# Patient Record
Sex: MALE | Race: WHITE | NOT HISPANIC OR LATINO | URBAN - METROPOLITAN AREA
[De-identification: names, ages, dates, MRNs, and addresses within clinical notes are randomized per-mention and may not be internally consistent; named-entity substitution may affect disease eponyms.]

---

## 2017-01-19 ENCOUNTER — INPATIENT (INPATIENT)
Facility: HOSPITAL | Age: 72
LOS: 0 days | Discharge: HOME | DRG: 72 | End: 2017-01-20
Attending: INTERNAL MEDICINE | Admitting: INTERNAL MEDICINE
Payer: COMMERCIAL

## 2017-01-19 DIAGNOSIS — E78.5 HYPERLIPIDEMIA, UNSPECIFIED: ICD-10-CM

## 2017-01-19 DIAGNOSIS — R25.3 FASCICULATION: ICD-10-CM

## 2017-01-19 DIAGNOSIS — Z79.82 LONG TERM (CURRENT) USE OF ASPIRIN: ICD-10-CM

## 2017-01-19 DIAGNOSIS — J44.9 CHRONIC OBSTRUCTIVE PULMONARY DISEASE, UNSPECIFIED: ICD-10-CM

## 2017-01-19 DIAGNOSIS — Z87.891 PERSONAL HISTORY OF NICOTINE DEPENDENCE: ICD-10-CM

## 2017-01-19 DIAGNOSIS — R53.1 WEAKNESS: ICD-10-CM

## 2017-01-19 DIAGNOSIS — R56.9 UNSPECIFIED CONVULSIONS: ICD-10-CM

## 2017-01-19 DIAGNOSIS — I48.2 CHRONIC ATRIAL FIBRILLATION: ICD-10-CM

## 2017-01-19 DIAGNOSIS — I10 ESSENTIAL (PRIMARY) HYPERTENSION: ICD-10-CM

## 2017-01-19 DIAGNOSIS — G93.9 DISORDER OF BRAIN, UNSPECIFIED: ICD-10-CM

## 2017-01-19 DIAGNOSIS — E11.9 TYPE 2 DIABETES MELLITUS WITHOUT COMPLICATIONS: ICD-10-CM

## 2017-01-19 PROCEDURE — 99223 1ST HOSP IP/OBS HIGH 75: CPT

## 2017-01-19 PROCEDURE — 71010: CPT | Mod: 26

## 2017-01-19 PROCEDURE — 70450 CT HEAD/BRAIN W/O DYE: CPT | Mod: 26

## 2017-01-19 PROCEDURE — 93010 ELECTROCARDIOGRAM REPORT: CPT

## 2017-01-20 PROCEDURE — 83036 HEMOGLOBIN GLYCOSYLATED A1C: CPT

## 2017-01-20 PROCEDURE — 84484 ASSAY OF TROPONIN QUANT: CPT

## 2017-01-20 PROCEDURE — 96360 HYDRATION IV INFUSION INIT: CPT

## 2017-01-20 PROCEDURE — 82607 VITAMIN B-12: CPT

## 2017-01-20 PROCEDURE — 99285 EMERGENCY DEPT VISIT HI MDM: CPT | Mod: 25

## 2017-01-20 PROCEDURE — 84436 ASSAY OF TOTAL THYROXINE: CPT

## 2017-01-20 PROCEDURE — 84443 ASSAY THYROID STIM HORMONE: CPT

## 2017-01-20 PROCEDURE — 80053 COMPREHEN METABOLIC PANEL: CPT

## 2017-01-20 PROCEDURE — 85027 COMPLETE CBC AUTOMATED: CPT

## 2017-01-20 PROCEDURE — 93005 ELECTROCARDIOGRAM TRACING: CPT

## 2017-01-20 PROCEDURE — 71045 X-RAY EXAM CHEST 1 VIEW: CPT

## 2017-01-20 PROCEDURE — 70450 CT HEAD/BRAIN W/O DYE: CPT

## 2017-01-20 PROCEDURE — 82962 GLUCOSE BLOOD TEST: CPT

## 2017-01-20 PROCEDURE — 80061 LIPID PANEL: CPT

## 2017-01-20 PROCEDURE — 95812 EEG 41-60 MINUTES: CPT

## 2017-01-20 PROCEDURE — 85610 PROTHROMBIN TIME: CPT

## 2017-01-20 PROCEDURE — 99222 1ST HOSP IP/OBS MODERATE 55: CPT

## 2017-01-20 PROCEDURE — 85730 THROMBOPLASTIN TIME PARTIAL: CPT

## 2017-01-20 PROCEDURE — 82550 ASSAY OF CK (CPK): CPT

## 2017-01-20 PROCEDURE — 82248 BILIRUBIN DIRECT: CPT

## 2017-02-08 ENCOUNTER — OUTPATIENT (OUTPATIENT)
Dept: OUTPATIENT SERVICES | Facility: HOSPITAL | Age: 72
LOS: 1 days | End: 2017-02-08
Payer: COMMERCIAL

## 2017-02-08 ENCOUNTER — APPOINTMENT (OUTPATIENT)
Dept: MRI IMAGING | Facility: CLINIC | Age: 72
End: 2017-02-08

## 2017-02-08 DIAGNOSIS — Z00.8 ENCOUNTER FOR OTHER GENERAL EXAMINATION: ICD-10-CM

## 2017-02-08 PROCEDURE — 70553 MRI BRAIN STEM W/O & W/DYE: CPT | Mod: 26

## 2017-02-08 PROCEDURE — 82565 ASSAY OF CREATININE: CPT

## 2017-02-08 PROCEDURE — A9585: CPT

## 2017-02-08 PROCEDURE — 70553 MRI BRAIN STEM W/O & W/DYE: CPT

## 2017-03-01 ENCOUNTER — APPOINTMENT (OUTPATIENT)
Dept: MRI IMAGING | Facility: CLINIC | Age: 72
End: 2017-03-01

## 2017-03-01 ENCOUNTER — OUTPATIENT (OUTPATIENT)
Dept: OUTPATIENT SERVICES | Facility: HOSPITAL | Age: 72
LOS: 1 days | End: 2017-03-01
Payer: COMMERCIAL

## 2017-03-01 DIAGNOSIS — Z00.8 ENCOUNTER FOR OTHER GENERAL EXAMINATION: ICD-10-CM

## 2017-03-31 ENCOUNTER — APPOINTMENT (OUTPATIENT)
Dept: NEUROSURGERY | Facility: CLINIC | Age: 72
End: 2017-03-31

## 2017-03-31 VITALS
BODY MASS INDEX: 27.3 KG/M2 | HEART RATE: 90 BPM | OXYGEN SATURATION: 98 % | SYSTOLIC BLOOD PRESSURE: 144 MMHG | HEIGHT: 71 IN | WEIGHT: 195 LBS | DIASTOLIC BLOOD PRESSURE: 83 MMHG

## 2017-04-13 PROCEDURE — 70544 MR ANGIOGRAPHY HEAD W/O DYE: CPT

## 2017-05-02 ENCOUNTER — APPOINTMENT (OUTPATIENT)
Dept: NEUROSURGERY | Facility: CLINIC | Age: 72
End: 2017-05-02

## 2017-05-02 VITALS
DIASTOLIC BLOOD PRESSURE: 88 MMHG | BODY MASS INDEX: 27.3 KG/M2 | HEIGHT: 71 IN | SYSTOLIC BLOOD PRESSURE: 127 MMHG | WEIGHT: 195 LBS | OXYGEN SATURATION: 97 % | HEART RATE: 125 BPM

## 2017-05-08 ENCOUNTER — INPATIENT (INPATIENT)
Facility: HOSPITAL | Age: 72
LOS: 9 days | Discharge: NURSING, REHAB OR LONG-TERM CARE | DRG: 25 | End: 2017-05-18
Attending: NEUROLOGICAL SURGERY | Admitting: NEUROLOGICAL SURGERY
Payer: COMMERCIAL

## 2017-05-08 VITALS
HEART RATE: 80 BPM | TEMPERATURE: 96 F | DIASTOLIC BLOOD PRESSURE: 65 MMHG | OXYGEN SATURATION: 94 % | SYSTOLIC BLOOD PRESSURE: 116 MMHG | RESPIRATION RATE: 15 BRPM

## 2017-05-08 DIAGNOSIS — D32.9 BENIGN NEOPLASM OF MENINGES, UNSPECIFIED: ICD-10-CM

## 2017-05-08 DIAGNOSIS — Z98.890 OTHER SPECIFIED POSTPROCEDURAL STATES: Chronic | ICD-10-CM

## 2017-05-08 DIAGNOSIS — Z98.49 CATARACT EXTRACTION STATUS, UNSPECIFIED EYE: Chronic | ICD-10-CM

## 2017-05-08 LAB
ANION GAP SERPL CALC-SCNC: 8 MMOL/L — LOW (ref 9–16)
ANION GAP SERPL CALC-SCNC: 9 MMOL/L — SIGNIFICANT CHANGE UP (ref 9–16)
APTT BLD: 27.6 SEC — SIGNIFICANT CHANGE UP (ref 27.5–37.4)
APTT BLD: 33.9 SEC — SIGNIFICANT CHANGE UP (ref 27.5–37.4)
BASOPHILS NFR BLD AUTO: 0.5 % — SIGNIFICANT CHANGE UP (ref 0–2)
BLD GP AB SCN SERPL QL: NEGATIVE — SIGNIFICANT CHANGE UP
BUN SERPL-MCNC: 19 MG/DL — SIGNIFICANT CHANGE UP (ref 7–23)
BUN SERPL-MCNC: 22 MG/DL — SIGNIFICANT CHANGE UP (ref 7–23)
CALCIUM SERPL-MCNC: 8.3 MG/DL — LOW (ref 8.5–10.5)
CALCIUM SERPL-MCNC: 9.6 MG/DL — SIGNIFICANT CHANGE UP (ref 8.5–10.5)
CHLORIDE SERPL-SCNC: 106 MMOL/L — SIGNIFICANT CHANGE UP (ref 96–108)
CHLORIDE SERPL-SCNC: 111 MMOL/L — HIGH (ref 96–108)
CHOLEST SERPL-MCNC: 120 MG/DL — SIGNIFICANT CHANGE UP
CO2 SERPL-SCNC: 21 MMOL/L — LOW (ref 22–31)
CO2 SERPL-SCNC: 25 MMOL/L — SIGNIFICANT CHANGE UP (ref 22–31)
CREAT SERPL-MCNC: 0.86 MG/DL — SIGNIFICANT CHANGE UP (ref 0.5–1.3)
CREAT SERPL-MCNC: 1.02 MG/DL — SIGNIFICANT CHANGE UP (ref 0.5–1.3)
EOSINOPHIL NFR BLD AUTO: 4.3 % — SIGNIFICANT CHANGE UP (ref 0–6)
GLUCOSE SERPL-MCNC: 127 MG/DL — HIGH (ref 70–99)
GLUCOSE SERPL-MCNC: 162 MG/DL — HIGH (ref 70–99)
HBA1C BLD-MCNC: 7.1 % — HIGH (ref 4.8–5.6)
HCT VFR BLD CALC: 32.8 % — LOW (ref 39–50)
HCT VFR BLD CALC: 40 % — SIGNIFICANT CHANGE UP (ref 39–50)
HDLC SERPL-MCNC: 28 MG/DL — LOW
HGB BLD-MCNC: 10.4 G/DL — LOW (ref 13–17)
HGB BLD-MCNC: 12.8 G/DL — LOW (ref 13–17)
INR BLD: 1.01 — SIGNIFICANT CHANGE UP (ref 0.88–1.16)
INR BLD: 1.08 — SIGNIFICANT CHANGE UP (ref 0.88–1.16)
LIPID PNL WITH DIRECT LDL SERPL: 66 MG/DL — SIGNIFICANT CHANGE UP
LYMPHOCYTES # BLD AUTO: 12.2 % — LOW (ref 13–44)
MAGNESIUM SERPL-MCNC: 1.7 MG/DL — SIGNIFICANT CHANGE UP (ref 1.6–2.4)
MCHC RBC-ENTMCNC: 24.8 PG — LOW (ref 27–34)
MCHC RBC-ENTMCNC: 25.2 PG — LOW (ref 27–34)
MCHC RBC-ENTMCNC: 31.7 G/DL — LOW (ref 32–36)
MCHC RBC-ENTMCNC: 32 G/DL — SIGNIFICANT CHANGE UP (ref 32–36)
MCV RBC AUTO: 78.3 FL — LOW (ref 80–100)
MCV RBC AUTO: 78.7 FL — LOW (ref 80–100)
MONOCYTES NFR BLD AUTO: 7.3 % — SIGNIFICANT CHANGE UP (ref 2–14)
NEUTROPHILS NFR BLD AUTO: 75.7 % — SIGNIFICANT CHANGE UP (ref 43–77)
PHOSPHATE SERPL-MCNC: 2.9 MG/DL — SIGNIFICANT CHANGE UP (ref 2.5–4.5)
PLATELET # BLD AUTO: 294 K/UL — SIGNIFICANT CHANGE UP (ref 150–400)
PLATELET # BLD AUTO: 372 K/UL — SIGNIFICANT CHANGE UP (ref 150–400)
POTASSIUM SERPL-MCNC: 3.7 MMOL/L — SIGNIFICANT CHANGE UP (ref 3.5–5.3)
POTASSIUM SERPL-MCNC: 3.9 MMOL/L — SIGNIFICANT CHANGE UP (ref 3.5–5.3)
POTASSIUM SERPL-SCNC: 3.7 MMOL/L — SIGNIFICANT CHANGE UP (ref 3.5–5.3)
POTASSIUM SERPL-SCNC: 3.9 MMOL/L — SIGNIFICANT CHANGE UP (ref 3.5–5.3)
PROTHROM AB SERPL-ACNC: 11.2 SEC — SIGNIFICANT CHANGE UP (ref 9.8–12.7)
PROTHROM AB SERPL-ACNC: 12 SEC — SIGNIFICANT CHANGE UP (ref 9.8–12.7)
RBC # BLD: 4.19 M/UL — LOW (ref 4.2–5.8)
RBC # BLD: 5.08 M/UL — SIGNIFICANT CHANGE UP (ref 4.2–5.8)
RBC # FLD: 16.2 % — SIGNIFICANT CHANGE UP (ref 10.3–16.9)
RBC # FLD: 16.3 % — SIGNIFICANT CHANGE UP (ref 10.3–16.9)
RH IG SCN BLD-IMP: POSITIVE — SIGNIFICANT CHANGE UP
SODIUM SERPL-SCNC: 139 MMOL/L — SIGNIFICANT CHANGE UP (ref 135–145)
SODIUM SERPL-SCNC: 141 MMOL/L — SIGNIFICANT CHANGE UP (ref 135–145)
TOTAL CHOLESTEROL/HDL RATIO MEASUREMENT: 4.3 RATIO — SIGNIFICANT CHANGE UP
TRIGL SERPL-MCNC: 131 MG/DL — SIGNIFICANT CHANGE UP
WBC # BLD: 11.1 K/UL — HIGH (ref 3.8–10.5)
WBC # BLD: 9.7 K/UL — SIGNIFICANT CHANGE UP (ref 3.8–10.5)
WBC # FLD AUTO: 11.1 K/UL — HIGH (ref 3.8–10.5)
WBC # FLD AUTO: 9.7 K/UL — SIGNIFICANT CHANGE UP (ref 3.8–10.5)

## 2017-05-08 PROCEDURE — 93970 EXTREMITY STUDY: CPT | Mod: 26

## 2017-05-08 PROCEDURE — 61624 TCAT PERM OCCLS/EMBOLJ CNS: CPT

## 2017-05-08 PROCEDURE — 70552 MRI BRAIN STEM W/DYE: CPT | Mod: 26

## 2017-05-08 PROCEDURE — 99291 CRITICAL CARE FIRST HOUR: CPT | Mod: 24

## 2017-05-08 PROCEDURE — 75894 X-RAYS TRANSCATH THERAPY: CPT | Mod: 26

## 2017-05-08 PROCEDURE — 36228 PLACE CATH INTRACRANIAL ART: CPT | Mod: LT

## 2017-05-08 PROCEDURE — 36227 PLACE CATH XTRNL CAROTID: CPT | Mod: 50

## 2017-05-08 PROCEDURE — 36224 PLACE CATH CAROTD ART: CPT | Mod: 50

## 2017-05-08 PROCEDURE — 75898 FOLLOW-UP ANGIOGRAPHY: CPT | Mod: 26

## 2017-05-08 RX ORDER — PANTOPRAZOLE SODIUM 20 MG/1
40 TABLET, DELAYED RELEASE ORAL
Qty: 0 | Refills: 0 | Status: DISCONTINUED | OUTPATIENT
Start: 2017-05-08 | End: 2017-05-09

## 2017-05-08 RX ORDER — CHLORHEXIDINE GLUCONATE 213 G/1000ML
1 SOLUTION TOPICAL ONCE
Qty: 0 | Refills: 0 | Status: DISCONTINUED | OUTPATIENT
Start: 2017-05-08 | End: 2017-05-09

## 2017-05-08 RX ORDER — SENNA PLUS 8.6 MG/1
2 TABLET ORAL AT BEDTIME
Qty: 0 | Refills: 0 | Status: DISCONTINUED | OUTPATIENT
Start: 2017-05-08 | End: 2017-05-09

## 2017-05-08 RX ORDER — DOCUSATE SODIUM 100 MG
100 CAPSULE ORAL THREE TIMES A DAY
Qty: 0 | Refills: 0 | Status: DISCONTINUED | OUTPATIENT
Start: 2017-05-08 | End: 2017-05-09

## 2017-05-08 RX ORDER — SODIUM CHLORIDE 9 MG/ML
1000 INJECTION, SOLUTION INTRAVENOUS
Qty: 0 | Refills: 0 | Status: DISCONTINUED | OUTPATIENT
Start: 2017-05-08 | End: 2017-05-09

## 2017-05-08 RX ORDER — DEXTROSE 50 % IN WATER 50 %
1 SYRINGE (ML) INTRAVENOUS ONCE
Qty: 0 | Refills: 0 | Status: DISCONTINUED | OUTPATIENT
Start: 2017-05-08 | End: 2017-05-09

## 2017-05-08 RX ORDER — LEVETIRACETAM 250 MG/1
500 TABLET, FILM COATED ORAL
Qty: 0 | Refills: 0 | Status: DISCONTINUED | OUTPATIENT
Start: 2017-05-08 | End: 2017-05-09

## 2017-05-08 RX ORDER — GLUCAGON INJECTION, SOLUTION 0.5 MG/.1ML
1 INJECTION, SOLUTION SUBCUTANEOUS ONCE
Qty: 0 | Refills: 0 | Status: DISCONTINUED | OUTPATIENT
Start: 2017-05-08 | End: 2017-05-09

## 2017-05-08 RX ORDER — SODIUM CHLORIDE 9 MG/ML
1000 INJECTION INTRAMUSCULAR; INTRAVENOUS; SUBCUTANEOUS
Qty: 0 | Refills: 0 | Status: DISCONTINUED | OUTPATIENT
Start: 2017-05-08 | End: 2017-05-09

## 2017-05-08 RX ORDER — DEXAMETHASONE 0.5 MG/5ML
6 ELIXIR ORAL EVERY 6 HOURS
Qty: 0 | Refills: 0 | Status: COMPLETED | OUTPATIENT
Start: 2017-05-08 | End: 2017-05-08

## 2017-05-08 RX ORDER — INSULIN LISPRO 100/ML
VIAL (ML) SUBCUTANEOUS
Qty: 0 | Refills: 0 | Status: DISCONTINUED | OUTPATIENT
Start: 2017-05-08 | End: 2017-05-09

## 2017-05-08 RX ORDER — MAGNESIUM SULFATE 500 MG/ML
2 VIAL (ML) INJECTION ONCE
Qty: 0 | Refills: 0 | Status: COMPLETED | OUTPATIENT
Start: 2017-05-08 | End: 2017-05-08

## 2017-05-08 RX ORDER — ACETAMINOPHEN 500 MG
650 TABLET ORAL EVERY 6 HOURS
Qty: 0 | Refills: 0 | Status: DISCONTINUED | OUTPATIENT
Start: 2017-05-08 | End: 2017-05-09

## 2017-05-08 RX ORDER — DEXTROSE 50 % IN WATER 50 %
12.5 SYRINGE (ML) INTRAVENOUS ONCE
Qty: 0 | Refills: 0 | Status: DISCONTINUED | OUTPATIENT
Start: 2017-05-08 | End: 2017-05-09

## 2017-05-08 RX ORDER — LAMOTRIGINE 25 MG/1
150 TABLET, ORALLY DISINTEGRATING ORAL DAILY
Qty: 0 | Refills: 0 | Status: DISCONTINUED | OUTPATIENT
Start: 2017-05-08 | End: 2017-05-09

## 2017-05-08 RX ORDER — METOPROLOL TARTRATE 50 MG
100 TABLET ORAL DAILY
Qty: 0 | Refills: 0 | Status: DISCONTINUED | OUTPATIENT
Start: 2017-05-08 | End: 2017-05-09

## 2017-05-08 RX ORDER — FOLIC ACID 0.8 MG
1 TABLET ORAL DAILY
Qty: 0 | Refills: 0 | Status: DISCONTINUED | OUTPATIENT
Start: 2017-05-08 | End: 2017-05-09

## 2017-05-08 RX ADMIN — Medication 6: at 22:07

## 2017-05-08 RX ADMIN — LEVETIRACETAM 500 MILLIGRAM(S): 250 TABLET, FILM COATED ORAL at 19:11

## 2017-05-08 RX ADMIN — Medication 50 GRAM(S): at 19:12

## 2017-05-08 RX ADMIN — Medication 100 MILLIGRAM(S): at 22:07

## 2017-05-08 NOTE — H&P ADULT - HISTORY OF PRESENT ILLNESS
72 y. o Right  handed male h/o rate controlled Afib not on AC, h/o GIB, CAD, DM, HLD found to have a left frontal mass likely a meningioma.  Initially presented with seizure episode and currently has right sided trace weakness RUE>RLE right arm spasticity.  Patient presents today for pre op cerebral angiogram and possible embolization.

## 2017-05-08 NOTE — PROGRESS NOTE ADULT - SUBJECTIVE AND OBJECTIVE BOX
=================================  NEUROCRITICAL CARE ATTENDING NOTE  =================================    NANY HOUGH   [id number]  Summary:  72M with h/o Afib not on AC, h/o GI bleed, CAD, Diabetes Mellitus dyslipidemia, presented with seizures, on work-up found a meningioma.  Admitted on  for pre-op angio / embolization.  Overnight Events: Admitted to NSICU today    PAST MEDICAL & SURGICAL HISTORY: Pulmonary hypertension DM (diabetes mellitus) Seizure Meningioma: left frontal Other hyperlipidemia Essential hypertension H/O cataract removal with insertion of prosthetic lens: B/L H/O right inguinal hernia repair  NKDA  Home meds: exforge HCT 10/320/25 (amlodipine valsartan HCTZ) fenofibrate 135 dailiy zetia 10mg daily folic acid daily metformin 1g BID omeprazole 40mg daily onglyza (saxagliptin) 5mg daily thiamine 50mg daily levetiracetam 500mg BID lamictal 150mg daily metoprolol 100mg daily     PHYSICAL EXAMINATION  T(C): , Max: 35.7 (-08 @ 11:45) HR: 80 (78 - 84) BP: 106/67 (105/61 - 116/65) RR: 14 (12 - 15) SpO2: 94% (94% - 96%)  NEUROLOGIC EXAMINATION:  Patient is awake, alert, fully oriented, pupils 2-3mm equal and briskly reactive to light, EOMs intact, R facial droop, R UE 4/5, and RLE L UE/LE 5/5   GENERAL:  not intubated, not in cardiorespiratory distress  EENT: anicteric  CARDIOVASC:  (+) S1 S2, normal rate and irregularly irregular rhythm  PULMONARY:  clear to auscultation bilaterally  ABDOMEN:  soft, nontender, with normoactive bowel sounds  EXTREMITIES:  no edema, no groin hematoma on R, distal pulses good  SKIN:  no rash    LABS:  CAPILLARY BLOOD GLUCOSE 157 (08 May 2017 12:30)                        10.4   11.1  )-----------( 294      ( 08 May 2017 12:21 )             32.8     141  |  111<H>  |  19  ----------------------------<  162<H>  3.9   |  21<L>  |  0.86    Ca    8.3<L>      08 May 2017 12:21  Phos  2.9     05-08  Mg     1.7     05-08    Bacteriology:  CSF studies:  EEG:  Neuroimagin/08 L frontal convexity menigioma abutting SSS, involving sinus wall, intraosseous extension, mass effect, small edema.  Other imaging:    MEDICATIONS: peridex topical docusate mod ISS levetiracetam 500 BID lamotrigine 150mg daily metoprolol ER 100mg daily pantoprazole 40 folic acid 1mg daily senna    IV FLUIDS: NS  DRIPS:  DIET:  Lines / Drains:    CODE STATUS:  full code                       GOALS OF CARE:  aggressive                      DISPOSITION:  ICU

## 2017-05-08 NOTE — H&P ADULT - PMH
DM (diabetes mellitus)    Essential hypertension    Meningioma  left frontal  Other hyperlipidemia    Seizure DM (diabetes mellitus)    Essential hypertension    Meningioma  left frontal  Other hyperlipidemia    Pulmonary hypertension    Seizure

## 2017-05-08 NOTE — H&P ADULT - PROBLEM SELECTOR PLAN 1
Cerebral angiogram and possible endovascular embolization in preparation for next day surgical removal.  Neuro checks along with vitals, groin and ipsilateral pulses  NPO after midnight for OR in AM Cerebral angiogram and possible endovascular embolization in preparation for next day surgical removal.  Neuro checks along with vitals, groin and ipsilateral pulses  NPO after midnight for OR in AM  Decadron 6mg every 6 hrs

## 2017-05-08 NOTE — PROGRESS NOTE ADULT - ATTENDING COMMENTS
PLAN:   NEURO: neurochecks q1h, pain meds with tylenol  meningioma s/p angio/embo:  for resection tomorrow  seizure treatment: continue levetiracetam and lamotrigine  REHAB:  physical therapy evaluation and management    EARLY MOB:   flat x4 hours then HOB up thereafter    PULM:  Room air, incentive spirometry  CARDIO:  SBP goal 100-150mm Hg, continue metoprolol (off exforge); h/o AFib not on anticoagulation due to h/o GI bleed, will discuss re: need for anticoagulation; rate controlled with metoprolol  ENDO:  Blood sugar goals 140-180 mg/dL, continue insulin sliding scale, check A1C, lipid profile, restart fenofibrate / zetia  GI:  PPI - cont (home meds)  DIET: restart diet once HOB up, NPO after midnight  RENAL:  IVF once NPO  HEM/ONC: check post-op Hb  VTE Prophylaxis:  SCDs only, patient for OR tomorrow - no DVT chemoprophylaxis tonight; baseline Dopplers for DVT suspected on admission  ID: afebrile, no leukocytosis  Social: will update family    Patient at high risk for neurological deterioration or death due to:   seizure, delirium, intracranial bleeding.   Critical care time, excluding procedures: 60 minutes.

## 2017-05-08 NOTE — PROGRESS NOTE ADULT - SUBJECTIVE AND OBJECTIVE BOX
Subjective: Seen/evaluated at bedside.  Doing well, no complaints at this time.  No acute events    T(C): 35.7, Max: 35.7 (05-08 @ 11:45)  HR: 80 (78 - 84)  BP: 106/67 (105/61 - 116/65)  RR: 14 (12 - 15)  SpO2: 94% (94% - 96%)  Wt(kg): --    Exam: A/Ox3, FC, speech clear  Right facial droop, cranial nerves otherwise intact  Right UE baseline weakness, grossly 4/5.  Otherwise 5/5 strength throughout.  Sensation grossly intact    CBC Full  -  ( 08 May 2017 12:21 )  WBC Count : 11.1 K/uL  Hemoglobin : 10.4 g/dL  Hematocrit : 32.8 %  Platelet Count - Automated : 294 K/uL  Mean Cell Volume : 78.3 fL  Mean Cell Hemoglobin : 24.8 pg  Mean Cell Hemoglobin Concentration : 31.7 g/dL  Auto Neutrophil # : x  Auto Lymphocyte # : x  Auto Monocyte # : x  Auto Eosinophil # : x  Auto Basophil # : x  Auto Neutrophil % : x  Auto Lymphocyte % : x  Auto Monocyte % : x  Auto Eosinophil % : x  Auto Basophil % : x    05-08    141  |  111<H>  |  19  ----------------------------<  162<H>  3.9   |  21<L>  |  0.86    Ca    8.3<L>      08 May 2017 12:21  Phos  2.9     05-08  Mg     1.7     05-08      PT/INR - ( 08 May 2017 12:21 )   PT: 12.0 sec;   INR: 1.08          PTT - ( 08 May 2017 12:21 )  PTT:27.6 sec      Wound: Groin C/D/I, no hematoma    Assessment/Plan: left frontal brain tumor s/p embolization, for resection in AM  -Neuro checks  -BP control  -AEDs, continue home meds  -Preop for OR in AM, NPO  -MRI tonight  -DVT/GI PPX  -D/W Dr. Estevez/Gen

## 2017-05-08 NOTE — H&P ADULT - NSHPPHYSICALEXAM_GEN_ALL_CORE
WD, WN pleasant male in NAD  Neuro;  A&O X3, speech clear and fluent, EOMI, PERRLA (B/L prosthetic lenses)  Motor: Mild right arm weakness, right hand spasticity. remainder motor and sensory grossly normal  Heart: S1-S2,   Lungs: CTA  ABD: Soft NT, +BS  Ext: No C/C/E, no calf tenderness  B/L DP Pulses +2

## 2017-05-08 NOTE — PROGRESS NOTE ADULT - ASSESSMENT
72M with meninigioma L frontal lobe, brain compression, cerebral edema, s/p angio / embo (05/08/17, Dr. Blevins); Diabetes Mellitus, Hypertension, seizure disorder, dyslipidemia, h/o pulmonary hypertension

## 2017-05-09 ENCOUNTER — APPOINTMENT (OUTPATIENT)
Dept: NEUROSURGERY | Facility: HOSPITAL | Age: 72
End: 2017-05-09

## 2017-05-09 ENCOUNTER — RESULT REVIEW (OUTPATIENT)
Age: 72
End: 2017-05-09

## 2017-05-09 LAB
ANION GAP SERPL CALC-SCNC: 9 MMOL/L — SIGNIFICANT CHANGE UP (ref 9–16)
ANION GAP SERPL CALC-SCNC: 9 MMOL/L — SIGNIFICANT CHANGE UP (ref 9–16)
APTT BLD: 27.7 SEC — SIGNIFICANT CHANGE UP (ref 27.5–37.4)
APTT BLD: 27.7 SEC — SIGNIFICANT CHANGE UP (ref 27.5–37.4)
BASE EXCESS BLDA CALC-SCNC: -2.8 MMOL/L — LOW (ref -2–3)
BASE EXCESS BLDA CALC-SCNC: -5.9 MMOL/L — LOW (ref -2–3)
BASE EXCESS BLDA CALC-SCNC: -7 MMOL/L — LOW (ref -2–3)
BUN SERPL-MCNC: 15 MG/DL — SIGNIFICANT CHANGE UP (ref 7–23)
BUN SERPL-MCNC: 15 MG/DL — SIGNIFICANT CHANGE UP (ref 7–23)
CA-I BLDA-SCNC: 0.99 MMOL/L — LOW (ref 1.1–1.3)
CA-I BLDA-SCNC: 1 MMOL/L — LOW (ref 1.1–1.3)
CA-I BLDA-SCNC: 1.13 MMOL/L — SIGNIFICANT CHANGE UP (ref 1.1–1.3)
CALCIUM SERPL-MCNC: 7.8 MG/DL — LOW (ref 8.5–10.5)
CALCIUM SERPL-MCNC: 8.2 MG/DL — LOW (ref 8.5–10.5)
CHLORIDE SERPL-SCNC: 110 MMOL/L — HIGH (ref 96–108)
CHLORIDE SERPL-SCNC: 112 MMOL/L — HIGH (ref 96–108)
CO2 SERPL-SCNC: 18 MMOL/L — LOW (ref 22–31)
CO2 SERPL-SCNC: 20 MMOL/L — LOW (ref 22–31)
COHGB MFR BLDA: 0.4 % — SIGNIFICANT CHANGE UP
COHGB MFR BLDA: 0.5 % — SIGNIFICANT CHANGE UP
COHGB MFR BLDA: 1.1 % — SIGNIFICANT CHANGE UP
CREAT SERPL-MCNC: 0.8 MG/DL — SIGNIFICANT CHANGE UP (ref 0.5–1.3)
CREAT SERPL-MCNC: 0.92 MG/DL — SIGNIFICANT CHANGE UP (ref 0.5–1.3)
GAS PNL BLDA: SIGNIFICANT CHANGE UP
GLUCOSE SERPL-MCNC: 109 MG/DL — HIGH (ref 70–99)
GLUCOSE SERPL-MCNC: 179 MG/DL — HIGH (ref 70–99)
HBA1C BLD-MCNC: 6.8 % — HIGH (ref 4.8–5.6)
HCO3 BLDA-SCNC: 17 MMOL/L — LOW (ref 21–28)
HCO3 BLDA-SCNC: 17 MMOL/L — LOW (ref 21–28)
HCO3 BLDA-SCNC: 20 MMOL/L — LOW (ref 21–28)
HCT VFR BLD CALC: 29.8 % — LOW (ref 39–50)
HCT VFR BLD CALC: 30.1 % — LOW (ref 39–50)
HCT VFR BLD CALC: 32 % — LOW (ref 39–50)
HGB BLD-MCNC: 10.1 G/DL — LOW (ref 13–17)
HGB BLD-MCNC: 9.6 G/DL — LOW (ref 13–17)
HGB BLD-MCNC: 9.7 G/DL — LOW (ref 13–17)
HGB BLDA-MCNC: 10 G/DL — LOW (ref 13–17)
HGB BLDA-MCNC: 11 G/DL — LOW (ref 13–17)
HGB BLDA-MCNC: 9.3 G/DL — LOW (ref 13–17)
INR BLD: 1.07 — SIGNIFICANT CHANGE UP (ref 0.88–1.16)
INR BLD: 1.15 — SIGNIFICANT CHANGE UP (ref 0.88–1.16)
MAGNESIUM SERPL-MCNC: 2.1 MG/DL — SIGNIFICANT CHANGE UP (ref 1.6–2.4)
MAGNESIUM SERPL-MCNC: 2.2 MG/DL — SIGNIFICANT CHANGE UP (ref 1.6–2.4)
MCHC RBC-ENTMCNC: 24.6 PG — LOW (ref 27–34)
MCHC RBC-ENTMCNC: 25.1 PG — LOW (ref 27–34)
MCHC RBC-ENTMCNC: 25.1 PG — LOW (ref 27–34)
MCHC RBC-ENTMCNC: 31.6 G/DL — LOW (ref 32–36)
MCHC RBC-ENTMCNC: 32.2 G/DL — SIGNIFICANT CHANGE UP (ref 32–36)
MCHC RBC-ENTMCNC: 32.2 G/DL — SIGNIFICANT CHANGE UP (ref 32–36)
MCV RBC AUTO: 77.8 FL — LOW (ref 80–100)
MCV RBC AUTO: 78 FL — LOW (ref 80–100)
MCV RBC AUTO: 78 FL — LOW (ref 80–100)
METHGB MFR BLDA: 0.1 % — SIGNIFICANT CHANGE UP
METHGB MFR BLDA: 0.2 % — SIGNIFICANT CHANGE UP
METHGB MFR BLDA: 0.4 % — SIGNIFICANT CHANGE UP
O2 CT VFR BLDA CALC: 13.6 ML/DL — SIGNIFICANT CHANGE UP (ref 15–23)
O2 CT VFR BLDA CALC: 14.6 ML/DL — SIGNIFICANT CHANGE UP (ref 15–23)
O2 CT VFR BLDA CALC: 15.5 ML/DL — SIGNIFICANT CHANGE UP (ref 15–23)
OXYHGB MFR BLDA: 98 % — SIGNIFICANT CHANGE UP (ref 94–100)
OXYHGB MFR BLDA: 98 % — SIGNIFICANT CHANGE UP (ref 94–100)
OXYHGB MFR BLDA: 99 % — SIGNIFICANT CHANGE UP (ref 94–100)
PCO2 BLDA: 25 MMHG — LOW (ref 35–48)
PCO2 BLDA: 31 MMHG — LOW (ref 35–48)
PCO2 BLDA: 31 MMHG — LOW (ref 35–48)
PH BLDA: 7.37 — SIGNIFICANT CHANGE UP (ref 7.35–7.45)
PH BLDA: 7.44 — SIGNIFICANT CHANGE UP (ref 7.35–7.45)
PH BLDA: 7.45 — SIGNIFICANT CHANGE UP (ref 7.35–7.45)
PHOSPHATE SERPL-MCNC: 2 MG/DL — LOW (ref 2.5–4.5)
PHOSPHATE SERPL-MCNC: 2.8 MG/DL — SIGNIFICANT CHANGE UP (ref 2.5–4.5)
PLATELET # BLD AUTO: 308 K/UL — SIGNIFICANT CHANGE UP (ref 150–400)
PLATELET # BLD AUTO: 317 K/UL — SIGNIFICANT CHANGE UP (ref 150–400)
PLATELET # BLD AUTO: 321 K/UL — SIGNIFICANT CHANGE UP (ref 150–400)
PO2 BLDA: 179 MMHG — HIGH (ref 83–108)
PO2 BLDA: 262 MMHG — HIGH (ref 83–108)
PO2 BLDA: 289 MMHG — HIGH (ref 83–108)
POTASSIUM BLDA-SCNC: 3.1 MMOL/L — LOW (ref 3.5–4.9)
POTASSIUM BLDA-SCNC: 3.6 MMOL/L — SIGNIFICANT CHANGE UP (ref 3.5–4.9)
POTASSIUM BLDA-SCNC: 4.1 MMOL/L — SIGNIFICANT CHANGE UP (ref 3.5–4.9)
POTASSIUM SERPL-MCNC: 3.6 MMOL/L — SIGNIFICANT CHANGE UP (ref 3.5–5.3)
POTASSIUM SERPL-MCNC: 4.3 MMOL/L — SIGNIFICANT CHANGE UP (ref 3.5–5.3)
POTASSIUM SERPL-SCNC: 3.6 MMOL/L — SIGNIFICANT CHANGE UP (ref 3.5–5.3)
POTASSIUM SERPL-SCNC: 4.3 MMOL/L — SIGNIFICANT CHANGE UP (ref 3.5–5.3)
PROTHROM AB SERPL-ACNC: 11.9 SEC — SIGNIFICANT CHANGE UP (ref 9.8–12.7)
PROTHROM AB SERPL-ACNC: 12.8 SEC — HIGH (ref 9.8–12.7)
RBC # BLD: 3.82 M/UL — LOW (ref 4.2–5.8)
RBC # BLD: 3.87 M/UL — LOW (ref 4.2–5.8)
RBC # BLD: 4.1 M/UL — LOW (ref 4.2–5.8)
RBC # FLD: 16.2 % — SIGNIFICANT CHANGE UP (ref 10.3–16.9)
RBC # FLD: 16.2 % — SIGNIFICANT CHANGE UP (ref 10.3–16.9)
RBC # FLD: 16.3 % — SIGNIFICANT CHANGE UP (ref 10.3–16.9)
SAO2 % BLDA: 99 % — SIGNIFICANT CHANGE UP (ref 95–100)
SODIUM BLDA-SCNC: 136 MMOL/L — LOW (ref 138–146)
SODIUM BLDA-SCNC: 136 MMOL/L — LOW (ref 138–146)
SODIUM BLDA-SCNC: 137 MMOL/L — LOW (ref 138–146)
SODIUM SERPL-SCNC: 139 MMOL/L — SIGNIFICANT CHANGE UP (ref 135–145)
SODIUM SERPL-SCNC: 139 MMOL/L — SIGNIFICANT CHANGE UP (ref 135–145)
WBC # BLD: 10 K/UL — SIGNIFICANT CHANGE UP (ref 3.8–10.5)
WBC # BLD: 13.4 K/UL — HIGH (ref 3.8–10.5)
WBC # BLD: 14.5 K/UL — HIGH (ref 3.8–10.5)
WBC # FLD AUTO: 10 K/UL — SIGNIFICANT CHANGE UP (ref 3.8–10.5)
WBC # FLD AUTO: 13.4 K/UL — HIGH (ref 3.8–10.5)
WBC # FLD AUTO: 14.5 K/UL — HIGH (ref 3.8–10.5)

## 2017-05-09 PROCEDURE — 15750 NEUROVASCULAR PEDICLE FLAP: CPT

## 2017-05-09 PROCEDURE — 99291 CRITICAL CARE FIRST HOUR: CPT | Mod: 24

## 2017-05-09 PROCEDURE — 61781 SCAN PROC CRANIAL INTRA: CPT

## 2017-05-09 PROCEDURE — 61512 CRNEC TREPH EXC MNGIOMA STTL: CPT

## 2017-05-09 RX ORDER — DEXAMETHASONE 0.5 MG/5ML
4 ELIXIR ORAL EVERY 6 HOURS
Qty: 0 | Refills: 0 | Status: DISCONTINUED | OUTPATIENT
Start: 2017-05-09 | End: 2017-05-10

## 2017-05-09 RX ORDER — LAMOTRIGINE 25 MG/1
150 TABLET, ORALLY DISINTEGRATING ORAL DAILY
Qty: 0 | Refills: 0 | Status: DISCONTINUED | OUTPATIENT
Start: 2017-05-09 | End: 2017-05-18

## 2017-05-09 RX ORDER — SENNA PLUS 8.6 MG/1
2 TABLET ORAL AT BEDTIME
Qty: 0 | Refills: 0 | Status: DISCONTINUED | OUTPATIENT
Start: 2017-05-09 | End: 2017-05-18

## 2017-05-09 RX ORDER — SODIUM CHLORIDE 9 MG/ML
1000 INJECTION INTRAMUSCULAR; INTRAVENOUS; SUBCUTANEOUS
Qty: 0 | Refills: 0 | Status: DISCONTINUED | OUTPATIENT
Start: 2017-05-09 | End: 2017-05-10

## 2017-05-09 RX ORDER — DOCUSATE SODIUM 100 MG
100 CAPSULE ORAL THREE TIMES A DAY
Qty: 0 | Refills: 0 | Status: DISCONTINUED | OUTPATIENT
Start: 2017-05-09 | End: 2017-05-18

## 2017-05-09 RX ORDER — POTASSIUM CHLORIDE 20 MEQ
40 PACKET (EA) ORAL ONCE
Qty: 0 | Refills: 0 | Status: DISCONTINUED | OUTPATIENT
Start: 2017-05-09 | End: 2017-05-09

## 2017-05-09 RX ORDER — CEFAZOLIN SODIUM 1 G
1000 VIAL (EA) INJECTION EVERY 8 HOURS
Qty: 0 | Refills: 0 | Status: COMPLETED | OUTPATIENT
Start: 2017-05-09 | End: 2017-05-09

## 2017-05-09 RX ORDER — ONDANSETRON 8 MG/1
4 TABLET, FILM COATED ORAL EVERY 6 HOURS
Qty: 0 | Refills: 0 | Status: DISCONTINUED | OUTPATIENT
Start: 2017-05-09 | End: 2017-05-18

## 2017-05-09 RX ORDER — VALSARTAN 80 MG/1
160 TABLET ORAL DAILY
Qty: 0 | Refills: 0 | Status: DISCONTINUED | OUTPATIENT
Start: 2017-05-09 | End: 2017-05-18

## 2017-05-09 RX ORDER — PANTOPRAZOLE SODIUM 20 MG/1
40 TABLET, DELAYED RELEASE ORAL
Qty: 0 | Refills: 0 | Status: DISCONTINUED | OUTPATIENT
Start: 2017-05-09 | End: 2017-05-18

## 2017-05-09 RX ORDER — DEXTROSE 50 % IN WATER 50 %
1 SYRINGE (ML) INTRAVENOUS ONCE
Qty: 0 | Refills: 0 | Status: DISCONTINUED | OUTPATIENT
Start: 2017-05-09 | End: 2017-05-18

## 2017-05-09 RX ORDER — LABETALOL HCL 100 MG
10 TABLET ORAL EVERY 4 HOURS
Qty: 0 | Refills: 0 | Status: DISCONTINUED | OUTPATIENT
Start: 2017-05-09 | End: 2017-05-17

## 2017-05-09 RX ORDER — FOLIC ACID 0.8 MG
1 TABLET ORAL DAILY
Qty: 0 | Refills: 0 | Status: DISCONTINUED | OUTPATIENT
Start: 2017-05-09 | End: 2017-05-11

## 2017-05-09 RX ORDER — METOPROLOL TARTRATE 50 MG
100 TABLET ORAL DAILY
Qty: 0 | Refills: 0 | Status: DISCONTINUED | OUTPATIENT
Start: 2017-05-09 | End: 2017-05-18

## 2017-05-09 RX ORDER — INSULIN LISPRO 100/ML
VIAL (ML) SUBCUTANEOUS
Qty: 0 | Refills: 0 | Status: DISCONTINUED | OUTPATIENT
Start: 2017-05-09 | End: 2017-05-18

## 2017-05-09 RX ORDER — ACETAMINOPHEN 500 MG
650 TABLET ORAL EVERY 6 HOURS
Qty: 0 | Refills: 0 | Status: DISCONTINUED | OUTPATIENT
Start: 2017-05-09 | End: 2017-05-09

## 2017-05-09 RX ORDER — ACETAMINOPHEN 500 MG
325 TABLET ORAL EVERY 4 HOURS
Qty: 0 | Refills: 0 | Status: DISCONTINUED | OUTPATIENT
Start: 2017-05-09 | End: 2017-05-18

## 2017-05-09 RX ORDER — GLUCAGON INJECTION, SOLUTION 0.5 MG/.1ML
1 INJECTION, SOLUTION SUBCUTANEOUS ONCE
Qty: 0 | Refills: 0 | Status: DISCONTINUED | OUTPATIENT
Start: 2017-05-09 | End: 2017-05-18

## 2017-05-09 RX ORDER — DEXTROSE 50 % IN WATER 50 %
12.5 SYRINGE (ML) INTRAVENOUS ONCE
Qty: 0 | Refills: 0 | Status: DISCONTINUED | OUTPATIENT
Start: 2017-05-09 | End: 2017-05-18

## 2017-05-09 RX ORDER — ALBUMIN HUMAN 25 %
50 VIAL (ML) INTRAVENOUS
Qty: 0 | Refills: 0 | Status: COMPLETED | OUTPATIENT
Start: 2017-05-09 | End: 2017-05-09

## 2017-05-09 RX ORDER — ACETAMINOPHEN 500 MG
650 TABLET ORAL EVERY 6 HOURS
Qty: 0 | Refills: 0 | Status: DISCONTINUED | OUTPATIENT
Start: 2017-05-09 | End: 2017-05-18

## 2017-05-09 RX ORDER — POTASSIUM CHLORIDE 20 MEQ
40 PACKET (EA) ORAL ONCE
Qty: 0 | Refills: 0 | Status: COMPLETED | OUTPATIENT
Start: 2017-05-09 | End: 2017-05-09

## 2017-05-09 RX ORDER — LEVETIRACETAM 250 MG/1
500 TABLET, FILM COATED ORAL
Qty: 0 | Refills: 0 | Status: DISCONTINUED | OUTPATIENT
Start: 2017-05-09 | End: 2017-05-18

## 2017-05-09 RX ORDER — FENTANYL CITRATE 50 UG/ML
25 INJECTION INTRAVENOUS ONCE
Qty: 0 | Refills: 0 | Status: DISCONTINUED | OUTPATIENT
Start: 2017-05-09 | End: 2017-05-09

## 2017-05-09 RX ORDER — SODIUM CHLORIDE 9 MG/ML
1000 INJECTION, SOLUTION INTRAVENOUS
Qty: 0 | Refills: 0 | Status: DISCONTINUED | OUTPATIENT
Start: 2017-05-09 | End: 2017-05-18

## 2017-05-09 RX ADMIN — ONDANSETRON 4 MILLIGRAM(S): 8 TABLET, FILM COATED ORAL at 23:23

## 2017-05-09 RX ADMIN — Medication 100 MILLIGRAM(S): at 14:59

## 2017-05-09 RX ADMIN — Medication 2: at 23:23

## 2017-05-09 RX ADMIN — Medication 10 MILLIGRAM(S): at 22:54

## 2017-05-09 RX ADMIN — Medication 100 MILLIGRAM(S): at 21:10

## 2017-05-09 RX ADMIN — PANTOPRAZOLE SODIUM 40 MILLIGRAM(S): 20 TABLET, DELAYED RELEASE ORAL at 18:33

## 2017-05-09 RX ADMIN — Medication 650 MILLIGRAM(S): at 17:01

## 2017-05-09 RX ADMIN — FENTANYL CITRATE 25 MICROGRAM(S): 50 INJECTION INTRAVENOUS at 12:52

## 2017-05-09 RX ADMIN — LEVETIRACETAM 500 MILLIGRAM(S): 250 TABLET, FILM COATED ORAL at 17:01

## 2017-05-09 RX ADMIN — Medication 2: at 17:38

## 2017-05-09 RX ADMIN — Medication 100 MILLIGRAM(S): at 07:07

## 2017-05-09 RX ADMIN — Medication 650 MILLIGRAM(S): at 17:40

## 2017-05-09 RX ADMIN — LEVETIRACETAM 500 MILLIGRAM(S): 250 TABLET, FILM COATED ORAL at 07:07

## 2017-05-09 RX ADMIN — FENTANYL CITRATE 25 MICROGRAM(S): 50 INJECTION INTRAVENOUS at 13:15

## 2017-05-09 RX ADMIN — VALSARTAN 160 MILLIGRAM(S): 80 TABLET ORAL at 18:32

## 2017-05-09 RX ADMIN — Medication 4 MILLIGRAM(S): at 17:01

## 2017-05-09 RX ADMIN — Medication 100 MILLIGRAM(S): at 07:08

## 2017-05-09 RX ADMIN — Medication 4 MILLIGRAM(S): at 23:23

## 2017-05-09 RX ADMIN — Medication 63.75 MILLIMOLE(S): at 14:58

## 2017-05-09 NOTE — PROGRESS NOTE ADULT - ATTENDING COMMENTS
PLAN:   NEURO: neurochecks q1h, pain meds with tylenol  meningioma s/p angio/embo:  for resection tomorrow  seizure treatment: continue levetiracetam and lamotrigine  REHAB:  physical therapy evaluation and management    EARLY MOB:   flat x4 hours then HOB up thereafter    PULM:  Room air, incentive spirometry  CARDIO:  SBP goal 100-150mm Hg, continue metoprolol (off exforge); h/o AFib not on anticoagulation due to h/o GI bleed, will discuss re: need for anticoagulation; rate controlled with metoprolol  ENDO:  Blood sugar goals 140-180 mg/dL, continue insulin sliding scale, check A1C, lipid profile, restart fenofibrate / zetia  GI:  PPI - cont (home meds)  DIET: restart diet once HOB up, NPO after midnight  RENAL:  IVF once NPO  HEM/ONC: check post-op Hb  VTE Prophylaxis:  SCDs only, patient for OR tomorrow - no DVT chemoprophylaxis tonight; baseline Dopplers for DVT suspected on admission  ID: afebrile, no leukocytosis  Social: will update family    Patient at high risk for neurological deterioration or death due to:   seizure, delirium, intracranial bleeding.   Critical care time, excluding procedures: 60 minutes. PLAN:   NEURO: neurochecks q1h, pain meds with tylenol  meningioma s/p angio/embo:  s/p resection today, frozen: meningioma, f/u histopathology  seizure treatment: continue levetiracetam and lamotrigine  REHAB:  physical therapy evaluation and management    EARLY MOB:   HOB up    PULM:  Room air, incentive spirometry  CARDIO:  SBP goal 100-150mm Hg, continue metoprolol (off exforge); h/o AFib not on anticoagulation due to h/o GI bleed, will discuss re: need for anticoagulation; rate controlled with metoprolol  ENDO:  Blood sugar goals 140-180 mg/dL, continue insulin sliding scale, Hemoglobin A1C = 6.8, LDL 66 HDL 28  total 120, cont fenofibrate / zetia  GI:  PPI -   DIET: restart diet   RENAL:  continue IV fluids (sinus breach, keep hydrated)  HEM/ONC: check post-op Hb  VTE Prophylaxis:  SCDs only, no DVT chemoprophylaxis tonight - patient is fresh post-op; baseline Dopplers for DVT suspected on admission  ID: afebrile, no leukocytosis  Social: will update family    Patient at high risk for neurological deterioration or death due to:   seizure, delirium, intracranial bleeding.   Critical care time, excluding procedures: 45 minutes.

## 2017-05-09 NOTE — PROGRESS NOTE ADULT - ASSESSMENT
72M with meninigioma L frontal lobe, brain compression, cerebral edema, s/p angio / embo (05/08/17, Dr. Blevins); Diabetes Mellitus, Hypertension, seizure disorder, dyslipidemia, h/o pulmonary hypertension 72M with meninigioma L frontal lobe, brain compression, cerebral edema, s/p L crani, resection of meningioma (Dr. Estevez, 05/09/2017), s/p angio / embo (05/08/17, Dr. Blevins); Diabetes Mellitus, fair control, Hypertension, seizure disorder, dyslipidemia, h/o pulmonary hypertension

## 2017-05-09 NOTE — PROGRESS NOTE ADULT - SUBJECTIVE AND OBJECTIVE BOX
=================================  NEUROCRITICAL CARE ATTENDING NOTE  =================================    NANY HOUGH   MRN-[id number]  Summary:  72M with h/o Afib not on AC, h/o GI bleed, CAD, Diabetes Mellitus dyslipidemia, presented with seizures, on work-up found a meningioma.  Admitted on  for pre-op angio / embolization.  Overnight Events: Admitted to NSICU today    PAST MEDICAL & SURGICAL HISTORY: Pulmonary hypertension DM (diabetes mellitus) Seizure Meningioma: left frontal Other hyperlipidemia Essential hypertension H/O cataract removal with insertion of prosthetic lens: B/L H/O right inguinal hernia repair  NKDA  Home meds: exforge HCT 10/320/25 (amlodipine valsartan HCTZ) fenofibrate 135 dailiy zetia 10mg daily folic acid daily metformin 1g BID omeprazole 40mg daily onglyza (saxagliptin) 5mg daily thiamine 50mg daily levetiracetam 500mg BID lamictal 150mg daily metoprolol 100mg daily     PHYSICAL EXAMINATION  T(C): , Max: 37.2 (-09 @ 01:00) HR: 80 (70 - 96) BP: 140/96 (105/61 - 140/96) RR: 15 (12 - 34) SpO2: 94% (93% - 96%)  NEUROLOGIC EXAMINATION:  Patient is awake, alert, fully oriented, pupils 2-3mm equal and briskly reactive to light, EOMs intact, R facial droop, R UE 4/5, and RLE L UE/LE 5/5   GENERAL:  not intubated, not in cardiorespiratory distress  EENT: anicteric  CARDIOVASC:  (+) S1 S2, normal rate and irregularly irregular rhythm  PULMONARY:  clear to auscultation bilaterally  ABDOMEN:  soft, nontender, with normoactive bowel sounds  EXTREMITIES:  no edema, no groin hematoma on R, distal pulses good  SKIN:  no rash    LABS:  CAPILLARY BLOOD GLUCOSE 114 (09 May 2017 06:36) 114 (09 May 2017 06:01) 286 (08 May 2017 21:39) 157 (08 May 2017 12:30)                        10.1   10.0  )-----------( 308      ( 09 May 2017 05:36 )             32.0     139  |  110<H>  |  15  ----------------------------<  109<H>  3.6   |  20<L>  |  0.80    Ca    8.2<L>      09 May 2017 05:36  Phos  2.8       Mg     2.1         I & Os for current day (as of  @ 07:48)  IN: 1620 ml / OUT: 2180 ml / NET: -560 ml    Bacteriology:  CSF studies:  EEG:  Neuroimagin/08 L frontal convexity menigioma abutting SSS, involving sinus wall, intraosseous extension, mass effect, small edema.  Other imaging:    MEDICATIONS:  levetiracetam 500 BID lamotrigine 150 daily docusate senna pantoprazole 40 mod ISS metoprolol 100mg daily ER    IV FLUIDS: NS  DRIPS:  DIET:  Lines / Drains:    CODE STATUS:  full code                       GOALS OF CARE:  aggressive                      DISPOSITION:  ICU =================================  NEUROCRITICAL CARE ATTENDING NOTE  =================================    NANY HOUGH   MRN-[id number]  Summary:  72M with h/o Afib not on AC, h/o GI bleed, CAD, Diabetes Mellitus dyslipidemia, presented with seizures, on work-up found a meningioma.  Admitted on  for pre-op angio / embolization.  Overnight Events: went to OR this morning     PAST MEDICAL & SURGICAL HISTORY: Pulmonary hypertension DM (diabetes mellitus) Seizure Meningioma: left frontal Other hyperlipidemia Essential hypertension H/O cataract removal with insertion of prosthetic lens: B/L H/O right inguinal hernia repair  NKDA  Home meds: exforge HCT 10/320/25 (amlodipine valsartan HCTZ) fenofibrate 135 dailiy zetia 10mg daily folic acid daily metformin 1g BID omeprazole 40mg daily onglyza (saxagliptin) 5mg daily thiamine 50mg daily levetiracetam 500mg BID lamictal 150mg daily metoprolol 100mg daily     PHYSICAL EXAMINATION  T(C): , Max: 37.2 (-09 @ 01:00) HR: 98 (70 - 98) BP: 150/92 (111/74 - 150/92) RR: 14 (12 - 34) SpO2: 95% (93% - 96%)  NEUROLOGIC EXAMINATION:  Patient is awake, alert, fully oriented, pupils 2-3mm equal and briskly reactive to light, EOMs intact, R facial droop, R UE 2/5, and RLE L UE/LE 5/5   GENERAL:  not intubated, not in cardiorespiratory distress  EENT: anicteric  CARDIOVASC:  (+) S1 S2, normal rate and irregularly irregular rhythm  PULMONARY:  clear to auscultation bilaterally  ABDOMEN:  soft, nontender, with normoactive bowel sounds  EXTREMITIES:  no edema, no groin hematoma on R, distal pulses good  SKIN:  no rash    LABS:  CAPILLARY BLOOD GLUCOSE 114 (09 May 2017 06:36) 114 (09 May 2017 06:01) 286 (08 May 2017 21:39) 157 (08 May 2017 12:30)                        10.1   10.0  )-----------( 308      ( 09 May 2017 05:36 )             32.0     139  |  110<H>  |  15  ----------------------------<  109<H>  3.6   |  20<L>  |  0.80    Ca    8.2<L>      09 May 2017 05:36  Phos  2.8       Mg     2.1         I & Os for current day (as of  @ 07:48)  IN: 1620 ml / OUT: 2180 ml / NET: -560 ml    Bacteriology:  CSF studies:  EEG:  Neuroimagin/08 L frontal convexity menigioma abutting SSS, involving sinus wall, intraosseous extension, mass effect, small edema.  Other imaging:    MEDICATIONS:  levetiracetam 500 BID lamotrigine 150 daily docusate senna pantoprazole 40 mod ISS metoprolol 100mg daily ER    IV FLUIDS: NS  DRIPS:  DIET:  Lines / Drains:    CODE STATUS:  full code                       GOALS OF CARE:  aggressive                      DISPOSITION:  ICU

## 2017-05-09 NOTE — BRIEF OPERATIVE NOTE - OPERATION/FINDINGS
PROCEDURE  L frontoparietal craniotomy, resection of parasagittal meningioma    Perez grade II gross total resection, some venous bleeding from SSS during craniotomy and tumor resection  Frozen section: meningioma  No complications
Under general anesthesia, using a 5 fr sheath right CFA, cerebral angiogram was performed.  Findings C/w moderately vascular left frontal tumor blush supplied by Left MMA.  Endovascular embolization using embospheres.  Full report to follow,  Patient tolerated procedure well, no new neurological deficit, hemodynamically stable.  Right groin/vasc access site: Perclose.  Hemostasis obtained, no hematoma  Patient was extubated and transferred to NSICU  Above d/w Dr. Blevins

## 2017-05-09 NOTE — PROGRESS NOTE ADULT - SUBJECTIVE AND OBJECTIVE BOX
NEUROSURGERY POST OP CHECK    Pt seen and examined at bedside. c/o mild complain at incision sight     HPI:  72 y.o Right  handed male h/o rate controlled Afib not on AC, h/o GIB, CAD, DM, HLD found to have a left frontal mass likely a meningioma.  Initially presented with seizure episode and currently has right sided trace weakness RUE>RLE right arm spasticity.  Patient presents today for pre op cerebral angiogram and possible embolization. (08 May 2017 08:57)    OVERNIGHT EVENTS:  Vital Signs Last 24 Hrs  T(C): 36.1, Max: 37.2 (05-09 @ 01:00)  T(F): 97, Max: 98.9 (05-09 @ 01:00)  HR: 96 (70 - 100)  BP: 112/86 (111/74 - 150/92)  BP(mean): 93 (83 - 114)  RR: 19 (12 - 34)  SpO2: 95% (93% - 96%)    I&O's Detail  I & Os for 24h ending 09 May 2017 07:00  =============================================  IN:    sodium chloride 0.9%: 1520 ml    Oral Fluid: 100 ml    Total IN: 1620 ml  ---------------------------------------------  OUT:    Indwelling Catheter - Urethral: 2180 ml    Total OUT: 2180 ml  ---------------------------------------------  Total NET: -560 ml    I & Os for current day (as of 09 May 2017 15:49)  =============================================  IN:    sodium chloride 0.9%.: 160 ml    IV PiggyBack: 50 ml    Total IN: 210 ml  ---------------------------------------------  OUT:    Indwelling Catheter - Urethral: 800 ml    Total OUT: 800 ml  ---------------------------------------------  Total NET: -590 ml    I&O's Summary  I & Os for 24h ending 09 May 2017 07:00  =============================================  IN: 1620 ml / OUT: 2180 ml / NET: -560 ml    I & Os for current day (as of 09 May 2017 15:49)  =============================================  IN: 210 ml / OUT: 800 ml / NET: -590 ml      PHYSICAL EXAM:  Neurological:  AAOx3, slightly dysarthric, FC  PERRL, EOMI, flight R facial   MAEx4, strength ARCADIO and LL 5/5, RU 3/5, RL 2/5  SILT         TUBES/LINES:  [] CVC  [x] A-line  [] Lumbar Drain  [] Ventriculostomy  [] Other  -Herrera    DIET:  [] NPO  [x] Mechanical  [] Tube feeds    LABS:                        9.6    13.4  )-----------( 317      ( 09 May 2017 13:08 )             29.8     05-09    139  |  112<H>  |  15  ----------------------------<  179<H>  4.3   |  18<L>  |  0.92    Ca    7.8<L>      09 May 2017 13:08  Phos  2.0     05-09  Mg     2.2     05-09      PT/INR - ( 09 May 2017 05:37 )   PT: 11.9 sec;   INR: 1.07          PTT - ( 09 May 2017 05:37 )  PTT:27.7 sec        CAPILLARY BLOOD GLUCOSE  142 (09 May 2017 11:50)  127 (09 May 2017 08:39)  114 (09 May 2017 06:36)  114 (09 May 2017 06:01)  286 (08 May 2017 21:39)      Drug Levels: [] N/A    CSF Analysis: [] N/A      Allergies    No Known Allergies    Intolerances      MEDICATIONS:  Antibiotics:  ceFAZolin   IVPB 1000milliGRAM(s) IV Intermittent every 8 hours    Neuro:  levETIRAcetam 500milliGRAM(s) Oral two times a day  lamoTRIgine 150milliGRAM(s) Oral daily  acetaminophen   Tablet. 325milliGRAM(s) Oral every 4 hours PRN  acetaminophen   Tablet. 650milliGRAM(s) Oral every 6 hours PRN  ondansetron Injectable 4milliGRAM(s) IV Push every 6 hours PRN    Anticoagulation:    OTHER:  insulin lispro (HumaLOG) corrective regimen sliding scale  SubCutaneous Before meals and at bedtime  dextrose Gel 1Dose(s) Oral once PRN  dextrose 50% Injectable 12.5Gram(s) IV Push once  glucagon  Injectable 1milliGRAM(s) IntraMuscular once PRN  docusate sodium 100milliGRAM(s) Oral three times a day  senna 2Tablet(s) Oral at bedtime PRN  metoprolol succinate ER 100milliGRAM(s) Oral daily  pantoprazole    Tablet 40milliGRAM(s) Oral before breakfast  dexamethasone  Injectable 4milliGRAM(s) IV Push every 6 hours    IVF:  dextrose 5%. 1000milliLiter(s) IV Continuous <Continuous>  sodium chloride 0.9%. 1000milliLiter(s) IV Continuous <Continuous>  folic acid 1milliGRAM(s) Oral daily    CULTURES:    RADIOLOGY & ADDITIONAL TESTS:      ASSESSMENT:  72y Male s/p craniotomy for resection of left frontal meningioma     PLAN:  NEURO:  -neuro checks q1hr  -pain management  -keppra/lamictal  -decadron   -MRI in 48 hrs    CARDIOVASCULAR:  --150    PULMONARY:  -room air    RENAL:  -IVF    GI:    HEME:    ID:    ENDO:    DVT PROPHYLAXIS:  [x] Venodynes                                [] Heparin/Lovenox    FALL RISK:  [] Low Risk                                    [] Impulsive    DISPOSITION: NEUROSURGERY POST OP CHECK    Pt seen and examined at bedside. c/o mild complain at incision sight     HPI:  72 y.o Right  handed male h/o rate controlled Afib not on AC, h/o GIB, CAD, DM, HLD found to have a left frontal mass likely a meningioma.  Initially presented with seizure episode and currently has right sided trace weakness RUE>RLE right arm spasticity.  Patient presents today for pre op cerebral angiogram and possible embolization. (08 May 2017 08:57)    OVERNIGHT EVENTS:  Vital Signs Last 24 Hrs  T(C): 36.1, Max: 37.2 (05-09 @ 01:00)  T(F): 97, Max: 98.9 (05-09 @ 01:00)  HR: 96 (70 - 100)  BP: 112/86 (111/74 - 150/92)  BP(mean): 93 (83 - 114)  RR: 19 (12 - 34)  SpO2: 95% (93% - 96%)    I&O's Detail  I & Os for 24h ending 09 May 2017 07:00  =============================================  IN:    sodium chloride 0.9%: 1520 ml    Oral Fluid: 100 ml    Total IN: 1620 ml  ---------------------------------------------  OUT:    Indwelling Catheter - Urethral: 2180 ml    Total OUT: 2180 ml  ---------------------------------------------  Total NET: -560 ml    I & Os for current day (as of 09 May 2017 15:49)  =============================================  IN:    sodium chloride 0.9%.: 160 ml    IV PiggyBack: 50 ml    Total IN: 210 ml  ---------------------------------------------  OUT:    Indwelling Catheter - Urethral: 800 ml    Total OUT: 800 ml  ---------------------------------------------  Total NET: -590 ml    I&O's Summary  I & Os for 24h ending 09 May 2017 07:00  =============================================  IN: 1620 ml / OUT: 2180 ml / NET: -560 ml    I & Os for current day (as of 09 May 2017 15:49)  =============================================  IN: 210 ml / OUT: 800 ml / NET: -590 ml      PHYSICAL EXAM:  Neurological:  AAOx3, slightly dysarthric, FC  PERRL, EOMI, flight R facial   MAEx4, strength ARCADIO and LL 5/5, RU 3/5, RL 2/5  SILT         TUBES/LINES:  [] CVC  [x] A-line  [] Lumbar Drain  [] Ventriculostomy  [] Other  -Herrera    DIET:  [] NPO  [x] Mechanical  [] Tube feeds    LABS:                        9.6    13.4  )-----------( 317      ( 09 May 2017 13:08 )             29.8     05-09    139  |  112<H>  |  15  ----------------------------<  179<H>  4.3   |  18<L>  |  0.92    Ca    7.8<L>      09 May 2017 13:08  Phos  2.0     05-09  Mg     2.2     05-09      PT/INR - ( 09 May 2017 05:37 )   PT: 11.9 sec;   INR: 1.07          PTT - ( 09 May 2017 05:37 )  PTT:27.7 sec        CAPILLARY BLOOD GLUCOSE  142 (09 May 2017 11:50)  127 (09 May 2017 08:39)  114 (09 May 2017 06:36)  114 (09 May 2017 06:01)  286 (08 May 2017 21:39)      Drug Levels: [] N/A    CSF Analysis: [] N/A      Allergies    No Known Allergies    Intolerances      MEDICATIONS:  Antibiotics:  ceFAZolin   IVPB 1000milliGRAM(s) IV Intermittent every 8 hours    Neuro:  levETIRAcetam 500milliGRAM(s) Oral two times a day  lamoTRIgine 150milliGRAM(s) Oral daily  acetaminophen   Tablet. 325milliGRAM(s) Oral every 4 hours PRN  acetaminophen   Tablet. 650milliGRAM(s) Oral every 6 hours PRN  ondansetron Injectable 4milliGRAM(s) IV Push every 6 hours PRN    Anticoagulation:    OTHER:  insulin lispro (HumaLOG) corrective regimen sliding scale  SubCutaneous Before meals and at bedtime  dextrose Gel 1Dose(s) Oral once PRN  dextrose 50% Injectable 12.5Gram(s) IV Push once  glucagon  Injectable 1milliGRAM(s) IntraMuscular once PRN  docusate sodium 100milliGRAM(s) Oral three times a day  senna 2Tablet(s) Oral at bedtime PRN  metoprolol succinate ER 100milliGRAM(s) Oral daily  pantoprazole    Tablet 40milliGRAM(s) Oral before breakfast  dexamethasone  Injectable 4milliGRAM(s) IV Push every 6 hours    IVF:  dextrose 5%. 1000milliLiter(s) IV Continuous <Continuous>  sodium chloride 0.9%. 1000milliLiter(s) IV Continuous <Continuous>  folic acid 1milliGRAM(s) Oral daily    CULTURES:    RADIOLOGY & ADDITIONAL TESTS:      ASSESSMENT:  72y Male s/p craniotomy for resection of left frontal meningioma     PLAN:  NEURO:  -neuro checks q1hr  -pain management  -keppra/lamictal  -decadron   -MRI in 48 hrs    CARDIOVASCULAR:  --150    PULMONARY:  -room air    RENAL:  -IVF    GI:  -regular diet  -protonix  -senna/colace    HEME:  -stable    ID:  -stable    ENDO:  -ISS    DVT PROPHYLAXIS:  [x] Venodynes                                [] Heparin/Lovenox    -D/w Dr. Blevins NEUROSURGERY POST OP CHECK    Pt seen and examined at bedside. c/o mild complain at incision sight. pt had 1U of blood loss intraop    HPI:  72 y.o Right  handed male h/o rate controlled Afib not on AC, h/o GIB, CAD, DM, HLD found to have a left frontal mass likely a meningioma.  Initially presented with seizure episode and currently has right sided trace weakness RUE>RLE right arm spasticity.  Patient presents today for pre op cerebral angiogram and possible embolization. (08 May 2017 08:57)    OVERNIGHT EVENTS:  Vital Signs Last 24 Hrs  T(C): 36.1, Max: 37.2 (05-09 @ 01:00)  T(F): 97, Max: 98.9 (05-09 @ 01:00)  HR: 96 (70 - 100)  BP: 112/86 (111/74 - 150/92)  BP(mean): 93 (83 - 114)  RR: 19 (12 - 34)  SpO2: 95% (93% - 96%)    I&O's Detail  I & Os for 24h ending 09 May 2017 07:00  =============================================  IN:    sodium chloride 0.9%: 1520 ml    Oral Fluid: 100 ml    Total IN: 1620 ml  ---------------------------------------------  OUT:    Indwelling Catheter - Urethral: 2180 ml    Total OUT: 2180 ml  ---------------------------------------------  Total NET: -560 ml    I & Os for current day (as of 09 May 2017 15:49)  =============================================  IN:    sodium chloride 0.9%.: 160 ml    IV PiggyBack: 50 ml    Total IN: 210 ml  ---------------------------------------------  OUT:    Indwelling Catheter - Urethral: 800 ml    Total OUT: 800 ml  ---------------------------------------------  Total NET: -590 ml    I&O's Summary  I & Os for 24h ending 09 May 2017 07:00  =============================================  IN: 1620 ml / OUT: 2180 ml / NET: -560 ml    I & Os for current day (as of 09 May 2017 15:49)  =============================================  IN: 210 ml / OUT: 800 ml / NET: -590 ml      PHYSICAL EXAM:  Neurological:  AAOx3, slightly dysarthric, FC  PERRL, EOMI, flight R facial   MAEx4, strength ARCADIO and LL 5/5, RU 3/5, RL 2/5  SILT         TUBES/LINES:  [] CVC  [x] A-line  [] Lumbar Drain  [] Ventriculostomy  [] Other  -Herrera    DIET:  [] NPO  [x] Mechanical  [] Tube feeds    LABS:                        9.6    13.4  )-----------( 317      ( 09 May 2017 13:08 )             29.8     05-09    139  |  112<H>  |  15  ----------------------------<  179<H>  4.3   |  18<L>  |  0.92    Ca    7.8<L>      09 May 2017 13:08  Phos  2.0     05-09  Mg     2.2     05-09      PT/INR - ( 09 May 2017 05:37 )   PT: 11.9 sec;   INR: 1.07          PTT - ( 09 May 2017 05:37 )  PTT:27.7 sec        CAPILLARY BLOOD GLUCOSE  142 (09 May 2017 11:50)  127 (09 May 2017 08:39)  114 (09 May 2017 06:36)  114 (09 May 2017 06:01)  286 (08 May 2017 21:39)      Drug Levels: [] N/A    CSF Analysis: [] N/A      Allergies    No Known Allergies    Intolerances      MEDICATIONS:  Antibiotics:  ceFAZolin   IVPB 1000milliGRAM(s) IV Intermittent every 8 hours    Neuro:  levETIRAcetam 500milliGRAM(s) Oral two times a day  lamoTRIgine 150milliGRAM(s) Oral daily  acetaminophen   Tablet. 325milliGRAM(s) Oral every 4 hours PRN  acetaminophen   Tablet. 650milliGRAM(s) Oral every 6 hours PRN  ondansetron Injectable 4milliGRAM(s) IV Push every 6 hours PRN    Anticoagulation:    OTHER:  insulin lispro (HumaLOG) corrective regimen sliding scale  SubCutaneous Before meals and at bedtime  dextrose Gel 1Dose(s) Oral once PRN  dextrose 50% Injectable 12.5Gram(s) IV Push once  glucagon  Injectable 1milliGRAM(s) IntraMuscular once PRN  docusate sodium 100milliGRAM(s) Oral three times a day  senna 2Tablet(s) Oral at bedtime PRN  metoprolol succinate ER 100milliGRAM(s) Oral daily  pantoprazole    Tablet 40milliGRAM(s) Oral before breakfast  dexamethasone  Injectable 4milliGRAM(s) IV Push every 6 hours    IVF:  dextrose 5%. 1000milliLiter(s) IV Continuous <Continuous>  sodium chloride 0.9%. 1000milliLiter(s) IV Continuous <Continuous>  folic acid 1milliGRAM(s) Oral daily    CULTURES:    RADIOLOGY & ADDITIONAL TESTS:      ASSESSMENT:  72y Male s/p craniotomy for resection of left frontal meningioma     PLAN:  NEURO:  -neuro checks q1hr  -pain management  -keppra/lamictal  -decadron   -MRI in 48 hrs    CARDIOVASCULAR:  --150  -monitor hemodynamics  -repeat CBC in pm    PULMONARY:  -room air    RENAL:  -IVF    GI:  -regular diet  -protonix  -senna/colace    HEME:  -stable    ID:  -stable    ENDO:  -ISS    DVT PROPHYLAXIS:  [x] Venodynes                                [] Heparin/Lovenox    -D/w Dr. Blevins NEUROSURGERY POST OP CHECK    Pt seen and examined at bedside. c/o mild complain at incision sight. pt had 1L of blood loss intraop    HPI:  72 y.o Right  handed male h/o rate controlled Afib not on AC, h/o GIB, CAD, DM, HLD found to have a left frontal mass likely a meningioma.  Initially presented with seizure episode and currently has right sided trace weakness RUE>RLE right arm spasticity.  Patient presents today for pre op cerebral angiogram and possible embolization. (08 May 2017 08:57)    OVERNIGHT EVENTS:  Vital Signs Last 24 Hrs  T(C): 36.1, Max: 37.2 (05-09 @ 01:00)  T(F): 97, Max: 98.9 (05-09 @ 01:00)  HR: 96 (70 - 100)  BP: 112/86 (111/74 - 150/92)  BP(mean): 93 (83 - 114)  RR: 19 (12 - 34)  SpO2: 95% (93% - 96%)    I&O's Detail  I & Os for 24h ending 09 May 2017 07:00  =============================================  IN:    sodium chloride 0.9%: 1520 ml    Oral Fluid: 100 ml    Total IN: 1620 ml  ---------------------------------------------  OUT:    Indwelling Catheter - Urethral: 2180 ml    Total OUT: 2180 ml  ---------------------------------------------  Total NET: -560 ml    I & Os for current day (as of 09 May 2017 15:49)  =============================================  IN:    sodium chloride 0.9%.: 160 ml    IV PiggyBack: 50 ml    Total IN: 210 ml  ---------------------------------------------  OUT:    Indwelling Catheter - Urethral: 800 ml    Total OUT: 800 ml  ---------------------------------------------  Total NET: -590 ml    I&O's Summary  I & Os for 24h ending 09 May 2017 07:00  =============================================  IN: 1620 ml / OUT: 2180 ml / NET: -560 ml    I & Os for current day (as of 09 May 2017 15:49)  =============================================  IN: 210 ml / OUT: 800 ml / NET: -590 ml      PHYSICAL EXAM:  Neurological:  AAOx3, slightly dysarthric, FC  PERRL, EOMI, flight R facial   MAEx4, strength ARCADIO and LL 5/5, RU 3/5, RL 2/5  SILT         TUBES/LINES:  [] CVC  [x] A-line  [] Lumbar Drain  [] Ventriculostomy  [] Other  -Herrera    DIET:  [] NPO  [x] Mechanical  [] Tube feeds    LABS:                        9.6    13.4  )-----------( 317      ( 09 May 2017 13:08 )             29.8     05-09    139  |  112<H>  |  15  ----------------------------<  179<H>  4.3   |  18<L>  |  0.92    Ca    7.8<L>      09 May 2017 13:08  Phos  2.0     05-09  Mg     2.2     05-09      PT/INR - ( 09 May 2017 05:37 )   PT: 11.9 sec;   INR: 1.07          PTT - ( 09 May 2017 05:37 )  PTT:27.7 sec        CAPILLARY BLOOD GLUCOSE  142 (09 May 2017 11:50)  127 (09 May 2017 08:39)  114 (09 May 2017 06:36)  114 (09 May 2017 06:01)  286 (08 May 2017 21:39)      Drug Levels: [] N/A    CSF Analysis: [] N/A      Allergies    No Known Allergies    Intolerances      MEDICATIONS:  Antibiotics:  ceFAZolin   IVPB 1000milliGRAM(s) IV Intermittent every 8 hours    Neuro:  levETIRAcetam 500milliGRAM(s) Oral two times a day  lamoTRIgine 150milliGRAM(s) Oral daily  acetaminophen   Tablet. 325milliGRAM(s) Oral every 4 hours PRN  acetaminophen   Tablet. 650milliGRAM(s) Oral every 6 hours PRN  ondansetron Injectable 4milliGRAM(s) IV Push every 6 hours PRN    Anticoagulation:    OTHER:  insulin lispro (HumaLOG) corrective regimen sliding scale  SubCutaneous Before meals and at bedtime  dextrose Gel 1Dose(s) Oral once PRN  dextrose 50% Injectable 12.5Gram(s) IV Push once  glucagon  Injectable 1milliGRAM(s) IntraMuscular once PRN  docusate sodium 100milliGRAM(s) Oral three times a day  senna 2Tablet(s) Oral at bedtime PRN  metoprolol succinate ER 100milliGRAM(s) Oral daily  pantoprazole    Tablet 40milliGRAM(s) Oral before breakfast  dexamethasone  Injectable 4milliGRAM(s) IV Push every 6 hours    IVF:  dextrose 5%. 1000milliLiter(s) IV Continuous <Continuous>  sodium chloride 0.9%. 1000milliLiter(s) IV Continuous <Continuous>  folic acid 1milliGRAM(s) Oral daily    CULTURES:    RADIOLOGY & ADDITIONAL TESTS:      ASSESSMENT:  72y Male s/p craniotomy for resection of left frontal meningioma     PLAN:  NEURO:  -neuro checks q1hr  -pain management  -keppra/lamictal  -decadron   -MRI in 48 hrs    CARDIOVASCULAR:  --150  -monitor hemodynamics  -repeat CBC in pm    PULMONARY:  -room air    RENAL:  -IVF    GI:  -regular diet  -protonix  -senna/colace    HEME:  -stable    ID:  -stable    ENDO:  -ISS    DVT PROPHYLAXIS:  [x] Venodynes                                [] Heparin/Lovenox    -D/w Dr. Blevins

## 2017-05-10 LAB
ANION GAP SERPL CALC-SCNC: 10 MMOL/L — SIGNIFICANT CHANGE UP (ref 9–16)
APTT BLD: 27.5 SEC — SIGNIFICANT CHANGE UP (ref 27.5–37.4)
BUN SERPL-MCNC: 17 MG/DL — SIGNIFICANT CHANGE UP (ref 7–23)
CALCIUM SERPL-MCNC: 8.7 MG/DL — SIGNIFICANT CHANGE UP (ref 8.5–10.5)
CHLORIDE SERPL-SCNC: 108 MMOL/L — SIGNIFICANT CHANGE UP (ref 96–108)
CO2 SERPL-SCNC: 22 MMOL/L — SIGNIFICANT CHANGE UP (ref 22–31)
CREAT SERPL-MCNC: 0.75 MG/DL — SIGNIFICANT CHANGE UP (ref 0.5–1.3)
GLUCOSE SERPL-MCNC: 212 MG/DL — HIGH (ref 70–99)
HCT VFR BLD CALC: 30 % — LOW (ref 39–50)
HGB BLD-MCNC: 9.5 G/DL — LOW (ref 13–17)
INR BLD: 1.13 — SIGNIFICANT CHANGE UP (ref 0.88–1.16)
MAGNESIUM SERPL-MCNC: 2.1 MG/DL — SIGNIFICANT CHANGE UP (ref 1.6–2.4)
MCHC RBC-ENTMCNC: 24.5 PG — LOW (ref 27–34)
MCHC RBC-ENTMCNC: 31.7 G/DL — LOW (ref 32–36)
MCV RBC AUTO: 77.3 FL — LOW (ref 80–100)
PHOSPHATE SERPL-MCNC: 2.2 MG/DL — LOW (ref 2.5–4.5)
PLATELET # BLD AUTO: 330 K/UL — SIGNIFICANT CHANGE UP (ref 150–400)
POTASSIUM SERPL-MCNC: 3.5 MMOL/L — SIGNIFICANT CHANGE UP (ref 3.5–5.3)
POTASSIUM SERPL-SCNC: 3.5 MMOL/L — SIGNIFICANT CHANGE UP (ref 3.5–5.3)
PROTHROM AB SERPL-ACNC: 12.6 SEC — SIGNIFICANT CHANGE UP (ref 9.8–12.7)
RBC # BLD: 3.88 M/UL — LOW (ref 4.2–5.8)
RBC # FLD: 16.8 % — SIGNIFICANT CHANGE UP (ref 10.3–16.9)
SODIUM SERPL-SCNC: 140 MMOL/L — SIGNIFICANT CHANGE UP (ref 135–145)
T4 FREE SERPL-MCNC: 0.95 NG/DL — SIGNIFICANT CHANGE UP (ref 0.7–1.48)
TSH SERPL-MCNC: 0.69 UIU/ML — SIGNIFICANT CHANGE UP (ref 0.35–4.94)
WBC # BLD: 15.2 K/UL — HIGH (ref 3.8–10.5)
WBC # FLD AUTO: 15.2 K/UL — HIGH (ref 3.8–10.5)

## 2017-05-10 PROCEDURE — 99291 CRITICAL CARE FIRST HOUR: CPT | Mod: 24

## 2017-05-10 PROCEDURE — 93010 ELECTROCARDIOGRAM REPORT: CPT

## 2017-05-10 RX ORDER — HALOPERIDOL DECANOATE 100 MG/ML
0.5 INJECTION INTRAMUSCULAR ONCE
Qty: 0 | Refills: 0 | Status: COMPLETED | OUTPATIENT
Start: 2017-05-10 | End: 2017-05-11

## 2017-05-10 RX ORDER — POTASSIUM CHLORIDE 20 MEQ
40 PACKET (EA) ORAL ONCE
Qty: 0 | Refills: 0 | Status: COMPLETED | OUTPATIENT
Start: 2017-05-10 | End: 2017-05-10

## 2017-05-10 RX ORDER — POTASSIUM CHLORIDE 20 MEQ
10 PACKET (EA) ORAL ONCE
Qty: 0 | Refills: 0 | Status: COMPLETED | OUTPATIENT
Start: 2017-05-10 | End: 2017-05-10

## 2017-05-10 RX ORDER — DILTIAZEM HCL 120 MG
5 CAPSULE, EXT RELEASE 24 HR ORAL
Qty: 125 | Refills: 0 | Status: DISCONTINUED | OUTPATIENT
Start: 2017-05-10 | End: 2017-05-10

## 2017-05-10 RX ORDER — SCOPALAMINE 1 MG/3D
1.5 PATCH, EXTENDED RELEASE TRANSDERMAL
Qty: 0 | Refills: 0 | Status: DISCONTINUED | OUTPATIENT
Start: 2017-05-10 | End: 2017-05-11

## 2017-05-10 RX ORDER — METOPROLOL TARTRATE 50 MG
5 TABLET ORAL ONCE
Qty: 0 | Refills: 0 | Status: COMPLETED | OUTPATIENT
Start: 2017-05-10 | End: 2017-05-10

## 2017-05-10 RX ORDER — POTASSIUM PHOSPHATE, MONOBASIC POTASSIUM PHOSPHATE, DIBASIC 236; 224 MG/ML; MG/ML
21 INJECTION, SOLUTION INTRAVENOUS ONCE
Qty: 0 | Refills: 0 | Status: COMPLETED | OUTPATIENT
Start: 2017-05-10 | End: 2017-05-10

## 2017-05-10 RX ORDER — INSULIN GLARGINE 100 [IU]/ML
10 INJECTION, SOLUTION SUBCUTANEOUS EVERY MORNING
Qty: 0 | Refills: 0 | Status: DISCONTINUED | OUTPATIENT
Start: 2017-05-10 | End: 2017-05-18

## 2017-05-10 RX ORDER — POTASSIUM CHLORIDE 20 MEQ
10 PACKET (EA) ORAL
Qty: 0 | Refills: 0 | Status: COMPLETED | OUTPATIENT
Start: 2017-05-10 | End: 2017-05-10

## 2017-05-10 RX ORDER — DEXAMETHASONE 0.5 MG/5ML
4 ELIXIR ORAL EVERY 8 HOURS
Qty: 0 | Refills: 0 | Status: DISCONTINUED | OUTPATIENT
Start: 2017-05-10 | End: 2017-05-11

## 2017-05-10 RX ORDER — ENOXAPARIN SODIUM 100 MG/ML
40 INJECTION SUBCUTANEOUS AT BEDTIME
Qty: 0 | Refills: 0 | Status: DISCONTINUED | OUTPATIENT
Start: 2017-05-10 | End: 2017-05-18

## 2017-05-10 RX ORDER — SODIUM,POTASSIUM PHOSPHATES 278-250MG
1 POWDER IN PACKET (EA) ORAL
Qty: 0 | Refills: 0 | Status: DISCONTINUED | OUTPATIENT
Start: 2017-05-10 | End: 2017-05-10

## 2017-05-10 RX ADMIN — Medication 100 MILLIEQUIVALENT(S): at 09:30

## 2017-05-10 RX ADMIN — SCOPALAMINE 1.5 MILLIGRAM(S): 1 PATCH, EXTENDED RELEASE TRANSDERMAL at 11:55

## 2017-05-10 RX ADMIN — Medication 6: at 11:55

## 2017-05-10 RX ADMIN — Medication 10 MILLIGRAM(S): at 02:54

## 2017-05-10 RX ADMIN — Medication 4 MILLIGRAM(S): at 14:24

## 2017-05-10 RX ADMIN — Medication 1 MILLIGRAM(S): at 11:55

## 2017-05-10 RX ADMIN — Medication 5 MG/HR: at 10:27

## 2017-05-10 RX ADMIN — Medication 40 MILLIEQUIVALENT(S): at 11:54

## 2017-05-10 RX ADMIN — ONDANSETRON 4 MILLIGRAM(S): 8 TABLET, FILM COATED ORAL at 05:28

## 2017-05-10 RX ADMIN — Medication 5 MILLIGRAM(S): at 09:20

## 2017-05-10 RX ADMIN — LEVETIRACETAM 500 MILLIGRAM(S): 250 TABLET, FILM COATED ORAL at 05:23

## 2017-05-10 RX ADMIN — LEVETIRACETAM 500 MILLIGRAM(S): 250 TABLET, FILM COATED ORAL at 18:08

## 2017-05-10 RX ADMIN — LAMOTRIGINE 150 MILLIGRAM(S): 25 TABLET, ORALLY DISINTEGRATING ORAL at 11:55

## 2017-05-10 RX ADMIN — ENOXAPARIN SODIUM 40 MILLIGRAM(S): 100 INJECTION SUBCUTANEOUS at 21:23

## 2017-05-10 RX ADMIN — Medication 100 MILLIGRAM(S): at 05:23

## 2017-05-10 RX ADMIN — Medication 5 MILLIGRAM(S): at 09:15

## 2017-05-10 RX ADMIN — Medication 100 MILLIGRAM(S): at 21:23

## 2017-05-10 RX ADMIN — Medication 100 MILLIGRAM(S): at 14:24

## 2017-05-10 RX ADMIN — Medication 4 MILLIGRAM(S): at 21:23

## 2017-05-10 RX ADMIN — INSULIN GLARGINE 10 UNIT(S): 100 INJECTION, SOLUTION SUBCUTANEOUS at 11:57

## 2017-05-10 RX ADMIN — VALSARTAN 160 MILLIGRAM(S): 80 TABLET ORAL at 05:23

## 2017-05-10 RX ADMIN — Medication 4: at 07:04

## 2017-05-10 RX ADMIN — Medication 2: at 16:57

## 2017-05-10 RX ADMIN — Medication 4 MILLIGRAM(S): at 05:22

## 2017-05-10 RX ADMIN — SODIUM CHLORIDE 80 MILLILITER(S): 9 INJECTION INTRAMUSCULAR; INTRAVENOUS; SUBCUTANEOUS at 05:23

## 2017-05-10 RX ADMIN — PANTOPRAZOLE SODIUM 40 MILLIGRAM(S): 20 TABLET, DELAYED RELEASE ORAL at 07:04

## 2017-05-10 NOTE — PROGRESS NOTE ADULT - SUBJECTIVE AND OBJECTIVE BOX
HPI:  72 y. o Right  handed male h/o rate controlled Afib not on AC, h/o GIB, CAD, DM, HLD found to have a left frontal mass likely a meningioma.  Initially presented with seizure episode and currently has right sided trace weakness RUE>RLE right arm spasticity.  Patient presents today for pre op cerebral angiogram and possible embolization. (08 May 2017 08:57)    OVERNIGHT EVENTS:  Pt c/o nausea overnight. No vomiting. No HA. Pt had onset of rapid Afib in am. Started on Cardizem drip.     Vital Signs Last 24 Hrs  T(C): 37.7, Max: 37.7 (05-10 @ 09:00)  T(F): 99.9, Max: 99.9 (05-10 @ 09:00)  HR: 110 (86 - 168)  BP: 146/87 (112/86 - 173/120)  BP(mean): 109 (93 - 144)  RR: 17 (14 - 31)  SpO2: 95% (93% - 97%)    I&O's Detail  I & Os for 24h ending 10 May 2017 07:00  =============================================  IN:    sodium chloride 0.9%: 1040 ml    IV PiggyBack: 352 ml    Oral Fluid: 250 ml    Total IN: 1642 ml  ---------------------------------------------  OUT:    Indwelling Catheter - Urethral: 3300 ml    Total OUT: 3300 ml  ---------------------------------------------  Total NET: -1658 ml    I & Os for current day (as of 10 May 2017 12:14)  =============================================  IN:    IV PiggyBack: 100 ml    sodium chloride 0.9%: 80 ml    diltiazem Infusion: 10 ml    Total IN: 190 ml  ---------------------------------------------  OUT:    Indwelling Catheter - Urethral: 250 ml    Total OUT: 250 ml  ---------------------------------------------  Total NET: -60 ml    I&O's Summary  I & Os for 24h ending 10 May 2017 07:00  =============================================  IN: 1642 ml / OUT: 3300 ml / NET: -1658 ml    I & Os for current day (as of 10 May 2017 12:14)  =============================================  IN: 190 ml / OUT: 250 ml / NET: -60 ml      PHYSICAL EXAM:  Neurological:  Awake , some confusion, delerium ?   AxOx3  Dysarthria improved, however worsening expressive aphasia  RUE antigravity  RLE 2-3/5  L side 5/5  Sensation: [x] intact to light touch  Incision/Wound:C/D/I    TUBES/LINES:  [] CVC  [] A-line  [] Lumbar Drain  [] Ventriculostomy  [] Other    DIET:  [] NPO  [x] Mechanical  [] Tube feeds    LABS:                        9.5    15.2  )-----------( 330      ( 10 May 2017 06:35 )             30.0     05-10    140  |  108  |  17  ----------------------------<  212<H>  3.5   |  22  |  0.75    Ca    8.7      10 May 2017 06:35  Phos  2.2     05-10  Mg     2.1     05-10      PT/INR - ( 10 May 2017 06:35 )   PT: 12.6 sec;   INR: 1.13          PTT - ( 10 May 2017 06:35 )  PTT:27.5 sec        CAPILLARY BLOOD GLUCOSE  257 (10 May 2017 11:00)  203 (10 May 2017 06:00)  193 (09 May 2017 17:00)      Drug Levels: [] N/A    CSF Analysis: [] N/A      Allergies    No Known Allergies    Intolerances      MEDICATIONS:  Antibiotics:    Neuro:  levETIRAcetam 500milliGRAM(s) Oral two times a day  lamoTRIgine 150milliGRAM(s) Oral daily  acetaminophen   Tablet. 325milliGRAM(s) Oral every 4 hours PRN  acetaminophen   Tablet. 650milliGRAM(s) Oral every 6 hours PRN  ondansetron Injectable 4milliGRAM(s) IV Push every 6 hours PRN  oxyCODONE  5 mG/acetaminophen 325 mG 1Tablet(s) Oral every 4 hours PRN  scopolamine   Patch 1.5milliGRAM(s) Transdermal every 3 days    Anticoagulation:    OTHER:  insulin lispro (HumaLOG) corrective regimen sliding scale  SubCutaneous Before meals and at bedtime  dextrose Gel 1Dose(s) Oral once PRN  dextrose 50% Injectable 12.5Gram(s) IV Push once  glucagon  Injectable 1milliGRAM(s) IntraMuscular once PRN  docusate sodium 100milliGRAM(s) Oral three times a day  senna 2Tablet(s) Oral at bedtime PRN  metoprolol succinate ER 100milliGRAM(s) Oral daily  pantoprazole    Tablet 40milliGRAM(s) Oral before breakfast  valsartan 160milliGRAM(s) Oral daily  labetalol Injectable 10milliGRAM(s) IV Push every 4 hours PRN  diltiazem Infusion 5mG/Hr IV Continuous <Continuous>  insulin glargine Injectable (LANTUS) 10Unit(s) SubCutaneous every morning  dexamethasone     Tablet 4milliGRAM(s) Oral every 8 hours    IVF:  dextrose 5%. 1000milliLiter(s) IV Continuous <Continuous>  folic acid 1milliGRAM(s) Oral daily      ASSESSMENT:  72y Male s/p HPI:   Right  handed male h/o rate controlled Afib not on AC, h/o GIB, CAD, DM, HLD found to have a left frontal mass likely a meningioma.  Initially presented with seizure episode and currently has right sided trace weakness RUE>RLE right arm spasticity.  Patient presents today for pre op cerebral angiogram and possible embolization. (08 May 2017 08:57)    PLAN:  -A-line d/paul in am, was leaking  -decadron taper. May be contributing to delerium.  -Afib on cardizem drip  -hold PT and transfer to stepdown until Afib controlled  -MRI Brain post op  -Zofran prn nausea  -Lovenox for DVT prophylaxis  -D/W       DVT PROPHYLAXIS:  [x] Venodynes                                [x] Lovenox

## 2017-05-10 NOTE — PROGRESS NOTE ADULT - ATTENDING COMMENTS
PLAN:   NEURO: neurochecks q1h, pain meds with tylenol  meningioma s/p angio/embo:  s/p resection today, frozen: meningioma, f/u histopathology  seizure treatment: continue levetiracetam and lamotrigine  REHAB:  physical therapy evaluation and management    EARLY MOB:   HOB up    PULM:  Room air, incentive spirometry  CARDIO:  SBP goal 100-150mm Hg, continue metoprolol (off exforge); h/o AFib not on anticoagulation due to h/o GI bleed, will discuss re: need for anticoagulation; rate controlled with metoprolol  ENDO:  Blood sugar goals 140-180 mg/dL, continue insulin sliding scale, Hemoglobin A1C = 6.8, LDL 66 HDL 28  total 120, cont fenofibrate / zetia  GI:  PPI -   DIET: restart diet   RENAL:  continue IV fluids (sinus breach, keep hydrated)  HEM/ONC: check post-op Hb  VTE Prophylaxis:  SCDs only, no DVT chemoprophylaxis tonight - patient is fresh post-op; baseline Dopplers for DVT suspected on admission  ID: afebrile, no leukocytosis  Social: will update family    Patient at high risk for neurological deterioration or death due to:   seizure, delirium, intracranial bleeding.   Critical care time, excluding procedures: 45 minutes. PLAN:   NEURO: neurochecks q1h, pain meds with tylenol  meningioma s/p angio/embo:  s/p resection today, frozen: meningioma, f/u histopathology  seizure treatment: continue levetiracetam and lamotrigine  REHAB:  physical therapy evaluation and management    EARLY MOB:   HOB up    PULM:  PRN O2 supplement, incentive spirometry  CARDIO:  SBP goal 100-150mm Hg, continue metoprolol (off exforge); h/o AFib not on anticoagulation due to h/o GI bleed, will discuss re: need for anticoagulation; rate controlled with metoprolol; cardizem drip, transition to PO later  ENDO:  Blood sugar goals 140-180 mg/dL, continue insulin sliding scale, Hemoglobin A1C = 6.8, LDL 66 HDL 28  total 120, cont fenofibrate / zetia; lantus 10 this am  GI:  PPI -   DIET: cont diet   RENAL:  Iv lock  HEM/ONC: Hb stable  VTE Prophylaxis:  SCDs only, start DVT chemoprophylaxis tonight if okay with neurosurgery; Doppler NEG  ID: afebrile, no leukocytosis  Social: will update family    Patient at high risk for neurological deterioration or death due to:   seizure, delirium, intracranial bleeding.   Critical care time, excluding procedures: 45 minutes.

## 2017-05-10 NOTE — DIETITIAN INITIAL EVALUATION ADULT. - OTHER INFO
71y/o man s/p cerebral angiogram and s/p embolization. Pt w/ hx of frontal meningioma, seizures, DM, HTN, HLD, AFib. Pt had nausea yesterday w/o emesis which is now resolved. No diarrhea/constipation. Skin intact w/ ASW. Pt was NPO for breakfast today. Diet restarted for lunch today. Pt notes 20# wt loss over past 3 months 2/2 medication changes affecting his appetite. At home pt takes: fish oil, folic acid, garlic, MVI, selenium, milk thistle, vit C and vit E. Pt states he follows a consistent CHO diet at home and pt/wife demonstrated appropriate knowledge base. Pt has NKFA. Pt denies pain at present. Pt states yesterday s/p procedure he did not have difficulties chewing/swallowing. 73y/o man s/p cerebral angiogram and s/p embolization. Pt w/ hx of frontal meningioma, seizures, DM, HTN, HLD, AFib. Pt had nausea yesterday w/ emesis x1 which is now resolved. No diarrhea/constipation. Skin intact w/ ASW. Pt was NPO for breakfast today. Diet restarted for lunch today. Pt notes 20# wt loss over past 3 months 2/2 medication changes affecting his appetite. At home pt takes: fish oil, folic acid, garlic, MVI, selenium, milk thistle, vit C and vit E. Pt states he follows a consistent CHO diet at home and pt/wife demonstrated appropriate knowledge base. Pt has NKFA. Pt denies pain at present. Pt states yesterday s/p procedure he did not have difficulties chewing/swallowing.

## 2017-05-10 NOTE — DIETITIAN INITIAL EVALUATION ADULT. - ENERGY NEEDS
Ht: 71" IBW: 78kg, %IBW: 112%, BMI: 27  ABW used for calculations as pt between % of IBW.   Needs estimated for recent (9%) involuntary wt loss and surgical intervention.

## 2017-05-10 NOTE — DIETITIAN INITIAL EVALUATION ADULT. - DIET TYPE
consistent carbohydrate (evening snack)/renal replacement pts:no protein restr,no conc K & phos, low sodium/Renal restrictions not necessary for this pt.

## 2017-05-10 NOTE — PROGRESS NOTE ADULT - SUBJECTIVE AND OBJECTIVE BOX
=================================  NEUROCRITICAL CARE ATTENDING NOTE  =================================    NANY HOUGH   MRN-[id number]  Summary:  72M with h/o Afib not on AC, h/o GI bleed, CAD, Diabetes Mellitus dyslipidemia, presented with seizures, on work-up found a meningioma.  Admitted on  for pre-op angio / embolization.  Overnight Events: went to OR this morning     PAST MEDICAL & SURGICAL HISTORY: Pulmonary hypertension DM (diabetes mellitus) Seizure Meningioma: left frontal Other hyperlipidemia Essential hypertension H/O cataract removal with insertion of prosthetic lens: B/L H/O right inguinal hernia repair  NKDA  Home meds: exforge HCT 10/320/25 (amlodipine valsartan HCTZ) fenofibrate 135 dailiy zetia 10mg daily folic acid daily metformin 1g BID omeprazole 40mg daily onglyza (saxagliptin) 5mg daily thiamine 50mg daily levetiracetam 500mg BID lamictal 150mg daily metoprolol 100mg daily     PHYSICAL EXAMINATION  T(C): , Max: 36.9 (-09 @ 17:30) HR: 106 (86 - 110) BP: 142/89 (112/86 - 160/93) RR: 20 (14 - 31)SpO2: 97% (93% - 97%)  NEUROLOGIC EXAMINATION:  Patient is awake, alert, fully oriented, pupils 2-3mm equal and briskly reactive to light, EOMs intact, R facial droop, R UE 2/5, and RLE L UE/LE 5/5   GENERAL:  not intubated, not in cardiorespiratory distress  EENT: anicteric  CARDIOVASC:  (+) S1 S2, normal rate and irregularly irregular rhythm  PULMONARY:  clear to auscultation bilaterally  ABDOMEN:  soft, nontender, with normoactive bowel sounds  EXTREMITIES:  no edema, no groin hematoma on R, distal pulses good  SKIN:  no rash    LABS:  CAPILLARY BLOOD GLUCOSE 203 (10 May 2017 06:00) 193 (09 May 2017 17:00) 142 (09 May 2017 11:50)                        9.5    15.2  )-----------( 330      ( 10 May 2017 06:35 )             30.0     140  |  108  |  17  ----------------------------<  212<H>  3.5   |  22  |  0.75    Ca    8.7      10 May 2017 06:35  Phos  2.2     05-10  Mg     2.1     05-10    I & Os for 24h ending 05-10 @ 07:00  IN: 1642 ml / OUT: 3300 ml / NET: -1658 ml    Bacteriology:  CSF studies:  EEG:  Neuroimagin/08 L frontal convexity menigioma abutting SSS, involving sinus wall, intraosseous extension, mass effect, small edema.  Other imaging:    MEDICATIONS: mod ISS docusate senna levetiracetam 500 BID lamotrigine 150 daily metoprolol  daily pantoprazole 40 folic acid dexamethasone 4mg IV q6h valsartan 160 daily percocet scopolamine patch    IV FLUIDS: NS  DRIPS:  DIET:  Lines / Drains:    CODE STATUS:  full code                       GOALS OF CARE:  aggressive                      DISPOSITION:  ICU =================================  NEUROCRITICAL CARE ATTENDING NOTE  =================================    NANY HOUGH   MRN-[id number]  Summary:  72M with h/o Afib not on AC, h/o GI bleed, CAD, Diabetes Mellitus dyslipidemia, presented with seizures, on work-up found a meningioma.  Admitted on  for pre-op angio / embolization.  Overnight Events: Afib 180s given metoprolol 5mg IV x2, cardizem 10mg IV    PAST MEDICAL & SURGICAL HISTORY: Pulmonary hypertension DM (diabetes mellitus) Seizure Meningioma: left frontal Other hyperlipidemia Essential hypertension H/O cataract removal with insertion of prosthetic lens: B/L H/O right inguinal hernia repair  NKDA  Home meds: exforge HCT 10/320/25 (amlodipine valsartan HCTZ) fenofibrate 135 dailiy zetia 10mg daily folic acid daily metformin 1g BID omeprazole 40mg daily onglyza (saxagliptin) 5mg daily thiamine 50mg daily levetiracetam 500mg BID lamictal 150mg daily metoprolol 100mg daily     PHYSICAL EXAMINATION  T(C): , Max: 36.9 (- @ 17:30) HR: 106 (86 - 110) BP: 142/89 (112/86 - 160/93) RR: 20 (14 - 31)SpO2: 97% (93% - 97%)  NEUROLOGIC EXAMINATION:  Patient is awake, alert, fully oriented, pupils 2-3mm equal and briskly reactive to light, EOMs intact, R facial droop, R UE 2/5, and RLE L UE/LE 5/5   GENERAL:  not intubated, not in cardiorespiratory distress  EENT: anicteric  CARDIOVASC:  (+) S1 S2, tachycardic and irregularly irregular rhythm  PULMONARY:  clear to auscultation bilaterally  ABDOMEN:  soft, nontender, with normoactive bowel sounds  EXTREMITIES:  no edema, no groin hematoma on R, distal pulses good  SKIN:  no rash    LABS:  CAPILLARY BLOOD GLUCOSE 203 (10 May 2017 06:00) 193 (09 May 2017 17:00) 142 (09 May 2017 11:50)             (14.5)    9.5    15.2  )-----------( 330      ( 10 May 2017 06:35 )             30.0     140  |  108  |  17  ----------------------------<  212<H>  3.5   |  22  |  0.75    Ca    8.7      10 May 2017 06:35  Phos  2.2     05-10  Mg     2.1     05-10    I & Os for 24h ending -10 @ 07:00  IN: 1642 ml / OUT: 3300 ml / NET: -1658 ml    Bacteriology:  CSF studies:  EEG:  Neuroimagin/08 L frontal convexity menigioma abutting SSS, involving sinus wall, intraosseous extension, mass effect, small edema.  Other imaging:    MEDICATIONS: mod ISS docusate senna levetiracetam 500 BID lamotrigine 150 daily metoprolol  daily pantoprazole 40 folic acid dexamethasone 4mg IV q6h valsartan 160 daily percocet scopolamine patch    IV FLUIDS: IV locked  DRIPS: cardizem 5mg/hr  DIET: CCD  Lines / Drains: Herrera    CODE STATUS:  full code                       GOALS OF CARE:  aggressive                      DISPOSITION:  ICU

## 2017-05-10 NOTE — PROGRESS NOTE ADULT - ASSESSMENT
72M with meninigioma L frontal lobe, brain compression, cerebral edema, s/p L crani, resection of meningioma (Dr. Estevez, 05/09/2017), s/p angio / embo (05/08/17, Dr. Blevins); Diabetes Mellitus, fair control, Hypertension, seizure disorder, dyslipidemia, h/o pulmonary hypertension

## 2017-05-11 LAB
ANION GAP SERPL CALC-SCNC: 12 MMOL/L — SIGNIFICANT CHANGE UP (ref 9–16)
APTT BLD: 30.2 SEC — SIGNIFICANT CHANGE UP (ref 27.5–37.4)
BUN SERPL-MCNC: 26 MG/DL — HIGH (ref 7–23)
CALCIUM SERPL-MCNC: 8.6 MG/DL — SIGNIFICANT CHANGE UP (ref 8.5–10.5)
CHLORIDE SERPL-SCNC: 105 MMOL/L — SIGNIFICANT CHANGE UP (ref 96–108)
CO2 SERPL-SCNC: 21 MMOL/L — LOW (ref 22–31)
CREAT SERPL-MCNC: 0.87 MG/DL — SIGNIFICANT CHANGE UP (ref 0.5–1.3)
GLUCOSE SERPL-MCNC: 114 MG/DL — HIGH (ref 70–99)
HCT VFR BLD CALC: 31.2 % — LOW (ref 39–50)
HGB BLD-MCNC: 10 G/DL — LOW (ref 13–17)
INR BLD: 1.1 — SIGNIFICANT CHANGE UP (ref 0.88–1.16)
MAGNESIUM SERPL-MCNC: 2.3 MG/DL — SIGNIFICANT CHANGE UP (ref 1.6–2.4)
MCHC RBC-ENTMCNC: 24.9 PG — LOW (ref 27–34)
MCHC RBC-ENTMCNC: 32.1 G/DL — SIGNIFICANT CHANGE UP (ref 32–36)
MCV RBC AUTO: 77.8 FL — LOW (ref 80–100)
PHOSPHATE SERPL-MCNC: 2 MG/DL — LOW (ref 2.5–4.5)
PLATELET # BLD AUTO: 361 K/UL — SIGNIFICANT CHANGE UP (ref 150–400)
POTASSIUM SERPL-MCNC: 4.2 MMOL/L — SIGNIFICANT CHANGE UP (ref 3.5–5.3)
POTASSIUM SERPL-SCNC: 4.2 MMOL/L — SIGNIFICANT CHANGE UP (ref 3.5–5.3)
PROTHROM AB SERPL-ACNC: 12.2 SEC — SIGNIFICANT CHANGE UP (ref 9.8–12.7)
RBC # BLD: 4.01 M/UL — LOW (ref 4.2–5.8)
RBC # FLD: 16.6 % — SIGNIFICANT CHANGE UP (ref 10.3–16.9)
SODIUM SERPL-SCNC: 138 MMOL/L — SIGNIFICANT CHANGE UP (ref 135–145)
WBC # BLD: 19.3 K/UL — HIGH (ref 3.8–10.5)
WBC # FLD AUTO: 19.3 K/UL — HIGH (ref 3.8–10.5)

## 2017-05-11 PROCEDURE — 99291 CRITICAL CARE FIRST HOUR: CPT | Mod: 24

## 2017-05-11 PROCEDURE — 43752 NASAL/OROGASTRIC W/TUBE PLMT: CPT

## 2017-05-11 PROCEDURE — 70450 CT HEAD/BRAIN W/O DYE: CPT | Mod: 26

## 2017-05-11 PROCEDURE — 71010: CPT | Mod: 26

## 2017-05-11 RX ORDER — HALOPERIDOL DECANOATE 100 MG/ML
0.5 INJECTION INTRAMUSCULAR ONCE
Qty: 0 | Refills: 0 | Status: DISCONTINUED | OUTPATIENT
Start: 2017-05-11 | End: 2017-05-11

## 2017-05-11 RX ORDER — DILTIAZEM HCL 120 MG
5 CAPSULE, EXT RELEASE 24 HR ORAL
Qty: 125 | Refills: 0 | Status: DISCONTINUED | OUTPATIENT
Start: 2017-05-11 | End: 2017-05-11

## 2017-05-11 RX ORDER — DEXMEDETOMIDINE HYDROCHLORIDE IN 0.9% SODIUM CHLORIDE 4 UG/ML
0.1 INJECTION INTRAVENOUS
Qty: 200 | Refills: 0 | Status: DISCONTINUED | OUTPATIENT
Start: 2017-05-11 | End: 2017-05-11

## 2017-05-11 RX ORDER — QUETIAPINE FUMARATE 200 MG/1
25 TABLET, FILM COATED ORAL
Qty: 0 | Refills: 0 | Status: DISCONTINUED | OUTPATIENT
Start: 2017-05-11 | End: 2017-05-11

## 2017-05-11 RX ORDER — HALOPERIDOL DECANOATE 100 MG/ML
0.5 INJECTION INTRAMUSCULAR ONCE
Qty: 0 | Refills: 0 | Status: COMPLETED | OUTPATIENT
Start: 2017-05-11 | End: 2017-05-11

## 2017-05-11 RX ORDER — SODIUM CHLORIDE 9 MG/ML
1000 INJECTION, SOLUTION INTRAVENOUS
Qty: 0 | Refills: 0 | Status: DISCONTINUED | OUTPATIENT
Start: 2017-05-11 | End: 2017-05-12

## 2017-05-11 RX ORDER — SODIUM CHLORIDE 9 MG/ML
1000 INJECTION INTRAMUSCULAR; INTRAVENOUS; SUBCUTANEOUS
Qty: 0 | Refills: 0 | Status: DISCONTINUED | OUTPATIENT
Start: 2017-05-11 | End: 2017-05-13

## 2017-05-11 RX ORDER — DEXAMETHASONE 0.5 MG/5ML
2 ELIXIR ORAL EVERY 8 HOURS
Qty: 0 | Refills: 0 | Status: DISCONTINUED | OUTPATIENT
Start: 2017-05-11 | End: 2017-05-12

## 2017-05-11 RX ORDER — HALOPERIDOL DECANOATE 100 MG/ML
1 INJECTION INTRAMUSCULAR ONCE
Qty: 0 | Refills: 0 | Status: COMPLETED | OUTPATIENT
Start: 2017-05-11 | End: 2017-05-11

## 2017-05-11 RX ADMIN — LEVETIRACETAM 500 MILLIGRAM(S): 250 TABLET, FILM COATED ORAL at 17:08

## 2017-05-11 RX ADMIN — Medication 100 MILLIGRAM(S): at 06:34

## 2017-05-11 RX ADMIN — VALSARTAN 160 MILLIGRAM(S): 80 TABLET ORAL at 06:30

## 2017-05-11 RX ADMIN — LEVETIRACETAM 500 MILLIGRAM(S): 250 TABLET, FILM COATED ORAL at 06:30

## 2017-05-11 RX ADMIN — HALOPERIDOL DECANOATE 1 MILLIGRAM(S): 100 INJECTION INTRAMUSCULAR at 07:57

## 2017-05-11 RX ADMIN — Medication 1 MILLIGRAM(S): at 07:57

## 2017-05-11 RX ADMIN — HALOPERIDOL DECANOATE 0.5 MILLIGRAM(S): 100 INJECTION INTRAMUSCULAR at 00:04

## 2017-05-11 RX ADMIN — LAMOTRIGINE 150 MILLIGRAM(S): 25 TABLET, ORALLY DISINTEGRATING ORAL at 12:15

## 2017-05-11 RX ADMIN — Medication 2 MILLIGRAM(S): at 14:57

## 2017-05-11 RX ADMIN — Medication 2: at 21:31

## 2017-05-11 RX ADMIN — Medication 0.75 MILLIGRAM(S): at 00:57

## 2017-05-11 RX ADMIN — HALOPERIDOL DECANOATE 0.5 MILLIGRAM(S): 100 INJECTION INTRAMUSCULAR at 05:15

## 2017-05-11 RX ADMIN — Medication 100 MILLIGRAM(S): at 06:30

## 2017-05-11 RX ADMIN — Medication 25 MILLIGRAM(S): at 21:31

## 2017-05-11 RX ADMIN — HALOPERIDOL DECANOATE 0.5 MILLIGRAM(S): 100 INJECTION INTRAMUSCULAR at 05:07

## 2017-05-11 RX ADMIN — Medication: at 06:55

## 2017-05-11 RX ADMIN — Medication 25 MILLIGRAM(S): at 15:04

## 2017-05-11 RX ADMIN — SODIUM CHLORIDE 125 MILLILITER(S): 9 INJECTION, SOLUTION INTRAVENOUS at 12:15

## 2017-05-11 RX ADMIN — Medication 64.25 MILLIMOLE(S): at 06:34

## 2017-05-11 RX ADMIN — INSULIN GLARGINE 10 UNIT(S): 100 INJECTION, SOLUTION SUBCUTANEOUS at 07:22

## 2017-05-11 RX ADMIN — Medication 4: at 00:09

## 2017-05-11 RX ADMIN — Medication 2 MILLIGRAM(S): at 21:31

## 2017-05-11 RX ADMIN — Medication 4 MILLIGRAM(S): at 06:30

## 2017-05-11 RX ADMIN — DEXMEDETOMIDINE HYDROCHLORIDE IN 0.9% SODIUM CHLORIDE 2.2 MICROGRAM(S)/KG/HR: 4 INJECTION INTRAVENOUS at 01:50

## 2017-05-11 RX ADMIN — ENOXAPARIN SODIUM 40 MILLIGRAM(S): 100 INJECTION SUBCUTANEOUS at 21:30

## 2017-05-11 RX ADMIN — SODIUM CHLORIDE 75 MILLILITER(S): 9 INJECTION INTRAMUSCULAR; INTRAVENOUS; SUBCUTANEOUS at 21:30

## 2017-05-11 RX ADMIN — PANTOPRAZOLE SODIUM 40 MILLIGRAM(S): 20 TABLET, DELAYED RELEASE ORAL at 06:34

## 2017-05-11 NOTE — PROGRESS NOTE ADULT - SUBJECTIVE AND OBJECTIVE BOX
=================================  NEUROCRITICAL CARE ATTENDING NOTE  =================================    NANY HOUGH   MRN-[id number]  Summary:  72M with h/o Afib not on AC, h/o GI bleed, CAD, Diabetes Mellitus dyslipidemia, presented with seizures, on work-up found a meningioma.  Admitted on  for pre-op angio / embolization. 05/10 SVT / Afib in RVR, on cardizem drip  Overnight Events: cardizem drip transitioned to PO overnight; agitation overnight given haldol / ativan and started on precedex drip    PAST MEDICAL & SURGICAL HISTORY: Pulmonary hypertension DM (diabetes mellitus) Seizure Meningioma: left frontal Other hyperlipidemia Essential hypertension H/O cataract removal with insertion of prosthetic lens: B/L H/O right inguinal hernia repair  NKDA  Home meds: exforge HCT 10/320/25 (amlodipine valsartan HCTZ) fenofibrate 135 dailiy zetia 10mg daily folic acid daily metformin 1g BID omeprazole 40mg daily onglyza (saxagliptin) 5mg daily thiamine 50mg daily levetiracetam 500mg BID lamictal 150mg daily metoprolol 100mg daily     PHYSICAL EXAMINATION  T(C): , Max: 37.7 (05-10 @ 09:00) HR: 112 (80 - 168) BP: 107/71 (93/53 - 173/120) RR: 37 (16 - 37) SpO2: 95% (93% - 100%)  NEUROLOGIC EXAMINATION:  Patient is awake, alert, fully oriented, pupils 2-3mm equal and briskly reactive to light, EOMs intact, R facial droop, R UE 2/5, and RLE L UE/LE 5/5   GENERAL:  not intubated, not in cardiorespiratory distress  EENT: anicteric  CARDIOVASC:  (+) S1 S2, tachycardic and irregularly irregular rhythm  PULMONARY:  clear to auscultation bilaterally  ABDOMEN:  soft, nontender, with normoactive bowel sounds  EXTREMITIES:  no edema, no groin hematoma on R, distal pulses good  SKIN:  no rash    LABS:  CAPILLARY BLOOD GLUCOSE 151 (10 May 2017 16:00) 257 (10 May 2017 11:00)             (15.2)    10.0   19.3  )-----------( 361      ( 11 May 2017 04:25 )             31.2     138  |  105  |  26<H>  ----------------------------<  114<H>  4.2   |  21<L>  |  0.87    Ca    8.6      11 May 2017 04:25  Phos  2.0       Mg     2.3         I & Os for current day (as of  @ 07:40)  IN: 990 ml / OUT: 850 ml / NET: 140 ml    Bacteriology:  CSF studies:  EEG:  Neuroimagin/11 CT head: s/p L high parietal occipital craniotomy with subdural collection of acute hemorrhage and pneumocephalus;  L frontal convexity menigioma abutting SSS, involving sinus wall, intraosseous extension, mass effect, small edema.  Other imaging:    MEDICATIONS: mod ISS docusate senna levetiracetam 500 BID lamotrigine 150 daily metoprolol  daily pantoprazole 40 folic acid dexamethasone 4mg IV q6h valsartan 160 daily percocet scopolamine patch  MEDICATIONS: mod ISS docusate senna levetiracetam 500 BID lamotrigine 150 daily metoprolol  mg daily pantoprazole 40 folic acid valsartan 160mg daily percocet scopolamine patch 1.5 q3d lantus 10 in AM dexamethasone 4mg q8h diltiazem 60mg q6h SQL quetiapine 25mg BID     IV FLUIDS: banana bag  DRIPS:   DIET: CCD  Lines / Drains: Herrera    CODE STATUS:  full code                       GOALS OF CARE:  aggressive                      DISPOSITION:  ICU =================================  NEUROCRITICAL CARE ATTENDING NOTE  =================================    NANY HOUGH   MRN-[id number]  Summary:  72M with h/o Afib not on AC, h/o GI bleed, CAD, Diabetes Mellitus dyslipidemia, presented with seizures, on work-up found a meningioma.  Admitted on  for pre-op angio / embolization. 05/10 SVT / Afib in RVR, on cardizem drip  Overnight Events: cardizem drip transitioned to PO overnight; agitation overnight given haldol / ativan and started on precedex drip, now off    PAST MEDICAL & SURGICAL HISTORY: Pulmonary hypertension DM (diabetes mellitus) Seizure Meningioma: left frontal Other hyperlipidemia Essential hypertension H/O cataract removal with insertion of prosthetic lens: B/L H/O right inguinal hernia repair  NKDA  Home meds: exforge HCT 10/320/25 (amlodipine valsartan HCTZ) fenofibrate 135 dailiy zetia 10mg daily folic acid daily metformin 1g BID omeprazole 40mg daily onglyza (saxagliptin) 5mg daily thiamine 50mg daily levetiracetam 500mg BID lamictal 150mg daily metoprolol 100mg daily     PHYSICAL EXAMINATION  T(C): , Max: 37.7 (05-10 @ 09:00) HR: 112 (80 - 168) BP: 107/71 (93/53 - 173/120) RR: 37 (16 - 37) SpO2: 95% (93% - 100%)  NEUROLOGIC EXAMINATION:  Patient is awake, alert, fully oriented, pupils 2-3mm equal and briskly reactive to light, EOMs intact, R facial droop, R UE 2/5, and RLE L UE/LE 5/5   GENERAL:  not intubated, not in cardiorespiratory distress  EENT: anicteric  CARDIOVASC:  (+) S1 S2, tachycardic and irregularly irregular rhythm  PULMONARY:  clear to auscultation bilaterally  ABDOMEN:  soft, nontender, with normoactive bowel sounds  EXTREMITIES:  no edema, no groin hematoma on R, distal pulses good  SKIN:  no rash    LABS:  CAPILLARY BLOOD GLUCOSE 151 (10 May 2017 16:00) 257 (10 May 2017 11:00)             (15.2)    10.0   19.3  )-----------( 361      ( 11 May 2017 04:25 )             31.2     138  |  105  |  26<H>  ----------------------------<  114<H>  4.2   |  21<L>  |  0.87    Ca    8.6      11 May 2017 04:25  Phos  2.0       Mg     2.3         I & Os for current day (as of  @ 07:40)  IN: 990 ml / OUT: 850 ml / NET: 140 ml    Bacteriology:  CSF studies:  EEG:  Neuroimagin/11 CT head: s/p L high parietal occipital craniotomy with subdural collection of acute hemorrhage and pneumocephalus;  L frontal convexity menigioma abutting SSS, involving sinus wall, intraosseous extension, mass effect, small edema.  Other imaging:    MEDICATIONS: mod ISS docusate senna levetiracetam 500 BID lamotrigine 150 daily metoprolol  mg daily pantoprazole 40 folic acid valsartan 160mg daily percocet scopolamine patch 1.5 q3d lantus 10 in AM dexamethasone 4mg q8h diltiazem 60mg q6h SQL quetiapine 25mg BID     IV FLUIDS: banana bag  DRIPS:   DIET: CCD  Lines / Drains: restraints    CODE STATUS:  full code                       GOALS OF CARE:  aggressive                      DISPOSITION:  ICU

## 2017-05-11 NOTE — PROGRESS NOTE ADULT - SUBJECTIVE AND OBJECTIVE BOX
HPI:  Pt is 38yo female, R handed, Jehovah witness,  PMH: Proximal femoral focal deficiency syndrome, Mitral Valve repair (2012), 3 pregnancies, currently on ASA 81, presented to [facility] ED with complaints of L side numbness and tingling and bowel bladder incontinence x 1.5 weeks.  Pt developed transient L sided numbness and tingling since 2010.  Pt also reports being newly diagnosed with cystic lesion of the pituitary gland in 2015 for work up of a headache.  Pt was seen by Dr. Blevins and the plan was to observe the pituitary cystic lesion.  Pt states L sided numbness and tingling has completely resolved at this point.  Pt s/o generalized weakness, but denies sob, cp, acute visual changes, chills/fevers. (04 May 2017 23:10)    upon transfer to SDU went into afib, started dilt   overnight complicated by agitation and disorientation, given ativan, haldol, started precedex drip  CTH obtained showing pneumocephalus      OVERNIGHT EVENTS:  Vital Signs Last 24 Hrs  T(C): 37.1, Max: 37.1 (05-11 @ 05:36)  T(F): 98.7, Max: 98.7 (05-11 @ 05:36)  HR: 108 (74 - 110)  BP: 151/77 (111/59 - 151/77)  BP(mean): 100 (81 - 106)  RR: 11 (9 - 24)  SpO2: 98% (95% - 100%)    I&O's Detail  I & Os for 24h ending 10 May 2017 07:00  =============================================  IN:    Total IN: 0 ml  ---------------------------------------------  OUT:    Voided: 650 ml    Total OUT: 650 ml  ---------------------------------------------  Total NET: -650 ml    I & Os for current day (as of 11 May 2017 06:58)  =============================================  IN:    sodium chloride 2% .: 375 ml    sodium chloride 2%: 250 ml    sodium chloride 0.9%: 240 ml    Total IN: 865 ml  ---------------------------------------------  OUT:    Indwelling Catheter - Urethral: 1150 ml    Total OUT: 1150 ml  ---------------------------------------------  Total NET: -285 ml    I&O's Summary  I & Os for 24h ending 10 May 2017 07:00  =============================================  IN: 0 ml / OUT: 650 ml / NET: -650 ml    I & Os for current day (as of 11 May 2017 06:58)  =============================================  IN: 865 ml / OUT: 1150 ml / NET: -285 ml      PHYSICAL EXAM:  Neurological:    Motor exam:         [] Upper extremity              Bi(c5)  WE(c6)  EE(c7)   FF(c8)                                                R         5/5        5/5        5/5       5/5                                               L          5/5        5/5        5/5       5/5         [] Lower extremeity          HF(l2)   KE(l3)    TA(l4)   EHL(l5)  GS(s1)                                                 R        5/5        5/5        5/5       5/5         5/5                                               L         5/5        5/5       5/5       5/5          5/5                                                        [] warm well perfused; capillary refill <3 seconds     Sensation: [] intact to light touch  [] decreased:       Cardiovascular:  Respiratory:  Gastrointestinal:  Genitourinary:  Extremities:  Incision/Wound:    TUBES/LINES:  [] CVC  [] A-line  [] Lumbar Drain  [] Ventriculostomy  [] Other    DIET:  [] NPO  [] Mechanical  [] Tube feeds    LABS:                        10.6   18.8  )-----------( 242      ( 11 May 2017 04:20 )             32.0     05-11    142  |  110<H>  |  14  ----------------------------<  130<H>  3.9   |  22  |  0.54    Ca    7.8<L>      11 May 2017 04:20  Phos  2.8     05-11  Mg     2.1     05-11    TPro  7.2  /  Alb  3.3<L>  /  TBili  0.1<L>  /  DBili  x   /  AST  17  /  ALT  23  /  AlkPhos  102  05-10    PT/INR - ( 10 May 2017 18:54 )   PT: 11.9 sec;   INR: 1.07          PTT - ( 10 May 2017 18:54 )  PTT:28.5 sec        CAPILLARY BLOOD GLUCOSE  135 (11 May 2017 06:00)  157 (11 May 2017 01:00)      Drug Levels: [] N/A    CSF Analysis: [] N/A      Allergies    Atrovent (Short breath)    Intolerances    Zosyn (Nephrotoxicity)    MEDICATIONS:  Antibiotics:    Neuro:  topiramate 25milliGRAM(s) Oral four times a day  citalopram 40milliGRAM(s) Oral daily  metoclopramide Injectable 10milliGRAM(s) IV Push every 6 hours PRN  ondansetron Injectable 4milliGRAM(s) IV Push every 6 hours PRN  HYDROmorphone  Injectable 0.5milliGRAM(s) IV Push once PRN  acetaminophen  IVPB. 1000milliGRAM(s) IV Intermittent once    Anticoagulation:    OTHER:  metoprolol succinate ER 25milliGRAM(s) Oral daily  ALBUTerol    90 MICROgram(s) HFA Inhaler 2Puff(s) Inhalation every 6 hours PRN  docusate sodium 100milliGRAM(s) Oral two times a day  montelukast 10milliGRAM(s) Oral at bedtime  pantoprazole    Tablet 40milliGRAM(s) Oral before breakfast  senna 2Tablet(s) Oral at bedtime PRN  labetalol Injectable 20milliGRAM(s) IV Push every 1 hour PRN  hydrALAZINE Injectable 10milliGRAM(s) IV Push every 1 hour PRN  dexamethasone  Injectable 4milliGRAM(s) IV Push every 6 hours  insulin lispro (HumaLOG) corrective regimen sliding scale  SubCutaneous Before meals and at bedtime    IVF:  folic acid 1milliGRAM(s) Oral daily    CULTURES:    RADIOLOGY & ADDITIONAL TESTS: Status post interval left high parietal occipital craniotomy   with underlying subdural collection containing foci of acute hemorrhage   as well as pneumocephalus. No midline shift or evidence of central   herniation.      ASSESSMENT:  39y Female s/p    PLAN:  NEURO:    CARDIOVASCULAR:    PULMONARY:    RENAL:    GI:    HEME:    ID:    ENDO:    DVT PROPHYLAXIS:  [] Venodynes                                [] Heparin/Lovenox    FALL RISK:  [] Low Risk                                    [] Impulsive    DISPOSITION: HPI:  Pt is 38yo female, R handed, Jehovah witness,  PMH: Proximal femoral focal deficiency syndrome, Mitral Valve repair (2012), 3 pregnancies, currently on ASA 81, presented to [facility] ED with complaints of L side numbness and tingling and bowel bladder incontinence x 1.5 weeks.  Pt developed transient L sided numbness and tingling since 2010.  Pt also reports being newly diagnosed with cystic lesion of the pituitary gland in 2015 for work up of a headache.  Pt was seen by Dr. Blevins and the plan was to observe the pituitary cystic lesion.  Pt states L sided numbness and tingling has completely resolved at this point.  Pt s/o generalized weakness, but denies sob, cp, acute visual changes, chills/fevers. (04 May 2017 23:10)    upon transfer to SDU went into afib, started dilt   overnight complicated by agitation and disorientation, given ativan, haldol, started precedex drip  CTH obtained showing pneumocephalus      OVERNIGHT EVENTS:  Vital Signs Last 24 Hrs  T(C): 37.1, Max: 37.1 (05-11 @ 05:36)  T(F): 98.7, Max: 98.7 (05-11 @ 05:36)  HR: 108 (74 - 110)  BP: 151/77 (111/59 - 151/77)  BP(mean): 100 (81 - 106)  RR: 11 (9 - 24)  SpO2: 98% (95% - 100%)    I&O's Detail  I & Os for 24h ending 10 May 2017 07:00  =============================================  IN:    Total IN: 0 ml  ---------------------------------------------  OUT:    Voided: 650 ml    Total OUT: 650 ml  ---------------------------------------------  Total NET: -650 ml    I & Os for current day (as of 11 May 2017 06:58)  =============================================  IN:    sodium chloride 2% .: 375 ml    sodium chloride 2%: 250 ml    sodium chloride 0.9%: 240 ml    Total IN: 865 ml  ---------------------------------------------  OUT:    Indwelling Catheter - Urethral: 1150 ml    Total OUT: 1150 ml  ---------------------------------------------  Total NET: -285 ml    I&O's Summary  I & Os for 24h ending 10 May 2017 07:00  =============================================  IN: 0 ml / OUT: 650 ml / NET: -650 ml    I & Os for current day (as of 11 May 2017 06:58)  =============================================  IN: 865 ml / OUT: 1150 ml / NET: -285 ml      PHYSICAL EXAM:  Neurological:  awake, alert, confused, intermittently agitated  EOMI, PERRL, rt facial droop, RUE 2/5, o/w 5/5   [place] x 4, exam non focal  incision c/d/i    TUBES/LINES:  pivs    DIET:  [x NPO  [] Mechanical  [] Tube feeds    LABS:                        10.6   18.8  )-----------( 242      ( 11 May 2017 04:20 )             32.0     05-11    142  |  110<H>  |  14  ----------------------------<  130<H>  3.9   |  22  |  0.54    Ca    7.8<L>      11 May 2017 04:20  Phos  2.8     05-11  Mg     2.1     05-11    TPro  7.2  /  Alb  3.3<L>  /  TBili  0.1<L>  /  DBili  x   /  AST  17  /  ALT  23  /  AlkPhos  102  05-10    PT/INR - ( 10 May 2017 18:54 )   PT: 11.9 sec;   INR: 1.07          PTT - ( 10 May 2017 18:54 )  PTT:28.5 sec        CAPILLARY BLOOD GLUCOSE  135 (11 May 2017 06:00)  157 (11 May 2017 01:00)      Drug Levels: [] N/A    CSF Analysis: [] N/A      Allergies    Atrovent (Short breath)    Intolerances    Zosyn (Nephrotoxicity)    MEDICATIONS:  Antibiotics:    Neuro:  topiramate 25milliGRAM(s) Oral four times a day  citalopram 40milliGRAM(s) Oral daily  metoclopramide Injectable 10milliGRAM(s) IV Push every 6 hours PRN  ondansetron Injectable 4milliGRAM(s) IV Push every 6 hours PRN  HYDROmorphone  Injectable 0.5milliGRAM(s) IV Push once PRN  acetaminophen  IVPB. 1000milliGRAM(s) IV Intermittent once    Anticoagulation:    OTHER:  metoprolol succinate ER 25milliGRAM(s) Oral daily  ALBUTerol    90 MICROgram(s) HFA Inhaler 2Puff(s) Inhalation every 6 hours PRN  docusate sodium 100milliGRAM(s) Oral two times a day  montelukast 10milliGRAM(s) Oral at bedtime  pantoprazole    Tablet 40milliGRAM(s) Oral before breakfast  senna 2Tablet(s) Oral at bedtime PRN  labetalol Injectable 20milliGRAM(s) IV Push every 1 hour PRN  hydrALAZINE Injectable 10milliGRAM(s) IV Push every 1 hour PRN  dexamethasone  Injectable 4milliGRAM(s) IV Push every 6 hours  insulin lispro (HumaLOG) corrective regimen sliding scale  SubCutaneous Before meals and at bedtime    IVF:  folic acid 1milliGRAM(s) Oral daily    CULTURES:    RADIOLOGY & ADDITIONAL TESTS: Status post interval left high parietal occipital craniotomy   with underlying subdural collection containing foci of acute hemorrhage   as well as pneumocephalus. No midline shift or evidence of central   herniation.      ASSESSMENT:  71 y/o male s/p menigioma resection     PLAN:  NEURO:  q1h neuro checks  prn tylenol  cont sz ppx  librium for DTs, banana bag    CARDIOVASCULAR:  -150  cont dilt    PULMONARY:  supplemental o2    RENAL:  NS hydration at 75cc /hr    GI:  NPO for now  PPI    HEME:  h/h stable    ID:  afeb  wbc stable    ENDO:  ISS    DVT PROPHYLAXIS:  [x] Venodynes                                [x] Lovenox    DISPOSITION:   ICU status full code  d/r Dr. Estevez

## 2017-05-11 NOTE — PROGRESS NOTE ADULT - ATTENDING COMMENTS
PLAN:   NEURO: neurochecks q1h, pain meds with tylenol  meningioma s/p angio/embo:  s/p resection today, frozen: meningioma, f/u histopathology  seizure treatment: continue levetiracetam and lamotrigine  REHAB:  physical therapy evaluation and management    EARLY MOB:   HOB up    PULM:  PRN O2 supplement, incentive spirometry  CARDIO:  SBP goal 100-150mm Hg, continue metoprolol (off exforge); h/o AFib not on anticoagulation due to h/o GI bleed, will discuss re: need for anticoagulation; rate controlled with metoprolol; cardizem drip, transition to PO later  ENDO:  Blood sugar goals 140-180 mg/dL, continue insulin sliding scale, Hemoglobin A1C = 6.8, LDL 66 HDL 28  total 120, cont fenofibrate / zetia; lantus 10 this am  GI:  PPI -   DIET: cont diet   RENAL:  Iv lock  HEM/ONC: Hb stable  VTE Prophylaxis:  SCDs only, start DVT chemoprophylaxis tonight if okay with neurosurgery; Doppler NEG  ID: afebrile, no leukocytosis  Social: will update family    Patient at high risk for neurological deterioration or death due to:   seizure, delirium, intracranial bleeding.   Critical care time, excluding procedures: 45 minutes. PLAN:   NEURO: neurochecks q1h, pain meds with tylenol  meningioma s/p angio/embo:  s/p resection today, frozen: meningioma, f/u histopathology  seizure treatment: continue levetiracetam and lamotrigine  DT - librium taper, banana bag  REHAB:  physical therapy evaluation and management    EARLY MOB:   flat on bed    PULM:  NRB O2, incentive spirometry  CARDIO:  SBP goal 100-150mm Hg, continue metoprolol (off exforge); h/o AFib not on anticoagulation due to h/o GI bleed, will discuss re: need for anticoagulation; rate controlled with metoprolol; cont PO cardizem  ENDO:  Blood sugar goals 140-180 mg/dL, continue insulin sliding scale, Hemoglobin A1C = 6.8, LDL 66 HDL 28  total 120, cont fenofibrate / zetia; lantus 10 this am  GI:  PPI -   DIET: NPO while lethargic  RENAL:  NS75cc/hr  HEM/ONC: Hb stable  VTE Prophylaxis:  SCDs only, SQL, Doppler NEG  ID: afebrile, no leukocytosis  Social: will update family    Patient at high risk for neurological deterioration or death due to:   seizure, delirium, intracranial bleeding.   Critical care time, excluding procedures: 45 minutes.

## 2017-05-11 NOTE — PROGRESS NOTE ADULT - ASSESSMENT
72M with meninigioma L frontal lobe, brain compression, cerebral edema, s/p L crani, resection of meningioma (Dr. Estevez, 05/09/2017), s/p angio / embo (05/08/17, Dr. Blevins); Diabetes Mellitus, fair control, Hypertension, seizure disorder, dyslipidemia, h/o pulmonary hypertension; acute delirium

## 2017-05-12 LAB
ANION GAP SERPL CALC-SCNC: 8 MMOL/L — LOW (ref 9–16)
BUN SERPL-MCNC: 31 MG/DL — HIGH (ref 7–23)
CALCIUM SERPL-MCNC: 8.9 MG/DL — SIGNIFICANT CHANGE UP (ref 8.5–10.5)
CHLORIDE SERPL-SCNC: 108 MMOL/L — SIGNIFICANT CHANGE UP (ref 96–108)
CO2 SERPL-SCNC: 23 MMOL/L — SIGNIFICANT CHANGE UP (ref 22–31)
CREAT SERPL-MCNC: 0.65 MG/DL — SIGNIFICANT CHANGE UP (ref 0.5–1.3)
GLUCOSE SERPL-MCNC: 176 MG/DL — HIGH (ref 70–99)
HCT VFR BLD CALC: 29.1 % — LOW (ref 39–50)
HGB BLD-MCNC: 9.2 G/DL — LOW (ref 13–17)
MAGNESIUM SERPL-MCNC: 2.2 MG/DL — SIGNIFICANT CHANGE UP (ref 1.6–2.6)
MCHC RBC-ENTMCNC: 24.5 PG — LOW (ref 27–34)
MCHC RBC-ENTMCNC: 31.6 G/DL — LOW (ref 32–36)
MCV RBC AUTO: 77.4 FL — LOW (ref 80–100)
PHOSPHATE SERPL-MCNC: 2.4 MG/DL — LOW (ref 2.5–4.5)
PLATELET # BLD AUTO: 338 K/UL — SIGNIFICANT CHANGE UP (ref 150–400)
POTASSIUM SERPL-MCNC: 4 MMOL/L — SIGNIFICANT CHANGE UP (ref 3.5–5.3)
POTASSIUM SERPL-SCNC: 4 MMOL/L — SIGNIFICANT CHANGE UP (ref 3.5–5.3)
RBC # BLD: 3.76 M/UL — LOW (ref 4.2–5.8)
RBC # FLD: 16.6 % — SIGNIFICANT CHANGE UP (ref 10.3–16.9)
SODIUM SERPL-SCNC: 139 MMOL/L — SIGNIFICANT CHANGE UP (ref 135–145)
WBC # BLD: 12.4 K/UL — HIGH (ref 3.8–10.5)
WBC # FLD AUTO: 12.4 K/UL — HIGH (ref 3.8–10.5)

## 2017-05-12 PROCEDURE — 99291 CRITICAL CARE FIRST HOUR: CPT | Mod: 24

## 2017-05-12 RX ORDER — DEXAMETHASONE 0.5 MG/5ML
2 ELIXIR ORAL EVERY 12 HOURS
Qty: 0 | Refills: 0 | Status: COMPLETED | OUTPATIENT
Start: 2017-05-12 | End: 2017-05-13

## 2017-05-12 RX ORDER — THIAMINE MONONITRATE (VIT B1) 100 MG
100 TABLET ORAL DAILY
Qty: 0 | Refills: 0 | Status: DISCONTINUED | OUTPATIENT
Start: 2017-05-12 | End: 2017-05-18

## 2017-05-12 RX ORDER — POTASSIUM PHOSPHATE, MONOBASIC POTASSIUM PHOSPHATE, DIBASIC 236; 224 MG/ML; MG/ML
15 INJECTION, SOLUTION INTRAVENOUS ONCE
Qty: 0 | Refills: 0 | Status: COMPLETED | OUTPATIENT
Start: 2017-05-12 | End: 2017-05-12

## 2017-05-12 RX ORDER — FOLIC ACID 0.8 MG
1 TABLET ORAL DAILY
Qty: 0 | Refills: 0 | Status: DISCONTINUED | OUTPATIENT
Start: 2017-05-12 | End: 2017-05-18

## 2017-05-12 RX ADMIN — Medication 1 TABLET(S): at 12:19

## 2017-05-12 RX ADMIN — Medication 100 MILLIGRAM(S): at 07:05

## 2017-05-12 RX ADMIN — Medication 1 MILLIGRAM(S): at 12:19

## 2017-05-12 RX ADMIN — LEVETIRACETAM 500 MILLIGRAM(S): 250 TABLET, FILM COATED ORAL at 17:35

## 2017-05-12 RX ADMIN — ENOXAPARIN SODIUM 40 MILLIGRAM(S): 100 INJECTION SUBCUTANEOUS at 23:21

## 2017-05-12 RX ADMIN — Medication 2: at 07:13

## 2017-05-12 RX ADMIN — Medication 4: at 17:34

## 2017-05-12 RX ADMIN — LEVETIRACETAM 500 MILLIGRAM(S): 250 TABLET, FILM COATED ORAL at 07:13

## 2017-05-12 RX ADMIN — LAMOTRIGINE 150 MILLIGRAM(S): 25 TABLET, ORALLY DISINTEGRATING ORAL at 12:19

## 2017-05-12 RX ADMIN — VALSARTAN 160 MILLIGRAM(S): 80 TABLET ORAL at 07:05

## 2017-05-12 RX ADMIN — POTASSIUM PHOSPHATE, MONOBASIC POTASSIUM PHOSPHATE, DIBASIC 62.5 MILLIMOLE(S): 236; 224 INJECTION, SOLUTION INTRAVENOUS at 09:27

## 2017-05-12 RX ADMIN — Medication 2 MILLIGRAM(S): at 17:34

## 2017-05-12 RX ADMIN — PANTOPRAZOLE SODIUM 40 MILLIGRAM(S): 20 TABLET, DELAYED RELEASE ORAL at 07:05

## 2017-05-12 RX ADMIN — Medication 100 MILLIGRAM(S): at 12:19

## 2017-05-12 RX ADMIN — Medication 100 MILLIGRAM(S): at 15:41

## 2017-05-12 RX ADMIN — INSULIN GLARGINE 10 UNIT(S): 100 INJECTION, SOLUTION SUBCUTANEOUS at 07:04

## 2017-05-12 RX ADMIN — Medication 2 MILLIGRAM(S): at 07:06

## 2017-05-12 RX ADMIN — Medication 100 MILLIGRAM(S): at 22:29

## 2017-05-12 RX ADMIN — Medication 2: at 22:29

## 2017-05-12 NOTE — PROGRESS NOTE ADULT - SUBJECTIVE AND OBJECTIVE BOX
HPI:  72 y. o Right  handed male h/o rate controlled Afib not on AC, h/o GIB, CAD, DM, HLD found to have a left frontal mass likely a meningioma.  Initially presented with seizure episode and currently has right sided trace weakness RUE>RLE right arm spasticity.  Patient presents today for pre op cerebral angiogram and possible embolization. (08 May 2017 08:57)    OVERNIGHT EVENTS: No acute events overnight. Ativan prn started.  Vital Signs Last 24 Hrs  T(C): 36.4, Max: 37.1 (05-11 @ 14:19)  T(F): 97.5, Max: 98.8 (05-11 @ 14:19)  HR: 66 (60 - 98)  BP: 128/75 (89/63 - 164/103)  BP(mean): 93 (70 - 126)  RR: 13 (10 - 20)  SpO2: 95% (94% - 100%)    I&O's Detail    I & Os for current day (as of 12 May 2017 09:02)  =============================================  IN:    multivitamin/thiamine/folic acid in sodium chloride 0.9%: 1250 ml    sodium chloride 0.9%.: 525 ml    Total IN: 1775 ml  ---------------------------------------------  OUT:    Voided: 1100 ml    Total OUT: 1100 ml  ---------------------------------------------  Total NET: 675 ml    I&O's Summary    I & Os for current day (as of 12 May 2017 09:02)  =============================================  IN: 1775 ml / OUT: 1100 ml / NET: 675 ml      PHYSICAL EXAM:  Neurological: alert, confused, intermittently agitated  CNII-XII: EOMI, PERRL, R facial droop, RUE 2/5, o/w 5/5   [place] x 4 spontaneously         [x] warm well perfused; capillary refill <3 seconds   Incision/Wound: Scalp incision site C/D/I    TUBES/LINES:  [] CVC  [] A-line  [] Lumbar Drain  [] Ventriculostomy  [] Other    DIET:  [x] NPO  [] Mechanical  [] Tube feeds    LABS:                        9.2    12.4  )-----------( 338      ( 12 May 2017 06:25 )             29.1     05-12    139  |  108  |  31<H>  ----------------------------<  176<H>  4.0   |  23  |  0.65    Ca    8.9      12 May 2017 06:25  Phos  2.4     05-12  Mg     2.2     05-12      PT/INR - ( 11 May 2017 04:25 )   PT: 12.2 sec;   INR: 1.10          PTT - ( 11 May 2017 04:25 )  PTT:30.2 sec        CAPILLARY BLOOD GLUCOSE  170 (12 May 2017 07:00)  170 (11 May 2017 22:00)  143 (11 May 2017 16:00)  150 (11 May 2017 12:00)      Drug Levels: [] N/A    CSF Analysis: [] N/A      Allergies    No Known Allergies    Intolerances      MEDICATIONS:  Antibiotics:    Neuro:  levETIRAcetam 500milliGRAM(s) Oral two times a day  lamoTRIgine 150milliGRAM(s) Oral daily  acetaminophen   Tablet. 325milliGRAM(s) Oral every 4 hours PRN  acetaminophen   Tablet. 650milliGRAM(s) Oral every 6 hours PRN  ondansetron Injectable 4milliGRAM(s) IV Push every 6 hours PRN  oxyCODONE  5 mG/acetaminophen 325 mG 1Tablet(s) Oral every 4 hours PRN  LORazepam   Injectable 1milliGRAM(s) IntraMuscular every 6 hours PRN    Anticoagulation:  enoxaparin Injectable 40milliGRAM(s) SubCutaneous at bedtime    OTHER:  insulin lispro (HumaLOG) corrective regimen sliding scale  SubCutaneous Before meals and at bedtime  dextrose Gel 1Dose(s) Oral once PRN  dextrose 50% Injectable 12.5Gram(s) IV Push once  glucagon  Injectable 1milliGRAM(s) IntraMuscular once PRN  docusate sodium 100milliGRAM(s) Oral three times a day  senna 2Tablet(s) Oral at bedtime PRN  metoprolol succinate ER 100milliGRAM(s) Oral daily  pantoprazole    Tablet 40milliGRAM(s) Oral before breakfast  valsartan 160milliGRAM(s) Oral daily  labetalol Injectable 10milliGRAM(s) IV Push every 4 hours PRN  insulin glargine Injectable (LANTUS) 10Unit(s) SubCutaneous every morning  diltiazem    Tablet 60milliGRAM(s) Oral every 6 hours  dexamethasone     Tablet 2milliGRAM(s) Oral every 8 hours    IVF:  dextrose 5%. 1000milliLiter(s) IV Continuous <Continuous>  multivitamin/thiamine/folic acid in sodium chloride 0.9% 1000milliLiter(s) IV Continuous <Continuous>  sodium chloride 0.9%. 1000milliLiter(s) IV Continuous <Continuous>  potassium phosphate IVPB 15milliMole(s) IV Intermittent once    CULTURES:    RADIOLOGY & ADDITIONAL TESTS:      ASSESSMENT:  73y/o male s/p left craniotomy for meningioma resection, s/p angio, embo (05/8/17)    PLAN:  NEURO:  -neuro checks  -prn tylenol  -continue ativan for DTs, banana bag  -continue keppra    CARDIOVASCULAR:  --150    PULMONARY:  -nasal canula    RENAL:  -NS hydration at 75cc /hr    GI:  -NPO for now, speech and swallow eval  -PPI    HEME:  -H/H stable    ID:  -Afebrile    ENDO:  -ISS    DVT PROPHYLAXIS:  [x] Venodynes                                [x] Lovenox    -D/w Dr. Blevins HPI:  72 y. o Right  handed male h/o rate controlled Afib not on AC, h/o GIB, CAD, DM, HLD found to have a left frontal mass likely a meningioma.  Initially presented with seizure episode and currently has right sided trace weakness RUE>RLE right arm spasticity.  Patient presents today for pre op cerebral angiogram and possible embolization. (08 May 2017 08:57)    OVERNIGHT EVENTS: No acute events overnight. Ativan prn started.  Vital Signs Last 24 Hrs  T(C): 36.4, Max: 37.1 (05-11 @ 14:19)  T(F): 97.5, Max: 98.8 (05-11 @ 14:19)  HR: 66 (60 - 98)  BP: 128/75 (89/63 - 164/103)  BP(mean): 93 (70 - 126)  RR: 13 (10 - 20)  SpO2: 95% (94% - 100%)    I&O's Detail    I & Os for current day (as of 12 May 2017 09:02)  =============================================  IN:    multivitamin/thiamine/folic acid in sodium chloride 0.9%: 1250 ml    sodium chloride 0.9%.: 525 ml    Total IN: 1775 ml  ---------------------------------------------  OUT:    Voided: 1100 ml    Total OUT: 1100 ml  ---------------------------------------------  Total NET: 675 ml    I&O's Summary    I & Os for current day (as of 12 May 2017 09:02)  =============================================  IN: 1775 ml / OUT: 1100 ml / NET: 675 ml      PHYSICAL EXAM:  Neurological: AAOX3, FC, dysarthria  CNII-XII: EOMI, PERRL, R facial droop  Motor: [place] x 4, RUE 3/5, LUE 4/5, RLE HF 2/5, DF 3/5 KE/KF/PF 4/5, LLE HF 4+/5, KE/KF/DF/PF 5/5          [x] warm well perfused; capillary refill <3 seconds   Incision/Wound: Scalp incision site C/D/I, staples in place    TUBES/LINES:  [] CVC  [] A-line  [] Lumbar Drain  [] Ventriculostomy  [] Other    DIET:  [x] NPO  [] Mechanical  [] Tube feeds    LABS:                        9.2    12.4  )-----------( 338      ( 12 May 2017 06:25 )             29.1     05-12    139  |  108  |  31<H>  ----------------------------<  176<H>  4.0   |  23  |  0.65    Ca    8.9      12 May 2017 06:25  Phos  2.4     05-12  Mg     2.2     05-12      PT/INR - ( 11 May 2017 04:25 )   PT: 12.2 sec;   INR: 1.10          PTT - ( 11 May 2017 04:25 )  PTT:30.2 sec        CAPILLARY BLOOD GLUCOSE  170 (12 May 2017 07:00)  170 (11 May 2017 22:00)  143 (11 May 2017 16:00)  150 (11 May 2017 12:00)      Drug Levels: [] N/A    CSF Analysis: [] N/A      Allergies    No Known Allergies    Intolerances      MEDICATIONS:  Antibiotics:    Neuro:  levETIRAcetam 500milliGRAM(s) Oral two times a day  lamoTRIgine 150milliGRAM(s) Oral daily  acetaminophen   Tablet. 325milliGRAM(s) Oral every 4 hours PRN  acetaminophen   Tablet. 650milliGRAM(s) Oral every 6 hours PRN  ondansetron Injectable 4milliGRAM(s) IV Push every 6 hours PRN  oxyCODONE  5 mG/acetaminophen 325 mG 1Tablet(s) Oral every 4 hours PRN  LORazepam   Injectable 1milliGRAM(s) IntraMuscular every 6 hours PRN    Anticoagulation:  enoxaparin Injectable 40milliGRAM(s) SubCutaneous at bedtime    OTHER:  insulin lispro (HumaLOG) corrective regimen sliding scale  SubCutaneous Before meals and at bedtime  dextrose Gel 1Dose(s) Oral once PRN  dextrose 50% Injectable 12.5Gram(s) IV Push once  glucagon  Injectable 1milliGRAM(s) IntraMuscular once PRN  docusate sodium 100milliGRAM(s) Oral three times a day  senna 2Tablet(s) Oral at bedtime PRN  metoprolol succinate ER 100milliGRAM(s) Oral daily  pantoprazole    Tablet 40milliGRAM(s) Oral before breakfast  valsartan 160milliGRAM(s) Oral daily  labetalol Injectable 10milliGRAM(s) IV Push every 4 hours PRN  insulin glargine Injectable (LANTUS) 10Unit(s) SubCutaneous every morning  diltiazem    Tablet 60milliGRAM(s) Oral every 6 hours  dexamethasone     Tablet 2milliGRAM(s) Oral every 8 hours    IVF:  dextrose 5%. 1000milliLiter(s) IV Continuous <Continuous>  multivitamin/thiamine/folic acid in sodium chloride 0.9% 1000milliLiter(s) IV Continuous <Continuous>  sodium chloride 0.9%. 1000milliLiter(s) IV Continuous <Continuous>  potassium phosphate IVPB 15milliMole(s) IV Intermittent once    CULTURES:    RADIOLOGY & ADDITIONAL TESTS:      ASSESSMENT:  73y/o male s/p left craniotomy for meningioma resection, s/p angio, embo (05/8/17)    PLAN:  NEURO:  -neuro checks  -prn tylenol  -continue ativan for DTs, banana bag  -continue keppra    CARDIOVASCULAR:  --150    PULMONARY:  -nasal canula    RENAL:  -NS hydration at 75cc /hr    GI:  -NPO for now, f/u dysphagia screen  -PPI    HEME:  -H/H stable    ID:  -Afebrile    ENDO:  -ISS    DVT PROPHYLAXIS:  [x] Venodynes                                [x] Lovenox    -D/w Dr. Blevins

## 2017-05-12 NOTE — PROGRESS NOTE ADULT - SUBJECTIVE AND OBJECTIVE BOX
=================================  NEUROCRITICAL CARE ATTENDING NOTE  =================================    NANY HOUGH   MRN-[id number]  Summary:  72M with h/o Afib not on AC, h/o GI bleed, CAD, Diabetes Mellitus dyslipidemia, presented with seizures, on work-up found a meningioma.  Admitted on  for pre-op angio / embolization. 05/10 SVT / Afib in RVR, on cardizem drip  Overnight Events: cardizem drip transitioned to PO overnight; agitation overnight given haldol / ativan and started on precedex drip, now off    PAST MEDICAL & SURGICAL HISTORY: Pulmonary hypertension DM (diabetes mellitus) Seizure Meningioma: left frontal Other hyperlipidemia Essential hypertension H/O cataract removal with insertion of prosthetic lens: B/L H/O right inguinal hernia repair  NKDA  Home meds: exforge HCT 10/320/25 (amlodipine valsartan HCTZ) fenofibrate 135 dailiy zetia 10mg daily folic acid daily metformin 1g BID omeprazole 40mg daily onglyza (saxagliptin) 5mg daily thiamine 50mg daily levetiracetam 500mg BID lamictal 150mg daily metoprolol 100mg daily     PHYSICAL EXAMINATION  T(C): , Max: 37.1 (05-11 @ 14:19) HR: 74 (60 - 106) BP: 115/64 (89/63 - 164/103) RR: 16 (10 - 22) SpO2: 97% (94% - 100%)  NEUROLOGIC EXAMINATION:  Patient is awake, alert, fully oriented, pupils 2-3mm equal and briskly reactive to light, EOMs intact, R facial droop, R UE 2/5, and RLE L UE/LE 5/5   GENERAL:  not intubated, not in cardiorespiratory distress  EENT: anicteric  CARDIOVASC:  (+) S1 S2, tachycardic and irregularly irregular rhythm  PULMONARY:  clear to auscultation bilaterally  ABDOMEN:  soft, nontender, with normoactive bowel sounds  EXTREMITIES:  no edema, no groin hematoma on R, distal pulses good  SKIN:  no rash    LABS:  CAPILLARY BLOOD GLUCOSE 170 (11 May 2017 22:00) 143 (11 May 2017 16:00) 150 (11 May 2017 12:00)                        9.2    12.4  )-----------( 338      ( 12 May 2017 06:25 )             29.1     139  |  108  |  31<H>  ----------------------------<  176<H>  4.0   |  23  |  0.65    Ca    8.9      12 May 2017 06:25  Phos  2.4       Mg     2.2     12    I & Os for current day (as of  @ 07:20)  IN: 1775 ml / OUT: 1100 ml / NET: 675 ml    Bacteriology:  CSF studies:  EEG:  Neuroimagin/11 CT head: s/p L high parietal occipital craniotomy with subdural collection of acute hemorrhage and pneumocephalus;  L frontal convexity menigioma abutting SSS, involving sinus wall, intraosseous extension, mass effect, small edema.  Other imaging:    MEDICATIONS: mod ISS docusate senna levetiracetam 500 BID lamotrigine 150 daily metoprolol  daily pantoprazole 40 valsartan 160 daily percocet lantus 10 in AM diltiazem 60 q6h SQL HS dexamethasone 2mg q8h ativan PRN    IV FLUIDS: banana bag  DRIPS:   DIET: CCD  Lines / Drains: restraints    CODE STATUS:  full code                       GOALS OF CARE:  aggressive                      DISPOSITION:  ICU =================================  NEUROCRITICAL CARE ATTENDING NOTE  =================================    NANY HOUGH   MRN-[id number]  Summary:  72M with h/o Afib not on AC, h/o GI bleed, CAD, Diabetes Mellitus dyslipidemia, presented with seizures, on work-up found a meningioma.  Admitted on  for pre-op angio / embolization. 05/10 SVT / Afib in RVR, on cardizem drip  DTs - started on librium  Overnight Events: no agitation overnight    PAST MEDICAL & SURGICAL HISTORY: Pulmonary hypertension DM (diabetes mellitus) Seizure Meningioma: left frontal Other hyperlipidemia Essential hypertension H/O cataract removal with insertion of prosthetic lens: B/L H/O right inguinal hernia repair  NKDA  Home meds: exforge HCT 10/320/25 (amlodipine valsartan HCTZ) fenofibrate 135 dailiy zetia 10mg daily folic acid daily metformin 1g BID omeprazole 40mg daily onglyza (saxagliptin) 5mg daily thiamine 50mg daily levetiracetam 500mg BID lamictal 150mg daily metoprolol 100mg daily     PHYSICAL EXAMINATION  T(C): , Max: 37.1 (- @ 14:19) HR: 74 (60 - 106) BP: 115/64 (89/63 - 164/103) RR: 16 (10 - 22) SpO2: 97% (94% - 100%)  NEUROLOGIC EXAMINATION:  Patient is awake, alert, fully oriented, pupils 2-3mm equal and briskly reactive to light, EOMs intact, R facial droop, R UE 3/5, and BLE 4+/5 L UE 5/5   GENERAL:  not intubated, not in cardiorespiratory distress  EENT: anicteric  CARDIOVASC:  (+) S1 S2, normal rate and irregularly irregular rhythm  PULMONARY:  clear to auscultation bilaterally  ABDOMEN:  soft, nontender, with normoactive bowel sounds  EXTREMITIES:  no edema, no groin hematoma on R, distal pulses good  SKIN:  no rash    LABS:  CAPILLARY BLOOD GLUCOSE 170 (11 May 2017 22:00) 143 (11 May 2017 16:00) 150 (11 May 2017 12:00)             (19.3)   9.2    12.4  )-----------( 338      ( 12 May 2017 06:25 )             29.1     139  |  108  |  31<H>  ----------------------------<  176<H>  4.0   |  23  |  0.65    Ca    8.9      12 May 2017 06:25  Phos  2.4       Mg     2.2     12    I & Os for current day (as of  @ 07:20)  IN: 1775 ml / OUT: 1100 ml / NET: 675 ml    Bacteriology:  CSF studies:  EEG:  Neuroimagin/11 CT head: s/p L high parietal occipital craniotomy with subdural collection of acute hemorrhage and pneumocephalus;  L frontal convexity menigioma abutting SSS, involving sinus wall, intraosseous extension, mass effect, small edema.  Other imaging:    MEDICATIONS: mod ISS docusate senna levetiracetam 500 BID lamotrigine 150 daily metoprolol  daily pantoprazole 40 valsartan 160 daily percocet lantus 10 in AM diltiazem 60 q6h SQL HS dexamethasone 2mg q8h ativan PRN    IV FLUIDS: banana bag; NS@75cc/hr  DRIPS:   DIET: CCD  Lines / Drains:     CODE STATUS:  full code                       GOALS OF CARE:  aggressive                      DISPOSITION:  ICU / stepdown

## 2017-05-12 NOTE — PROGRESS NOTE ADULT - ATTENDING COMMENTS
PLAN:   NEURO: neurochecks q1h, pain meds with tylenol  meningioma s/p angio/embo:  s/p resection today, frozen: meningioma, f/u histopathology  seizure treatment: continue levetiracetam and lamotrigine  DT - librium taper, banana bag  REHAB:  physical therapy evaluation and management    EARLY MOB:   flat on bed    PULM:  NRB O2, incentive spirometry  CARDIO:  SBP goal 100-150mm Hg, continue metoprolol (off exforge); h/o AFib not on anticoagulation due to h/o GI bleed, will discuss re: need for anticoagulation; rate controlled with metoprolol; cont PO cardizem  ENDO:  Blood sugar goals 140-180 mg/dL, continue insulin sliding scale, Hemoglobin A1C = 6.8, LDL 66 HDL 28  total 120, cont fenofibrate / zetia; lantus 10 this am  GI:  PPI -   DIET: NPO while lethargic  RENAL:  NS75cc/hr  HEM/ONC: Hb stable  VTE Prophylaxis:  SCDs only, SQL, Doppler NEG  ID: afebrile, no leukocytosis  Social: will update family    Patient at high risk for neurological deterioration or death due to:   seizure, delirium, intracranial bleeding.   Critical care time, excluding procedures: 45 minutes. PLAN:   NEURO: neurochecks q2h, pain meds with tylenol  meningioma s/p angio/embo:  s/p resection today, frozen: meningioma, f/u histopathology  seizure treatment: continue levetiracetam and lamotrigine  DTs - ativan PRN  REHAB:  physical therapy evaluation and management    EARLY MOB:   OOB to chair    PULM:  Room air, incentive spirometry  CARDIO:  SBP goal 100-150mm Hg, continue metoprolol (off exforge); h/o AFib not on anticoagulation due to h/o GI bleed, will discuss re: need for anticoagulation; rate controlled with metoprolol; cont PO cardizem, valsartan  ENDO:  Blood sugar goals 140-180 mg/dL, continue insulin sliding scale, Hemoglobin A1C = 6.8, LDL 66 HDL 28  total 120, cont fenofibrate / zetia; lantus 10 this am  GI:  PPI   DIET: CCD  RENAL:  D/c IVF, d/c banana bag  HEM/ONC: Hb stable  VTE Prophylaxis:  SCDs,  SQL, Doppler NEG  ID: afebrile, no leukocytosis  Social: will update family    Patient at high risk for neurological deterioration or death due to:   seizure, delirium, intracranial bleeding.   Critical care time, excluding procedures: 45 minutes.

## 2017-05-13 ENCOUNTER — TRANSCRIPTION ENCOUNTER (OUTPATIENT)
Age: 72
End: 2017-05-13

## 2017-05-13 LAB
ANION GAP SERPL CALC-SCNC: 6 MMOL/L — LOW (ref 9–16)
BUN SERPL-MCNC: 33 MG/DL — HIGH (ref 7–23)
CALCIUM SERPL-MCNC: 8.3 MG/DL — LOW (ref 8.5–10.5)
CHLORIDE SERPL-SCNC: 106 MMOL/L — SIGNIFICANT CHANGE UP (ref 96–108)
CO2 SERPL-SCNC: 25 MMOL/L — SIGNIFICANT CHANGE UP (ref 22–31)
CREAT SERPL-MCNC: 0.71 MG/DL — SIGNIFICANT CHANGE UP (ref 0.5–1.3)
GLUCOSE SERPL-MCNC: 174 MG/DL — HIGH (ref 70–99)
HCT VFR BLD CALC: 31.5 % — LOW (ref 39–50)
HGB BLD-MCNC: 10.1 G/DL — LOW (ref 13–17)
MAGNESIUM SERPL-MCNC: 2.4 MG/DL — SIGNIFICANT CHANGE UP (ref 1.6–2.6)
MCHC RBC-ENTMCNC: 24.7 PG — LOW (ref 27–34)
MCHC RBC-ENTMCNC: 32.1 G/DL — SIGNIFICANT CHANGE UP (ref 32–36)
MCV RBC AUTO: 77 FL — LOW (ref 80–100)
PHOSPHATE SERPL-MCNC: 3.1 MG/DL — SIGNIFICANT CHANGE UP (ref 2.5–4.5)
PLATELET # BLD AUTO: 428 K/UL — HIGH (ref 150–400)
POTASSIUM SERPL-MCNC: 4.1 MMOL/L — SIGNIFICANT CHANGE UP (ref 3.5–5.3)
POTASSIUM SERPL-SCNC: 4.1 MMOL/L — SIGNIFICANT CHANGE UP (ref 3.5–5.3)
RBC # BLD: 4.09 M/UL — LOW (ref 4.2–5.8)
RBC # FLD: 16.6 % — SIGNIFICANT CHANGE UP (ref 10.3–16.9)
SODIUM SERPL-SCNC: 137 MMOL/L — SIGNIFICANT CHANGE UP (ref 135–145)
WBC # BLD: 14.6 K/UL — HIGH (ref 3.8–10.5)
WBC # FLD AUTO: 14.6 K/UL — HIGH (ref 3.8–10.5)

## 2017-05-13 PROCEDURE — 70553 MRI BRAIN STEM W/O & W/DYE: CPT | Mod: 26

## 2017-05-13 RX ORDER — METOPROLOL TARTRATE 50 MG
5 TABLET ORAL ONCE
Qty: 0 | Refills: 0 | Status: COMPLETED | OUTPATIENT
Start: 2017-05-13 | End: 2017-05-13

## 2017-05-13 RX ADMIN — ENOXAPARIN SODIUM 40 MILLIGRAM(S): 100 INJECTION SUBCUTANEOUS at 21:29

## 2017-05-13 RX ADMIN — Medication 2: at 06:25

## 2017-05-13 RX ADMIN — LEVETIRACETAM 500 MILLIGRAM(S): 250 TABLET, FILM COATED ORAL at 06:25

## 2017-05-13 RX ADMIN — LAMOTRIGINE 150 MILLIGRAM(S): 25 TABLET, ORALLY DISINTEGRATING ORAL at 10:58

## 2017-05-13 RX ADMIN — Medication 4: at 21:30

## 2017-05-13 RX ADMIN — PANTOPRAZOLE SODIUM 40 MILLIGRAM(S): 20 TABLET, DELAYED RELEASE ORAL at 06:25

## 2017-05-13 RX ADMIN — Medication: at 11:42

## 2017-05-13 RX ADMIN — Medication 10 MILLIGRAM(S): at 21:56

## 2017-05-13 RX ADMIN — Medication 100 MILLIGRAM(S): at 06:25

## 2017-05-13 RX ADMIN — LEVETIRACETAM 500 MILLIGRAM(S): 250 TABLET, FILM COATED ORAL at 18:09

## 2017-05-13 RX ADMIN — Medication 5 MILLIGRAM(S): at 21:40

## 2017-05-13 RX ADMIN — Medication 100 MILLIGRAM(S): at 07:11

## 2017-05-13 RX ADMIN — VALSARTAN 160 MILLIGRAM(S): 80 TABLET ORAL at 07:11

## 2017-05-13 RX ADMIN — Medication 100 MILLIGRAM(S): at 13:45

## 2017-05-13 RX ADMIN — Medication 1 MILLIGRAM(S): at 10:22

## 2017-05-13 RX ADMIN — Medication 100 MILLIGRAM(S): at 10:21

## 2017-05-13 RX ADMIN — Medication 100 MILLIGRAM(S): at 21:29

## 2017-05-13 RX ADMIN — Medication 2 MILLIGRAM(S): at 07:11

## 2017-05-13 RX ADMIN — INSULIN GLARGINE 10 UNIT(S): 100 INJECTION, SOLUTION SUBCUTANEOUS at 10:54

## 2017-05-13 RX ADMIN — Medication 2 MILLIGRAM(S): at 18:08

## 2017-05-13 RX ADMIN — Medication 1 TABLET(S): at 10:21

## 2017-05-13 RX ADMIN — Medication: at 18:09

## 2017-05-13 NOTE — OCCUPATIONAL THERAPY INITIAL EVALUATION ADULT - PERTINENT HX OF CURRENT PROBLEM, REHAB EVAL
Patient is a 71 yo RHD male presenting s/p L crani for meningioma resection. Initially presented with seizure episode and right sided weakness (RUE > RLE) and right arm spasticity, s/p angio and embo on 5/8/17

## 2017-05-13 NOTE — OCCUPATIONAL THERAPY INITIAL EVALUATION ADULT - NS ASR FOLLOW COMMAND OT EVAL
Patient reports difficulty with word finding at times/able to follow multistep instructions/100% of the time

## 2017-05-13 NOTE — DISCHARGE NOTE ADULT - PLAN OF CARE
Resume normal activities of daily living 1. You should wash your hair starting 24 hours after being home. Use your normal  shampoo. During the shampoo, massage the staples to remove any dried blood  or crusting. This keeps your wound clean and helps healing. you should shampoo everyday.  2. Pat the wound dry with a clean towel afterwards and leave it open to air. Do not  apply creams or ointments to the wound. Mild swelling around the incision is  common.  3. Follow-up with one of the Nurse Practitioners for suture or staple removal 7-10  days after surgery. Call our office at [phone] to set up the appointment  once you get home.  4. Inform the doctor immediately if you have a fever (above 101), chills, night  sweats, wound drainage, increasing wound redness or pain, nausea, vomiting, or  worsening headache.

## 2017-05-13 NOTE — PROVIDER CONTACT NOTE (CHANGE IN STATUS NOTIFICATION) - ACTION/TREATMENT ORDERED:
Lopressor 5mg IV push ordered and administered. Labetolol 10mg IV push ordered and administered and Cardizem 60mg PO administered. , will continue to monitor

## 2017-05-13 NOTE — DISCHARGE NOTE ADULT - ADDITIONAL INSTRUCTIONS
B. ACTIVITY LEVEL  1. Fatigue is common following brain surgery. Listen to your body and rest if  you feel tired.  2. You should get up and walk around every hour during the daytime.  3. No bending, lifting or twisting for the first 3 weeks, but walking is  recommended.  4. Drink plenty of water.  5. In summer, stay out of direct sun and heat; remain in the shade when  outdoors.   Pain Medications: You may be given a narcotic pain reliever such as Percocet  (oxycodone-acetaminophen). These are for use only for a short time after  surgery. They may cause drowsiness, nausea, and constipation. Take after food   and drink plenty of water to help reduce side effects. Do not drink alcohol with  these medications.

## 2017-05-13 NOTE — OCCUPATIONAL THERAPY INITIAL EVALUATION ADULT - RANGE OF MOTION EXAMINATION
LUE without active or passive deficits noted. RUE PROM WNL.  RUE AROM limited by weakness. R shoulder 0-120, R elbow WFL, R wrist flexion WFL, R wrist extension 0-10, R forearm supination 0-10. Demonstrating R full digit flex/ext. R thumb opposition limited by 2cm to digit II, compensating with thumb adductor

## 2017-05-13 NOTE — PHYSICAL THERAPY INITIAL EVALUATION ADULT - GENERAL OBSERVATIONS, REHAB EVAL
Patient received supine in NAD, denies pain +EKG +IV heplock. Patient left as found +RN Pritee aware + call roach

## 2017-05-13 NOTE — OCCUPATIONAL THERAPY INITIAL EVALUATION ADULT - MANUAL MUSCLE TESTING RESULTS, REHAB EVAL
JARON WFL. R shoulder 3+/5, R elbow flex/ext 3+/5, R supination 2/5, R wrist extension 2/5. L  > R , weak intrinsics

## 2017-05-13 NOTE — PROGRESS NOTE ADULT - SUBJECTIVE AND OBJECTIVE BOX
Subjective: Seen/evaluated at bedside.  Doing well.  No overnight events.  No new complaints    T(C): 36.6, Max: 36.6 (05-12 @ 22:00)  HR: 74 (66 - 96)  BP: 126/66 (98/54 - 159/87)  RR: 16 (12 - 30)  SpO2: 94% (93% - 97%)  Wt(kg): --    Exam: A/Ox3, FC, dysarthric speech  Left side 5/5 strength  Right UE 3/5, Right LE 4/5  Right facial droop, CN otherwise intact  PERRL, EOMI    CBC Full  -  ( 12 May 2017 06:25 )  WBC Count : 12.4 K/uL  Hemoglobin : 9.2 g/dL  Hematocrit : 29.1 %  Platelet Count - Automated : 338 K/uL  Mean Cell Volume : 77.4 fL  Mean Cell Hemoglobin : 24.5 pg  Mean Cell Hemoglobin Concentration : 31.6 g/dL  Auto Neutrophil # : x  Auto Lymphocyte # : x  Auto Monocyte # : x  Auto Eosinophil # : x  Auto Basophil # : x  Auto Neutrophil % : x  Auto Lymphocyte % : x  Auto Monocyte % : x  Auto Eosinophil % : x  Auto Basophil % : x    05-12    139  |  108  |  31<H>  ----------------------------<  176<H>  4.0   |  23  |  0.65    Ca    8.9      12 May 2017 06:25  Phos  2.4     05-12  Mg     2.2     05-12    Wound: C/D/I, +staples    Assessment/Plan: s/p left crani for meningioma  -Neuro checks  -BP control  -AFIB, rate-controlled  -Watch for DTs  -MRI brain w/ and w/out today  -PT/OT/OOB  -DVT/GI ppx  -D/W Dr. Estevez

## 2017-05-13 NOTE — OCCUPATIONAL THERAPY INITIAL EVALUATION ADULT - GENERAL OBSERVATIONS, REHAB EVAL
Communicated with FLEX Severino prior to session. Patient received supine with HOB elevated in NAD, +R PIV medlocked, +tele Communicated with FLEX Severino prior to session. Patient received supine with HOB elevated in NAD, +R PIV medlocked, +tele. Incision c/d/i

## 2017-05-13 NOTE — PHYSICAL THERAPY INITIAL EVALUATION ADULT - CRITERIA FOR SKILLED THERAPEUTIC INTERVENTIONS
anticipated discharge recommendation/therapy frequency/impairments found/rehab potential/functional limitations in following categories

## 2017-05-13 NOTE — DISCHARGE NOTE ADULT - MEDICATION SUMMARY - MEDICATIONS TO STOP TAKING
I will STOP taking the medications listed below when I get home from the hospital:    Exforge HCT 10 mg-320 mg-25 mg oral tablet  -- 1 tab(s) by mouth once a day    fenofibric acid 135 mg oral delayed release capsule  -- 1 cap(s) by mouth once a day    Zetia 10 mg oral tablet  -- 1 tab(s) by mouth once a day    metFORMIN 1000 mg oral tablet  -- 1 tab(s) by mouth 2 times a day    omeprazole 40 mg oral delayed release capsule  -- 1 cap(s) by mouth once a day    Onglyza 5 mg oral tablet  -- 1 tab(s) by mouth once a day

## 2017-05-13 NOTE — OCCUPATIONAL THERAPY INITIAL EVALUATION ADULT - FINE MOTOR COORDINATION, RIGHT HAND, GRAPHOMOTOR SKILLS, OT EVAL
unable to perform/Patient reports being unable to properly write for past 4 months since seizure medication was changed

## 2017-05-13 NOTE — DISCHARGE NOTE ADULT - MEDICATION SUMMARY - MEDICATIONS TO TAKE
I will START or STAY ON the medications listed below when I get home from the hospital:    dexamethasone 2 mg oral tablet  -- 1 tab(s) by mouth 2 times a day  -- Indication: For Brain swelling    Diovan 160 mg oral tablet  -- 1 tab(s) by mouth once a day  -- Indication: For Hypertension    dilTIAZem 360 mg/24 hours oral capsule, extended release  -- 1 cap(s) by mouth once a day  -- Indication: For Atrial fibrillation    enoxaparin  -- 40 milligram(s) subcutaneous once a day (at bedtime)  -- Indication: For DVT prophylaxis    Keppra 500 mg oral tablet  -- 1 tab(s) by mouth 2 times a day  -- Indication: For Antiepileptic    LaMICtal 150 mg oral tablet  --  by mouth once a day  -- Indication: For Antiepileptic    insulin glargine  -- 10 unit(s) subcutaneous once a day (in the morning)  -- Indication: For Diabetes mellitus    insulin lispro 100 units/mL subcutaneous solution  --  subcutaneous 4 times a day (before meals and at bedtime); 2 Unit(s) if Glucose 151 - 200  4 Unit(s) if Glucose 201 - 250  6 Unit(s) if Glucose 251 - 300  8 Unit(s) if Glucose 301 - 350  10 Unit(s) if Glucose 351 - 400  12 Unit(s) if Glucose Greater Than 400  -- Indication: For Diabetes Mellitus    ondansetron 2 mg/mL injectable solution  --  injectable   -- Indication: For Antiemetic    metoprolol succinate 100 mg oral tablet, extended release  -- 1 tab(s) by mouth once a day  -- Indication: For Hypertension/Atrial fibrillation    docusate sodium 100 mg oral capsule  -- 1 cap(s) by mouth 3 times a day  -- Indication: For Stool softener    pantoprazole 40 mg oral delayed release tablet  -- 1 tab(s) by mouth once a day (before a meal)  -- Indication: For GERD and while on steroids    Multiple Vitamins oral tablet  -- 1 tab(s) by mouth once a day  -- Indication: For Vitamins    folic acid 1 mg oral tablet  -- 1 tab(s) by mouth once a day  -- Indication: For Vitamins    thiamine 100 mg oral tablet  -- 1 tab(s) by mouth once a day  -- Indication: For Vitamins

## 2017-05-13 NOTE — DISCHARGE NOTE ADULT - PATIENT PORTAL LINK FT
“You can access the [facility] Patient Portal, offered by [facility], by registering with the following website: [email or web address]”

## 2017-05-13 NOTE — OCCUPATIONAL THERAPY INITIAL EVALUATION ADULT - LIVES WITH, PROFILE
Private home. 1 flight to entrance of home, 1 flight to 1st level of house then 2 flights to bedroom/spouse
spouse/Private home. 1 flight to entrance of home, 1 flight to 1st level of house then 2 flights to bedroom

## 2017-05-13 NOTE — PROVIDER CONTACT NOTE (CHANGE IN STATUS NOTIFICATION) - SITUATION
patient went into Rapid Afib. patient heart rate 130-160 bpm. patient blood pressure 186/113. patient reports no pain at this time. no palpitations

## 2017-05-13 NOTE — DISCHARGE NOTE ADULT - HOSPITAL COURSE
73 y/o Right  handed male h/o rate controlled Afib not on AC, h/o GIB, CAD, DM, HLD found to have a left frontal mass likely a meningioma. Initially presented with seizure episode and currently has right sided trace weakness RUE>RLE, right arm spasticity.  Patient underwent a cerebral angiogram and endovascular embolization of left frontal tumor on 5/8/17. On 5/9/17 Pt underwent left frontoparietal craniotomy, resection of parasagittal meningioma. Post-operative hospital course complicated by delirium tremens. Patient is now stable for discharge to rehab. 73 y/o Right  handed male h/o rate controlled Afib not on AC, h/o GIB, CAD, DM, HLD found to have a left frontal mass likely a meningioma. Initially presented with seizure episode and currently has right sided trace weakness RUE>RLE, right arm spasticity.  Patient underwent a cerebral angiogram and endovascular embolization of left frontal tumor on 5/8/17. On 5/9/17 Pt underwent left frontoparietal craniotomy, resection of parasagittal meningioma. Post-operative hospital course complicated by delirium tremens. On 5/15/2017 R side slightly more wekaer  Head CT stable and increased his steroids. Slow steroid taper and transferred then to rehab at_______________ ____________________ 73 y/o Right  handed male h/o rate controlled Afib not on AC, h/o GIB, CAD, DM, HLD found to have a left frontal mass likely a meningioma. Initially presented with seizure episode and currently has right sided trace weakness RUE>RLE, right arm spasticity.  Patient underwent a cerebral angiogram and endovascular embolization of left frontal tumor on 5/8/17. On 5/9/17 Pt underwent left frontoparietal craniotomy, resection of parasagittal meningioma. Post-operative hospital course complicated by delirium tremens and atrial fibrillation. On 5/15/2017 R side slightly weaker. Head CT stable and increased his steroids. Slow steroid taper and transferred then to rehab at_______________ ____________________ 73 y/o Right  handed male h/o rate controlled Afib not on AC, h/o GIB, CAD, DM, HLD found to have a left frontal mass likely a meningioma. Initially presented with seizure episode and currently has right sided trace weakness RUE>RLE, right arm spasticity.  Patient underwent a cerebral angiogram and endovascular embolization of left frontal tumor on 5/8/17. On 5/9/17 Pt underwent left frontoparietal craniotomy, resection of parasagittal meningioma. Post-operative hospital course complicated by delirium tremens and atrial fibrillation. On 5/15/2017 R side slightly weaker. Head CT stable and increased his steroids. Slow steroid taper. Pt is stable for discharge to rehab.

## 2017-05-13 NOTE — DISCHARGE NOTE ADULT - CARE PROVIDER_API CALL
Anthony Estevez), Neurological Surgery  [address] [address]  Phone: [phone]  Fax: [phone]

## 2017-05-13 NOTE — DISCHARGE NOTE ADULT - CARE PLAN
Goal:	Resume normal activities of daily living  Instructions for follow-up, activity and diet:	1. You should wash your hair starting 24 hours after being home. Use your normal  shampoo. During the shampoo, massage the staples to remove any dried blood  or crusting. This keeps your wound clean and helps healing. you should shampoo everyday.  2. Pat the wound dry with a clean towel afterwards and leave it open to air. Do not  apply creams or ointments to the wound. Mild swelling around the incision is  common.  3. Follow-up with one of the Nurse Practitioners for suture or staple removal 7-10  days after surgery. Call our office at [phone] to set up the appointment  once you get home.  4. Inform the doctor immediately if you have a fever (above 101), chills, night  sweats, wound drainage, increasing wound redness or pain, nausea, vomiting, or  worsening headache. Goal:	Resume normal activities of daily living  Instructions for follow-up, activity and diet:	1. You should wash your hair starting 24 hours after being home. Use your normal  shampoo. During the shampoo, massage the staples to remove any dried blood  or crusting. This keeps your wound clean and helps healing. you should shampoo everyday.  2. Pat the wound dry with a clean towel afterwards and leave it open to air. Do not  apply creams or ointments to the wound. Mild swelling around the incision is  common.  3. Follow-up with one of the Nurse Practitioners for suture or staple removal 7-10  days after surgery. Call our office at [phone] to set up the appointment  once you get home.  4. Inform the doctor immediately if you have a fever (above 101), chills, night  sweats, wound drainage, increasing wound redness or pain, nausea, vomiting, or  worsening headache. Goal:	Resume normal activities of daily living  Instructions for follow-up, activity and diet:	1. You should wash your hair starting 24 hours after being home. Use your normal  shampoo. During the shampoo, massage the staples to remove any dried blood  or crusting. This keeps your wound clean and helps healing. you should shampoo everyday.  2. Pat the wound dry with a clean towel afterwards and leave it open to air. Do not  apply creams or ointments to the wound. Mild swelling around the incision is  common.  3. Follow-up with one of the Nurse Practitioners for suture or staple removal 7-10  days after surgery. Call our office at [phone] to set up the appointment  once you get home.  4. Inform the doctor immediately if you have a fever (above 101), chills, night  sweats, wound drainage, increasing wound redness or pain, nausea, vomiting, or  worsening headache. Goal:	Resume normal activities of daily living  Instructions for follow-up, activity and diet:	1. You should wash your hair starting 24 hours after being home. Use your normal  shampoo. During the shampoo, massage the staples to remove any dried blood  or crusting. This keeps your wound clean and helps healing. you should shampoo everyday.  2. Pat the wound dry with a clean towel afterwards and leave it open to air. Do not  apply creams or ointments to the wound. Mild swelling around the incision is  common.  3. Follow-up with one of the Nurse Practitioners for suture or staple removal 7-10  days after surgery. Call our office at [phone] to set up the appointment  once you get home.  4. Inform the doctor immediately if you have a fever (above 101), chills, night  sweats, wound drainage, increasing wound redness or pain, nausea, vomiting, or  worsening headache. Principal Discharge DX:	Meningioma  Goal:	Resume normal activities of daily living  Instructions for follow-up, activity and diet:	1. You should wash your hair starting 24 hours after being home. Use your normal  shampoo. During the shampoo, massage the staples to remove any dried blood  or crusting. This keeps your wound clean and helps healing. you should shampoo everyday.  2. Pat the wound dry with a clean towel afterwards and leave it open to air. Do not  apply creams or ointments to the wound. Mild swelling around the incision is  common.  3. Follow-up with one of the Nurse Practitioners for suture or staple removal 7-10  days after surgery. Call our office at [phone] to set up the appointment  once you get home.  4. Inform the doctor immediately if you have a fever (above 101), chills, night  sweats, wound drainage, increasing wound redness or pain, nausea, vomiting, or  worsening headache. Principal Discharge DX:	Meningioma  Goal:	Resume normal activities of daily living  Instructions for follow-up, activity and diet:	1. You should wash your hair starting 24 hours after being home. Use your normal  shampoo. During the shampoo, massage the staples to remove any dried blood  or crusting. This keeps your wound clean and helps healing. you should shampoo everyday.  2. Pat the wound dry with a clean towel afterwards and leave it open to air. Do not  apply creams or ointments to the wound. Mild swelling around the incision is  common.  3. Follow-up with one of the Nurse Practitioners for suture or staple removal 7-10  days after surgery. Call our office at [phone] to set up the appointment  once you get home.  4. Inform the doctor immediately if you have a fever (above 101), chills, night  sweats, wound drainage, increasing wound redness or pain, nausea, vomiting, or  worsening headache.

## 2017-05-13 NOTE — OCCUPATIONAL THERAPY INITIAL EVALUATION ADULT - PERTINENT HX OF CURRENT PROBLEM, REHAB EVAL
Patient is a 71 yo RHD male presenting s/p L crani for meningioma resection. Initially presented with seizure episode and right sided weakness (RUE>RLE) and right arm spasticity, s/p angio, embo on 5/8/17

## 2017-05-13 NOTE — PHYSICAL THERAPY INITIAL EVALUATION ADULT - PERTINENT HX OF CURRENT PROBLEM, REHAB EVAL
72M found to have a left frontal mass likely a meningioma.  Initially presented with seizure episode and currently has right sided trace weakness RUE>RLE right arm spasticity.

## 2017-05-13 NOTE — PHYSICAL THERAPY INITIAL EVALUATION ADULT - GAIT DEVIATIONS NOTED, PT EVAL
R knee buckling, decreased R hand  strength, moderate VCs for sequencing and executing/decreased weight-shifting ability/decreased daniela/decreased step length

## 2017-05-13 NOTE — OCCUPATIONAL THERAPY INITIAL EVALUATION ADULT - GENERAL OBSERVATIONS, REHAB EVAL
Communicated with FLEX Severino prior to session. Patient received supine with HOB elevated in NAD, +R PIV medlocked, +tele.

## 2017-05-13 NOTE — PHYSICAL THERAPY INITIAL EVALUATION ADULT - PLANNED THERAPY INTERVENTIONS, PT EVAL
neuromuscular re-education/strengthening/postural re-education/transfer training/bed mobility training/gait training/balance training

## 2017-05-13 NOTE — PHYSICAL THERAPY INITIAL EVALUATION ADULT - MODALITIES TREATMENT COMMENTS
Faicla nerve: smile slightly drooped R lip, eyebrow elevation/cheek puff symmetrical; Tongue: midline; Visual fields grossly intact; Visual tracking all directions grossly intact

## 2017-05-13 NOTE — PHYSICAL THERAPY INITIAL EVALUATION ADULT - IMPAIRMENTS FOUND, PT EVAL
posture/aerobic capacity/endurance/gait, locomotion, and balance/decreased midline orientation/muscle strength

## 2017-05-13 NOTE — OCCUPATIONAL THERAPY INITIAL EVALUATION ADULT - SHORT TERM MEMORY, REHAB EVAL
Patient reports decreased attention/short term memory with names of staff members, however with good recall during session

## 2017-05-13 NOTE — PROVIDER CONTACT NOTE (CHANGE IN STATUS NOTIFICATION) - BACKGROUND
patient s/pangioembolization of meningioma and left frontoparietal resection of parasagittal meningioma. Patient does have a history of AFIB,

## 2017-05-14 LAB
ANION GAP SERPL CALC-SCNC: 9 MMOL/L — SIGNIFICANT CHANGE UP (ref 9–16)
BUN SERPL-MCNC: 30 MG/DL — HIGH (ref 7–23)
CALCIUM SERPL-MCNC: 8.6 MG/DL — SIGNIFICANT CHANGE UP (ref 8.5–10.5)
CHLORIDE SERPL-SCNC: 107 MMOL/L — SIGNIFICANT CHANGE UP (ref 96–108)
CO2 SERPL-SCNC: 23 MMOL/L — SIGNIFICANT CHANGE UP (ref 22–31)
CREAT SERPL-MCNC: 0.72 MG/DL — SIGNIFICANT CHANGE UP (ref 0.5–1.3)
GLUCOSE SERPL-MCNC: 193 MG/DL — HIGH (ref 70–99)
HCT VFR BLD CALC: 34.5 % — LOW (ref 39–50)
HGB BLD-MCNC: 11 G/DL — LOW (ref 13–17)
MAGNESIUM SERPL-MCNC: 2.3 MG/DL — SIGNIFICANT CHANGE UP (ref 1.6–2.6)
MCHC RBC-ENTMCNC: 24.6 PG — LOW (ref 27–34)
MCHC RBC-ENTMCNC: 31.9 G/DL — LOW (ref 32–36)
MCV RBC AUTO: 77 FL — LOW (ref 80–100)
PHOSPHATE SERPL-MCNC: 3.4 MG/DL — SIGNIFICANT CHANGE UP (ref 2.5–4.5)
PLATELET # BLD AUTO: 449 K/UL — HIGH (ref 150–400)
POTASSIUM SERPL-MCNC: 4.1 MMOL/L — SIGNIFICANT CHANGE UP (ref 3.5–5.3)
POTASSIUM SERPL-SCNC: 4.1 MMOL/L — SIGNIFICANT CHANGE UP (ref 3.5–5.3)
RBC # BLD: 4.48 M/UL — SIGNIFICANT CHANGE UP (ref 4.2–5.8)
RBC # FLD: 16.6 % — SIGNIFICANT CHANGE UP (ref 10.3–16.9)
SODIUM SERPL-SCNC: 139 MMOL/L — SIGNIFICANT CHANGE UP (ref 135–145)
WBC # BLD: 14.1 K/UL — HIGH (ref 3.8–10.5)
WBC # FLD AUTO: 14.1 K/UL — HIGH (ref 3.8–10.5)

## 2017-05-14 RX ORDER — METOPROLOL TARTRATE 50 MG
5 TABLET ORAL ONCE
Qty: 0 | Refills: 0 | Status: DISCONTINUED | OUTPATIENT
Start: 2017-05-14 | End: 2017-05-16

## 2017-05-14 RX ADMIN — LAMOTRIGINE 150 MILLIGRAM(S): 25 TABLET, ORALLY DISINTEGRATING ORAL at 12:17

## 2017-05-14 RX ADMIN — PANTOPRAZOLE SODIUM 40 MILLIGRAM(S): 20 TABLET, DELAYED RELEASE ORAL at 06:04

## 2017-05-14 RX ADMIN — ENOXAPARIN SODIUM 40 MILLIGRAM(S): 100 INJECTION SUBCUTANEOUS at 23:20

## 2017-05-14 RX ADMIN — INSULIN GLARGINE 10 UNIT(S): 100 INJECTION, SOLUTION SUBCUTANEOUS at 09:15

## 2017-05-14 RX ADMIN — LEVETIRACETAM 500 MILLIGRAM(S): 250 TABLET, FILM COATED ORAL at 18:29

## 2017-05-14 RX ADMIN — VALSARTAN 160 MILLIGRAM(S): 80 TABLET ORAL at 06:03

## 2017-05-14 RX ADMIN — Medication 1 TABLET(S): at 12:17

## 2017-05-14 RX ADMIN — Medication 1 MILLIGRAM(S): at 12:17

## 2017-05-14 RX ADMIN — Medication 2: at 22:49

## 2017-05-14 RX ADMIN — Medication 100 MILLIGRAM(S): at 12:17

## 2017-05-14 RX ADMIN — Medication 100 MILLIGRAM(S): at 22:49

## 2017-05-14 RX ADMIN — Medication 100 MILLIGRAM(S): at 18:01

## 2017-05-14 RX ADMIN — LEVETIRACETAM 500 MILLIGRAM(S): 250 TABLET, FILM COATED ORAL at 06:03

## 2017-05-14 RX ADMIN — Medication: at 09:16

## 2017-05-14 RX ADMIN — Medication 100 MILLIGRAM(S): at 06:03

## 2017-05-14 NOTE — PROGRESS NOTE ADULT - SUBJECTIVE AND OBJECTIVE BOX
Pt was examined at the bedside, overnight required Lopressor and Labetalol IV push for A-fib control, pt denies sob, cp, n/v, acute visual changes    ICU Vital Signs Last 24 Hrs  T(C): 36.1, Max: 36.1 (05-13 @ 14:07)  T(F): 97, Max: 97 (05-13 @ 14:07)  HR: 74 (74 - 138)  BP: 156/81 (136/78 - 186/113)  BP(mean): 112 (103 - 160)  ABP: --  ABP(mean): --  RR: 17 (17 - 18)  SpO2: 97% (93% - 98%)                          11.0   14.1  )-----------( 449      ( 14 May 2017 06:54 )             34.5       Exam:  AA&Ox3, NAD, dysarthric speech,  PERRL, EOMI b/l, R facial weakness, tongue protr midline  motor RUE 3/5, RLE 3-4/5, o/w 5/5  sensation to LT grossly intact  Head: incision c/d/i, no drainage, no edema, no erythema, no palpable collections    Plan:  MRI brain - completed  SQL  SCDs, IS, Bowel regimen, OOB with assistance  PT/OT eval - Acute Rehab placement  Cont Lamictal and Keppra  Ativan PRN agitation due to DTs   D/w Dr. Blevins

## 2017-05-15 LAB — SURGICAL PATHOLOGY STUDY: SIGNIFICANT CHANGE UP

## 2017-05-15 PROCEDURE — 70450 CT HEAD/BRAIN W/O DYE: CPT | Mod: 26

## 2017-05-15 PROCEDURE — 93970 EXTREMITY STUDY: CPT | Mod: 26

## 2017-05-15 RX ORDER — DEXAMETHASONE 0.5 MG/5ML
2 ELIXIR ORAL
Qty: 0 | Refills: 0 | Status: DISCONTINUED | OUTPATIENT
Start: 2017-05-15 | End: 2017-05-18

## 2017-05-15 RX ORDER — DEXAMETHASONE 0.5 MG/5ML
6 ELIXIR ORAL ONCE
Qty: 0 | Refills: 0 | Status: COMPLETED | OUTPATIENT
Start: 2017-05-15 | End: 2017-05-15

## 2017-05-15 RX ADMIN — LAMOTRIGINE 150 MILLIGRAM(S): 25 TABLET, ORALLY DISINTEGRATING ORAL at 12:17

## 2017-05-15 RX ADMIN — Medication 6 MILLIGRAM(S): at 12:16

## 2017-05-15 RX ADMIN — Medication 4: at 18:12

## 2017-05-15 RX ADMIN — PANTOPRAZOLE SODIUM 40 MILLIGRAM(S): 20 TABLET, DELAYED RELEASE ORAL at 06:12

## 2017-05-15 RX ADMIN — LEVETIRACETAM 500 MILLIGRAM(S): 250 TABLET, FILM COATED ORAL at 06:12

## 2017-05-15 RX ADMIN — Medication 6: at 22:45

## 2017-05-15 RX ADMIN — Medication 2 MILLIGRAM(S): at 18:16

## 2017-05-15 RX ADMIN — Medication 100 MILLIGRAM(S): at 06:12

## 2017-05-15 RX ADMIN — INSULIN GLARGINE 10 UNIT(S): 100 INJECTION, SOLUTION SUBCUTANEOUS at 08:13

## 2017-05-15 RX ADMIN — Medication 1 TABLET(S): at 12:17

## 2017-05-15 RX ADMIN — LEVETIRACETAM 500 MILLIGRAM(S): 250 TABLET, FILM COATED ORAL at 18:12

## 2017-05-15 RX ADMIN — ENOXAPARIN SODIUM 40 MILLIGRAM(S): 100 INJECTION SUBCUTANEOUS at 22:46

## 2017-05-15 RX ADMIN — VALSARTAN 160 MILLIGRAM(S): 80 TABLET ORAL at 06:12

## 2017-05-15 RX ADMIN — Medication 1 MILLIGRAM(S): at 12:17

## 2017-05-15 RX ADMIN — Medication 100 MILLIGRAM(S): at 12:16

## 2017-05-15 NOTE — PROGRESS NOTE ADULT - SUBJECTIVE AND OBJECTIVE BOX
HPI:  72 y. o Right  handed male h/o rate controlled Afib not on AC, h/o GIB, CAD, DM, HLD found to have a left frontal mass likely a meningioma.  Initially presented with seizure episode and currently has right sided trace weakness RUE>RLE right arm spasticity.  Patient presents today for pre op cerebral angiogram and possible embolization. (08 May 2017 08:57)    OVERNIGHT EVENTS:  Vital Signs Last 24 Hrs  T(C): 36.4, Max: 36.8 (05-14 @ 09:00)  T(F): 97.6, Max: 98.2 (05-14 @ 09:00)  HR: 78 (78 - 86)  BP: 120/67 (101/50 - 164/83)  BP(mean): 89 (69 - 117)  RR: 17 (17 - 18)  SpO2: 97% (92% - 97%)    I&O's Summary    I & Os for current day (as of 15 May 2017 07:51)  =============================================  IN: 0 ml / OUT: 400 ml / NET: -400 ml      PHYSICAL EXAM:  Neurological:  Exam: A/Ox3, FC, dysarthric speech  Left side 5/5 strength  Right UE 3/5, Right LE 4/5  Right facial droop,PERRL, EOMI    DIET: Regular  LABS:                        11.0   14.1  )-----------( 449      ( 14 May 2017 06:54 )             34.5     05-14    139  |  107  |  30<H>  ----------------------------<  193<H>  4.1   |  23  |  0.72    Ca    8.6      14 May 2017 06:54  Phos  3.4     05-14  Mg     2.3     05-14              CAPILLARY BLOOD GLUCOSE  131 (15 May 2017 05:52)  152 (14 May 2017 21:38)  103 (14 May 2017 16:49)  153 (14 May 2017 11:23)        Allergies    No Known Allergies    Intolerances      MEDICATIONS:  Antibiotics:    Neuro:  levETIRAcetam 500milliGRAM(s) Oral two times a day  lamoTRIgine 150milliGRAM(s) Oral daily  acetaminophen   Tablet. 325milliGRAM(s) Oral every 4 hours PRN  acetaminophen   Tablet. 650milliGRAM(s) Oral every 6 hours PRN  ondansetron Injectable 4milliGRAM(s) IV Push every 6 hours PRN  oxyCODONE  5 mG/acetaminophen 325 mG 1Tablet(s) Oral every 4 hours PRN  LORazepam   Injectable 1milliGRAM(s) IV Push every 6 hours PRN    Anticoagulation:  enoxaparin Injectable 40milliGRAM(s) SubCutaneous at bedtime    OTHER:  insulin lispro (HumaLOG) corrective regimen sliding scale  SubCutaneous Before meals and at bedtime  dextrose Gel 1Dose(s) Oral once PRN  dextrose 50% Injectable 12.5Gram(s) IV Push once  glucagon  Injectable 1milliGRAM(s) IntraMuscular once PRN  docusate sodium 100milliGRAM(s) Oral three times a day  senna 2Tablet(s) Oral at bedtime PRN  metoprolol succinate ER 100milliGRAM(s) Oral daily  pantoprazole    Tablet 40milliGRAM(s) Oral before breakfast  valsartan 160milliGRAM(s) Oral daily  labetalol Injectable 10milliGRAM(s) IV Push every 4 hours PRN  insulin glargine Injectable (LANTUS) 10Unit(s) SubCutaneous every morning  diltiazem    Tablet 60milliGRAM(s) Oral every 6 hours  metoprolol Injectable 5milliGRAM(s) IV Push once PRN    IVF:  dextrose 5%. 1000milliLiter(s) IV Continuous <Continuous>  multivitamin 1Tablet(s) Oral daily  thiamine 100milliGRAM(s) Oral daily  folic acid 1milliGRAM(s) Oral daily        ASSESSMENT:  72y Male s/p Crani resection mass    PLAN:  NEURO:    Monitor neuro status  OT/PT  Continue AED  Pain management  Bowel regime  Routine BLE dopplers    Dispo Discussed with attending  .

## 2017-05-16 LAB
ANION GAP SERPL CALC-SCNC: 7 MMOL/L — LOW (ref 9–16)
BUN SERPL-MCNC: 32 MG/DL — HIGH (ref 7–23)
CALCIUM SERPL-MCNC: 9 MG/DL — SIGNIFICANT CHANGE UP (ref 8.5–10.5)
CHLORIDE SERPL-SCNC: 107 MMOL/L — SIGNIFICANT CHANGE UP (ref 96–108)
CO2 SERPL-SCNC: 25 MMOL/L — SIGNIFICANT CHANGE UP (ref 22–31)
CREAT SERPL-MCNC: 0.8 MG/DL — SIGNIFICANT CHANGE UP (ref 0.5–1.3)
GLUCOSE SERPL-MCNC: 238 MG/DL — HIGH (ref 70–99)
HCT VFR BLD CALC: 34.2 % — LOW (ref 39–50)
HGB BLD-MCNC: 10.6 G/DL — LOW (ref 13–17)
MCHC RBC-ENTMCNC: 24 PG — LOW (ref 27–34)
MCHC RBC-ENTMCNC: 31 G/DL — LOW (ref 32–36)
MCV RBC AUTO: 77.4 FL — LOW (ref 80–100)
PLATELET # BLD AUTO: 439 K/UL — HIGH (ref 150–400)
POTASSIUM SERPL-MCNC: 4.4 MMOL/L — SIGNIFICANT CHANGE UP (ref 3.5–5.3)
POTASSIUM SERPL-SCNC: 4.4 MMOL/L — SIGNIFICANT CHANGE UP (ref 3.5–5.3)
RBC # BLD: 4.42 M/UL — SIGNIFICANT CHANGE UP (ref 4.2–5.8)
RBC # FLD: 16.6 % — SIGNIFICANT CHANGE UP (ref 10.3–16.9)
SODIUM SERPL-SCNC: 139 MMOL/L — SIGNIFICANT CHANGE UP (ref 135–145)
WBC # BLD: 12 K/UL — HIGH (ref 3.8–10.5)
WBC # FLD AUTO: 12 K/UL — HIGH (ref 3.8–10.5)

## 2017-05-16 RX ADMIN — Medication 2: at 12:30

## 2017-05-16 RX ADMIN — Medication 100 MILLIGRAM(S): at 22:00

## 2017-05-16 RX ADMIN — Medication 1 TABLET(S): at 12:27

## 2017-05-16 RX ADMIN — PANTOPRAZOLE SODIUM 40 MILLIGRAM(S): 20 TABLET, DELAYED RELEASE ORAL at 06:54

## 2017-05-16 RX ADMIN — VALSARTAN 160 MILLIGRAM(S): 80 TABLET ORAL at 06:55

## 2017-05-16 RX ADMIN — Medication 100 MILLIGRAM(S): at 13:52

## 2017-05-16 RX ADMIN — Medication 2: at 22:00

## 2017-05-16 RX ADMIN — Medication 4: at 06:54

## 2017-05-16 RX ADMIN — Medication 1 MILLIGRAM(S): at 12:27

## 2017-05-16 RX ADMIN — LAMOTRIGINE 150 MILLIGRAM(S): 25 TABLET, ORALLY DISINTEGRATING ORAL at 12:27

## 2017-05-16 RX ADMIN — Medication 100 MILLIGRAM(S): at 12:27

## 2017-05-16 RX ADMIN — ENOXAPARIN SODIUM 40 MILLIGRAM(S): 100 INJECTION SUBCUTANEOUS at 22:00

## 2017-05-16 RX ADMIN — Medication 100 MILLIGRAM(S): at 06:55

## 2017-05-16 RX ADMIN — LEVETIRACETAM 500 MILLIGRAM(S): 250 TABLET, FILM COATED ORAL at 18:18

## 2017-05-16 RX ADMIN — INSULIN GLARGINE 10 UNIT(S): 100 INJECTION, SOLUTION SUBCUTANEOUS at 08:47

## 2017-05-16 RX ADMIN — Medication 2 MILLIGRAM(S): at 07:10

## 2017-05-16 RX ADMIN — LEVETIRACETAM 500 MILLIGRAM(S): 250 TABLET, FILM COATED ORAL at 06:53

## 2017-05-16 RX ADMIN — Medication 2 MILLIGRAM(S): at 18:17

## 2017-05-16 RX ADMIN — Medication 6: at 18:18

## 2017-05-16 NOTE — PROGRESS NOTE ADULT - SUBJECTIVE AND OBJECTIVE BOX
HPI:  72 y. o Right  handed male h/o rate controlled Afib not on AC, h/o GIB, CAD, DM, HLD found to have a left frontal mass likely a meningioma.  Initially presented with seizure episode and currently has right sided trace weakness RUE>RLE right arm spasticity.  Patient presents today for pre op cerebral angiogram and possible embolization. (08 May 2017 08:57)    OVERNIGHT EVENTS:  Vital Signs Last 24 Hrs  T(C): 36.4, Max: 36.5 (05-15 @ 18:21)  T(F): 97.6, Max: 97.7 (05-15 @ 18:21)  HR: 80 (76 - 90)  BP: 138/78 (114/58 - 139/78)  BP(mean): 98 (82 - 104)  RR: 14 (14 - 16)  SpO2: 98% (93% - 98%)    I&O's Summary    I & Os for current day (as of 16 May 2017 09:05)  =============================================  IN: 0 ml / OUT: 450 ml / NET: -450 ml      PHYSICAL EXAM:  Neurological:A&OX3 expressive aphasia imroving AFSHAN resolving R facial weakness TML  Motor L side 5/5 RUE +3/5 RLE 3/4 stronger distally than proximally  Crani incision CDI      DIET: NPO      LABS:                        10.6   12.0  )-----------( 439      ( 16 May 2017 06:58 )             34.2     05-16    139  |  107  |  32<H>  ----------------------------<  238<H>  4.4   |  25  |  0.80    Ca    9.0      16 May 2017 06:58        CAPILLARY BLOOD GLUCOSE  214 (16 May 2017 05:00)  266 (15 May 2017 21:14)  244 (15 May 2017 17:00)  205 (15 May 2017 11:30)      Drug Levels: [] N/A    CSF Analysis: [] N/A      Allergies    No Known Allergies    Intolerances      MEDICATIONS:  Antibiotics:    Neuro:  levETIRAcetam 500milliGRAM(s) Oral two times a day  lamoTRIgine 150milliGRAM(s) Oral daily  acetaminophen   Tablet. 325milliGRAM(s) Oral every 4 hours PRN  acetaminophen   Tablet. 650milliGRAM(s) Oral every 6 hours PRN  ondansetron Injectable 4milliGRAM(s) IV Push every 6 hours PRN  oxyCODONE  5 mG/acetaminophen 325 mG 1Tablet(s) Oral every 4 hours PRN  LORazepam   Injectable 1milliGRAM(s) IV Push every 6 hours PRN    Anticoagulation:  enoxaparin Injectable 40milliGRAM(s) SubCutaneous at bedtime    OTHER:  insulin lispro (HumaLOG) corrective regimen sliding scale  SubCutaneous Before meals and at bedtime  dextrose Gel 1Dose(s) Oral once PRN  dextrose 50% Injectable 12.5Gram(s) IV Push once  glucagon  Injectable 1milliGRAM(s) IntraMuscular once PRN  docusate sodium 100milliGRAM(s) Oral three times a day  senna 2Tablet(s) Oral at bedtime PRN  metoprolol succinate ER 100milliGRAM(s) Oral daily  pantoprazole    Tablet 40milliGRAM(s) Oral before breakfast  valsartan 160milliGRAM(s) Oral daily  labetalol Injectable 10milliGRAM(s) IV Push every 4 hours PRN  insulin glargine Injectable (LANTUS) 10Unit(s) SubCutaneous every morning  diltiazem    Tablet 60milliGRAM(s) Oral every 6 hours  metoprolol Injectable 5milliGRAM(s) IV Push once PRN  dexamethasone     Tablet 2milliGRAM(s) Oral two times a day    IVF:  dextrose 5%. 1000milliLiter(s) IV Continuous <Continuous>  multivitamin 1Tablet(s) Oral daily  thiamine 100milliGRAM(s) Oral daily  folic acid 1milliGRAM(s) Oral daily      ASSESSMENT:  72y Male s/p Crani resection mass    PLAN:    NEURO:  Monitor neuro status  Keep steroid dose today and start weaning steroid slowly  Pain Management  Bowel Regime  Continue current medical regime    Dispo; Discussed with attending

## 2017-05-17 RX ORDER — METOPROLOL TARTRATE 50 MG
5 TABLET ORAL ONCE
Qty: 0 | Refills: 0 | Status: COMPLETED | OUTPATIENT
Start: 2017-05-17 | End: 2017-05-17

## 2017-05-17 RX ORDER — DILTIAZEM HCL 120 MG
300 CAPSULE, EXT RELEASE 24 HR ORAL DAILY
Qty: 0 | Refills: 0 | Status: COMPLETED | OUTPATIENT
Start: 2017-05-17 | End: 2017-05-18

## 2017-05-17 RX ORDER — DILTIAZEM HCL 120 MG
360 CAPSULE, EXT RELEASE 24 HR ORAL DAILY
Qty: 0 | Refills: 0 | Status: DISCONTINUED | OUTPATIENT
Start: 2017-05-18 | End: 2017-05-18

## 2017-05-17 RX ADMIN — ENOXAPARIN SODIUM 40 MILLIGRAM(S): 100 INJECTION SUBCUTANEOUS at 22:17

## 2017-05-17 RX ADMIN — INSULIN GLARGINE 10 UNIT(S): 100 INJECTION, SOLUTION SUBCUTANEOUS at 09:18

## 2017-05-17 RX ADMIN — Medication 1 MILLIGRAM(S): at 12:45

## 2017-05-17 RX ADMIN — Medication 2: at 22:17

## 2017-05-17 RX ADMIN — LEVETIRACETAM 500 MILLIGRAM(S): 250 TABLET, FILM COATED ORAL at 17:26

## 2017-05-17 RX ADMIN — LEVETIRACETAM 500 MILLIGRAM(S): 250 TABLET, FILM COATED ORAL at 05:57

## 2017-05-17 RX ADMIN — Medication 2 MILLIGRAM(S): at 17:26

## 2017-05-17 RX ADMIN — Medication 100 MILLIGRAM(S): at 22:16

## 2017-05-17 RX ADMIN — VALSARTAN 160 MILLIGRAM(S): 80 TABLET ORAL at 05:57

## 2017-05-17 RX ADMIN — Medication 300 MILLIGRAM(S): at 12:46

## 2017-05-17 RX ADMIN — Medication 100 MILLIGRAM(S): at 06:04

## 2017-05-17 RX ADMIN — Medication 4: at 12:49

## 2017-05-17 RX ADMIN — Medication 2 MILLIGRAM(S): at 05:57

## 2017-05-17 RX ADMIN — Medication 6: at 17:26

## 2017-05-17 RX ADMIN — LAMOTRIGINE 150 MILLIGRAM(S): 25 TABLET, ORALLY DISINTEGRATING ORAL at 12:46

## 2017-05-17 RX ADMIN — Medication 5 MILLIGRAM(S): at 09:15

## 2017-05-17 RX ADMIN — Medication 100 MILLIGRAM(S): at 12:45

## 2017-05-17 RX ADMIN — Medication 1 TABLET(S): at 12:45

## 2017-05-17 RX ADMIN — PANTOPRAZOLE SODIUM 40 MILLIGRAM(S): 20 TABLET, DELAYED RELEASE ORAL at 09:08

## 2017-05-17 RX ADMIN — Medication 2: at 09:14

## 2017-05-17 RX ADMIN — Medication 100 MILLIGRAM(S): at 05:56

## 2017-05-17 NOTE — PROGRESS NOTE ADULT - SUBJECTIVE AND OBJECTIVE BOX
HPI:  72 y. o Right  handed male h/o rate controlled Afib not on AC, h/o GIB, CAD, DM, HLD found to have a left frontal mass likely a meningioma.  Initially presented with seizure episode and currently has right sided trace weakness RUE>RLE right arm spasticity.  Patient presents today for pre op cerebral angiogram and possible embolization. (08 May 2017 08:57)    OVERNIGHT EVENTS:  Pt c/o dizziness when getting out of bed this morning. Pt also in rapid a.fib this morning, converted w/ metoprolol IV push 5mg. Denies pain. No signs of DT.     Vital Signs Last 24 Hrs  T(C): 36.7, Max: 36.8 (05-16 @ 21:57)  T(F): 98, Max: 98.2 (05-16 @ 21:57)  HR: 96 (74 - 96)  BP: 138/94 (113/56 - 163/76)  BP(mean): 111 (81 - 111)  RR: 16 (16 - 18)  SpO2: 97% (94% - 97%)    I&O's Summary    I & Os for current day (as of 17 May 2017 09:53)  =============================================  IN: 0 ml / OUT: 950 ml / NET: -950 ml      PHYSICAL EXAM:  Neurological: Awake and alert  AxOx3  R face droop slight  expressive aphasia improving  RUE 4-/5 RLE 2/5 proximal 3/5 distal    Incision/Wound:C/D/I      DIET:  [] NPO  [x] Mechanical  [] Tube feeds    LABS:                        10.6   12.0  )-----------( 439      ( 16 May 2017 06:58 )             34.2     05-16    139  |  107  |  32<H>  ----------------------------<  238<H>  4.4   |  25  |  0.80    Ca    9.0      16 May 2017 06:58              CAPILLARY BLOOD GLUCOSE  171 (17 May 2017 05:48)  170 (16 May 2017 22:11)  273 (16 May 2017 16:05)  166 (16 May 2017 12:23)      Drug Levels: [] N/A    CSF Analysis: [] N/A      Allergies    No Known Allergies    Intolerances      MEDICATIONS:  Antibiotics:    Neuro:  levETIRAcetam 500milliGRAM(s) Oral two times a day  lamoTRIgine 150milliGRAM(s) Oral daily  acetaminophen   Tablet. 325milliGRAM(s) Oral every 4 hours PRN  acetaminophen   Tablet. 650milliGRAM(s) Oral every 6 hours PRN  ondansetron Injectable 4milliGRAM(s) IV Push every 6 hours PRN  LORazepam   Injectable 1milliGRAM(s) IV Push every 6 hours PRN    Anticoagulation:  enoxaparin Injectable 40milliGRAM(s) SubCutaneous at bedtime    OTHER:  insulin lispro (HumaLOG) corrective regimen sliding scale  SubCutaneous Before meals and at bedtime  dextrose Gel 1Dose(s) Oral once PRN  dextrose 50% Injectable 12.5Gram(s) IV Push once  glucagon  Injectable 1milliGRAM(s) IntraMuscular once PRN  docusate sodium 100milliGRAM(s) Oral three times a day  senna 2Tablet(s) Oral at bedtime PRN  metoprolol succinate ER 100milliGRAM(s) Oral daily  pantoprazole    Tablet 40milliGRAM(s) Oral before breakfast  valsartan 160milliGRAM(s) Oral daily  insulin glargine Injectable (LANTUS) 10Unit(s) SubCutaneous every morning  dexamethasone     Tablet 2milliGRAM(s) Oral two times a day  diltiazem   CD 300milliGRAM(s) Oral daily    IVF:  dextrose 5%. 1000milliLiter(s) IV Continuous <Continuous>  multivitamin 1Tablet(s) Oral daily  thiamine 100milliGRAM(s) Oral daily  folic acid 1milliGRAM(s) Oral daily    CULTURES:    RADIOLOGY & ADDITIONAL TESTS:  v.doppler 5/16 :No deep vein thrombosis seen.  MRI Brain 5/13 post op:   tatus post left high posterior frontal parietal craniotomy   with resection of previously seen extra-axial enhancing mass lesion with   mild overlying dural enhancement. Bilateralcerebral hemispheric   extra-axial collections of fluid blood product and air. No evidence of   acute infarction.  ASSESSMENT:  72y Male s/p L frontal craniotomy tumor excision on 5/9 post op course complicated by rapid a fib, DT/ withdrawl, R side weakness improving after restarting decadron    PLAN:  -continue decadron 2q12. slow taper  -PT for OOB  -plan to d/c staples 5/19  -rapid afib this am. Converted. Cardizem dose increased and changed to Cardizem  daily. Continue hold Exforge.  -lovenox for DVT prophylaxis  -SW for acute rehab placement  -D/W     DVT PROPHYLAXIS:  [x] Venodynes                                [x] Lovenox

## 2017-05-18 ENCOUNTER — INPATIENT (INPATIENT)
Facility: HOSPITAL | Age: 72
LOS: 22 days | Discharge: HOME HEALTH | DRG: 949 | End: 2017-06-10
Payer: COMMERCIAL

## 2017-05-18 VITALS — DIASTOLIC BLOOD PRESSURE: 65 MMHG | HEART RATE: 74 BPM | SYSTOLIC BLOOD PRESSURE: 122 MMHG | OXYGEN SATURATION: 100 %

## 2017-05-18 DIAGNOSIS — R26.9 UNSPECIFIED ABNORMALITIES OF GAIT AND MOBILITY: ICD-10-CM

## 2017-05-18 DIAGNOSIS — G81.91 HEMIPLEGIA, UNSPECIFIED AFFECTING RIGHT DOMINANT SIDE: ICD-10-CM

## 2017-05-18 DIAGNOSIS — E11.65 TYPE 2 DIABETES MELLITUS WITH HYPERGLYCEMIA: ICD-10-CM

## 2017-05-18 DIAGNOSIS — G93.6 CEREBRAL EDEMA: ICD-10-CM

## 2017-05-18 DIAGNOSIS — I10 ESSENTIAL (PRIMARY) HYPERTENSION: ICD-10-CM

## 2017-05-18 DIAGNOSIS — G40.909 EPILEPSY, UNSPECIFIED, NOT INTRACTABLE, WITHOUT STATUS EPILEPTICUS: ICD-10-CM

## 2017-05-18 DIAGNOSIS — I48.2 CHRONIC ATRIAL FIBRILLATION: ICD-10-CM

## 2017-05-18 DIAGNOSIS — I27.2 OTHER SECONDARY PULMONARY HYPERTENSION: ICD-10-CM

## 2017-05-18 DIAGNOSIS — Z98.890 OTHER SPECIFIED POSTPROCEDURAL STATES: Chronic | ICD-10-CM

## 2017-05-18 DIAGNOSIS — Z48.3 AFTERCARE FOLLOWING SURGERY FOR NEOPLASM: ICD-10-CM

## 2017-05-18 DIAGNOSIS — E78.00 PURE HYPERCHOLESTEROLEMIA, UNSPECIFIED: ICD-10-CM

## 2017-05-18 DIAGNOSIS — Z98.49 CATARACT EXTRACTION STATUS, UNSPECIFIED EYE: Chronic | ICD-10-CM

## 2017-05-18 DIAGNOSIS — R25.1 TREMOR, UNSPECIFIED: ICD-10-CM

## 2017-05-18 DIAGNOSIS — D64.9 ANEMIA, UNSPECIFIED: ICD-10-CM

## 2017-05-18 DIAGNOSIS — R19.5 OTHER FECAL ABNORMALITIES: ICD-10-CM

## 2017-05-18 DIAGNOSIS — I50.9 HEART FAILURE, UNSPECIFIED: ICD-10-CM

## 2017-05-18 DIAGNOSIS — D50.9 IRON DEFICIENCY ANEMIA, UNSPECIFIED: ICD-10-CM

## 2017-05-18 DIAGNOSIS — Z51.89 ENCOUNTER FOR OTHER SPECIFIED AFTERCARE: ICD-10-CM

## 2017-05-18 DIAGNOSIS — D43.2 NEOPLASM OF UNCERTAIN BEHAVIOR OF BRAIN, UNSPECIFIED: ICD-10-CM

## 2017-05-18 DIAGNOSIS — D72.829 ELEVATED WHITE BLOOD CELL COUNT, UNSPECIFIED: ICD-10-CM

## 2017-05-18 DIAGNOSIS — R00.0 TACHYCARDIA, UNSPECIFIED: ICD-10-CM

## 2017-05-18 DIAGNOSIS — Z48.812 ENCOUNTER FOR SURGICAL AFTERCARE FOLLOWING SURGERY ON THE CIRCULATORY SYSTEM: ICD-10-CM

## 2017-05-18 DIAGNOSIS — E78.5 HYPERLIPIDEMIA, UNSPECIFIED: ICD-10-CM

## 2017-05-18 DIAGNOSIS — E87.8 OTHER DISORDERS OF ELECTROLYTE AND FLUID BALANCE, NOT ELSEWHERE CLASSIFIED: ICD-10-CM

## 2017-05-18 DIAGNOSIS — I25.10 ATHEROSCLEROTIC HEART DISEASE OF NATIVE CORONARY ARTERY WITHOUT ANGINA PECTORIS: ICD-10-CM

## 2017-05-18 LAB
ANION GAP SERPL CALC-SCNC: 14 MMOL/L — SIGNIFICANT CHANGE UP (ref 5–17)
BUN SERPL-MCNC: 27 MG/DL — HIGH (ref 7–23)
CALCIUM SERPL-MCNC: 8.9 MG/DL — SIGNIFICANT CHANGE UP (ref 8.4–10.5)
CHLORIDE SERPL-SCNC: 99 MMOL/L — SIGNIFICANT CHANGE UP (ref 96–108)
CO2 SERPL-SCNC: 21 MMOL/L — LOW (ref 22–31)
CREAT SERPL-MCNC: 0.7 MG/DL — SIGNIFICANT CHANGE UP (ref 0.5–1.3)
GLUCOSE SERPL-MCNC: 203 MG/DL — HIGH (ref 70–99)
HCT VFR BLD CALC: 37.4 % — LOW (ref 39–50)
HGB BLD-MCNC: 12.1 G/DL — LOW (ref 13–17)
MCHC RBC-ENTMCNC: 24.5 PG — LOW (ref 27–34)
MCHC RBC-ENTMCNC: 32.4 G/DL — SIGNIFICANT CHANGE UP (ref 32–36)
MCV RBC AUTO: 75.7 FL — LOW (ref 80–100)
PLATELET # BLD AUTO: 560 K/UL — HIGH (ref 150–400)
POTASSIUM SERPL-MCNC: 4.7 MMOL/L — SIGNIFICANT CHANGE UP (ref 3.5–5.3)
POTASSIUM SERPL-SCNC: 4.7 MMOL/L — SIGNIFICANT CHANGE UP (ref 3.5–5.3)
RBC # BLD: 4.94 M/UL — SIGNIFICANT CHANGE UP (ref 4.2–5.8)
RBC # FLD: 16.4 % — SIGNIFICANT CHANGE UP (ref 10.3–16.9)
SODIUM SERPL-SCNC: 134 MMOL/L — LOW (ref 135–145)
WBC # BLD: 9.4 K/UL — SIGNIFICANT CHANGE UP (ref 3.8–10.5)
WBC # FLD AUTO: 9.4 K/UL — SIGNIFICANT CHANGE UP (ref 3.8–10.5)

## 2017-05-18 PROCEDURE — P9047: CPT

## 2017-05-18 PROCEDURE — 36415 COLL VENOUS BLD VENIPUNCTURE: CPT

## 2017-05-18 PROCEDURE — 70552 MRI BRAIN STEM W/DYE: CPT

## 2017-05-18 PROCEDURE — 86900 BLOOD TYPING SEROLOGIC ABO: CPT

## 2017-05-18 PROCEDURE — C1713: CPT

## 2017-05-18 PROCEDURE — 86850 RBC ANTIBODY SCREEN: CPT

## 2017-05-18 PROCEDURE — A9585: CPT

## 2017-05-18 PROCEDURE — 85027 COMPLETE CBC AUTOMATED: CPT

## 2017-05-18 PROCEDURE — 99222 1ST HOSP IP/OBS MODERATE 55: CPT

## 2017-05-18 PROCEDURE — 97162 PT EVAL MOD COMPLEX 30 MIN: CPT

## 2017-05-18 PROCEDURE — 85610 PROTHROMBIN TIME: CPT

## 2017-05-18 PROCEDURE — 93306 TTE W/DOPPLER COMPLETE: CPT

## 2017-05-18 PROCEDURE — 93005 ELECTROCARDIOGRAM TRACING: CPT

## 2017-05-18 PROCEDURE — 85730 THROMBOPLASTIN TIME PARTIAL: CPT

## 2017-05-18 PROCEDURE — 71045 X-RAY EXAM CHEST 1 VIEW: CPT

## 2017-05-18 PROCEDURE — C1760: CPT

## 2017-05-18 PROCEDURE — 86923 COMPATIBILITY TEST ELECTRIC: CPT

## 2017-05-18 PROCEDURE — 84100 ASSAY OF PHOSPHORUS: CPT

## 2017-05-18 PROCEDURE — 88333 PATH CONSLTJ SURG CYTO XM 1: CPT

## 2017-05-18 PROCEDURE — C1769: CPT

## 2017-05-18 PROCEDURE — 93970 EXTREMITY STUDY: CPT

## 2017-05-18 PROCEDURE — 88307 TISSUE EXAM BY PATHOLOGIST: CPT

## 2017-05-18 PROCEDURE — 84295 ASSAY OF SERUM SODIUM: CPT

## 2017-05-18 PROCEDURE — 70450 CT HEAD/BRAIN W/O DYE: CPT

## 2017-05-18 PROCEDURE — 84443 ASSAY THYROID STIM HORMONE: CPT

## 2017-05-18 PROCEDURE — C1889: CPT

## 2017-05-18 PROCEDURE — 83735 ASSAY OF MAGNESIUM: CPT

## 2017-05-18 PROCEDURE — 80061 LIPID PANEL: CPT

## 2017-05-18 PROCEDURE — 84439 ASSAY OF FREE THYROXINE: CPT

## 2017-05-18 PROCEDURE — 82330 ASSAY OF CALCIUM: CPT

## 2017-05-18 PROCEDURE — 84132 ASSAY OF SERUM POTASSIUM: CPT

## 2017-05-18 PROCEDURE — 88360 TUMOR IMMUNOHISTOCHEM/MANUAL: CPT

## 2017-05-18 PROCEDURE — 70553 MRI BRAIN STEM W/O & W/DYE: CPT

## 2017-05-18 PROCEDURE — 97530 THERAPEUTIC ACTIVITIES: CPT

## 2017-05-18 PROCEDURE — 85025 COMPLETE CBC W/AUTO DIFF WBC: CPT

## 2017-05-18 PROCEDURE — 88331 PATH CONSLTJ SURG 1 BLK 1SPC: CPT

## 2017-05-18 PROCEDURE — 85018 HEMOGLOBIN: CPT

## 2017-05-18 PROCEDURE — C1894: CPT

## 2017-05-18 PROCEDURE — 86901 BLOOD TYPING SEROLOGIC RH(D): CPT

## 2017-05-18 PROCEDURE — 80048 BASIC METABOLIC PNL TOTAL CA: CPT

## 2017-05-18 PROCEDURE — 83036 HEMOGLOBIN GLYCOSYLATED A1C: CPT

## 2017-05-18 RX ORDER — EZETIMIBE 10 MG/1
1 TABLET ORAL
Qty: 0 | Refills: 0 | COMMUNITY

## 2017-05-18 RX ORDER — FOLIC ACID 0.8 MG
1 TABLET ORAL
Qty: 0 | Refills: 0 | COMMUNITY

## 2017-05-18 RX ORDER — METOPROLOL TARTRATE 50 MG
1 TABLET ORAL
Qty: 0 | Refills: 0 | DISCHARGE
Start: 2017-05-18

## 2017-05-18 RX ORDER — THIAMINE MONONITRATE (VIT B1) 100 MG
1 TABLET ORAL
Qty: 0 | Refills: 0 | COMMUNITY

## 2017-05-18 RX ORDER — DEXAMETHASONE 0.5 MG/5ML
1 ELIXIR ORAL
Qty: 0 | Refills: 0 | DISCHARGE
Start: 2017-05-18

## 2017-05-18 RX ORDER — DILTIAZEM HCL 120 MG
1 CAPSULE, EXT RELEASE 24 HR ORAL
Qty: 0 | Refills: 0 | DISCHARGE
Start: 2017-05-18

## 2017-05-18 RX ORDER — METFORMIN HYDROCHLORIDE 850 MG/1
1 TABLET ORAL
Qty: 0 | Refills: 0 | COMMUNITY

## 2017-05-18 RX ORDER — OMEPRAZOLE 10 MG/1
1 CAPSULE, DELAYED RELEASE ORAL
Qty: 0 | Refills: 0 | COMMUNITY

## 2017-05-18 RX ORDER — PANTOPRAZOLE SODIUM 20 MG/1
1 TABLET, DELAYED RELEASE ORAL
Qty: 0 | Refills: 0 | DISCHARGE
Start: 2017-05-18

## 2017-05-18 RX ORDER — DOCUSATE SODIUM 100 MG
1 CAPSULE ORAL
Qty: 0 | Refills: 0 | DISCHARGE
Start: 2017-05-18

## 2017-05-18 RX ORDER — ONDANSETRON 8 MG/1
0 TABLET, FILM COATED ORAL
Qty: 0 | Refills: 0 | DISCHARGE
Start: 2017-05-18

## 2017-05-18 RX ORDER — INSULIN GLARGINE 100 [IU]/ML
10 INJECTION, SOLUTION SUBCUTANEOUS
Qty: 0 | Refills: 0 | DISCHARGE
Start: 2017-05-18

## 2017-05-18 RX ORDER — ENOXAPARIN SODIUM 100 MG/ML
40 INJECTION SUBCUTANEOUS
Qty: 0 | Refills: 0 | DISCHARGE
Start: 2017-05-18

## 2017-05-18 RX ORDER — METOPROLOL TARTRATE 50 MG
1 TABLET ORAL
Qty: 0 | Refills: 0 | COMMUNITY

## 2017-05-18 RX ORDER — FENOFIBRIC ACID 105 MG/1
1 TABLET ORAL
Qty: 0 | Refills: 0 | COMMUNITY

## 2017-05-18 RX ORDER — FOLIC ACID 0.8 MG
1 TABLET ORAL
Qty: 0 | Refills: 0 | DISCHARGE
Start: 2017-05-18

## 2017-05-18 RX ORDER — VALSARTAN 80 MG/1
1 TABLET ORAL
Qty: 0 | Refills: 0 | DISCHARGE
Start: 2017-05-18

## 2017-05-18 RX ORDER — THIAMINE MONONITRATE (VIT B1) 100 MG
1 TABLET ORAL
Qty: 0 | Refills: 0 | DISCHARGE
Start: 2017-05-18

## 2017-05-18 RX ORDER — INSULIN LISPRO 100/ML
0 VIAL (ML) SUBCUTANEOUS
Qty: 0 | Refills: 0 | DISCHARGE
Start: 2017-05-18

## 2017-05-18 RX ADMIN — VALSARTAN 160 MILLIGRAM(S): 80 TABLET ORAL at 09:56

## 2017-05-18 RX ADMIN — Medication 2: at 06:33

## 2017-05-18 RX ADMIN — Medication 360 MILLIGRAM(S): at 06:33

## 2017-05-18 RX ADMIN — LAMOTRIGINE 150 MILLIGRAM(S): 25 TABLET, ORALLY DISINTEGRATING ORAL at 12:03

## 2017-05-18 RX ADMIN — Medication 4: at 12:05

## 2017-05-18 RX ADMIN — Medication 2 MILLIGRAM(S): at 06:30

## 2017-05-18 RX ADMIN — INSULIN GLARGINE 10 UNIT(S): 100 INJECTION, SOLUTION SUBCUTANEOUS at 09:55

## 2017-05-18 RX ADMIN — PANTOPRAZOLE SODIUM 40 MILLIGRAM(S): 20 TABLET, DELAYED RELEASE ORAL at 06:32

## 2017-05-18 RX ADMIN — Medication 100 MILLIGRAM(S): at 12:03

## 2017-05-18 RX ADMIN — Medication 1 MILLIGRAM(S): at 12:03

## 2017-05-18 RX ADMIN — Medication 100 MILLIGRAM(S): at 06:32

## 2017-05-18 RX ADMIN — LEVETIRACETAM 500 MILLIGRAM(S): 250 TABLET, FILM COATED ORAL at 06:32

## 2017-05-18 RX ADMIN — Medication 1 TABLET(S): at 12:03

## 2017-05-18 NOTE — PROGRESS NOTE ADULT - SUBJECTIVE AND OBJECTIVE BOX
HPI:  72 y. o Right  handed male h/o rate controlled Afib not on AC, h/o GIB, CAD, DM, HLD found to have a left frontal mass likely a meningioma.  Initially presented with seizure episode and currently has right sided trace weakness RUE>RLE right arm spasticity.  Patient presents today for pre op cerebral angiogram and possible embolization. (08 May 2017 08:57)    OVERNIGHT EVENTS: No acute events overnight. Pt states weakness of right side is much improved since surgery. Pt denies acute weakness/loss of sensation of extremities.    Vital Signs Last 24 Hrs  T(C): 35.8, Max: 36.8 (05-17 @ 10:25)  T(F): 96.5, Max: 98.2 (05-17 @ 10:25)  HR: 102 (86 - 102)  BP: 153/94 (153/80 - 161/84)  BP(mean): 117 (107 - 117)  RR: 16 (16 - 20)  SpO2: 100% (99% - 100%)    I&O's Summary    I & Os for current day (as of 18 May 2017 09:01)  =============================================  IN: 0 ml / OUT: 1150 ml / NET: -1150 ml      PHYSICAL EXAM:  Neurological: AAOx3, FC, speech coherent  CNII-XII: EOM intact, PERRL  Motor: LUE/LLE 5/5, RUE 4/5, RLE PF 3/5 o/w 4/5         [x] warm well perfused; capillary refill <3 seconds   Sensation: [x] intact to light touch  [] decreased:   Incision/Wound: Scalp incision site C/D/I, staples in place    TUBES/LINES:  [] Herrera  [] Lumbar Drain  [] Wound Drains  [] Others    DIET:  [] NPO  [x] Mechanical  [] Tube feeds    LABS:                        12.1   9.4   )-----------( 560      ( 18 May 2017 06:53 )             37.4     05-18    134<L>  |  99  |  27<H>  ----------------------------<  203<H>  4.7   |  21<L>  |  0.70    Ca    8.9      18 May 2017 06:52              CAPILLARY BLOOD GLUCOSE  198 (18 May 2017 05:23)  187 (17 May 2017 21:52)  251 (17 May 2017 15:41)  245 (17 May 2017 11:25)      Drug Levels: [] N/A    CSF Analysis: [] N/A      Allergies    No Known Allergies    Intolerances      MEDICATIONS:  Antibiotics:    Neuro:  levETIRAcetam 500milliGRAM(s) Oral two times a day  lamoTRIgine 150milliGRAM(s) Oral daily  acetaminophen   Tablet. 325milliGRAM(s) Oral every 4 hours PRN  acetaminophen   Tablet. 650milliGRAM(s) Oral every 6 hours PRN  ondansetron Injectable 4milliGRAM(s) IV Push every 6 hours PRN  LORazepam   Injectable 1milliGRAM(s) IV Push every 6 hours PRN    Anticoagulation:  enoxaparin Injectable 40milliGRAM(s) SubCutaneous at bedtime    OTHER:  insulin lispro (HumaLOG) corrective regimen sliding scale  SubCutaneous Before meals and at bedtime  dextrose Gel 1Dose(s) Oral once PRN  dextrose 50% Injectable 12.5Gram(s) IV Push once  glucagon  Injectable 1milliGRAM(s) IntraMuscular once PRN  docusate sodium 100milliGRAM(s) Oral three times a day  senna 2Tablet(s) Oral at bedtime PRN  metoprolol succinate ER 100milliGRAM(s) Oral daily  pantoprazole    Tablet 40milliGRAM(s) Oral before breakfast  valsartan 160milliGRAM(s) Oral daily  insulin glargine Injectable (LANTUS) 10Unit(s) SubCutaneous every morning  dexamethasone     Tablet 2milliGRAM(s) Oral two times a day  diltiazem   CD 360milliGRAM(s) Oral daily    IVF:  dextrose 5%. 1000milliLiter(s) IV Continuous <Continuous>  multivitamin 1Tablet(s) Oral daily  thiamine 100milliGRAM(s) Oral daily  folic acid 1milliGRAM(s) Oral daily    CULTURES:    RADIOLOGY & ADDITIONAL TESTS:    ASSESSMENT:  72y Male s/p left frontal craniotomy for brain tumor excision on 5/9 post-op course complicated by rapid a-fib, DT/ withdrawl, R side weakness improving after restarting decadron    PLAN:  -neuro checks  -continue decadron 2q12, slow taper  -continue keppra 500 BID  -remove staples today  -Pt for OOB  -Dispo planning  -DVT prophylaxis: SCDs, lovenox  -D/w Dr. Blevins HPI:  72 y. o Right  handed male h/o rate controlled Afib not on AC, h/o GIB, CAD, DM, HLD found to have a left frontal mass likely a meningioma.  Initially presented with seizure episode and currently has right sided trace weakness RUE>RLE right arm spasticity.  Patient presents today for pre op cerebral angiogram and possible embolization. (08 May 2017 08:57)    OVERNIGHT EVENTS: No acute events overnight. A-fib rate controlled overnight on cardizem 360mg daily. Pt states weakness of right side is much improved since surgery. Pt denies acute weakness/loss of sensation of extremities.    Vital Signs Last 24 Hrs  T(C): 35.8, Max: 36.8 (05-17 @ 10:25)  T(F): 96.5, Max: 98.2 (05-17 @ 10:25)  HR: 102 (86 - 102)  BP: 153/94 (153/80 - 161/84)  BP(mean): 117 (107 - 117)  RR: 16 (16 - 20)  SpO2: 100% (99% - 100%)    I&O's Summary    I & Os for current day (as of 18 May 2017 09:01)  =============================================  IN: 0 ml / OUT: 1150 ml / NET: -1150 ml      PHYSICAL EXAM:  Neurological: AAOx3, FC, speech coherent  CNII-XII: EOM intact, PERRL  Motor: LUE/LLE 5/5, RUE 4/5, RLE PF 3/5 o/w 4/5         [x] warm well perfused; capillary refill <3 seconds   Sensation: [x] intact to light touch  [] decreased:   Incision/Wound: Scalp incision site C/D/I, staples in place    TUBES/LINES:  [] Herrera  [] Lumbar Drain  [] Wound Drains  [] Others    DIET:  [] NPO  [x] Mechanical  [] Tube feeds    LABS:                        12.1   9.4   )-----------( 560      ( 18 May 2017 06:53 )             37.4     05-18    134<L>  |  99  |  27<H>  ----------------------------<  203<H>  4.7   |  21<L>  |  0.70    Ca    8.9      18 May 2017 06:52              CAPILLARY BLOOD GLUCOSE  198 (18 May 2017 05:23)  187 (17 May 2017 21:52)  251 (17 May 2017 15:41)  245 (17 May 2017 11:25)      Drug Levels: [] N/A    CSF Analysis: [] N/A      Allergies    No Known Allergies    Intolerances      MEDICATIONS:  Antibiotics:    Neuro:  levETIRAcetam 500milliGRAM(s) Oral two times a day  lamoTRIgine 150milliGRAM(s) Oral daily  acetaminophen   Tablet. 325milliGRAM(s) Oral every 4 hours PRN  acetaminophen   Tablet. 650milliGRAM(s) Oral every 6 hours PRN  ondansetron Injectable 4milliGRAM(s) IV Push every 6 hours PRN  LORazepam   Injectable 1milliGRAM(s) IV Push every 6 hours PRN    Anticoagulation:  enoxaparin Injectable 40milliGRAM(s) SubCutaneous at bedtime    OTHER:  insulin lispro (HumaLOG) corrective regimen sliding scale  SubCutaneous Before meals and at bedtime  dextrose Gel 1Dose(s) Oral once PRN  dextrose 50% Injectable 12.5Gram(s) IV Push once  glucagon  Injectable 1milliGRAM(s) IntraMuscular once PRN  docusate sodium 100milliGRAM(s) Oral three times a day  senna 2Tablet(s) Oral at bedtime PRN  metoprolol succinate ER 100milliGRAM(s) Oral daily  pantoprazole    Tablet 40milliGRAM(s) Oral before breakfast  valsartan 160milliGRAM(s) Oral daily  insulin glargine Injectable (LANTUS) 10Unit(s) SubCutaneous every morning  dexamethasone     Tablet 2milliGRAM(s) Oral two times a day  diltiazem   CD 360milliGRAM(s) Oral daily    IVF:  dextrose 5%. 1000milliLiter(s) IV Continuous <Continuous>  multivitamin 1Tablet(s) Oral daily  thiamine 100milliGRAM(s) Oral daily  folic acid 1milliGRAM(s) Oral daily    CULTURES:    RADIOLOGY & ADDITIONAL TESTS:    ASSESSMENT:  72y Male s/p left frontal craniotomy for brain tumor excision on 5/9 post-op course complicated by rapid a-fib, DT/ withdrawl, R side weakness improving after restarting decadron    PLAN:  -neuro checks  -continue decadron 2q12, slow taper  -continue keppra 500 BID  -remove staples today  -Pt for OOB  -Dispo planning  -DVT prophylaxis: SCDs, lovenox  -D/w Dr. Blevins

## 2017-05-19 PROCEDURE — 96116 NUBHVL XM PHYS/QHP 1ST HR: CPT

## 2017-05-19 PROCEDURE — 99233 SBSQ HOSP IP/OBS HIGH 50: CPT

## 2017-05-20 PROCEDURE — 99232 SBSQ HOSP IP/OBS MODERATE 35: CPT

## 2017-05-20 PROCEDURE — 99223 1ST HOSP IP/OBS HIGH 75: CPT

## 2017-05-21 PROCEDURE — 99232 SBSQ HOSP IP/OBS MODERATE 35: CPT

## 2017-05-22 DIAGNOSIS — J45.909 UNSPECIFIED ASTHMA, UNCOMPLICATED: ICD-10-CM

## 2017-05-22 DIAGNOSIS — E11.9 TYPE 2 DIABETES MELLITUS WITHOUT COMPLICATIONS: ICD-10-CM

## 2017-05-22 DIAGNOSIS — D32.0 BENIGN NEOPLASM OF CEREBRAL MENINGES: ICD-10-CM

## 2017-05-22 DIAGNOSIS — R53.1 WEAKNESS: ICD-10-CM

## 2017-05-22 DIAGNOSIS — F10.231 ALCOHOL DEPENDENCE WITH WITHDRAWAL DELIRIUM: ICD-10-CM

## 2017-05-22 DIAGNOSIS — I10 ESSENTIAL (PRIMARY) HYPERTENSION: ICD-10-CM

## 2017-05-22 DIAGNOSIS — Z79.84 LONG TERM (CURRENT) USE OF ORAL HYPOGLYCEMIC DRUGS: ICD-10-CM

## 2017-05-22 DIAGNOSIS — G40.909 EPILEPSY, UNSPECIFIED, NOT INTRACTABLE, WITHOUT STATUS EPILEPTICUS: ICD-10-CM

## 2017-05-22 DIAGNOSIS — I48.91 UNSPECIFIED ATRIAL FIBRILLATION: ICD-10-CM

## 2017-05-22 DIAGNOSIS — Z87.891 PERSONAL HISTORY OF NICOTINE DEPENDENCE: ICD-10-CM

## 2017-05-22 DIAGNOSIS — I27.2 OTHER SECONDARY PULMONARY HYPERTENSION: ICD-10-CM

## 2017-05-22 DIAGNOSIS — G93.5 COMPRESSION OF BRAIN: ICD-10-CM

## 2017-05-22 DIAGNOSIS — E78.4 OTHER HYPERLIPIDEMIA: ICD-10-CM

## 2017-05-22 DIAGNOSIS — I25.10 ATHEROSCLEROTIC HEART DISEASE OF NATIVE CORONARY ARTERY WITHOUT ANGINA PECTORIS: ICD-10-CM

## 2017-05-22 PROCEDURE — 99232 SBSQ HOSP IP/OBS MODERATE 35: CPT

## 2017-05-23 PROCEDURE — 99232 SBSQ HOSP IP/OBS MODERATE 35: CPT

## 2017-05-23 PROCEDURE — 99233 SBSQ HOSP IP/OBS HIGH 50: CPT

## 2017-05-24 PROCEDURE — 99232 SBSQ HOSP IP/OBS MODERATE 35: CPT

## 2017-05-25 PROCEDURE — 99233 SBSQ HOSP IP/OBS HIGH 50: CPT

## 2017-05-25 PROCEDURE — 99232 SBSQ HOSP IP/OBS MODERATE 35: CPT

## 2017-05-26 PROCEDURE — 99232 SBSQ HOSP IP/OBS MODERATE 35: CPT

## 2017-05-27 PROCEDURE — 99232 SBSQ HOSP IP/OBS MODERATE 35: CPT | Mod: GC

## 2017-05-28 PROCEDURE — 99232 SBSQ HOSP IP/OBS MODERATE 35: CPT

## 2017-05-29 PROCEDURE — 99232 SBSQ HOSP IP/OBS MODERATE 35: CPT

## 2017-05-30 PROCEDURE — 99232 SBSQ HOSP IP/OBS MODERATE 35: CPT

## 2017-05-31 PROCEDURE — 99232 SBSQ HOSP IP/OBS MODERATE 35: CPT

## 2017-06-01 PROCEDURE — 99232 SBSQ HOSP IP/OBS MODERATE 35: CPT

## 2017-06-02 PROCEDURE — 99232 SBSQ HOSP IP/OBS MODERATE 35: CPT

## 2017-06-03 PROCEDURE — 99232 SBSQ HOSP IP/OBS MODERATE 35: CPT

## 2017-06-04 PROCEDURE — 99232 SBSQ HOSP IP/OBS MODERATE 35: CPT

## 2017-06-05 PROCEDURE — 99232 SBSQ HOSP IP/OBS MODERATE 35: CPT

## 2017-06-06 PROCEDURE — 99232 SBSQ HOSP IP/OBS MODERATE 35: CPT

## 2017-06-07 PROCEDURE — 99232 SBSQ HOSP IP/OBS MODERATE 35: CPT

## 2017-06-07 PROCEDURE — 99233 SBSQ HOSP IP/OBS HIGH 50: CPT

## 2017-06-08 PROCEDURE — 99232 SBSQ HOSP IP/OBS MODERATE 35: CPT

## 2017-06-09 PROCEDURE — 99232 SBSQ HOSP IP/OBS MODERATE 35: CPT

## 2017-06-10 PROCEDURE — 82728 ASSAY OF FERRITIN: CPT

## 2017-06-10 PROCEDURE — 83550 IRON BINDING TEST: CPT

## 2017-06-10 PROCEDURE — 97163 PT EVAL HIGH COMPLEX 45 MIN: CPT

## 2017-06-10 PROCEDURE — 97116 GAIT TRAINING THERAPY: CPT

## 2017-06-10 PROCEDURE — 97167 OT EVAL HIGH COMPLEX 60 MIN: CPT

## 2017-06-10 PROCEDURE — 97530 THERAPEUTIC ACTIVITIES: CPT

## 2017-06-10 PROCEDURE — 97110 THERAPEUTIC EXERCISES: CPT

## 2017-06-10 PROCEDURE — 97535 SELF CARE MNGMENT TRAINING: CPT

## 2017-06-10 PROCEDURE — 99238 HOSP IP/OBS DSCHRG MGMT 30/<: CPT

## 2017-06-10 PROCEDURE — 85025 COMPLETE CBC W/AUTO DIFF WBC: CPT

## 2017-06-10 PROCEDURE — 80048 BASIC METABOLIC PNL TOTAL CA: CPT

## 2017-06-10 PROCEDURE — 83036 HEMOGLOBIN GLYCOSYLATED A1C: CPT

## 2017-06-10 PROCEDURE — 92610 EVALUATE SWALLOWING FUNCTION: CPT

## 2017-06-10 PROCEDURE — 92523 SPEECH SOUND LANG COMPREHEN: CPT

## 2017-06-10 PROCEDURE — 82272 OCCULT BLD FECES 1-3 TESTS: CPT

## 2017-06-10 PROCEDURE — 80053 COMPREHEN METABOLIC PANEL: CPT

## 2017-06-10 PROCEDURE — 84100 ASSAY OF PHOSPHORUS: CPT

## 2017-06-10 PROCEDURE — 80069 RENAL FUNCTION PANEL: CPT

## 2017-06-10 PROCEDURE — 85027 COMPLETE CBC AUTOMATED: CPT

## 2017-06-10 PROCEDURE — 83735 ASSAY OF MAGNESIUM: CPT

## 2017-06-10 PROCEDURE — 92507 TX SP LANG VOICE COMM INDIV: CPT

## 2017-08-08 ENCOUNTER — OUTPATIENT (OUTPATIENT)
Dept: OUTPATIENT SERVICES | Facility: HOSPITAL | Age: 72
LOS: 1 days | End: 2017-08-08
Payer: COMMERCIAL

## 2017-08-08 ENCOUNTER — APPOINTMENT (OUTPATIENT)
Dept: MRI IMAGING | Facility: CLINIC | Age: 72
End: 2017-08-08
Payer: MEDICARE

## 2017-08-08 DIAGNOSIS — D32.9 BENIGN NEOPLASM OF MENINGES, UNSPECIFIED: ICD-10-CM

## 2017-08-08 DIAGNOSIS — Z98.49 CATARACT EXTRACTION STATUS, UNSPECIFIED EYE: Chronic | ICD-10-CM

## 2017-08-08 DIAGNOSIS — R56.9 UNSPECIFIED CONVULSIONS: ICD-10-CM

## 2017-08-08 DIAGNOSIS — Z98.890 OTHER SPECIFIED POSTPROCEDURAL STATES: Chronic | ICD-10-CM

## 2017-08-08 PROCEDURE — 70553 MRI BRAIN STEM W/O & W/DYE: CPT | Mod: 26

## 2017-08-09 PROCEDURE — 70553 MRI BRAIN STEM W/O & W/DYE: CPT

## 2017-08-09 PROCEDURE — 82565 ASSAY OF CREATININE: CPT

## 2017-08-09 PROCEDURE — A9585: CPT

## 2017-08-28 ENCOUNTER — APPOINTMENT (OUTPATIENT)
Dept: NEUROSURGERY | Facility: CLINIC | Age: 72
End: 2017-08-28
Payer: MEDICARE

## 2017-08-28 VITALS
SYSTOLIC BLOOD PRESSURE: 153 MMHG | DIASTOLIC BLOOD PRESSURE: 80 MMHG | HEIGHT: 71 IN | HEART RATE: 76 BPM | WEIGHT: 200 LBS | OXYGEN SATURATION: 100 % | BODY MASS INDEX: 28 KG/M2

## 2017-08-28 PROCEDURE — 99214 OFFICE O/P EST MOD 30 MIN: CPT

## 2017-09-05 ENCOUNTER — APPOINTMENT (OUTPATIENT)
Dept: RADIATION ONCOLOGY | Facility: CLINIC | Age: 72
End: 2017-09-05
Payer: MEDICARE

## 2017-09-05 ENCOUNTER — OUTPATIENT (OUTPATIENT)
Dept: OUTPATIENT SERVICES | Facility: HOSPITAL | Age: 72
LOS: 1 days | Discharge: HOME | End: 2017-09-05

## 2017-09-05 DIAGNOSIS — Z98.890 OTHER SPECIFIED POSTPROCEDURAL STATES: Chronic | ICD-10-CM

## 2017-09-05 DIAGNOSIS — Z98.49 CATARACT EXTRACTION STATUS, UNSPECIFIED EYE: Chronic | ICD-10-CM

## 2017-09-05 PROCEDURE — 99203 OFFICE O/P NEW LOW 30 MIN: CPT | Mod: 25

## 2017-09-05 PROCEDURE — 77263 THER RADIOLOGY TX PLNG CPLX: CPT

## 2017-09-15 PROCEDURE — 77300 RADIATION THERAPY DOSE PLAN: CPT | Mod: 26

## 2017-09-15 PROCEDURE — 77338 DESIGN MLC DEVICE FOR IMRT: CPT | Mod: 26

## 2017-09-15 PROCEDURE — 77301 RADIOTHERAPY DOSE PLAN IMRT: CPT | Mod: 26

## 2017-09-25 PROCEDURE — G6002: CPT | Mod: 26

## 2017-09-26 PROCEDURE — G6002: CPT | Mod: 26

## 2017-09-27 PROCEDURE — G6002: CPT | Mod: 26

## 2017-09-28 PROCEDURE — G6002: CPT | Mod: 26

## 2017-09-29 PROCEDURE — G6002: CPT | Mod: 26

## 2017-10-02 VITALS — BODY MASS INDEX: 28.46 KG/M2 | WEIGHT: 204.03 LBS

## 2017-10-02 VITALS
RESPIRATION RATE: 16 BRPM | HEART RATE: 76 BPM | TEMPERATURE: 98.2 F | SYSTOLIC BLOOD PRESSURE: 142 MMHG | HEIGHT: 71 IN | OXYGEN SATURATION: 99 % | DIASTOLIC BLOOD PRESSURE: 91 MMHG | BODY MASS INDEX: 28.46 KG/M2

## 2017-10-02 PROCEDURE — G6002: CPT | Mod: 26

## 2017-10-02 PROCEDURE — 77427 RADIATION TX MANAGEMENT X5: CPT

## 2017-10-03 PROCEDURE — G6002: CPT | Mod: 26

## 2017-10-04 PROCEDURE — G6002: CPT | Mod: 26

## 2017-10-05 PROCEDURE — G6002: CPT | Mod: 26

## 2017-10-06 PROCEDURE — G6002: CPT | Mod: 26

## 2017-10-09 VITALS
TEMPERATURE: 97.8 F | BODY MASS INDEX: 29 KG/M2 | HEIGHT: 71 IN | DIASTOLIC BLOOD PRESSURE: 78 MMHG | HEART RATE: 83 BPM | RESPIRATION RATE: 16 BRPM | SYSTOLIC BLOOD PRESSURE: 165 MMHG | WEIGHT: 207.12 LBS | OXYGEN SATURATION: 99 %

## 2017-10-09 PROCEDURE — 77427 RADIATION TX MANAGEMENT X5: CPT

## 2017-10-09 PROCEDURE — G6002: CPT | Mod: 26

## 2017-10-10 PROCEDURE — G6002: CPT | Mod: 26

## 2017-10-11 PROCEDURE — G6002: CPT | Mod: 26

## 2017-10-12 PROCEDURE — G6002: CPT | Mod: 26

## 2017-10-13 PROCEDURE — G6002: CPT | Mod: 26

## 2017-10-16 VITALS
BODY MASS INDEX: 28.69 KG/M2 | TEMPERATURE: 97.5 F | WEIGHT: 204.92 LBS | DIASTOLIC BLOOD PRESSURE: 90 MMHG | HEART RATE: 74 BPM | HEIGHT: 71 IN | OXYGEN SATURATION: 99 % | SYSTOLIC BLOOD PRESSURE: 144 MMHG | RESPIRATION RATE: 15 BRPM

## 2017-10-16 PROCEDURE — G6002: CPT | Mod: 26

## 2017-10-16 PROCEDURE — 77427 RADIATION TX MANAGEMENT X5: CPT

## 2017-10-17 PROCEDURE — G6002: CPT | Mod: 26

## 2017-10-18 PROCEDURE — G6002: CPT | Mod: 26

## 2017-10-19 PROCEDURE — G6002: CPT | Mod: 26

## 2017-10-20 PROCEDURE — G6002: CPT | Mod: 26

## 2017-10-23 VITALS
HEIGHT: 71 IN | SYSTOLIC BLOOD PRESSURE: 154 MMHG | OXYGEN SATURATION: 99 % | TEMPERATURE: 98.2 F | RESPIRATION RATE: 15 BRPM | HEART RATE: 84 BPM | WEIGHT: 202.49 LBS | BODY MASS INDEX: 28.35 KG/M2 | DIASTOLIC BLOOD PRESSURE: 88 MMHG

## 2017-10-23 PROCEDURE — G6002: CPT | Mod: 26

## 2017-10-23 PROCEDURE — 77427 RADIATION TX MANAGEMENT X5: CPT

## 2017-10-24 PROCEDURE — G6002: CPT | Mod: 26

## 2017-10-25 PROCEDURE — G6002: CPT | Mod: 26

## 2017-10-26 PROCEDURE — G6002: CPT | Mod: 26

## 2017-10-27 PROCEDURE — G6002: CPT | Mod: 26

## 2017-10-30 VITALS
OXYGEN SATURATION: 99 % | DIASTOLIC BLOOD PRESSURE: 79 MMHG | HEART RATE: 64 BPM | TEMPERATURE: 97.8 F | HEIGHT: 71 IN | RESPIRATION RATE: 15 BRPM | WEIGHT: 204.26 LBS | SYSTOLIC BLOOD PRESSURE: 132 MMHG | BODY MASS INDEX: 28.6 KG/M2

## 2017-10-30 PROCEDURE — G6002: CPT | Mod: 26

## 2017-10-30 PROCEDURE — 77427 RADIATION TX MANAGEMENT X5: CPT

## 2017-10-31 PROCEDURE — G6002: CPT | Mod: 26

## 2017-11-01 PROCEDURE — G6002: CPT | Mod: 26

## 2017-11-02 PROCEDURE — G6002: CPT | Mod: 26

## 2017-11-03 PROCEDURE — G6002: CPT | Mod: 26

## 2017-11-21 ENCOUNTER — INPATIENT (INPATIENT)
Facility: HOSPITAL | Age: 72
LOS: 3 days | Discharge: HOME | DRG: 872 | End: 2017-11-25
Attending: STUDENT IN AN ORGANIZED HEALTH CARE EDUCATION/TRAINING PROGRAM | Admitting: STUDENT IN AN ORGANIZED HEALTH CARE EDUCATION/TRAINING PROGRAM
Payer: COMMERCIAL

## 2017-11-21 DIAGNOSIS — R65.20 SEVERE SEPSIS WITHOUT SEPTIC SHOCK: ICD-10-CM

## 2017-11-21 DIAGNOSIS — I12.9 HYPERTENSIVE CHRONIC KIDNEY DISEASE WITH STAGE 1 THROUGH STAGE 4 CHRONIC KIDNEY DISEASE, OR UNSPECIFIED CHRONIC KIDNEY DISEASE: ICD-10-CM

## 2017-11-21 DIAGNOSIS — N20.1 CALCULUS OF URETER: ICD-10-CM

## 2017-11-21 DIAGNOSIS — A41.9 SEPSIS, UNSPECIFIED ORGANISM: ICD-10-CM

## 2017-11-21 DIAGNOSIS — E87.1 HYPO-OSMOLALITY AND HYPONATREMIA: ICD-10-CM

## 2017-11-21 DIAGNOSIS — N17.9 ACUTE KIDNEY FAILURE, UNSPECIFIED: ICD-10-CM

## 2017-11-21 DIAGNOSIS — E86.1 HYPOVOLEMIA: ICD-10-CM

## 2017-11-21 DIAGNOSIS — E11.22 TYPE 2 DIABETES MELLITUS WITH DIABETIC CHRONIC KIDNEY DISEASE: ICD-10-CM

## 2017-11-21 DIAGNOSIS — E78.5 HYPERLIPIDEMIA, UNSPECIFIED: ICD-10-CM

## 2017-11-21 DIAGNOSIS — N18.3 CHRONIC KIDNEY DISEASE, STAGE 3 (MODERATE): ICD-10-CM

## 2017-11-21 DIAGNOSIS — E87.6 HYPOKALEMIA: ICD-10-CM

## 2017-11-21 DIAGNOSIS — Z98.49 CATARACT EXTRACTION STATUS, UNSPECIFIED EYE: Chronic | ICD-10-CM

## 2017-11-21 DIAGNOSIS — Z98.890 OTHER SPECIFIED POSTPROCEDURAL STATES: Chronic | ICD-10-CM

## 2017-11-21 DIAGNOSIS — N39.0 URINARY TRACT INFECTION, SITE NOT SPECIFIED: ICD-10-CM

## 2017-11-21 DIAGNOSIS — I48.91 UNSPECIFIED ATRIAL FIBRILLATION: ICD-10-CM

## 2017-11-21 PROCEDURE — 93010 ELECTROCARDIOGRAM REPORT: CPT

## 2017-11-21 PROCEDURE — 71010: CPT | Mod: 26

## 2017-11-21 PROCEDURE — 74176 CT ABD & PELVIS W/O CONTRAST: CPT | Mod: 26

## 2017-11-21 PROCEDURE — 99223 1ST HOSP IP/OBS HIGH 75: CPT

## 2017-11-23 PROCEDURE — 99233 SBSQ HOSP IP/OBS HIGH 50: CPT

## 2017-11-24 PROCEDURE — 74176 CT ABD & PELVIS W/O CONTRAST: CPT | Mod: 26

## 2017-11-25 PROCEDURE — 99238 HOSP IP/OBS DSCHRG MGMT 30/<: CPT

## 2017-11-29 DIAGNOSIS — R50.9 FEVER, UNSPECIFIED: ICD-10-CM

## 2017-12-11 ENCOUNTER — APPOINTMENT (OUTPATIENT)
Dept: RADIATION ONCOLOGY | Facility: CLINIC | Age: 72
End: 2017-12-11
Payer: MEDICARE

## 2017-12-11 VITALS
HEART RATE: 92 BPM | HEIGHT: 71 IN | BODY MASS INDEX: 27.15 KG/M2 | TEMPERATURE: 98.1 F | DIASTOLIC BLOOD PRESSURE: 77 MMHG | OXYGEN SATURATION: 100 % | WEIGHT: 193.89 LBS | RESPIRATION RATE: 16 BRPM | SYSTOLIC BLOOD PRESSURE: 148 MMHG

## 2017-12-11 PROCEDURE — 99214 OFFICE O/P EST MOD 30 MIN: CPT

## 2017-12-19 PROCEDURE — 85610 PROTHROMBIN TIME: CPT

## 2017-12-19 PROCEDURE — 81003 URINALYSIS AUTO W/O SCOPE: CPT

## 2017-12-19 PROCEDURE — 87086 URINE CULTURE/COLONY COUNT: CPT

## 2017-12-19 PROCEDURE — 80177 DRUG SCRN QUAN LEVETIRACETAM: CPT

## 2017-12-19 PROCEDURE — 80076 HEPATIC FUNCTION PANEL: CPT

## 2017-12-19 PROCEDURE — 94640 AIRWAY INHALATION TREATMENT: CPT

## 2017-12-19 PROCEDURE — 84300 ASSAY OF URINE SODIUM: CPT

## 2017-12-19 PROCEDURE — 96365 THER/PROPH/DIAG IV INF INIT: CPT

## 2017-12-19 PROCEDURE — 83735 ASSAY OF MAGNESIUM: CPT

## 2017-12-19 PROCEDURE — 83605 ASSAY OF LACTIC ACID: CPT

## 2017-12-19 PROCEDURE — 83036 HEMOGLOBIN GLYCOSYLATED A1C: CPT

## 2017-12-19 PROCEDURE — 85730 THROMBOPLASTIN TIME PARTIAL: CPT

## 2017-12-19 PROCEDURE — 87040 BLOOD CULTURE FOR BACTERIA: CPT

## 2017-12-19 PROCEDURE — 99285 EMERGENCY DEPT VISIT HI MDM: CPT | Mod: 25

## 2017-12-19 PROCEDURE — 71045 X-RAY EXAM CHEST 1 VIEW: CPT

## 2017-12-19 PROCEDURE — 93005 ELECTROCARDIOGRAM TRACING: CPT

## 2017-12-19 PROCEDURE — 85027 COMPLETE CBC AUTOMATED: CPT

## 2017-12-19 PROCEDURE — 80069 RENAL FUNCTION PANEL: CPT

## 2017-12-19 PROCEDURE — 84550 ASSAY OF BLOOD/URIC ACID: CPT

## 2017-12-19 PROCEDURE — 96375 TX/PRO/DX INJ NEW DRUG ADDON: CPT

## 2017-12-19 PROCEDURE — 84443 ASSAY THYROID STIM HORMONE: CPT

## 2017-12-19 PROCEDURE — 74176 CT ABD & PELVIS W/O CONTRAST: CPT

## 2017-12-19 PROCEDURE — 87150 DNA/RNA AMPLIFIED PROBE: CPT

## 2017-12-19 PROCEDURE — 97162 PT EVAL MOD COMPLEX 30 MIN: CPT

## 2017-12-19 PROCEDURE — 85025 COMPLETE CBC W/AUTO DIFF WBC: CPT

## 2017-12-19 PROCEDURE — 80048 BASIC METABOLIC PNL TOTAL CA: CPT

## 2018-01-23 ENCOUNTER — OUTPATIENT (OUTPATIENT)
Dept: OUTPATIENT SERVICES | Facility: HOSPITAL | Age: 73
LOS: 1 days | End: 2018-01-23
Payer: COMMERCIAL

## 2018-01-23 ENCOUNTER — APPOINTMENT (OUTPATIENT)
Dept: MRI IMAGING | Facility: CLINIC | Age: 73
End: 2018-01-23
Payer: MEDICARE

## 2018-01-23 DIAGNOSIS — Z00.8 ENCOUNTER FOR OTHER GENERAL EXAMINATION: ICD-10-CM

## 2018-01-23 DIAGNOSIS — Z98.890 OTHER SPECIFIED POSTPROCEDURAL STATES: Chronic | ICD-10-CM

## 2018-01-23 DIAGNOSIS — Z98.49 CATARACT EXTRACTION STATUS, UNSPECIFIED EYE: Chronic | ICD-10-CM

## 2018-01-23 DIAGNOSIS — N20.0 CALCULUS OF KIDNEY: ICD-10-CM

## 2018-01-23 PROCEDURE — 70553 MRI BRAIN STEM W/O & W/DYE: CPT | Mod: 26

## 2018-01-23 PROCEDURE — A9585: CPT

## 2018-01-23 PROCEDURE — 70553 MRI BRAIN STEM W/O & W/DYE: CPT

## 2018-01-25 ENCOUNTER — OUTPATIENT (OUTPATIENT)
Dept: OUTPATIENT SERVICES | Facility: HOSPITAL | Age: 73
LOS: 1 days | End: 2018-01-25
Payer: COMMERCIAL

## 2018-01-25 DIAGNOSIS — Z98.49 CATARACT EXTRACTION STATUS, UNSPECIFIED EYE: Chronic | ICD-10-CM

## 2018-01-25 DIAGNOSIS — D42.0 NEOPLASM OF UNCERTAIN BEHAVIOR OF CEREBRAL MENINGES: ICD-10-CM

## 2018-01-25 DIAGNOSIS — R26.9 UNSPECIFIED ABNORMALITIES OF GAIT AND MOBILITY: ICD-10-CM

## 2018-01-25 DIAGNOSIS — Z98.890 OTHER SPECIFIED POSTPROCEDURAL STATES: Chronic | ICD-10-CM

## 2018-02-01 PROBLEM — N20.0 NEPHROLITHIASIS: Status: ACTIVE | Noted: 2018-02-01

## 2018-02-12 ENCOUNTER — APPOINTMENT (OUTPATIENT)
Dept: NEUROSURGERY | Facility: CLINIC | Age: 73
End: 2018-02-12
Payer: MEDICARE

## 2018-02-12 VITALS
SYSTOLIC BLOOD PRESSURE: 132 MMHG | DIASTOLIC BLOOD PRESSURE: 80 MMHG | HEIGHT: 71 IN | OXYGEN SATURATION: 99 % | BODY MASS INDEX: 27.72 KG/M2 | HEART RATE: 65 BPM | WEIGHT: 198 LBS

## 2018-02-12 PROCEDURE — 99214 OFFICE O/P EST MOD 30 MIN: CPT

## 2018-03-12 ENCOUNTER — APPOINTMENT (OUTPATIENT)
Dept: PHYSICAL MEDICINE AND REHAB | Facility: CLINIC | Age: 73
End: 2018-03-12
Payer: MEDICARE

## 2018-03-12 VITALS
RESPIRATION RATE: 16 BRPM | OXYGEN SATURATION: 97 % | DIASTOLIC BLOOD PRESSURE: 85 MMHG | WEIGHT: 197 LBS | HEIGHT: 71 IN | SYSTOLIC BLOOD PRESSURE: 146 MMHG | BODY MASS INDEX: 27.58 KG/M2 | HEART RATE: 67 BPM

## 2018-03-12 DIAGNOSIS — R25.1 TREMOR, UNSPECIFIED: ICD-10-CM

## 2018-03-12 PROCEDURE — 99214 OFFICE O/P EST MOD 30 MIN: CPT

## 2018-03-12 RX ORDER — TROLAMINE SALICYLATE 10 G/100G
10 CREAM TOPICAL
Qty: 2 | Refills: 1 | Status: ACTIVE | COMMUNITY
Start: 2018-03-12 | End: 1900-01-01

## 2018-03-12 RX ORDER — DICLOFENAC SODIUM 10 MG/G
1 GEL TOPICAL
Qty: 2 | Refills: 0 | Status: ACTIVE | COMMUNITY
Start: 2018-03-12 | End: 1900-01-01

## 2018-03-12 RX ADMIN — TROLAMINE SALICYLATE 0 %: 10 CREAM TOPICAL at 00:00

## 2018-04-11 ENCOUNTER — RX RENEWAL (OUTPATIENT)
Age: 73
End: 2018-04-11

## 2018-04-17 ENCOUNTER — OUTPATIENT (OUTPATIENT)
Dept: OUTPATIENT SERVICES | Facility: HOSPITAL | Age: 73
LOS: 1 days | End: 2018-04-17
Payer: COMMERCIAL

## 2018-04-17 DIAGNOSIS — M25.571 PAIN IN RIGHT ANKLE AND JOINTS OF RIGHT FOOT: ICD-10-CM

## 2018-04-17 DIAGNOSIS — Z98.49 CATARACT EXTRACTION STATUS, UNSPECIFIED EYE: Chronic | ICD-10-CM

## 2018-04-17 DIAGNOSIS — Z98.890 OTHER SPECIFIED POSTPROCEDURAL STATES: Chronic | ICD-10-CM

## 2018-04-17 PROCEDURE — 73610 X-RAY EXAM OF ANKLE: CPT

## 2018-04-18 ENCOUNTER — APPOINTMENT (OUTPATIENT)
Dept: RADIATION ONCOLOGY | Facility: CLINIC | Age: 73
End: 2018-04-18
Payer: MEDICARE

## 2018-04-18 VITALS
DIASTOLIC BLOOD PRESSURE: 82 MMHG | WEIGHT: 205 LBS | TEMPERATURE: 97.52 F | RESPIRATION RATE: 15 BRPM | HEART RATE: 79 BPM | BODY MASS INDEX: 28.59 KG/M2 | SYSTOLIC BLOOD PRESSURE: 149 MMHG | OXYGEN SATURATION: 96 %

## 2018-04-18 PROCEDURE — 99214 OFFICE O/P EST MOD 30 MIN: CPT

## 2018-04-19 PROCEDURE — 73610 X-RAY EXAM OF ANKLE: CPT | Mod: 26,RT

## 2018-04-24 PROCEDURE — 97140 MANUAL THERAPY 1/> REGIONS: CPT

## 2018-04-24 PROCEDURE — 97166 OT EVAL MOD COMPLEX 45 MIN: CPT

## 2018-04-24 PROCEDURE — 97116 GAIT TRAINING THERAPY: CPT

## 2018-04-24 PROCEDURE — 97530 THERAPEUTIC ACTIVITIES: CPT

## 2018-04-24 PROCEDURE — 97110 THERAPEUTIC EXERCISES: CPT

## 2018-04-24 PROCEDURE — 97162 PT EVAL MOD COMPLEX 30 MIN: CPT

## 2018-04-24 PROCEDURE — 97112 NEUROMUSCULAR REEDUCATION: CPT

## 2018-05-02 ENCOUNTER — APPOINTMENT (OUTPATIENT)
Dept: PHYSICAL MEDICINE AND REHAB | Facility: CLINIC | Age: 73
End: 2018-05-02
Payer: MEDICARE

## 2018-05-02 VITALS — SYSTOLIC BLOOD PRESSURE: 146 MMHG | DIASTOLIC BLOOD PRESSURE: 76 MMHG | RESPIRATION RATE: 12 BRPM | HEART RATE: 69 BPM

## 2018-05-02 DIAGNOSIS — R25.2 CRAMP AND SPASM: ICD-10-CM

## 2018-05-02 PROCEDURE — 99214 OFFICE O/P EST MOD 30 MIN: CPT

## 2018-06-08 ENCOUNTER — INPATIENT (INPATIENT)
Facility: HOSPITAL | Age: 73
LOS: 4 days | Discharge: HOME | DRG: 378 | End: 2018-06-13
Attending: INTERNAL MEDICINE | Admitting: INTERNAL MEDICINE
Payer: COMMERCIAL

## 2018-06-08 DIAGNOSIS — K62.5 HEMORRHAGE OF ANUS AND RECTUM: ICD-10-CM

## 2018-06-08 DIAGNOSIS — Z98.890 OTHER SPECIFIED POSTPROCEDURAL STATES: Chronic | ICD-10-CM

## 2018-06-08 DIAGNOSIS — Z98.49 CATARACT EXTRACTION STATUS, UNSPECIFIED EYE: Chronic | ICD-10-CM

## 2018-06-08 PROCEDURE — 99223 1ST HOSP IP/OBS HIGH 75: CPT

## 2018-06-09 PROCEDURE — 99233 SBSQ HOSP IP/OBS HIGH 50: CPT

## 2018-06-10 PROCEDURE — 74177 CT ABD & PELVIS W/CONTRAST: CPT | Mod: 26

## 2018-06-10 PROCEDURE — 99233 SBSQ HOSP IP/OBS HIGH 50: CPT

## 2018-06-11 PROCEDURE — 99233 SBSQ HOSP IP/OBS HIGH 50: CPT | Mod: GC

## 2018-06-12 ENCOUNTER — TRANSCRIPTION ENCOUNTER (OUTPATIENT)
Age: 73
End: 2018-06-12

## 2018-06-12 PROCEDURE — 99233 SBSQ HOSP IP/OBS HIGH 50: CPT

## 2018-06-13 ENCOUNTER — RESULT REVIEW (OUTPATIENT)
Age: 73
End: 2018-06-13

## 2018-06-13 PROCEDURE — 80053 COMPREHEN METABOLIC PANEL: CPT

## 2018-06-13 PROCEDURE — 80048 BASIC METABOLIC PNL TOTAL CA: CPT

## 2018-06-13 PROCEDURE — 83036 HEMOGLOBIN GLYCOSYLATED A1C: CPT

## 2018-06-13 PROCEDURE — 88305 TISSUE EXAM BY PATHOLOGIST: CPT

## 2018-06-13 PROCEDURE — 86900 BLOOD TYPING SEROLOGIC ABO: CPT

## 2018-06-13 PROCEDURE — 85027 COMPLETE CBC AUTOMATED: CPT

## 2018-06-13 PROCEDURE — 84300 ASSAY OF URINE SODIUM: CPT

## 2018-06-13 PROCEDURE — 88305 TISSUE EXAM BY PATHOLOGIST: CPT | Mod: 26

## 2018-06-13 PROCEDURE — 99233 SBSQ HOSP IP/OBS HIGH 50: CPT | Mod: GC

## 2018-06-13 PROCEDURE — 36415 COLL VENOUS BLD VENIPUNCTURE: CPT

## 2018-06-13 PROCEDURE — 85730 THROMBOPLASTIN TIME PARTIAL: CPT

## 2018-06-13 PROCEDURE — 99285 EMERGENCY DEPT VISIT HI MDM: CPT

## 2018-06-13 PROCEDURE — 82570 ASSAY OF URINE CREATININE: CPT

## 2018-06-13 PROCEDURE — 74177 CT ABD & PELVIS W/CONTRAST: CPT

## 2018-06-13 PROCEDURE — 86850 RBC ANTIBODY SCREEN: CPT

## 2018-06-13 PROCEDURE — 82272 OCCULT BLD FECES 1-3 TESTS: CPT

## 2018-06-13 PROCEDURE — 85610 PROTHROMBIN TIME: CPT

## 2018-07-25 ENCOUNTER — APPOINTMENT (OUTPATIENT)
Dept: PHYSICAL MEDICINE AND REHAB | Facility: CLINIC | Age: 73
End: 2018-07-25
Payer: MEDICARE

## 2018-07-25 VITALS
SYSTOLIC BLOOD PRESSURE: 139 MMHG | HEART RATE: 85 BPM | DIASTOLIC BLOOD PRESSURE: 79 MMHG | RESPIRATION RATE: 12 BRPM | OXYGEN SATURATION: 97 % | BODY MASS INDEX: 28.7 KG/M2 | HEIGHT: 71 IN | WEIGHT: 205 LBS

## 2018-07-25 DIAGNOSIS — M25.571 PAIN IN RIGHT ANKLE AND JOINTS OF RIGHT FOOT: ICD-10-CM

## 2018-07-25 PROBLEM — I27.2 OTHER SECONDARY PULMONARY HYPERTENSION: Chronic | Status: ACTIVE | Noted: 2017-05-08

## 2018-07-25 PROBLEM — I10 ESSENTIAL (PRIMARY) HYPERTENSION: Chronic | Status: ACTIVE | Noted: 2017-05-08

## 2018-07-25 PROBLEM — E78.4 OTHER HYPERLIPIDEMIA: Chronic | Status: ACTIVE | Noted: 2017-05-08

## 2018-07-25 PROCEDURE — 99213 OFFICE O/P EST LOW 20 MIN: CPT

## 2018-07-29 ENCOUNTER — FORM ENCOUNTER (OUTPATIENT)
Age: 73
End: 2018-07-29

## 2018-07-30 ENCOUNTER — APPOINTMENT (OUTPATIENT)
Dept: MRI IMAGING | Facility: CLINIC | Age: 73
End: 2018-07-30
Payer: MEDICARE

## 2018-07-30 ENCOUNTER — OUTPATIENT (OUTPATIENT)
Dept: OUTPATIENT SERVICES | Facility: HOSPITAL | Age: 73
LOS: 1 days | End: 2018-07-30
Payer: COMMERCIAL

## 2018-07-30 DIAGNOSIS — Z98.49 CATARACT EXTRACTION STATUS, UNSPECIFIED EYE: Chronic | ICD-10-CM

## 2018-07-30 DIAGNOSIS — Z00.8 ENCOUNTER FOR OTHER GENERAL EXAMINATION: ICD-10-CM

## 2018-07-30 DIAGNOSIS — Z98.890 OTHER SPECIFIED POSTPROCEDURAL STATES: Chronic | ICD-10-CM

## 2018-07-30 PROCEDURE — 70553 MRI BRAIN STEM W/O & W/DYE: CPT | Mod: 26

## 2018-07-30 PROCEDURE — A9585: CPT

## 2018-07-30 PROCEDURE — 70553 MRI BRAIN STEM W/O & W/DYE: CPT

## 2018-08-06 ENCOUNTER — APPOINTMENT (OUTPATIENT)
Dept: NEUROSURGERY | Facility: CLINIC | Age: 73
End: 2018-08-06
Payer: MEDICARE

## 2018-08-06 VITALS
HEIGHT: 71 IN | DIASTOLIC BLOOD PRESSURE: 82 MMHG | HEART RATE: 66 BPM | WEIGHT: 205 LBS | SYSTOLIC BLOOD PRESSURE: 142 MMHG | TEMPERATURE: 208.4 F | BODY MASS INDEX: 28.7 KG/M2 | OXYGEN SATURATION: 98 % | RESPIRATION RATE: 16 BRPM

## 2018-08-06 PROCEDURE — 99214 OFFICE O/P EST MOD 30 MIN: CPT

## 2018-09-19 ENCOUNTER — APPOINTMENT (OUTPATIENT)
Dept: RADIATION ONCOLOGY | Facility: CLINIC | Age: 73
End: 2018-09-19

## 2018-09-20 ENCOUNTER — APPOINTMENT (OUTPATIENT)
Dept: RADIATION ONCOLOGY | Facility: CLINIC | Age: 73
End: 2018-09-20

## 2018-11-06 ENCOUNTER — APPOINTMENT (OUTPATIENT)
Dept: RADIATION ONCOLOGY | Facility: CLINIC | Age: 73
End: 2018-11-06
Payer: MEDICARE

## 2018-11-06 VITALS
DIASTOLIC BLOOD PRESSURE: 87 MMHG | OXYGEN SATURATION: 98 % | SYSTOLIC BLOOD PRESSURE: 154 MMHG | HEART RATE: 92 BPM | RESPIRATION RATE: 18 BRPM

## 2018-11-06 VITALS — WEIGHT: 210.98 LBS | BODY MASS INDEX: 29.43 KG/M2

## 2018-11-06 PROCEDURE — 99214 OFFICE O/P EST MOD 30 MIN: CPT

## 2018-11-06 NOTE — REASON FOR VISIT
[Routine Follow-Up] : routine follow-up visit for [Brain Tumor] : brain tumor [FreeTextEntry1] : Atypical Meningioma

## 2018-11-06 NOTE — VITALS
[Maximal Pain Intensity: 0/10] : 0/10 [90: Able to carry normal activity; minor signs or symptoms of disease.] : 90: Able to carry normal activity; minor signs or symptoms of disease.  [90: Minor restrictions in physically strenous activity.] : 90: Minor restrictions in physically strenuous activity. [ECOG Performance Status: 1 - Restricted in physically strenuous activity but ambulatory and able to carry out work of a light or sedentary nature] : Performance Status: 1 - Restricted in physically strenuous activity but ambulatory and able to carry out work of a light or sedentary nature, e.g., light house work, office work

## 2018-11-06 NOTE — REVIEW OF SYSTEMS
[Fatigue] : fatigue [Joint Pain] : joint pain [Disturbance Of Gait] : gait disturbance [Dizziness] : dizziness [Difficulty Walking] : difficulty walking [Negative] : Psychiatric [Fever] : no fever [Chills] : no chills [Night Sweats] : no night sweats [Dysphagia] : no dysphagia [Loss of Hearing] : no loss of hearing [Confused] : no confusion [Fainting] : no fainting [FreeTextEntry2] : has gained weight recently [FreeTextEntry9] : right sided ankle pain [de-identified] : right hand still weak. Handwiting not the same as previous. Right side still weak, but much improved. dizziness when moving head rapidly.

## 2018-11-06 NOTE — HISTORY OF PRESENT ILLNESS
[FreeTextEntry1] : Mr. Juan Haddad is a 73 year old man with a diagnosis of a left frontal atypical grade 2 meningioma.  He underwent resection in May 2017 followed by adjuvant radiation completed November 2017.\par \par He was previously followed by my colleague Dr. Terrence Quiñones, last seen in April 2018, at which time he had persistent right sided weakness in both the arm and the leg. \par MRI on 1/23/18 demonstrated a stable exam,no recurrence.\par \par He saw Dr. Estevez in August 2018, at which time MRI was noted to be stable and a plan was made for repeat imaging in February 2019. \par \par 11/6/18- Last MRI done 7/30/18 showed decreased mild dural thickening and enhancement subadjacent to the craniotomy. Similar dural thickening and enhancement in the bilateral anterior frontal regions. Similar encephalomalacia and gliosis in the left parietal region. No evidence for residual enhancing neoplasm. Today he denies headaches. Notes his balance is still not 100%, but has greatly improved. He is still with some residual right sided weakness, which has improved. Handwriting seems to have plateaud. Has developed ankle pain, which he attributes to changes in walking after surgery and radiation. Denies blurry vision, confusion, nausea.

## 2018-11-06 NOTE — PHYSICAL EXAM
[Normal] : normal skin color and pigmentation and no rash [de-identified] : walks with a cane, not new [de-identified] : CN III-XII intact. Right UE intention tremor, Right UE interosseous 4/5, Right lower hip flexion, knee flexion and ankle flexion - 4/5. All other joints 5/5 in strength -no ataxia,dysmetria

## 2019-01-22 ENCOUNTER — APPOINTMENT (OUTPATIENT)
Dept: NEUROLOGY | Facility: CLINIC | Age: 74
End: 2019-01-22
Payer: MEDICARE

## 2019-01-22 VITALS
BODY MASS INDEX: 28.42 KG/M2 | SYSTOLIC BLOOD PRESSURE: 120 MMHG | OXYGEN SATURATION: 99 % | RESPIRATION RATE: 18 BRPM | DIASTOLIC BLOOD PRESSURE: 76 MMHG | WEIGHT: 203 LBS | HEIGHT: 71 IN | HEART RATE: 89 BPM | TEMPERATURE: 99.1 F

## 2019-01-22 PROCEDURE — 99215 OFFICE O/P EST HI 40 MIN: CPT

## 2019-01-22 RX ORDER — LAMOTRIGINE 2 MG/1
TABLET, FOR SUSPENSION ORAL
Refills: 0 | Status: DISCONTINUED | COMMUNITY
End: 2019-01-22

## 2019-01-22 RX ORDER — LEVETIRACETAM 1000 MG/1
TABLET, FILM COATED ORAL
Refills: 0 | Status: DISCONTINUED | COMMUNITY
End: 2019-01-22

## 2019-01-22 NOTE — HISTORY OF PRESENT ILLNESS
[FreeTextEntry1] : 73 M who is here for initial consultation of seizures that led to the diagnosis of his meningioma. Had right focal seizure to his hand in Jan and feb of 2017. he had none since. He was initially on keppra and he weaned off in Oct of 2018 when he was with Dr. Cornelius, his previous neurologist. Patient informed me that Dr. Cornelius didn't give the referral notes quickly to his PMD and so the PMD decided to refer him elsewhere. After his visit, his notes from Dr. Quiñones shows that he was on keppra and lamictal. He is currently on none. He has been driving since coming off the keppra. \par \par He is s/p craniotomy and tumor resection 5/9/17 and followup MRI 7/30/18 shows high left parietal craniotomy. No residual enhancing neoplasm.

## 2019-01-22 NOTE — CONSULT LETTER
[Dear  ___] : Dear ~LILA, [FreeTextEntry1] : \par \par Thank you very much for allowing me to participate in the care of your patient. Please don't hesitate to contact me with any questions or concerns. \par \par Sincerely,\par Salvatore Mejia MD\par Neurology\par [facility]\par [address]\[place] [address]\par Tel: [phone]\par Email: [email or web address]\par \par

## 2019-01-22 NOTE — DISCUSSION/SUMMARY
[FreeTextEntry1] : 73 M who is here for initial visit regarding seizures that led to his diagnosis of meningioma. Due to the location of the meningioma, he is at high risk for seizures. Will check EEG and have him see my epilepsy colleagues to determine if further testing such as EMU admission is needed to further evaluate his risk for recurrent seizure. Will have staff try to obtain previous records from Dr. Cornelius for reasons why he's off of lamictal and keppra. I left a message on patient's phone to determine why he's off the lamictal as well.\par \par addendum: spoke with VISHAL Hawk he is off lamictal and keppra as per Dr. Cornelius as she thought seizureswere from meningioma. \par \par Patient was advised to continue to monitor for neurologic symptoms and to notify my office or go to the nearest emergency room if there are any changes. Neurologic issues were discussed with the patient. All questions were answered to complete satisfaction. Education was provided to the patient during this encounter.\par Side effects of the above medications were discussed in detail including but not limited to applicable black box warning and teratogenicity as appropriate. \par Patient was advised to bring previous records to my office. \par \par

## 2019-01-22 NOTE — PHYSICAL EXAM
[General Appearance - Alert] : alert [General Appearance - Well Developed] : well developed [Oriented To Time, Place, And Person] : oriented to person, place, and time [Person] : oriented to person [Place] : oriented to place [Time] : oriented to time [Short Term Intact] : short term memory intact [Span Intact] : the attention span was normal [Naming Objects] : no difficulty naming common objects [Fluency] : fluency intact [Current Events] : adequate knowledge of current events [Cranial Nerves Optic (II)] : visual acuity intact bilaterally,  visual fields full to confrontation, pupils equal round and reactive to light [Cranial Nerves Oculomotor (III)] : extraocular motion intact [Cranial Nerves Trigeminal (V)] : facial sensation intact symmetrically [Cranial Nerves Facial (VII)] : face symmetrical [Cranial Nerves Vestibulocochlear (VIII)] : hearing was intact bilaterally [Cranial Nerves Glossopharyngeal (IX)] : tongue and palate midline [Cranial Nerves Accessory (XI - Cranial And Spinal)] : head turning and shoulder shrug symmetric [Cranial Nerves Hypoglossal (XII)] : there was no tongue deviation with protrusion [Motor Tone] : muscle tone was normal in all four extremities [Motor Strength] : muscle strength was normal in all four extremities [Sensation Tactile Decrease] : light touch was intact [Sensation Pain / Temperature Decrease] : pain and temperature was intact [1+] : Patella left 1+ [Extraocular Movements] : extraocular movements were intact [Hearing Threshold Finger Rub Not [place]] : hearing was normal [Arterial Pulses Femoral] : femoral pulses were normal without bruits [Abnormal Walk] : normal gait [] : no rash [Coordination - Dysmetria Impaired Finger-to-Nose Bilateral] : not present

## 2019-02-13 ENCOUNTER — APPOINTMENT (OUTPATIENT)
Dept: NEUROLOGY | Facility: CLINIC | Age: 74
End: 2019-02-13
Payer: MEDICARE

## 2019-02-13 VITALS
HEART RATE: 80 BPM | BODY MASS INDEX: 28 KG/M2 | DIASTOLIC BLOOD PRESSURE: 79 MMHG | SYSTOLIC BLOOD PRESSURE: 136 MMHG | HEIGHT: 71 IN | WEIGHT: 200 LBS

## 2019-02-13 PROCEDURE — 99214 OFFICE O/P EST MOD 30 MIN: CPT

## 2019-02-17 NOTE — CONSULT LETTER
[Dear  ___] : Dear  [unfilled], [Courtesy Letter:] : I had the pleasure of seeing your patient, [unfilled], in my office today. [Please see my note below.] : Please see my note below. [Sincerely,] : Sincerely, [FreeTextEntry3] : Pierre Elias MD PhD\par Director, Epilepsy Division\par [facility]\par  [DrAziza  ___] : Dr. KAY [DrAziza ___] : Dr. KAY

## 2019-02-17 NOTE — HISTORY OF PRESENT ILLNESS
[FreeTextEntry1] : PCP - Geeta Santiago MD -- [facility], [address], [place] [address] -- [phone] \par \par Referring Neurologist -  Salvatore Mejia - [facility] [address], [address], [place] [address] -- Phone: [phone]\par \par Neurosurgeon - Anthony Rodriguez MD -- [address] [address] -- Phone: [phone]\par \par *** 02/13/2019 ***\par Mr Juan Haddad is a 74 yo right handed male with hx of  Left meningioma which was discovered after having 2 focal seizures that affected his right hand. Seizures occurred in January and February of 2017 . He is s/p resection of tumor 5/2017. Mr. HADDAD  states that he was started on Keppra but experienced side effects including runny nose - which he attributed to Kepra - so Keppra dose was lowered and Lamictal was added to the regimen. It is unclear why Mr. HADDAD inadvertently stopped Lamictal in August of 2018. Mr. HADDAD stopped Keppra in Oct 2018. He has had no seizures apart from the 2 seizure in 2017 that led to he diagnosis of the meningioma. He is here today accompanied by his wife to see if anticonvulsant medication is necessary and needs to be resumed. \par \par

## 2019-02-17 NOTE — ASSESSMENT
[FreeTextEntry1] : 72 yo right handed male with 2 right sided focal seizures in his lifetime Hx of left  meningioma resection. Exam shows minimal weakness on right side.\par Plan:\par 1. 48hr EEG \par 2. MRI brain (doing in a few months)\par 3.reviewed seizure triggers\par 4. PT for strengthening and balance (right side)\par 5 Follow up in one month - after amb EEG\par 6. Mr. HOUGH has been stable off AEDs since Oct 2018. I do not think he needs to restart AED at this point. If amb EEG shows epileptiform activity, then it may be prudent to restart one medication, likely lamotrigine.

## 2019-02-17 NOTE — END OF VISIT
[FreeTextEntry3] : Mr. NANY HOUGH was seen with epilepsy nurse practitioner Evelina Cohen.  I reviewed history with patient and family, examined Mr. HOUGH , and edited the note.

## 2019-02-22 ENCOUNTER — OTHER (OUTPATIENT)
Age: 74
End: 2019-02-22

## 2019-02-22 ENCOUNTER — APPOINTMENT (OUTPATIENT)
Dept: NEUROLOGY | Facility: CLINIC | Age: 74
End: 2019-02-22
Payer: MEDICARE

## 2019-02-22 PROCEDURE — 95819 EEG AWAKE AND ASLEEP: CPT

## 2019-02-23 PROCEDURE — 95953: CPT

## 2019-02-24 PROCEDURE — 95953: CPT

## 2019-02-28 ENCOUNTER — OTHER (OUTPATIENT)
Age: 74
End: 2019-02-28

## 2019-03-13 ENCOUNTER — APPOINTMENT (OUTPATIENT)
Dept: NEUROLOGY | Facility: CLINIC | Age: 74
End: 2019-03-13
Payer: MEDICARE

## 2019-03-13 VITALS
SYSTOLIC BLOOD PRESSURE: 142 MMHG | BODY MASS INDEX: 28 KG/M2 | HEIGHT: 71 IN | DIASTOLIC BLOOD PRESSURE: 76 MMHG | HEART RATE: 78 BPM | WEIGHT: 200 LBS

## 2019-03-13 DIAGNOSIS — D42.0 NEOPLASM OF UNCERTAIN BEHAVIOR OF CEREBRAL MENINGES: ICD-10-CM

## 2019-03-13 DIAGNOSIS — R56.9 UNSPECIFIED CONVULSIONS: ICD-10-CM

## 2019-03-13 PROCEDURE — 99214 OFFICE O/P EST MOD 30 MIN: CPT

## 2019-03-13 RX ORDER — TIZANIDINE HYDROCHLORIDE 2 MG/1
2 CAPSULE ORAL
Qty: 60 | Refills: 1 | Status: DISCONTINUED | COMMUNITY
Start: 2018-05-02 | End: 2019-03-13

## 2019-03-13 RX ORDER — VALSARTAN 320 MG/1
320 TABLET, COATED ORAL
Refills: 0 | Status: COMPLETED | COMMUNITY
End: 2019-03-13

## 2019-03-13 NOTE — HISTORY OF PRESENT ILLNESS
[FreeTextEntry1] : PCP - Geeta Santiago MD -- [facility], [address], [place] [address] -- [phone] \par \par Referring Neurologist -  Salvatore Mejia - [facility] [address], [address], [place] [address] -- Phone: [phone]\par \par Neurosurgeon - Anthony Rodriguez MD -- [address] [address] -- Phone: [phone]\par \par *** 03/13/2019 ***\par Mr. NANY HADDAD returns for scheduled follow-up appointment. Mr. HADDAD reports that in the interval since his last visit, he is doing well. No interval seizures. He is not taking anticonvulsant.  He had 48h amb EEG that showed single left temporoparietal spike. \par \par *** 02/13/2019 ***\par Mr Nany Haddad is a 72 yo right handed male with hx of  Left meningioma which was discovered after having 2 focal seizures that affected his right hand. Seizures occurred in January and February of 2017 . He is s/p resection of tumor 5/2017. Mr. HADDAD  states that he was started on Keppra but experienced side effects including runny nose - which he attributed to Kepra - so Keppra dose was lowered and Lamictal was added to the regimen. It is unclear why Mr. HADDAD inadvertently stopped Lamictal in August of 2018. Mr. HADDAD stopped Keppra in Oct 2018. He has had no seizures apart from the 2 seizure in 2017 that led to he diagnosis of the meningioma. He is here today accompanied by his wife to see if anticonvulsant medication is necessary and needs to be resumed. \par \par

## 2019-03-13 NOTE — ASSESSMENT
[FreeTextEntry1] : 72 yo right handed male with 2 right sided focal seizures in his lifetime Hx of left meningioma resection. Exam shows minimal weakness on right side. Mr. HOUGH has been off all AED for approximately 6 months. He was last on lamotrigine after levetiracetam was dc'd following symptoms (e.g. runny nose) that were atypical side-effects. Amb EEG showed very rare epileptiform activity (EEG also reviewed by me)\par \par Plan:\par 1. Seizure recurrence risk in setting of structural abnormality and minimally epileptiform EEG is difficult to estimate, but most likely in range of 25-50% over course of 2 years.  I discussed at length the pros/cons of re-starting AED. Mr. HOUGH would like to weigh the decision and review information with Dr. Rodriguez at upcoming appointment. \par 2. Mr. HOUGH will f/u with me if he decides he wishes to resume AED.  \par \par Greater than 50% of the encounter time was spent on counseling and coordination of care for reviewing records in [employer], discussion with patient regarding plan. I have spent 25 minutes of face to face time with the patient reviewing the cause of seizures or seizure-like events, assessing the risk of recurrence, educating the patient or family to recognize seizures, and discussing possible treatment options and possible side effects of seizure medications. I also discussed seizure safety, and ways of reducing seizure risk.\par

## 2019-03-14 ENCOUNTER — MOBILE ON CALL (OUTPATIENT)
Age: 74
End: 2019-03-14

## 2019-03-22 ENCOUNTER — APPOINTMENT (OUTPATIENT)
Dept: MRI IMAGING | Facility: CLINIC | Age: 74
End: 2019-03-22

## 2019-03-29 ENCOUNTER — APPOINTMENT (OUTPATIENT)
Dept: MRI IMAGING | Facility: HOSPITAL | Age: 74
End: 2019-03-29

## 2019-03-30 ENCOUNTER — OUTPATIENT (OUTPATIENT)
Dept: OUTPATIENT SERVICES | Facility: HOSPITAL | Age: 74
LOS: 1 days | End: 2019-03-30
Payer: COMMERCIAL

## 2019-03-30 ENCOUNTER — APPOINTMENT (OUTPATIENT)
Dept: MRI IMAGING | Facility: CLINIC | Age: 74
End: 2019-03-30
Payer: MEDICARE

## 2019-03-30 DIAGNOSIS — Z98.49 CATARACT EXTRACTION STATUS, UNSPECIFIED EYE: Chronic | ICD-10-CM

## 2019-03-30 DIAGNOSIS — D42.0 NEOPLASM OF UNCERTAIN BEHAVIOR OF CEREBRAL MENINGES: ICD-10-CM

## 2019-03-30 DIAGNOSIS — Z98.890 OTHER SPECIFIED POSTPROCEDURAL STATES: Chronic | ICD-10-CM

## 2019-03-30 PROCEDURE — 70553 MRI BRAIN STEM W/O & W/DYE: CPT | Mod: 26

## 2019-03-30 PROCEDURE — 70553 MRI BRAIN STEM W/O & W/DYE: CPT

## 2019-03-30 PROCEDURE — A9585: CPT

## 2019-04-22 ENCOUNTER — APPOINTMENT (OUTPATIENT)
Dept: NEUROSURGERY | Facility: CLINIC | Age: 74
End: 2019-04-22
Payer: MEDICARE

## 2019-04-22 VITALS
DIASTOLIC BLOOD PRESSURE: 89 MMHG | HEIGHT: 71 IN | WEIGHT: 203 LBS | HEART RATE: 81 BPM | BODY MASS INDEX: 28.42 KG/M2 | RESPIRATION RATE: 16 BRPM | OXYGEN SATURATION: 96 % | SYSTOLIC BLOOD PRESSURE: 147 MMHG

## 2019-04-22 PROCEDURE — 99214 OFFICE O/P EST MOD 30 MIN: CPT

## 2019-04-22 NOTE — DATA REVIEWED
[de-identified] : I have reviewed the most recent MRI which shows stable postsurgical changes with small area of possiblenew enhancement within the left frontal lobe\par \par

## 2019-04-22 NOTE — ASSESSMENT
[FreeTextEntry1] : The patient’s established problem of meningioma WHO Grade II is stable. I had a long discussion with the patient regarding the role of serial imaging. Therapeutic and diagnostic tests include MRI brain with and without contrast in Spring 2020. I will see the patient back in 2020 with the new MRI. I have explained the alternatives, risks and benefits to the patient and he understands and agrees to proceed. \par

## 2019-04-22 NOTE — REASON FOR VISIT
[Follow-Up: _____] : a [unfilled] follow-up visit [FreeTextEntry1] : 73 year old male being seen for a follow-up visit, new MRI. \par \par He is s/p craniotomy and tumor resection 5/9/2017\par Pathology: Meningioma WHO II\par \par RT completed in November 2017. He has no new complaints. He is feeling well. He has some ringing in the ears bilaterally. He states his balance isn't the same since surgery. He is able to walk, just slower. \par \par He presents today with a new MRI. \par

## 2019-04-25 ENCOUNTER — APPOINTMENT (OUTPATIENT)
Dept: RADIATION ONCOLOGY | Facility: CLINIC | Age: 74
End: 2019-04-25

## 2019-06-25 ENCOUNTER — APPOINTMENT (OUTPATIENT)
Dept: NEUROLOGY | Facility: CLINIC | Age: 74
End: 2019-06-25

## 2019-08-20 NOTE — PHYSICAL THERAPY INITIAL EVALUATION ADULT - GAIT DISTANCE, PT EVAL
Reason:  SCREENING MAMMO

Procedure Date:  08/19/2019   

Accession Number:  [id number] / [id number]                    

Procedure:  MGN - Screening Mammo Dig w/Implants CPT Code:  

 

FULL RESULT:

 

 

EXAM: Screening Mammo Dig w/Implants

 

DATE: 8/19/2019 2:58 PM

 

CLINICAL HISTORY:  Routine screening. No reported personal history of 

breast cancer. Family history of breast cancer in daughter age 40, and 

age 70 and a cousin age 30. History of a left-sided implant.

 

TECHNIQUE: (B) - Bilateral  CC and MLO views were obtained.

 

COMPARISON: 8/1/2018 through 2/17/2015.

 

PARENCHYMAL PATTERN: (A) - The breasts demonstrate scattered 

fibroglandular densities bilaterally.

 

FINDINGS:

Bilateral breasts: There are no suspicious masses, calcifications, or 

areas of distortion. There is an intact subpectoral saline implant on the 

left. Stable oval mass a circumscribed margins medial right breast.

 

IMPRESSION: Benign findings.  BI-RADS category 2.

 

RECOMMENDATION: (ANNUAL)  - Recommend routine annual screening 

mammography.

 

BI-RADS CATEGORY: (2) - Benign Findings.

 

STANDARD QUALIFYING STATEMENTS:

1.  This examination was not reviewed with the aid of Computer-Aided 

Detection (CAD).

2.  A negative or benign  imaging report should not preclude biopsy if 

clinically suspicious findings are present.

3.  Dense breasts may obscure an underlying neoplasm.

4. This examination was reviewed without the aid of 3D breast imaging 

(tomosynthesis).
5 steps to HOB

## 2020-02-04 RX ORDER — MESALAMINE 400 MG
1 TABLET, DELAYED RELEASE (ENTERIC COATED) ORAL
Qty: 0 | Refills: 0 | DISCHARGE
Start: 2020-02-04

## 2020-02-27 ENCOUNTER — INPATIENT (INPATIENT)
Facility: HOSPITAL | Age: 75
LOS: 3 days | Discharge: HOME | DRG: 579 | End: 2020-03-02
Attending: INTERNAL MEDICINE | Admitting: FAMILY MEDICINE
Payer: COMMERCIAL

## 2020-02-27 ENCOUNTER — TRANSCRIPTION ENCOUNTER (OUTPATIENT)
Age: 75
End: 2020-02-27

## 2020-02-27 ENCOUNTER — APPOINTMENT (OUTPATIENT)
Dept: PODIATRY | Facility: HOSPITAL | Age: 75
End: 2020-02-27
Payer: MEDICARE

## 2020-02-27 ENCOUNTER — OUTPATIENT (OUTPATIENT)
Dept: OUTPATIENT SERVICES | Facility: HOSPITAL | Age: 75
LOS: 1 days | Discharge: ANOTHER HOSPITAL | End: 2020-02-27
Payer: COMMERCIAL

## 2020-02-27 VITALS
RESPIRATION RATE: 20 BRPM | BODY MASS INDEX: 29.4 KG/M2 | HEIGHT: 71 IN | SYSTOLIC BLOOD PRESSURE: 152 MMHG | OXYGEN SATURATION: 97 % | WEIGHT: 210 LBS | HEART RATE: 92 BPM | TEMPERATURE: 97.3 F | DIASTOLIC BLOOD PRESSURE: 79 MMHG

## 2020-02-27 VITALS
HEART RATE: 66 BPM | OXYGEN SATURATION: 98 % | TEMPERATURE: 98 F | WEIGHT: 205.03 LBS | SYSTOLIC BLOOD PRESSURE: 139 MMHG | RESPIRATION RATE: 18 BRPM | DIASTOLIC BLOOD PRESSURE: 83 MMHG

## 2020-02-27 DIAGNOSIS — Z98.890 OTHER SPECIFIED POSTPROCEDURAL STATES: Chronic | ICD-10-CM

## 2020-02-27 DIAGNOSIS — I10 ESSENTIAL (PRIMARY) HYPERTENSION: ICD-10-CM

## 2020-02-27 DIAGNOSIS — L03.90 CELLULITIS, UNSPECIFIED: ICD-10-CM

## 2020-02-27 DIAGNOSIS — E11.9 TYPE 2 DIABETES MELLITUS WITHOUT COMPLICATIONS: ICD-10-CM

## 2020-02-27 DIAGNOSIS — Z29.9 ENCOUNTER FOR PROPHYLACTIC MEASURES, UNSPECIFIED: ICD-10-CM

## 2020-02-27 DIAGNOSIS — N17.9 ACUTE KIDNEY FAILURE, UNSPECIFIED: ICD-10-CM

## 2020-02-27 DIAGNOSIS — Z98.49 CATARACT EXTRACTION STATUS, UNSPECIFIED EYE: Chronic | ICD-10-CM

## 2020-02-27 DIAGNOSIS — I48.91 UNSPECIFIED ATRIAL FIBRILLATION: ICD-10-CM

## 2020-02-27 DIAGNOSIS — L97.301 NON-PRESSURE CHRONIC ULCER OF UNSPECIFIED ANKLE LIMITED TO BREAKDOWN OF SKIN: ICD-10-CM

## 2020-02-27 DIAGNOSIS — L03.115 CELLULITIS OF RIGHT LOWER LIMB: ICD-10-CM

## 2020-02-27 DIAGNOSIS — K21.9 GASTRO-ESOPHAGEAL REFLUX DISEASE WITHOUT ESOPHAGITIS: ICD-10-CM

## 2020-02-27 LAB
ALBUMIN SERPL ELPH-MCNC: 3.8 G/DL — SIGNIFICANT CHANGE UP (ref 3.3–5)
ALP SERPL-CCNC: 59 U/L — SIGNIFICANT CHANGE UP (ref 40–120)
ALT FLD-CCNC: 19 U/L — SIGNIFICANT CHANGE UP (ref 12–78)
ANION GAP SERPL CALC-SCNC: 9 MMOL/L — SIGNIFICANT CHANGE UP (ref 5–17)
AST SERPL-CCNC: 21 U/L — SIGNIFICANT CHANGE UP (ref 15–37)
BASOPHILS # BLD AUTO: 0.05 K/UL — SIGNIFICANT CHANGE UP (ref 0–0.2)
BASOPHILS NFR BLD AUTO: 0.7 % — SIGNIFICANT CHANGE UP (ref 0–2)
BILIRUB SERPL-MCNC: 0.5 MG/DL — SIGNIFICANT CHANGE UP (ref 0.2–1.2)
BUN SERPL-MCNC: 28 MG/DL — HIGH (ref 7–23)
CALCIUM SERPL-MCNC: 9.8 MG/DL — SIGNIFICANT CHANGE UP (ref 8.5–10.1)
CHLORIDE SERPL-SCNC: 105 MMOL/L — SIGNIFICANT CHANGE UP (ref 96–108)
CO2 SERPL-SCNC: 25 MMOL/L — SIGNIFICANT CHANGE UP (ref 22–31)
CREAT SERPL-MCNC: 1.3 MG/DL — SIGNIFICANT CHANGE UP (ref 0.5–1.3)
CRP SERPL-MCNC: 0.48 MG/DL — HIGH (ref 0–0.4)
EOSINOPHIL # BLD AUTO: 0.26 K/UL — SIGNIFICANT CHANGE UP (ref 0–0.5)
EOSINOPHIL NFR BLD AUTO: 3.6 % — SIGNIFICANT CHANGE UP (ref 0–6)
ERYTHROCYTE [SEDIMENTATION RATE] IN BLOOD: 13 MM/HR — SIGNIFICANT CHANGE UP (ref 0–20)
GLUCOSE SERPL-MCNC: 98 MG/DL — SIGNIFICANT CHANGE UP (ref 70–99)
HCT VFR BLD CALC: 44.4 % — SIGNIFICANT CHANGE UP (ref 39–50)
HGB BLD-MCNC: 14.6 G/DL — SIGNIFICANT CHANGE UP (ref 13–17)
IMM GRANULOCYTES NFR BLD AUTO: 0.7 % — SIGNIFICANT CHANGE UP (ref 0–1.5)
LACTATE SERPL-SCNC: 0.9 MMOL/L — SIGNIFICANT CHANGE UP (ref 0.7–2)
LYMPHOCYTES # BLD AUTO: 1.34 K/UL — SIGNIFICANT CHANGE UP (ref 1–3.3)
LYMPHOCYTES # BLD AUTO: 18.7 % — SIGNIFICANT CHANGE UP (ref 13–44)
MCHC RBC-ENTMCNC: 29.1 PG — SIGNIFICANT CHANGE UP (ref 27–34)
MCHC RBC-ENTMCNC: 32.9 GM/DL — SIGNIFICANT CHANGE UP (ref 32–36)
MCV RBC AUTO: 88.6 FL — SIGNIFICANT CHANGE UP (ref 80–100)
MONOCYTES # BLD AUTO: 0.62 K/UL — SIGNIFICANT CHANGE UP (ref 0–0.9)
MONOCYTES NFR BLD AUTO: 8.6 % — SIGNIFICANT CHANGE UP (ref 2–14)
NEUTROPHILS # BLD AUTO: 4.86 K/UL — SIGNIFICANT CHANGE UP (ref 1.8–7.4)
NEUTROPHILS NFR BLD AUTO: 67.7 % — SIGNIFICANT CHANGE UP (ref 43–77)
NRBC # BLD: 0 /100 WBCS — SIGNIFICANT CHANGE UP (ref 0–0)
PLATELET # BLD AUTO: 208 K/UL — SIGNIFICANT CHANGE UP (ref 150–400)
POTASSIUM SERPL-MCNC: 3.9 MMOL/L — SIGNIFICANT CHANGE UP (ref 3.5–5.3)
POTASSIUM SERPL-SCNC: 3.9 MMOL/L — SIGNIFICANT CHANGE UP (ref 3.5–5.3)
PROT SERPL-MCNC: 8.3 G/DL — SIGNIFICANT CHANGE UP (ref 6–8.3)
RBC # BLD: 5.01 M/UL — SIGNIFICANT CHANGE UP (ref 4.2–5.8)
RBC # FLD: 14.2 % — SIGNIFICANT CHANGE UP (ref 10.3–14.5)
SODIUM SERPL-SCNC: 139 MMOL/L — SIGNIFICANT CHANGE UP (ref 135–145)
WBC # BLD: 7.18 K/UL — SIGNIFICANT CHANGE UP (ref 3.8–10.5)
WBC # FLD AUTO: 7.18 K/UL — SIGNIFICANT CHANGE UP (ref 3.8–10.5)

## 2020-02-27 PROCEDURE — 99285 EMERGENCY DEPT VISIT HI MDM: CPT

## 2020-02-27 PROCEDURE — 71045 X-RAY EXAM CHEST 1 VIEW: CPT | Mod: 26

## 2020-02-27 PROCEDURE — G0463: CPT

## 2020-02-27 PROCEDURE — 93925 LOWER EXTREMITY STUDY: CPT | Mod: 26

## 2020-02-27 PROCEDURE — 73720 MRI LWR EXTREMITY W/O&W/DYE: CPT | Mod: 26,RT

## 2020-02-27 PROCEDURE — 99223 1ST HOSP IP/OBS HIGH 75: CPT | Mod: GC,AI

## 2020-02-27 PROCEDURE — 73630 X-RAY EXAM OF FOOT: CPT | Mod: 26,50

## 2020-02-27 PROCEDURE — 93010 ELECTROCARDIOGRAM REPORT: CPT

## 2020-02-27 PROCEDURE — 99203 OFFICE O/P NEW LOW 30 MIN: CPT

## 2020-02-27 RX ORDER — LOSARTAN POTASSIUM 100 MG/1
100 TABLET, FILM COATED ORAL DAILY
Refills: 0 | Status: DISCONTINUED | OUTPATIENT
Start: 2020-02-27 | End: 2020-02-28

## 2020-02-27 RX ORDER — LEVETIRACETAM 250 MG/1
1 TABLET, FILM COATED ORAL
Qty: 0 | Refills: 0 | DISCHARGE

## 2020-02-27 RX ORDER — VANCOMYCIN HCL 1 G
1000 VIAL (EA) INTRAVENOUS ONCE
Refills: 0 | Status: COMPLETED | OUTPATIENT
Start: 2020-02-27 | End: 2020-02-27

## 2020-02-27 RX ORDER — SODIUM CHLORIDE 9 MG/ML
1000 INJECTION, SOLUTION INTRAVENOUS
Refills: 0 | Status: DISCONTINUED | OUTPATIENT
Start: 2020-02-27 | End: 2020-03-02

## 2020-02-27 RX ORDER — DEXTROSE 50 % IN WATER 50 %
25 SYRINGE (ML) INTRAVENOUS ONCE
Refills: 0 | Status: DISCONTINUED | OUTPATIENT
Start: 2020-02-27 | End: 2020-03-02

## 2020-02-27 RX ORDER — MESALAMINE 400 MG
1200 TABLET, DELAYED RELEASE (ENTERIC COATED) ORAL
Refills: 0 | Status: DISCONTINUED | OUTPATIENT
Start: 2020-02-27 | End: 2020-02-28

## 2020-02-27 RX ORDER — PANTOPRAZOLE SODIUM 20 MG/1
40 TABLET, DELAYED RELEASE ORAL
Refills: 0 | Status: DISCONTINUED | OUTPATIENT
Start: 2020-02-27 | End: 2020-02-27

## 2020-02-27 RX ORDER — PIPERACILLIN AND TAZOBACTAM 4; .5 G/20ML; G/20ML
3.38 INJECTION, POWDER, LYOPHILIZED, FOR SOLUTION INTRAVENOUS ONCE
Refills: 0 | Status: COMPLETED | OUTPATIENT
Start: 2020-02-27 | End: 2020-02-27

## 2020-02-27 RX ORDER — LAMOTRIGINE 25 MG/1
0 TABLET, ORALLY DISINTEGRATING ORAL
Qty: 0 | Refills: 0 | DISCHARGE

## 2020-02-27 RX ORDER — METOPROLOL TARTRATE 50 MG
100 TABLET ORAL DAILY
Refills: 0 | Status: DISCONTINUED | OUTPATIENT
Start: 2020-02-27 | End: 2020-03-02

## 2020-02-27 RX ORDER — HYDROCHLOROTHIAZIDE 25 MG
25 TABLET ORAL DAILY
Refills: 0 | Status: DISCONTINUED | OUTPATIENT
Start: 2020-02-27 | End: 2020-02-28

## 2020-02-27 RX ORDER — SODIUM CHLORIDE 9 MG/ML
1000 INJECTION, SOLUTION INTRAVENOUS
Refills: 0 | Status: DISCONTINUED | OUTPATIENT
Start: 2020-02-27 | End: 2020-02-27

## 2020-02-27 RX ORDER — INSULIN LISPRO 100/ML
VIAL (ML) SUBCUTANEOUS AT BEDTIME
Refills: 0 | Status: DISCONTINUED | OUTPATIENT
Start: 2020-02-27 | End: 2020-03-02

## 2020-02-27 RX ORDER — OMEPRAZOLE 10 MG/1
1 CAPSULE, DELAYED RELEASE ORAL
Qty: 0 | Refills: 0 | DISCHARGE

## 2020-02-27 RX ORDER — DEXTROSE 50 % IN WATER 50 %
12.5 SYRINGE (ML) INTRAVENOUS ONCE
Refills: 0 | Status: DISCONTINUED | OUTPATIENT
Start: 2020-02-27 | End: 2020-03-02

## 2020-02-27 RX ORDER — GLUCAGON INJECTION, SOLUTION 0.5 MG/.1ML
1 INJECTION, SOLUTION SUBCUTANEOUS ONCE
Refills: 0 | Status: DISCONTINUED | OUTPATIENT
Start: 2020-02-27 | End: 2020-03-02

## 2020-02-27 RX ORDER — INSULIN LISPRO 100/ML
VIAL (ML) SUBCUTANEOUS
Refills: 0 | Status: DISCONTINUED | OUTPATIENT
Start: 2020-02-27 | End: 2020-03-02

## 2020-02-27 RX ORDER — PIPERACILLIN AND TAZOBACTAM 4; .5 G/20ML; G/20ML
3.38 INJECTION, POWDER, LYOPHILIZED, FOR SOLUTION INTRAVENOUS EVERY 8 HOURS
Refills: 0 | Status: DISCONTINUED | OUTPATIENT
Start: 2020-02-27 | End: 2020-03-02

## 2020-02-27 RX ORDER — AMLODIPINE BESYLATE 2.5 MG/1
10 TABLET ORAL DAILY
Refills: 0 | Status: DISCONTINUED | OUTPATIENT
Start: 2020-02-27 | End: 2020-03-02

## 2020-02-27 RX ORDER — DEXTROSE 50 % IN WATER 50 %
15 SYRINGE (ML) INTRAVENOUS ONCE
Refills: 0 | Status: DISCONTINUED | OUTPATIENT
Start: 2020-02-27 | End: 2020-03-02

## 2020-02-27 RX ORDER — VANCOMYCIN HCL 1 G
1000 VIAL (EA) INTRAVENOUS EVERY 12 HOURS
Refills: 0 | Status: DISCONTINUED | OUTPATIENT
Start: 2020-02-28 | End: 2020-02-28

## 2020-02-27 RX ADMIN — PIPERACILLIN AND TAZOBACTAM 200 GRAM(S): 4; .5 INJECTION, POWDER, LYOPHILIZED, FOR SOLUTION INTRAVENOUS at 16:40

## 2020-02-27 RX ADMIN — Medication 250 MILLIGRAM(S): at 17:10

## 2020-02-27 NOTE — H&P ADULT - PROBLEM SELECTOR PLAN 6
IMPROVE VTE Individual Risk Assessment  RISK                                                                Points  [  ] Previous VTE                                                  3  [  ] Thrombophilia                                               2  [  ] Lower limb paralysis                                      2        (unable to hold up >15 seconds)    [  ] Current Cancer                                              2         (within 6 months)  [  ] Immobilization > 24 hrs                                1  [  ] ICU/CCU stay > 24 hours                              1  [ x ] Age > 60                                                      1  IMPROVE VTE Score ____1_____  IMPROVE Score 0-1: Low Risk, No VTE prophylaxis required for most patients, encourage ambulation.   IMPROVE Score 2-3: At risk, pharmacologic VTE prophylaxis is indicated for most patients (in the absence of a contraindication)  IMPROVE Score > or = 4: High Risk, pharmacologic VTE prophylaxis is indicated for most patients (in the absence of a contraindication)  DVT ppx: SCDs for possible surgery, may start DVT ppx following surgery per podiatry

## 2020-02-27 NOTE — H&P ADULT - NSHPREVIEWOFSYSTEMS_GEN_ALL_CORE
CONSTITUTIONAL: denies fever, chills, fatigue, weakness  HEENT: denies blurred vision, sore throat  SKIN: Admits ulcer, denies rash  CARDIOVASCULAR: denies chest pain, chest pressure, palpitations  RESPIRATORY: denies shortness of breath, sputum production  GASTROINTESTINAL: denies nausea, vomiting, diarrhea, abdominal pain, melena, hematochezia  GENITOURINARY: denies dysuria, discharge  NEUROLOGICAL: denies numbness, headache, focal weakness  MUSCULOSKELETAL: Admits right foot/ankle pain. denies new joint pain, muscle aches  HEMATOLOGIC: denies gross bleeding, bruising  LYMPHATICS: Admits extremity swelling

## 2020-02-27 NOTE — ED PROVIDER NOTE - PSYCHIATRIC, MLM
Alae nasi flaring Alert and oriented to person, place, time/situation. normal mood and affect. no apparent risk to self or others.

## 2020-02-27 NOTE — ED PROVIDER NOTE - ATTENDING CONTRIBUTION TO CARE
Pt is a 75 yo male who presents to the ED from wound care with concern for possible osteomyelitis.  PMhx of DM (diabetes mellitus), HTN, Meningioma  left frontal, HLD, HTN, seizure disorder. Pt reports that he has been following with the wound care center for a non healing right foot ulceration.  He has been on a course of po Doxycyline and then was started on a course of po Ampicillin.  She reports that despite taking this the wound has not healed and the cellulitis has actually worsened.  He reports pain at the site.  Denies fever, chills, N/V, CP, SOB, abd pain.  He followed up with the wound care center today and was center to the ED for admission for IV abx, and for concern for possible osteomyelitis.  On exam pt lying in bed NAD, NCAT, PERRL, EOMI, heart RRR, lungs CTA, abd soft NT/ND.  Obese abd limits exam.  Wound was just dressed at the wound care center prior to arrival.  Pt refusing to allow dressing take down.  Per their documentation 0.5 cm x 0.6 cm x 0.1 cm ulcer at the right lateral malleolus x 2, granular base, periwound erythema, serous drainage. +fluctuance at lateral aspect of right heel.  Pt with worsening cellulitis failed outpatient management.  Will obtain screening labs, x-ray of foot, MR, begin abx, consult podiatry and admit to the hospital

## 2020-02-27 NOTE — CONSULT NOTE ADULT - SUBJECTIVE AND OBJECTIVE BOX
74 year old male was seen for right foot/ankle cellulitis. Patient was referred for admission from [facility] [place]. Patient has failed outpatient oral antibiotics for the cellulitis/ulcer. Denies any N/V/F/C/CP/SOB.     PAST MEDICAL & SURGICAL HISTORY:  Pulmonary hypertension  DM (diabetes mellitus)  Seizure  Meningioma: left frontal  Other hyperlipidemia  Essential hypertension  H/O cataract removal with insertion of prosthetic lens: B/L  H/O right inguinal hernia repair    FAMILY HISTORY:  No pertinent family history in first degree relatives    Allergies    No Known Allergies    Intolerances    Social History:  Former smoker, denies drug use, drinks occasionally    Physical Exam:   Vasc: DP/PT pulses palpable left foot, DP/PT pulses nonpalpable right foot. +edema right foot and ankle.   Neuro: Grossly intact, cora.   Derm: 0.5 cm x 0.6 cm x 0.1 cm ulcer at the right lateral malleolus x 2, granular base, periwound erythema, serous drainage. +fluctuance at lateral aspect of right heel  MSK: 5/5 strength at all muscle groups crossing ankle joint, cora.     ICU Vital Signs Last 24 Hrs  T(C): 36.5 (27 Feb 2020 15:06), Max: 36.5 (27 Feb 2020 15:06)  T(F): 97.7 (27 Feb 2020 15:06), Max: 97.7 (27 Feb 2020 15:06)  HR: 66 (27 Feb 2020 15:06) (66 - 66)  BP: 139/83 (27 Feb 2020 15:06) (139/83 - 139/83)  BP(mean): --  ABP: --  ABP(mean): --  RR: 18 (27 Feb 2020 15:06) (18 - 18)  SpO2: 98% (27 Feb 2020 15:06) (98% - 98%)    CBC/BMP pending    MEDICATIONS  (STANDING):    MEDICATIONS  (PRN):

## 2020-02-27 NOTE — H&P ADULT - PROBLEM SELECTOR PLAN 1
- Rule out OM w/ MRI  - Continue vanc/zosyn  - Pending MRI results, pt will either undergo bedside debridement w/ podiatry or surgical intervention in OR  - NPO for possible surgery w/ maintenance IVF  - Podiatry consulted, Dr. Ocampo - Rule out OM w/ MRI  - Continue vanc/zosyn  - Pending MRI results, pt will either undergo bedside debridement w/ podiatry or surgical intervention in OR  - NPO for possible surgery w/ maintenance IVF  - Wound dressings per Podiatry  - Podiatry consulted, Dr. Ocampo - Rule out OM w/ MRI  - Continue vanc/zosyn  - Pending MRI results, pt will either undergo bedside debridement w/ podiatry or surgical intervention in OR  - F/u ESR, CRP, blood cultures  - NPO for possible surgery w/ maintenance IVF  - Wound dressings per Podiatry  - Podiatry consulted, Dr. Ocampo - Rule out OM w/ MRI  - Continue vanc/zosyn   - Pending MRI results, pt will either undergo bedside debridement w/ podiatry or surgical intervention in OR  - F/u ESR, CRP, blood cultures  - NPO for possible surgery w/ maintenance IVF  - Wound dressings per Podiatry  - Podiatry consulted, Dr. Ocampo  - Arterial duplex of LE showing possible stenosis of left peroneal artery. Consider vascular consult  - Daily monitoring of renal fxn. Per HIE baseline Cr ~1.5-1.7. May require renal dosing of zosyn/vanco if CrCl decreases

## 2020-02-27 NOTE — H&P ADULT - NSHPSOCIALHISTORY_GEN_ALL_CORE
Lives with wife in [place], independent of ADLs. Former smoker, quit in 2008. 1.5 etoh drinks per day. No illicit drugs

## 2020-02-27 NOTE — ED ADULT NURSE NOTE - NSIMPLEMENTINTERV_GEN_ALL_ED
Implemented All Fall Risk Interventions:  [place] to call system. Call bell, personal items and telephone within reach. Instruct patient to call for assistance. Room bathroom lighting operational. Non-slip footwear when patient is off stretcher. Physically safe environment: no spills, clutter or unnecessary equipment. Stretcher in lowest position, wheels locked, appropriate side rails in place. Provide visual cue, wrist band, yellow gown, etc. Monitor gait and stability. Monitor for mental status changes and reorient to person, place, and time. Review medications for side effects contributing to fall risk. Reinforce activity limits and safety measures with patient and family.

## 2020-02-27 NOTE — H&P ADULT - PROBLEM SELECTOR PLAN 4
- Continue PPI - Hold home glimepiride  - Low dose ISS, accuchecks, hypoglycemia protocol  - F/u A1c  - DASH/TLC w/ consistent carb diet when surgical intervention plans are finalized/tolerating PO

## 2020-02-27 NOTE — H&P ADULT - PROBLEM SELECTOR PLAN 5
IMPROVE VTE Individual Risk Assessment  RISK                                                                Points  [  ] Previous VTE                                                  3  [  ] Thrombophilia                                               2  [  ] Lower limb paralysis                                      2        (unable to hold up >15 seconds)    [  ] Current Cancer                                              2         (within 6 months)  [  ] Immobilization > 24 hrs                                1  [  ] ICU/CCU stay > 24 hours                              1  [ x ] Age > 60                                                      1  IMPROVE VTE Score ____1_____  IMPROVE Score 0-1: Low Risk, No VTE prophylaxis required for most patients, encourage ambulation.   IMPROVE Score 2-3: At risk, pharmacologic VTE prophylaxis is indicated for most patients (in the absence of a contraindication)  IMPROVE Score > or = 4: High Risk, pharmacologic VTE prophylaxis is indicated for most patients (in the absence of a contraindication)  DVT ppx: SCDs for possible surgery, may start DVT ppx following surgery per podiatry - Continue PPI

## 2020-02-27 NOTE — H&P ADULT - NSICDXPASTMEDICALHX_GEN_ALL_CORE_FT
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

## 2020-02-27 NOTE — H&P ADULT - ASSESSMENT
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 Admitted with cellulitis w/ failed outpatient therapy vs. osteomyelitis. 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 Admitted with cellulitis w/ abscess and failed outpatient therapy, and rule out osteomyelitis.

## 2020-02-27 NOTE — ED PROVIDER NOTE - MUSCULOSKELETAL, MLM
Spine appears normal, range of motion is not limited, right medial aspect of foot with fluctuant area and surrounding cellulitis warmth and redness

## 2020-02-27 NOTE — ED PROVIDER NOTE - PRO INTERPRETER NEED 2
I spoke with patient  Patient is rescheduled for 09/28 at 1 with Dr Nu Rai   Patient is Gren[place] speaking English

## 2020-02-27 NOTE — ED ADULT NURSE NOTE - PMH
DM (diabetes mellitus)    Essential hypertension    Meningioma  left frontal  Other hyperlipidemia    Pulmonary hypertension    Seizure

## 2020-02-27 NOTE — H&P ADULT - ATTENDING COMMENTS
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 Admitted with cellulitis w/ abscess and failed outpatient therapy, and rule out osteomyelitis. Plan: cont iv antibx, apprec ID recs, monitor clinical course, f/u MR results, f/u podiatry recs, PT eval, monitor clinical course

## 2020-02-27 NOTE — H&P ADULT - NSHPPHYSICALEXAM_GEN_ALL_CORE
T(C): 36.5 (02-27-20 @ 15:06), Max: 36.5 (02-27-20 @ 15:06)  HR: 66 (02-27-20 @ 15:06) (66 - 66)  BP: 139/83 (02-27-20 @ 15:06) (139/83 - 139/83)  RR: 18 (02-27-20 @ 15:06) (18 - 18)  SpO2: 98% (02-27-20 @ 15:06) (98% - 98%)    GENERAL: patient appears well, no acute distress, appropriate, pleasant  EYES: sclera clear, no exudates  ENMT: oropharynx clear without erythema, no exudates, moist mucous membranes  NECK: supple, soft   LUNGS: good air entry bilaterally, clear to auscultation, symmetric breath sounds, no wheezing or rhonchi appreciated  HEART: soft S1/S2, irregularly irregular, no murmurs noted, asymmetric LE edema (R>L)   GASTROINTESTINAL: abdomen is obese, soft, nontender, nondistended, normoactive bowel sounds   INTEGUMENT: good skin turgor, right foot/ankle ulcer has clean, dry dressing applied  MUSCULOSKELETAL: no clubbing or cyanosis, no obvious deformity  NEUROLOGIC: awake, alert, oriented x3, good muscle tone in 4 extremities, no obvious sensory deficits  PSYCHIATRIC: mood is good, affect is congruent, linear and logical thought process  HEME/LYMPH: no obvious ecchymosis or petechiae

## 2020-02-27 NOTE — ED ADULT NURSE NOTE - OBJECTIVE STATEMENT
Patient a and o x4 No acute distress Ultrasound done on leg labs sent. MRI form completed by patient MSpencer

## 2020-02-27 NOTE — H&P ADULT - NSHPOUTPATIENTPROVIDERS_GEN_ALL_CORE
Podiatry, Dr. Andrew PCP: Dr. Geeta Santiago  Podiatry, Dr. Andrew  Cardiology, Dr. Rainey at [facility]

## 2020-02-27 NOTE — ED PROVIDER NOTE - OBJECTIVE STATEMENT
Pt is a 73 yo male with pmhx of DM Essential hypertension Meningioma hyperlipidemia Pulmonary hypertension Seizure sent in from wound care by Dr. Andrew for admission. Pt has wound on right ankle/foot for months which is not healing and currently on ampicillin and advised cellulitis overlaying now. Pt denies any chest pain sob fever chills abdominal pain. Pt states wound started from his shoes.     pcp idania Pt is a 75 yo male with pmhx of DM Essential hypertension Meningioma afib hyperlipidemia Pulmonary hypertension Seizure sent in from wound care by Dr. Andrew for admission. Pt has wound on right ankle/foot for months which is not healing and currently on ampicillin and advised cellulitis overlaying now. Pt denies any chest pain sob fever chills abdominal pain. Pt states wound started from his shoes.     pcp idania

## 2020-02-27 NOTE — H&P ADULT - PROBLEM SELECTOR PLAN 2
Chronic, stable  - Continue home losartan, metoprolol, hctz, amlodipine with hold parameters  - Monitor routine hemodynamics - Admission BUN/Cr 28/1.3 from unknown recent baseline, last - Chronic, stable  - Continue rate control with metoprolol  - No on AC due to history of lower GIB

## 2020-02-27 NOTE — H&P ADULT - NSICDXPASTSURGICALHX_GEN_ALL_CORE_FT
PAST SURGICAL HISTORY:  H/O cataract removal with insertion of prosthetic lens B/L    H/O right inguinal hernia repair

## 2020-02-27 NOTE — PATIENT PROFILE ADULT - JOB HELP
The resident's documentation has been prepared under my direction and personally reviewed by me in its entirety. I confirm that the note above accurately reflects all work, treatment, procedures, and medical decision making performed by me. no

## 2020-02-27 NOTE — H&P ADULT - PROBLEM SELECTOR PLAN 3
- Hold home glimepiride  - Low dose ISS, accuchecks, hypoglycemia protocol  - F/u A1c  - DASH/TLC w/ consistent carb diet when surgical intervention plans are finalized/tolerating PO Chronic, stable  - Continue home losartan, metoprolol, hctz, amlodipine with hold parameters  - Monitor routine hemodynamics

## 2020-02-27 NOTE — H&P ADULT - HISTORY OF PRESENT ILLNESS
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 Andrew due to concern for osteomyelitis 2/2 non-healing right foot ulcer and need for IV antibiotics. Patient has had non-healing wound to right foot/ankle for several months. He is s/p course of PO doxycycline and is currently taking ampicillin for suspected cellulitis overlying the wound, without improvement. The ulcer is tender. Denies fever, chills, N/V, chest pain, SOB, abdominal pain.    ED Course: Vitals: Julio Cesar 97.7, HR 66, /83, RR 18, O2 sat 98 on RA.  Labs: WBC 7.18, H/h 14.6/44.4, BUN/Cr 28/1.3 (Cr was .7 in 2017), GFR 53  VA duplex LE arterial b/l: Left peroneal artery not well seen in the distal left calf with a blunted monophasic waveform, possibly secondary to a stenosis. Mild scattered plaque in the visualized lower extremity arteries.  XR foot: No evidence of osteomyelitis  CXR: No acute finding or change  EKG: 74 year old w/ PMHx of Afib (not on AC), h/o GIB, CAD, HLD, pulmonary HTN, HTN, T2DM, 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 (s/p left frontal craniotomy, 2017), who was sent to ED by Wound Care for non-healing right foot ulcer. Patient was sent due to worsening cellulitis despite PO abx, and concern for osteomyelitis. Patient has had non-healing wound to right foot/ankle for several months. He is s/p course of PO doxycycline and is currently taking ampicillin for suspected cellulitis overlying the wound, without improvement. The ulcer is tender. Denies fever, chills, N/V, chest pain, SOB, abdominal pain.    ED Course: Vitals: Julio Cesar 97.7, HR 66, /83, RR 18, O2 sat 98 on RA.  Labs: WBC 7.18, H/h 14.6/44.4, BUN/Cr 28/1.3 (Cr was .7 in 2017), GFR 53  VA duplex LE arterial b/l: Left peroneal artery not well seen in the distal left calf with a blunted monophasic waveform, possibly secondary to a stenosis. Mild scattered plaque in the visualized lower extremity arteries.  XR foot: No evidence of osteomyelitis  CXR: No acute finding or change  EKG: Atrial fibrillation at 63 bpm w/ PVCs  Patient s/p vanc/zosyn 74 year old w/ PMHx of Afib (not on AC 2/2 h/o GIB), CAD, HLD, pulmonary HTN, HTN, T2DM, 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 Patient developed the ulcer in October, when he had increased LE edema 2/2 switching diuretics. The increased edema caused his shoes to fit poorly, and the ulcer developed. Patient has been following with his PCP for treatment of the ulcer. His PCP diagnosed cellulitis overlying the wound, and he completed a course of doxycycline and is presently taking ampicillin, without improvement. He had an appointment with the [facility] Care Center today due to worsening cellulitis despite PO abx, and the [facility] Care Center was concerned for abscess and possible osteomyelitis. Patient reports that the ulcer and surrounding area is tender, erythematous, warm, and edematous. He denies purulent drainage from the wound, but does report that it is non-healing and occasionally drains a clear liquid. Denies fever, chills, N/V, chest pain, SOB, abdominal pain.    ED Course: Vitals: Julio Cesar 97.7, HR 66, /83, RR 18, O2 sat 98 on RA.  Labs: WBC 7.18, H/h 14.6/44.4, BUN/Cr 28/1.3 (Cr was .7 in 2017), GFR 53  VA duplex LE arterial b/l: Left peroneal artery not well seen in the distal left calf with a blunted monophasic waveform, possibly secondary to a stenosis. Mild scattered plaque in the visualized lower extremity arteries.  XR foot: No evidence of osteomyelitis  CXR: No acute finding or change  EKG: Atrial fibrillation at 63 bpm w/ PVCs  Patient s/p vanc/zosyn 74 year old w/ PMHx of Afib (not on AC 2/2 h/o GIB), CAD, HLD, pulmonary HTN, HTN, T2DM, 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 Patient developed the ulcer in October, when he had increased LE edema 2/2 switching diuretics. The increased edema caused his shoes to fit poorly, and the ulcer developed. Patient has been following with his PCP for treatment of the ulcer. His PCP diagnosed cellulitis overlying the wound, and he completed a course of doxycycline and is presently taking ampicillin, without improvement. He had an appointment with the [facility] Care Center today due to worsening cellulitis despite PO abx, and the [facility] Care Center was concerned for abscess and possible osteomyelitis. Patient reports that the ulcer and surrounding area is tender, erythematous, warm, and edematous. He denies purulent drainage from the wound, but does report that it is non-healing and occasionally drains a clear liquid. Denies fever, chills, N/V, chest pain, SOB, abdominal pain.    ED Course: Vitals: Julio Cesar 97.7, HR 66, /83, RR 18, O2 sat 98 on RA.  Labs: WBC 7.18, H/h 14.6/44.4, BUN/Cr 28/1.3 (baseline Cr 1.5 per HIE), GFR 53  VA duplex LE arterial b/l: Left peroneal artery not well seen in the distal left calf with a blunted monophasic waveform, possibly secondary to a stenosis. Mild scattered plaque in the visualized lower extremity arteries.  XR foot: No evidence of osteomyelitis  CXR: No acute finding or change  EKG: Atrial fibrillation at 63 bpm w/ PVCs  Patient s/p vanc/zosyn 74 year old male w/ PMHx of Afib (not on AC 2/2 h/o GIB), CAD, HLD, pulmonary HTN, HTN, T2DM, 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 Patient developed the ulcer in October, when he had increased LE edema 2/2 switching diuretics. The increased edema caused his shoes to fit poorly, and the ulcer developed. Patient has been following with his PCP for treatment of the ulcer. His PCP diagnosed cellulitis overlying the wound, and he completed a course of doxycycline and is presently taking ampicillin, without improvement. He had an appointment with the [facility] Center today due to worsening cellulitis despite PO abx, and the [facility] Care Center was concerned for abscess and possible osteomyelitis. Patient reports that the ulcer and surrounding area is tender, erythematous, warm, and edematous. He denies purulent drainage from the wound, but does report that it is non-healing and occasionally drains a clear liquid. Denies fever, chills, N/V, chest pain, SOB, abdominal pain.    ED Course: Vitals: Julio Cesar 97.7, HR 66, /83, RR 18, O2 sat 98 on RA.  Labs: WBC 7.18, H/h 14.6/44.4, BUN/Cr 28/1.3 (baseline Cr 1.5 per HIE), GFR 53  VA duplex LE arterial b/l: Left peroneal artery not well seen in the distal left calf with a blunted monophasic waveform, possibly secondary to a stenosis. Mild scattered plaque in the visualized lower extremity arteries.  XR foot: No evidence of osteomyelitis  CXR: No acute finding or change  EKG: Atrial fibrillation at 63 bpm w/ PVCs  Patient s/p vanc/zosyn 74 year old male w/ PMHx of Afib (not on AC 2/2 h/o GIB), CAD, HLD, pulmonary HTN, HTN, T2DM, 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 Patient developed the ulcer in October, when he had increased LE edema 2/2 switching diuretics. The increased edema caused his shoes to fit poorly, and the ulcer developed. Patient has been following with his PCP for treatment of the ulcer. His PCP diagnosed cellulitis overlying the wound, and he completed a course of doxycycline and is presently taking ampicillin, without improvement. He had an appointment with the [facility] Care Center today due to worsening cellulitis despite PO abx, and the Wound Care Center was concerned for abscess and possible osteomyelitis. Patient reports that the ulcer and surrounding area is tender, erythematous, warm, and edematous. He denies purulent drainage from the wound, but does report that it is non-healing and occasionally drains a clear liquid. Denies fever, chills, N/V, chest pain, SOB, abdominal pain.    ED Course: Vitals: Temp 97.7, HR 66, /83, RR 18, O2 sat 98 on RA.  Labs: WBC 7.18, H/h 14.6/44.4, BUN/Cr 28/1.3 (baseline Cr 1.5 per HIE), GFR 53  VA duplex LE arterial b/l: Left peroneal artery not well seen in the distal left calf with a blunted monophasic waveform, possibly secondary to a stenosis. Mild scattered plaque in the visualized lower extremity arteries.  XR foot: No evidence of osteomyelitis  CXR: No acute finding or change  EKG: Atrial fibrillation at 63 bpm w/ PVCs  Patient s/p vanc/zosyn

## 2020-02-28 LAB
-  COAGULASE NEGATIVE STAPHYLOCOCCUS: SIGNIFICANT CHANGE UP
ANION GAP SERPL CALC-SCNC: 10 MMOL/L — SIGNIFICANT CHANGE UP (ref 5–17)
BASOPHILS # BLD AUTO: 0.04 K/UL — SIGNIFICANT CHANGE UP (ref 0–0.2)
BASOPHILS NFR BLD AUTO: 0.6 % — SIGNIFICANT CHANGE UP (ref 0–2)
BUN SERPL-MCNC: 27 MG/DL — HIGH (ref 7–23)
CALCIUM SERPL-MCNC: 9.2 MG/DL — SIGNIFICANT CHANGE UP (ref 8.5–10.1)
CHLORIDE SERPL-SCNC: 106 MMOL/L — SIGNIFICANT CHANGE UP (ref 96–108)
CO2 SERPL-SCNC: 25 MMOL/L — SIGNIFICANT CHANGE UP (ref 22–31)
CREAT SERPL-MCNC: 1.5 MG/DL — HIGH (ref 0.5–1.3)
EOSINOPHIL # BLD AUTO: 0.32 K/UL — SIGNIFICANT CHANGE UP (ref 0–0.5)
EOSINOPHIL NFR BLD AUTO: 4.8 % — SIGNIFICANT CHANGE UP (ref 0–6)
GLUCOSE SERPL-MCNC: 147 MG/DL — HIGH (ref 70–99)
GRAM STN FLD: SIGNIFICANT CHANGE UP
HBA1C BLD-MCNC: 6.3 % — HIGH (ref 4–5.6)
HCT VFR BLD CALC: 39.5 % — SIGNIFICANT CHANGE UP (ref 39–50)
HGB BLD-MCNC: 12.9 G/DL — LOW (ref 13–17)
IMM GRANULOCYTES NFR BLD AUTO: 0.4 % — SIGNIFICANT CHANGE UP (ref 0–1.5)
LYMPHOCYTES # BLD AUTO: 1.36 K/UL — SIGNIFICANT CHANGE UP (ref 1–3.3)
LYMPHOCYTES # BLD AUTO: 20.3 % — SIGNIFICANT CHANGE UP (ref 13–44)
MCHC RBC-ENTMCNC: 29.1 PG — SIGNIFICANT CHANGE UP (ref 27–34)
MCHC RBC-ENTMCNC: 32.7 GM/DL — SIGNIFICANT CHANGE UP (ref 32–36)
MCV RBC AUTO: 89.2 FL — SIGNIFICANT CHANGE UP (ref 80–100)
METHOD TYPE: SIGNIFICANT CHANGE UP
MONOCYTES # BLD AUTO: 0.68 K/UL — SIGNIFICANT CHANGE UP (ref 0–0.9)
MONOCYTES NFR BLD AUTO: 10.2 % — SIGNIFICANT CHANGE UP (ref 2–14)
NEUTROPHILS # BLD AUTO: 4.26 K/UL — SIGNIFICANT CHANGE UP (ref 1.8–7.4)
NEUTROPHILS NFR BLD AUTO: 63.7 % — SIGNIFICANT CHANGE UP (ref 43–77)
NRBC # BLD: 0 /100 WBCS — SIGNIFICANT CHANGE UP (ref 0–0)
PLATELET # BLD AUTO: 187 K/UL — SIGNIFICANT CHANGE UP (ref 150–400)
POTASSIUM SERPL-MCNC: 3.6 MMOL/L — SIGNIFICANT CHANGE UP (ref 3.5–5.3)
POTASSIUM SERPL-SCNC: 3.6 MMOL/L — SIGNIFICANT CHANGE UP (ref 3.5–5.3)
PREALB SERPL-MCNC: 30 MG/DL — SIGNIFICANT CHANGE UP (ref 20–40)
RBC # BLD: 4.43 M/UL — SIGNIFICANT CHANGE UP (ref 4.2–5.8)
RBC # FLD: 13.9 % — SIGNIFICANT CHANGE UP (ref 10.3–14.5)
SODIUM SERPL-SCNC: 141 MMOL/L — SIGNIFICANT CHANGE UP (ref 135–145)
SPECIMEN SOURCE: SIGNIFICANT CHANGE UP
WBC # BLD: 6.69 K/UL — SIGNIFICANT CHANGE UP (ref 3.8–10.5)
WBC # FLD AUTO: 6.69 K/UL — SIGNIFICANT CHANGE UP (ref 3.8–10.5)

## 2020-02-28 PROCEDURE — 99223 1ST HOSP IP/OBS HIGH 75: CPT

## 2020-02-28 PROCEDURE — 10060 I&D ABSCESS SIMPLE/SINGLE: CPT | Mod: RT

## 2020-02-28 PROCEDURE — 99221 1ST HOSP IP/OBS SF/LOW 40: CPT

## 2020-02-28 PROCEDURE — 99233 SBSQ HOSP IP/OBS HIGH 50: CPT | Mod: GC

## 2020-02-28 RX ORDER — MESALAMINE 400 MG
800 TABLET, DELAYED RELEASE (ENTERIC COATED) ORAL DAILY
Refills: 0 | Status: DISCONTINUED | OUTPATIENT
Start: 2020-02-28 | End: 2020-02-29

## 2020-02-28 RX ORDER — VANCOMYCIN HCL 1 G
1000 VIAL (EA) INTRAVENOUS EVERY 24 HOURS
Refills: 0 | Status: DISCONTINUED | OUTPATIENT
Start: 2020-02-29 | End: 2020-02-29

## 2020-02-28 RX ORDER — IBUPROFEN 200 MG
200 TABLET ORAL ONCE
Refills: 0 | Status: COMPLETED | OUTPATIENT
Start: 2020-02-28 | End: 2020-02-28

## 2020-02-28 RX ADMIN — Medication 1200 MILLIGRAM(S): at 11:36

## 2020-02-28 RX ADMIN — SODIUM CHLORIDE 75 MILLILITER(S): 9 INJECTION, SOLUTION INTRAVENOUS at 00:12

## 2020-02-28 RX ADMIN — Medication 250 MILLIGRAM(S): at 05:34

## 2020-02-28 RX ADMIN — PIPERACILLIN AND TAZOBACTAM 25 GRAM(S): 4; .5 INJECTION, POWDER, LYOPHILIZED, FOR SOLUTION INTRAVENOUS at 19:13

## 2020-02-28 RX ADMIN — PIPERACILLIN AND TAZOBACTAM 25 GRAM(S): 4; .5 INJECTION, POWDER, LYOPHILIZED, FOR SOLUTION INTRAVENOUS at 11:35

## 2020-02-28 RX ADMIN — Medication 200 MILLIGRAM(S): at 01:35

## 2020-02-28 RX ADMIN — LOSARTAN POTASSIUM 100 MILLIGRAM(S): 100 TABLET, FILM COATED ORAL at 05:34

## 2020-02-28 RX ADMIN — Medication 2: at 12:16

## 2020-02-28 RX ADMIN — Medication 100 MILLIGRAM(S): at 05:34

## 2020-02-28 RX ADMIN — Medication 200 MILLIGRAM(S): at 00:33

## 2020-02-28 RX ADMIN — AMLODIPINE BESYLATE 10 MILLIGRAM(S): 2.5 TABLET ORAL at 05:34

## 2020-02-28 RX ADMIN — PIPERACILLIN AND TAZOBACTAM 25 GRAM(S): 4; .5 INJECTION, POWDER, LYOPHILIZED, FOR SOLUTION INTRAVENOUS at 00:12

## 2020-02-28 RX ADMIN — Medication 25 MILLIGRAM(S): at 05:34

## 2020-02-28 NOTE — PROGRESS NOTE ADULT - SUBJECTIVE AND OBJECTIVE BOX
Patient is a 74y old  Male who presents with a chief complaint of Foot ulcer (28 Feb 2020 10:58)    ----  INTERVAL HPI/OVERNIGHT EVENTS: Pt seen and evaluated at the bedside. Patient with 3 second pause on Tele monitor, with afib with rates of 70. Patient asymptomatic at the time. Denies any chest pain shortness of breath, headache, dizziness. Admits to not being able to sleep well last night due to foot discomfort.    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  Meningioma: left frontal, s/p resection and radiation  Other hyperlipidemia  Essential hypertension  H/O cataract removal with insertion of prosthetic lens: B/L  H/O right inguinal hernia repair      FAMILY HISTORY:  FH: hypertension: Father      Allergies    No Known Allergies    Intolerances    ----  REVIEW OF SYSTEMS:  CONSTITUTIONAL: denies fever, chills, fatigue, weakness  HEENT: denies blurred vision, sore throat  SKIN: denies new lesions, rash  CARDIOVASCULAR: denies chest pain, chest pressure, palpitations  RESPIRATORY: denies shortness of breath, sputum production  GASTROINTESTINAL: denies nausea, vomiting, diarrhea, abdominal pain  GENITOURINARY: denies dysuria, discharge  NEUROLOGICAL: denies numbness, headache, focal weakness  MUSCULOSKELETAL: admits to right foot pain, denies new joint pain, muscle aches  HEMATOLOGIC: denies gross bleeding, bruising  LYMPHATICS: denies enlarged lymph nodes, extremity swelling  PSYCHIATRIC: denies recent changes in anxiety, depression  ENDOCRINOLOGIC: denies sweating, cold or heat intolerance    ----  PHYSICAL EXAM:  GENERAL: patient appears well, no acute distress, appropriately interactive  EYES: sclera clear, no exudates  ENMT: oropharynx clear without erythema, moist mucous membranes  NECK: supple, soft, no thyromegaly noted  LUNGS: good air entry bilaterally, clear to auscultation, symmetric breath sounds, no wheezing or rhonchi appreciated  HEART: soft S1/S2, irregular rate and rhythm, no murmurs noted, no noted edema to b/l LE  GASTROINTESTINAL: abdomen is soft, nontender, nondistended, normoactive bowel sounds, no palpable masses  INTEGUMENT: good skin turgor, appropriate for ethnicity, appears well perfused, no jaundice noted  MUSCULOSKELETAL: no clubbing or cyanosis, no obvious deformity, right foot with dressings  NEUROLOGIC: awake, alert, oriented x3, good muscle tone in 4 extremities, no obvious sensory deficits  PSYCHIATRIC: mood is good, affect is congruent with mood, linear and logical thought process  HEME/LYMPH: no palpable supraclavicular nodules, no obvious ecchymosis     T(C): 36.3 (02-28-20 @ 08:25), Max: 36.8 (02-27-20 @ 20:55)  HR: 61 (02-28-20 @ 08:25) (61 - 73)  BP: 122/65 (02-28-20 @ 08:25) (100/73 - 139/83)  RR: 20 (02-28-20 @ 08:25) (16 - 20)  SpO2: 97% (02-28-20 @ 08:25) (95% - 99%)  Wt(kg): --    ----  I&O's Summary    27 Feb 2020 07:01  -  28 Feb 2020 07:00  --------------------------------------------------------  IN: 1050 mL / OUT: 0 mL / NET: 1050 mL        LABS:                        12.9   6.69  )-----------( 187      ( 28 Feb 2020 06:42 )             39.5     02-28    141  |  106  |  27<H>  ----------------------------<  147<H>  3.6   |  25  |  1.50<H>    Ca    9.2      28 Feb 2020 06:42  Phos  3.1     02-28  Mg     1.9     02-28    TPro  8.3  /  Alb  3.8  /  TBili  0.5  /  DBili  x   /  AST  21  /  ALT  19  /  AlkPhos  59  02-27        CAPILLARY BLOOD GLUCOSE      POCT Blood Glucose.: 240 mg/dL (28 Feb 2020 12:00)  POCT Blood Glucose.: 133 mg/dL (28 Feb 2020 08:13)  POCT Blood Glucose.: 93 mg/dL (27 Feb 2020 21:41)                ----

## 2020-02-28 NOTE — CONSULT NOTE ADULT - SUBJECTIVE AND OBJECTIVE BOX
[facility] - Daina Stevens, Julianne, Keny, Juhi, David Benites  Office Number: [phone]    Initial Consult Note    CHIEF COMPLAINT: Patient is a 74y old  Male who presents with a chief complaint of Foot ulcer (27 Feb 2020 17:27)    HPI: 74 year old male w/ PMHx of Afib (not on AC 2/2 h/o GIB), CAD, HLD, pulmonary HTN, HTN, T2DM, 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 Patient developed the ulcer in October, when he had increased LE edema 2/2 switching diuretics. The increased edema caused his shoes to fit poorly, and the ulcer developed. Patient has been following with his PCP for treatment of the ulcer. His PCP diagnosed cellulitis overlying the wound, and he completed a course of doxycycline and is presently taking ampicillin, without improvement. He had an appointment with the [facility] today due to worsening cellulitis despite PO abx, and the [facility] was concerned for abscess and possible osteomyelitis. Patient reports that the ulcer and surrounding area is tender, erythematous, warm, and edematous. He denies purulent drainage from the wound, but does report that it is non-healing and occasionally drains a clear liquid. Denies fever, chills, N/V, chest pain, SOB, abdominal pain.  ED Course: Vitals: Temp 97.7, HR 66, /83, RR 18, O2 sat 98 on RA. Patient s/p vanc/zosyn.  Labs: WBC 7.18, H/h 14.6/44.4, BUN/Cr 28/1.3 (baseline Cr 1.5 per HIE), GFR 53.      Patient seen and examined at bedside for cardio consultation - patient has been in A fib rates ranging 40s-60s, had 3 sec pause at 4:53 AM. He reports he was sleeping at the time. He denies any dizziness, lightheadedness, syncope, CP, palpitations, SOB or other complaints.     He has had A fib diagnosed in 2011, never been on AC due to bleeding from ulcerated colon in 2008 and 2018. He sees cardio Dr Rainey, last had echo in Nov 2020 which he believes was fine (does have documented hx of pulm HTN), and had nuclear stress test in 2017 which he also believes was fine. At home he tries to stay active, is able to climb his 4 flights of stairs if going at a steady pace. He does become dsypneic on overexertion e.g. running to catch a subway train but generally paces himself. Denies any angina with/without exertion.       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 He drinks one cocktail nightly.    FAMILY HISTORY: Hypertension and CVA in Father. Brother had "heart surgery" at age 80 (unknown type). No family history of acute MI or sudden cardiac death.    MEDICATIONS  (STANDING):  amLODIPine   Tablet 10 milliGRAM(s) Oral daily  dextrose 5%. 1000 milliLiter(s) (50 mL/Hr) IV Continuous <Continuous>  dextrose 50% Injectable 12.5 Gram(s) IV Push once  dextrose 50% Injectable 25 Gram(s) IV Push once  dextrose 50% Injectable 25 Gram(s) IV Push once  hydrochlorothiazide 25 milliGRAM(s) Oral daily  insulin lispro (HumaLOG) corrective regimen sliding scale   SubCutaneous three times a day before meals  insulin lispro (HumaLOG) corrective regimen sliding scale   SubCutaneous at bedtime  lactated ringers. 1000 milliLiter(s) (75 mL/Hr) IV Continuous <Continuous>  losartan 100 milliGRAM(s) Oral daily  mesalamine DR Capsule 1200 milliGRAM(s) Oral four times a day  metoprolol succinate  milliGRAM(s) Oral daily  piperacillin/tazobactam IVPB.. 3.375 Gram(s) IV Intermittent every 8 hours  vancomycin  IVPB 1000 milliGRAM(s) IV Intermittent every 12 hours    MEDICATIONS  (PRN):  dextrose 40% Gel 15 Gram(s) Oral once PRN Blood Glucose LESS THAN 70 milliGRAM(s)/deciliter  glucagon  Injectable 1 milliGRAM(s) IntraMuscular once PRN Glucose LESS THAN 70 milligrams/deciliter      Allergies    No Known Allergies    Intolerances        REVIEW OF SYSTEMS:    CONSTITUTIONAL: No weakness, fevers or chills  EYES/ENT: No visual changes;  No vertigo or throat pain   NECK: No pain or stiffness  RESPIRATORY: No cough, wheezing, hemoptysis; No shortness of breath  CARDIOVASCULAR: No chest pain or palpitations  GASTROINTESTINAL: No abdominal pain. No nausea, vomiting, or hematemesis; No diarrhea or constipation. No melena or hematochezia.  GENITOURINARY: No dysuria, frequency or hematuria  NEUROLOGICAL: No numbness or weakness  SKIN/EXTREMITY: +R foot/lower leg swelling and erythema with infection  All other review of systems is negative unless indicated above    VITAL SIGNS:   Vital Signs Last 24 Hrs  T(C): 36.3 (28 Feb 2020 08:25), Max: 36.8 (27 Feb 2020 20:55)  T(F): 97.3 (28 Feb 2020 08:25), Max: 98.3 (27 Feb 2020 20:55)  HR: 61 (28 Feb 2020 08:25) (61 - 73)  BP: 122/65 (28 Feb 2020 08:25) (100/73 - 139/83)  BP(mean): --  RR: 20 (28 Feb 2020 08:25) (16 - 20)  SpO2: 97% (28 Feb 2020 08:25) (95% - 99%)    I&O's Summary    27 Feb 2020 07:01  -  28 Feb 2020 07:00  --------------------------------------------------------  IN: 1050 mL / OUT: 0 mL / NET: 1050 mL        On Exam:    Constitutional: NAD, alert and oriented x 3  Lungs:  Non-labored, breath sounds are clear bilaterally, No wheezing, rales or rhonchi  Cardiovascular: Irregular, not tachycardic. S1 and S2 positive.  No murmurs, rubs, gallops or clicks  Gastrointestinal: Bowel Sounds present, soft, nontender.   Lymph: +edema by R foot/lower leg. No cervical lymphadenopathy.  Neurological: Alert, no focal deficits  Skin: R foot ulcer with erythema and edema  Psych: Mood & affect appropriate.    LABS: All Labs Reviewed:                        12.9   6.69  )-----------( 187      ( 28 Feb 2020 06:42 )             39.5                         14.6   7.18  )-----------( 208      ( 27 Feb 2020 16:39 )             44.4     28 Feb 2020 06:42    141    |  106    |  27     ----------------------------<  147    3.6     |  25     |  1.50   27 Feb 2020 16:39    139    |  105    |  28     ----------------------------<  98     3.9     |  25     |  1.30     Ca    9.2        28 Feb 2020 06:42  Ca    9.8        27 Feb 2020 16:39  Phos  3.1       28 Feb 2020 06:42  Mg     1.9       28 Feb 2020 06:42    TPro  8.3    /  Alb  3.8    /  TBili  0.5    /  DBili  x      /  AST  21     /  ALT  19     /  AlkPhos  59     27 Feb 2020 16:39    RADIOLOGY:  VA duplex LE arterial b/l: Left peroneal artery not well seen in the distal left calf with a blunted monophasic waveform, possibly secondary to a stenosis. Mild scattered plaque in the visualized lower extremity arteries.  XR foot: No evidence of osteomyelitis  CXR: No acute finding or change      EKG: Atrial fibrillation at 63 bpm w/ PVCs

## 2020-02-28 NOTE — CONSULT NOTE ADULT - SUBJECTIVE AND OBJECTIVE BOX
HPI:  74 year old male w/ PMHx of Afib (not on AC 2/2 h/o GIB), CAD, HLD, pulmonary HTN, HTN, T2DM, 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 (s/p left frontal craniotomy, 2017 and 3 months radiation), admitted with Right DM foot ulcer and abscess with cellulitis. Had completed a course of doxycycline and was on Ampicillin prior to hospitalization without improvement. He was seen at wound care Center and was sent to the hospital to be evaluated. Seen by podiatry and had I&D with purulent drainage. Denies fever, chills, N/V, chest pain, SOB, abdominal pain. MRI with no osteomyelitis.    Infectious Disease consult was called to evaluate pt and for antibiotic management.    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  Meningioma: left frontal, s/p resection and radiation  Other hyperlipidemia  Essential hypertension  H/O cataract removal with insertion of prosthetic lens: B/L  H/O right inguinal hernia repair      Social History--  EtOH: denies   Tobacco: denies   Drug Use: denies     FAMILY HISTORY:  FH: hypertension: Father      Allergies  No Known Allergies    Intolerances  NONE    Home Medications:  amLODIPine 10 mg oral tablet: 1 tab(s) orally once a day (27 Feb 2020 18:31)  glimepiride 2 mg oral tablet: 1 tab(s) orally 2 times a day (27 Feb 2020 18:31)  hydroCHLOROthiazide 25 mg oral tablet: 1 tab(s) orally once a day (27 Feb 2020 18:31)  losartan 100 mg oral tablet: 1 tab(s) orally once a day (27 Feb 2020 18:31)  mesalamine 1.2 g oral delayed release tablet: 1 tab(s) orally 4 times a day (27 Feb 2020 18:31)  metoprolol succinate 100 mg oral tablet, extended release: 1 tab(s) orally once a day (27 Feb 2020 18:31)  Current Inpatient Medications :    ANTIBIOTICS:   piperacillin/tazobactam IVPB.. 3.375 Gram(s) IV Intermittent every 8 hours      OTHER RELEVANT MEDICATIONS :  amLODIPine   Tablet 10 milliGRAM(s) Oral daily  dextrose 40% Gel 15 Gram(s) Oral once PRN  dextrose 5%. 1000 milliLiter(s) IV Continuous <Continuous>  dextrose 50% Injectable 12.5 Gram(s) IV Push once  dextrose 50% Injectable 25 Gram(s) IV Push once  dextrose 50% Injectable 25 Gram(s) IV Push once  glucagon  Injectable 1 milliGRAM(s) IntraMuscular once PRN  insulin lispro (HumaLOG) corrective regimen sliding scale   SubCutaneous three times a day before meals  insulin lispro (HumaLOG) corrective regimen sliding scale   SubCutaneous at bedtime  lactated ringers. 1000 milliLiter(s) IV Continuous <Continuous>  mesalamine DR Capsule 800 milliGRAM(s) Oral daily  metoprolol succinate  milliGRAM(s) Oral daily    ROS:  CONSTITUTIONAL:  Negative fever or chills  EYES:  Negative  blurry vision or double vision  CARDIOVASCULAR:  Negative for chest pain or palpitations  RESPIRATORY:  Negative for cough, wheezing, or SOB   GASTROINTESTINAL:  Negative for nausea, vomiting, diarrhea, constipation, or abdominal pain  GENITOURINARY:  Negative frequency, urgency , dysuria or hematuria   NEUROLOGIC:  No headache, confusion, dizziness, lightheadedness  All other systems were reviewed and are negative    I&O's Detail    27 Feb 2020 07:01  -  28 Feb 2020 07:00  --------------------------------------------------------  IN:    lactated ringers.: 600 mL    Solution: 200 mL    Solution: 250 mL  Total IN: 1050 mL    OUT:  Total OUT: 0 mL    Total NET: 1050 mL      28 Feb 2020 07:01  -  28 Feb 2020 14:38  --------------------------------------------------------  IN:    lactated ringers.: 375 mL    Solution: 100 mL  Total IN: 475 mL    OUT:    Voided: 300 mL  Total OUT: 300 mL    Total NET: 175 mL          Physical Exam:  Vital Signs Last 24 Hrs  T(C): 36.5 (28 Feb 2020 12:18), Max: 36.8 (27 Feb 2020 20:55)  T(F): 97.7 (28 Feb 2020 12:18), Max: 98.3 (27 Feb 2020 20:55)  HR: 76 (28 Feb 2020 12:18) (61 - 76)  BP: 132/84 (28 Feb 2020 12:18) (100/73 - 139/83)  BP(mean): --  RR: 17 (28 Feb 2020 12:18) (16 - 20)  SpO2: 97% (28 Feb 2020 12:18) (95% - 99%)    Weight (kg): 93 (02-27 @ 15:06)    General: well developed well nourished, in no acute distress  Eyes: sclera anicteric, pupils equal and reactive to light  ENMT: buccal mucosa moist, pharynx not injected  Neck: supple, trachea midline  Lungs: clear, no wheeze/rhonchi  Cardiovascular: regular rate and rhythm, S1 S2  Abdomen: soft, nontender, no organomegaly present, bowel sounds normal  Neurological:  alert and oriented x3, Cranial Nerves II-XII grossly intact  Skin:no increased ecchymosis/petechiae/purpura  Lymph Nodes: no palpable cervical/supraclavicular lymph nodes enlargements  Extremities: right foot heel ulcer/abscess site packed   + surrounding erythema + leg edema    Labs:                         12.9   6.69  )-----------( 187      ( 28 Feb 2020 06:42 )             39.5   02-28    141  |  106  |  27<H>  ----------------------------<  147<H>  3.6   |  25  |  1.50<H>    Ca    9.2      28 Feb 2020 06:42  Phos  3.1     02-28  Mg     1.9     02-28    TPro  8.3  /  Alb  3.8  /  TBili  0.5  /  DBili  x   /  AST  21  /  ALT  19  /  AlkPhos  59  02-27      RECENT CULTURES:  pending    RADIOLOGY & ADDITIONAL STUDIES:    EXAM:  MR FOOT WAW IC RT                            PROCEDURE DATE:  02/27/2020          INTERPRETATION:    PROCEDURE INFORMATION:   Exam: MR Right Lower Extremity Other Than Joint Without and With Contrast;   Hindfoot   Exam date and time: 2/27/2020 6:44 PM   Age: 74 years old   Clinical indication: Cellulitis and difficulty in walking; Patient HX: R/O   abscess right heel issues 4 months     TECHNIQUE:   Imaging protocol: MR of the Right foot without and with intravenous contrast.   Contrast material: GADAVIST; Contrast volume: 10 ml; Contrast route: BOLUS;    Other technique: Exam focused on the hindfoot.     COMPARISON:   DX XR FOOT 3 VIEWS BILATERAL 2/27/2020 4:49 PM     FINDINGS:   Diffuse subcutaneous edema/inflammation, more prominent   medially. No focal fluid collection or abscess.    No areas of bone marrow signal alteration that are suspicious for osteomyelitis. Navicular-cuneiform and first and second tarsometatarsal arthrosis with marginating subchondral cystic changes. Nojoint effusions. No acute tendon or muscle tears. No acute ankle ligamentous injuries. Thickening of the anterior talofibular ligament consistent with scar remodeling from remote injury. Thickening of the central cord of plantar fascia with a plantarcalcaneal enthesophyte, consistent with chronic plantar fasciitis. Moderate fatty atrophy of the abductor digiti minimi muscle.    IMPRESSION:   Soft tissue edema/inflammation consistent with cellulitis. No abscess or   osteomyelitis.      Assessment :   74 year old male w/ PMHx of Afib (not on AC 2/2 h/o GIB), CAD, HLD, pulmonary HTN, HTN, T2DM, 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 (s/p left frontal craniotomy, 2017 and 3 months radiation), admitted with Right DM foot ulcer and abscess with cellulitis sp I &D.    Plan :   Cont Zosyn  Add Vancomycin  Fu cultures  Wound care per podiatry  Elevate leg    D/w Hospitalist    Dr Nora Breaux to cover me over weekend.    Continue with present regime .  Approptiate use of antibiotics and adverse effects reviewed.      I have discussed the above plan of care with patient/family in detail. They expressed understanding of the treatment plan . Risks, benefits and alternatives discussed in detail. I have asked if they have any questions or concerns and appropriately addressed them to the best of my ability .      > 45 minutes spent in direct patient care reviewing  the notes, lab data/ imaging , discussion with multidisciplinary team. All questions were addressed and answered to the best of my capacity .    Thank you for allowing me to participate in the care of your patient .      Constantin Cosme MD  Infectious Disease  [phone]

## 2020-02-28 NOTE — CONSULT NOTE ADULT - ASSESSMENT
74 year old male w/ PMHx of Afib (not on AC 2/2 h/o GIB), CAD, HLD, pulmonary HTN, HTN, T2DM, 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 (s/p left frontal craniotomy, 2017 and 3 months radiation), admitted for non-healing right foot/ankle ulcer, had 3 second pause while in A fib this morning. No clear evidence of acute ischemia; EKG shows A fib with PVCs but no acute ST/T wave changes. Echo in 2017 showed EF ~55-60%, moderate pulm HTN; patient had another echo as outpatient in Nov 2019. BP is controlled.    INCOMPLETE     - For A fib, continue rate control with Metoprolol succinate  mg. Patient is not on AC due to history of GI bleeds.  - For HTN, continue Metoprolol, Amlodipine 10 mg, Losartan 100 mg.  - Continue telemetry monitoring.  - Monitor and replete lytes, keep K>4, Mg>2.  - All other management of foot ulcer per podiatry/primary team.   - Other cardiovascular workup will depend on clinical course. Will follow. 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 There is no clear evidence of acute ischemia; EKG shows A fib with PVCs but no acute ST/T wave changes. In idiopathic conduction disease-sick sinus syndrome, a pause up to 3 seconds is acceptable and no further therapy is warranted at this time.     - For A fib, continue rate control with Metoprolol succinate  mg and monitor off AC due to history of GI bleeds.  - For HTN, continue Metoprolol, Amlodipine 10 mg, Losartan 100 mg.  - Monitor and replete lytes, keep K>4, Mg>2.  - If needed, may proceed with surgical podiatric intervention. Rest of management of foot ulcer per podiatry/primary team.   - Other cardiovascular workup will depend on clinical course. Will follow.

## 2020-02-28 NOTE — CONSULT NOTE ADULT - ATTENDING COMMENTS
Good RLE perfusion. No vascular intervention at this time. I evaluated the patient in person and all questions were answered.
Chart reviewed    Patient seen and examined    Agree with plan as outlined above

## 2020-02-28 NOTE — PROGRESS NOTE ADULT - PROBLEM SELECTOR PLAN 3
Chronic, stable  - Continue home losartan, hctz, amlodipine with hold parameters  - Monitor routine hemodynamics

## 2020-02-28 NOTE — PROGRESS NOTE ADULT - SUBJECTIVE AND OBJECTIVE BOX
74 year old male was seen for right foot/ankle cellulitis. Patient reports that the swelling and pain in the right foot has gone down from what it was yesterday. Denies any acute overnight events. Denies any constitutional symptoms.    ICU Vital Signs Last 24 Hrs  T(C): 36.3 (28 Feb 2020 08:25), Max: 36.8 (27 Feb 2020 20:55)  T(F): 97.3 (28 Feb 2020 08:25), Max: 98.3 (27 Feb 2020 20:55)  HR: 61 (28 Feb 2020 08:25) (61 - 73)  BP: 122/65 (28 Feb 2020 08:25) (100/73 - 139/83)  BP(mean): --  ABP: --  ABP(mean): --  RR: 20 (28 Feb 2020 08:25) (16 - 20)  SpO2: 97% (28 Feb 2020 08:25) (95% - 99%)                          12.9   6.69  )-----------( 187      ( 28 Feb 2020 06:42 )             39.5     02-28    141  |  106  |  27<H>  ----------------------------<  147<H>  3.6   |  25  |  1.50<H>    Ca    9.2      28 Feb 2020 06:42  Phos  3.1     02-28  Mg     1.9     02-28    TPro  8.3  /  Alb  3.8  /  TBili  0.5  /  DBili  x   /  AST  21  /  ALT  19  /  AlkPhos  59  02-27    Physical Exam:   Vasc: DP/PT pulses palpable left foot, DP/PT pulses nonpalpable right foot. +edema right foot and ankle.   Neuro: Grossly intact, cora.   Derm: 0.5 cm x 0.6 cm x 0.1 cm ulcer at the right lateral malleolus x 2, granular base, periwound erythema, serous drainage. +fluctuance at lateral aspect of right heel  MSK: 5/5 strength at all muscle groups crossing ankle joint, cora.     MEDICATIONS  (STANDING):  amLODIPine   Tablet 10 milliGRAM(s) Oral daily  dextrose 5%. 1000 milliLiter(s) (50 mL/Hr) IV Continuous <Continuous>  dextrose 50% Injectable 12.5 Gram(s) IV Push once  dextrose 50% Injectable 25 Gram(s) IV Push once  dextrose 50% Injectable 25 Gram(s) IV Push once  hydrochlorothiazide 25 milliGRAM(s) Oral daily  insulin lispro (HumaLOG) corrective regimen sliding scale   SubCutaneous three times a day before meals  insulin lispro (HumaLOG) corrective regimen sliding scale   SubCutaneous at bedtime  lactated ringers. 1000 milliLiter(s) (75 mL/Hr) IV Continuous <Continuous>  losartan 100 milliGRAM(s) Oral daily  mesalamine DR Capsule 1200 milliGRAM(s) Oral four times a day  metoprolol succinate  milliGRAM(s) Oral daily  piperacillin/tazobactam IVPB.. 3.375 Gram(s) IV Intermittent every 8 hours  vancomycin  IVPB 1000 milliGRAM(s) IV Intermittent every 12 hours    MEDICATIONS  (PRN):  dextrose 40% Gel 15 Gram(s) Oral once PRN Blood Glucose LESS THAN 70 milliGRAM(s)/deciliter  glucagon  Injectable 1 milliGRAM(s) IntraMuscular once PRN Glucose LESS THAN 70 milligrams/deciliter

## 2020-02-28 NOTE — PROGRESS NOTE ADULT - ASSESSMENT
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 Admitted with cellulitis w/ abscess and failed outpatient therapy, and rule out osteomyelitis.

## 2020-02-28 NOTE — CHART NOTE - NSCHARTNOTEFT_GEN_A_CORE
Called by RN for LLE pain. Patient seen and examined at bedside. LLE dry, scaly, erythematous, and tender to palpation. Pt states he takes Motrin 200mg every night around 2AM for his ulcer, but is now requiring it earlier. States the pain is a dull, 8/10 intermittent pain that's usually worse at night. Of note, pt is currently being treated for LE cellulitis. Per chart review, pt has nothing ordered for pain.     -Will order PO Motrin 200mg x 1 Called by RN for LLE pain. Patient seen and examined at bedside. LLE dry, scaly, erythematous, and tender to palpation. Pt states he takes Motrin 200mg every night around 2AM for his ulcer, but is now requiring it earlier. States the pain is a dull, 8/10 intermittent pain that's usually worse at night. Of note, pt is currently being treated for LE cellulitis. Per chart review, pt has nothing ordered for pain.     -Will order PO Motrin 200mg x 1      ADDENDUM:    Called by RN for 3 second pause on Tele monitor. Pt is currently in afib w/ HR @ 71. Pt seen and examined at bedside. Pt woken up from sleep. Pt denies any chest pain, shortness of breath, or dizziness. Admits to mild RLE pain at site of cellulitis.       -Consulted [facility]  -Will continue to monitor  -RN to call for any significant changes Called by RN for LLE pain. Patient seen and examined at bedside. LLE dry, scaly, erythematous, and tender to palpation. Pt states he takes Motrin 200mg every night around 2AM for his ulcer, but is now requiring it earlier. States the pain is a dull, 8/10 intermittent pain that's usually worse at night. Of note, pt is currently being treated for LE cellulitis. Per chart review, pt has nothing ordered for pain.     -Will order PO Motrin 200mg x 1      ADDENDUM:    Called by RN for 3 second pause on Tele monitor. Pt is currently in afib w/ HR @ 71. Pt seen and examined at bedside. Pt woken up from sleep. Pt denies any chest pain, shortness of breath, or dizziness. Admits to mild RLE pain at site of cellulitis.     -Will hold home Metoprolol dose in setting of 3 second pause. However, note pt has received morning dose of Metoprolol Succinate ER 100mg  -Consulted [facility]  -Will continue to monitor  -RN to call for any significant changes

## 2020-02-28 NOTE — CONSULT NOTE ADULT - SUBJECTIVE AND OBJECTIVE BOX
Vascular Surgery consult, on behalf of and seen in conjunction with Dr. 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  Has non-healing ulcer posterior calcaneal region - s/p I&D yesterday with Podiatry.  Currently, patient without complaints - lying comfortably with right foot wound dressed.      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 Patient developed the ulcer in October, when he had increased LE edema 2/2 switching diuretics. The increased edema caused his shoes to fit poorly, and the ulcer developed. Patient has been following with his PCP for treatment of the ulcer. His PCP diagnosed cellulitis overlying the wound, and he completed a course of doxycycline and is presently taking ampicillin, without improvement. He had an appointment with [facility] today due to worsening cellulitis despite PO abx, and the [facility] was concerned for abscess and possible osteomyelitis. Patient reports that the ulcer and surrounding area is tender, erythematous, warm, and edematous. He denies purulent drainage from the wound, but does report that it is non-healing and occasionally drains a clear liquid. Denies fever, chills, N/V, chest pain, SOB, abdominal pain.    ED COURSE:   Vitals: Temp 97.7, HR 66, /83, RR 18, O2 sat 98 on RA.  Labs: WBC 7.18, H/h 14.6/44.4, BUN/Cr 28/1.3 (baseline Cr 1.5 per HIE), GFR 53  VA duplex LE arterial b/l: Left peroneal artery not well seen in the distal left calf with a blunted monophasic waveform, possibly secondary to a stenosis. Mild scattered plaque in the visualized lower extremity arteries.  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 Former smoker, quit in 2008. 1.5 etoh drinks per day. No illicit drugs    MEDICATIONS  (STANDING):  amLODIPine   Tablet 10 milliGRAM(s) Oral daily  dextrose 5%. 1000 milliLiter(s) (50 mL/Hr) IV Continuous <Continuous>  dextrose 50% Injectable 12.5 Gram(s) IV Push once  dextrose 50% Injectable 25 Gram(s) IV Push once  dextrose 50% Injectable 25 Gram(s) IV Push once  hydrochlorothiazide 25 milliGRAM(s) Oral daily  insulin lispro (HumaLOG) corrective regimen sliding scale   SubCutaneous three times a day before meals  insulin lispro (HumaLOG) corrective regimen sliding scale   SubCutaneous at bedtime  lactated ringers. 1000 milliLiter(s) (75 mL/Hr) IV Continuous <Continuous>  losartan 100 milliGRAM(s) Oral daily  mesalamine DR Capsule 1200 milliGRAM(s) Oral four times a day  metoprolol succinate  milliGRAM(s) Oral daily  piperacillin/tazobactam IVPB.. 3.375 Gram(s) IV Intermittent every 8 hours  vancomycin  IVPB 1000 milliGRAM(s) IV Intermittent every 12 hours    MEDICATIONS  (PRN):  dextrose 40% Gel 15 Gram(s) Oral once PRN Blood Glucose LESS THAN 70 milliGRAM(s)/deciliter  glucagon  Injectable 1 milliGRAM(s) IntraMuscular once PRN Glucose LESS THAN 70 milligrams/deciliter    ALLERGIES:   No known allergies    VITAL SIGNS LAST 24 H:  T(C): 36.3 (28 Feb 2020 08:25), Max: 36.8 (27 Feb 2020 20:55)  T(F): 97.3 (28 Feb 2020 08:25), Max: 98.3 (27 Feb 2020 20:55)  HR: 61 (28 Feb 2020 08:25) (61 - 73)  BP: 122/65 (28 Feb 2020 08:25) (100/73 - 139/83)  BP(mean): --  RR: 20 (28 Feb 2020 08:25) (16 - 20)  SpO2: 97% (28 Feb 2020 08:25) (95% - 99%)  Daily     Daily     PHYSICAL EXAM:  General: NAD, well-nourished  HEENT: Atraumatic, EOMI  Resp: Breathing comfortably on RA  CV: Normal sinus rhythm  Abd: soft, ND, NT  Upper extremities: 2+peripheral pulses bilat UE, forearms soft, sensation to light touch intact through all nerve distributions bilat UE  Lower extremities: 2+ DP pulses bilat feet; diminished but palpable PT pulses bilat feet.  Some edema noted right LE.  I&D wound noted at posterior calcaneus - good bleeding noted, with wound packing in place.  No purulence, no appreciable erythema of wound edges or surrounding skin.  Calves soft, NT bilat LE.  Sensation to light touch grossly intact to all nerve distributions bilat LE's.               12.9   6.69  )-----------( 187      ( 28 Feb 2020 06:42 )             39.5     02-28    141  |  106  |  27<H>  ----------------------------<  147<H>  3.6   |  25  |  1.50<H>    Ca    9.2      28 Feb 2020 06:42  Phos  3.1     02-28  Mg     1.9     02-28    TPro  8.3  /  Alb  3.8  /  TBili  0.5  /  DBili  x   /  AST  21  /  ALT  19  /  AlkPhos  59  02-27    DIAGNOSTIC WORKUP:   EXAM:  DUPLEX LOW ARTERIES COMP BILAT                        PROCEDURE DATE:  02/27/2020    INTERPRETATION:  US LOWER EXTREMITY ARTERIAL DUPLEX COMPLETE BILATERAL  HISTORY:  nonpalpable pulses  Real-time sonography of the bilateral lower extremity arterial system was performed using a high-resolution linear array transducer and including color and spectral Doppler.   Mild scattered plaque in the visualized lower extremity arteries. The distal left peroneal artery is not well seen on color imaging with a blunted monophasic waveform obtained. Remainder of the visualized lower extremity arteries are patent with no abnormal step up of peak systolic velocities to suggest a hemodynamically significant stenosis.  An arrhythmia is noted.  Flow phase patterns and peak systolic velocity measurements (in cm/s) were observed as follows:  RIGHT:  CFA:  Triphasic; 74   Proximal SFA: Triphasic; 75   Mid SFA:Biphasic; 91   Distal SFA:  Biphasic;  122   Popliteal:  Triphasic;  56   Anterior tibial distal:  Triphasic; 84   Posterior tibial distal: Biphasic; 59   Peroneal distal:  Biphasic;  57   Dorsalis pedis: Monophasic;  68   LEFT:  CFA:  Triphasic; 136   Proximal SFA: Triphasic; 103   Mid SFA:Triphasic; 133   Distal SFA:  Triphasic; 53   Popliteal: Triphasic; 44   Anterior tibial distal:  Biphasic; 86   Posterior tibial distal: Biphasic; 33   Peroneal proximal:  Biphasic; 44; color flow not well seen distally, however monophasic Doppler waveforms obtained with a velocity is 18 cm/s  Dorsalis pedis:  Biphasic;  69   IMPRESSION:  Left peroneal artery not well seen in the distal left calf with a blunted monophasic waveform, possibly secondary to a stenosis.  Remainder of the visualized lower extremity arteries are patent with no evidence of a hemodynamically significant stenosis.  Mild scattered plaque in the visualized lower extremity arteries.  Arrhythmia.  JAKOB S GLENN   This document has been electronically signed. Feb 27 2020  5:25PM      EXAM:  MR FOOT WAW IC RT                        PROCEDURE DATE:  02/27/2020    INTERPRETATION:    PROCEDURE INFORMATION:   Exam: MR Right Lower Extremity Other Than Joint Without and With Contrast;   Hindfoot   Exam date and time: 2/27/2020 6:44 PM   Age: 74 years old   Clinical indication: Cellulitis and difficulty in walking; Patient HX: R/O   abscess right heel issues 4 months   TECHNIQUE:   Imaging protocol: MR of the Right foot without and with intravenous contrast.   Contrast material: GADAVIST; Contrast volume: 10 ml; Contrast route: BOLUS;    Other technique: Exam focused on the hindfoot.   COMPARISON:   DX XR FOOT 3 VIEWS BILATERAL 2/27/2020 4:49 PM   FINDINGS:   Diffuse subcutaneous edema/inflammation, more prominent   medially. No focal fluid collection or abscess.  No areas of bone marrow signal alteration that are suspicious for osteomyelitis. Navicular-cuneiform and first and second tarsometatarsal arthrosis with marginating subchondral cystic changes. Nojoint effusions. No acute tendon or muscle tears. No acute ankle ligamentous injuries. Thickening of the anterior talofibular ligament consistent with scar remodeling from remote injury. Thickening of the central cord of plantar fascia with a plantarcalcaneal enthesophyte, consistent with chronic plantar fasciitis. Moderate fatty atrophy of the abductor digiti minimi muscle.  IMPRESSION:   Soft tissue edema/inflammation consistent with cellulitis. No abscess or   osteomyelitis.  GENESIS ACOSTA M.D., ATTENDING RADIOLOGIST  This document has been electronically signed. Feb 28 2020  9:10AM    ASSESSMENT:   Left foot abscess, s/p I&D - wound packed  +/- non-palpable PT pulses?    PLAN:   Continue current care per primary team  Continue Vancomycin and Zosyn   Continue wound care per podiatry  Ankle-brachial index ordered, results pending   Arterial duplex: left peroneal artery not visualized - not clinically important as remainder of vascular supply to foot shown to be intact  No Vascular surgical interventions or further diagnostic work-up necessary at this time  Will follow-up results of ALEX, then will sign-off care - after which will reconsult as necessary

## 2020-02-28 NOTE — PROGRESS NOTE ADULT - ATTENDING COMMENTS
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

## 2020-02-28 NOTE — PROGRESS NOTE ADULT - PROBLEM SELECTOR PLAN 4
- Hold home glimepiride  - Low dose ISS, accuchecks, hypoglycemia protocol  - F/u A1c  - DASH/TLC w/ consistent carb diet when surgical intervention plans are finalized/tolerating PO

## 2020-02-29 LAB
ALBUMIN SERPL ELPH-MCNC: 3.5 G/DL — SIGNIFICANT CHANGE UP (ref 3.3–5)
ALP SERPL-CCNC: 50 U/L — SIGNIFICANT CHANGE UP (ref 40–120)
ALT FLD-CCNC: 17 U/L — SIGNIFICANT CHANGE UP (ref 12–78)
ANION GAP SERPL CALC-SCNC: 10 MMOL/L — SIGNIFICANT CHANGE UP (ref 5–17)
AST SERPL-CCNC: 20 U/L — SIGNIFICANT CHANGE UP (ref 15–37)
BASOPHILS # BLD AUTO: 0.05 K/UL — SIGNIFICANT CHANGE UP (ref 0–0.2)
BASOPHILS NFR BLD AUTO: 0.7 % — SIGNIFICANT CHANGE UP (ref 0–2)
BILIRUB SERPL-MCNC: 0.7 MG/DL — SIGNIFICANT CHANGE UP (ref 0.2–1.2)
BUN SERPL-MCNC: 24 MG/DL — HIGH (ref 7–23)
CALCIUM SERPL-MCNC: 9.2 MG/DL — SIGNIFICANT CHANGE UP (ref 8.5–10.1)
CHLORIDE SERPL-SCNC: 104 MMOL/L — SIGNIFICANT CHANGE UP (ref 96–108)
CO2 SERPL-SCNC: 25 MMOL/L — SIGNIFICANT CHANGE UP (ref 22–31)
CREAT SERPL-MCNC: 1.6 MG/DL — HIGH (ref 0.5–1.3)
CULTURE RESULTS: NO GROWTH — SIGNIFICANT CHANGE UP
CULTURE RESULTS: SIGNIFICANT CHANGE UP
EOSINOPHIL # BLD AUTO: 0.34 K/UL — SIGNIFICANT CHANGE UP (ref 0–0.5)
EOSINOPHIL NFR BLD AUTO: 5 % — SIGNIFICANT CHANGE UP (ref 0–6)
GLUCOSE SERPL-MCNC: 142 MG/DL — HIGH (ref 70–99)
HCT VFR BLD CALC: 42.4 % — SIGNIFICANT CHANGE UP (ref 39–50)
HGB BLD-MCNC: 13.7 G/DL — SIGNIFICANT CHANGE UP (ref 13–17)
IMM GRANULOCYTES NFR BLD AUTO: 0.6 % — SIGNIFICANT CHANGE UP (ref 0–1.5)
LYMPHOCYTES # BLD AUTO: 1.47 K/UL — SIGNIFICANT CHANGE UP (ref 1–3.3)
LYMPHOCYTES # BLD AUTO: 21.5 % — SIGNIFICANT CHANGE UP (ref 13–44)
MCHC RBC-ENTMCNC: 28.6 PG — SIGNIFICANT CHANGE UP (ref 27–34)
MCHC RBC-ENTMCNC: 32.3 GM/DL — SIGNIFICANT CHANGE UP (ref 32–36)
MCV RBC AUTO: 88.5 FL — SIGNIFICANT CHANGE UP (ref 80–100)
MONOCYTES # BLD AUTO: 0.6 K/UL — SIGNIFICANT CHANGE UP (ref 0–0.9)
MONOCYTES NFR BLD AUTO: 8.8 % — SIGNIFICANT CHANGE UP (ref 2–14)
NEUTROPHILS # BLD AUTO: 4.35 K/UL — SIGNIFICANT CHANGE UP (ref 1.8–7.4)
NEUTROPHILS NFR BLD AUTO: 63.4 % — SIGNIFICANT CHANGE UP (ref 43–77)
NRBC # BLD: 0 /100 WBCS — SIGNIFICANT CHANGE UP (ref 0–0)
ORGANISM # SPEC MICROSCOPIC CNT: SIGNIFICANT CHANGE UP
ORGANISM # SPEC MICROSCOPIC CNT: SIGNIFICANT CHANGE UP
PLATELET # BLD AUTO: 214 K/UL — SIGNIFICANT CHANGE UP (ref 150–400)
POTASSIUM SERPL-MCNC: 3.6 MMOL/L — SIGNIFICANT CHANGE UP (ref 3.5–5.3)
POTASSIUM SERPL-SCNC: 3.6 MMOL/L — SIGNIFICANT CHANGE UP (ref 3.5–5.3)
PROT SERPL-MCNC: 7.6 G/DL — SIGNIFICANT CHANGE UP (ref 6–8.3)
RBC # BLD: 4.79 M/UL — SIGNIFICANT CHANGE UP (ref 4.2–5.8)
RBC # FLD: 14.2 % — SIGNIFICANT CHANGE UP (ref 10.3–14.5)
SODIUM SERPL-SCNC: 139 MMOL/L — SIGNIFICANT CHANGE UP (ref 135–145)
SPECIMEN SOURCE: SIGNIFICANT CHANGE UP
SPECIMEN SOURCE: SIGNIFICANT CHANGE UP
WBC # BLD: 6.85 K/UL — SIGNIFICANT CHANGE UP (ref 3.8–10.5)
WBC # FLD AUTO: 6.85 K/UL — SIGNIFICANT CHANGE UP (ref 3.8–10.5)

## 2020-02-29 PROCEDURE — 99232 SBSQ HOSP IP/OBS MODERATE 35: CPT

## 2020-02-29 PROCEDURE — 99233 SBSQ HOSP IP/OBS HIGH 50: CPT

## 2020-02-29 RX ORDER — ACETAMINOPHEN 500 MG
650 TABLET ORAL ONCE
Refills: 0 | Status: COMPLETED | OUTPATIENT
Start: 2020-02-29 | End: 2020-02-29

## 2020-02-29 RX ORDER — MESALAMINE 400 MG
1 TABLET, DELAYED RELEASE (ENTERIC COATED) ORAL
Qty: 0 | Refills: 0 | DISCHARGE

## 2020-02-29 RX ORDER — MESALAMINE 400 MG
1600 TABLET, DELAYED RELEASE (ENTERIC COATED) ORAL DAILY
Refills: 0 | Status: DISCONTINUED | OUTPATIENT
Start: 2020-02-29 | End: 2020-03-02

## 2020-02-29 RX ADMIN — Medication 650 MILLIGRAM(S): at 08:24

## 2020-02-29 RX ADMIN — PIPERACILLIN AND TAZOBACTAM 25 GRAM(S): 4; .5 INJECTION, POWDER, LYOPHILIZED, FOR SOLUTION INTRAVENOUS at 10:17

## 2020-02-29 RX ADMIN — Medication 650 MILLIGRAM(S): at 01:07

## 2020-02-29 RX ADMIN — Medication 650 MILLIGRAM(S): at 09:00

## 2020-02-29 RX ADMIN — Medication 1600 MILLIGRAM(S): at 11:53

## 2020-02-29 RX ADMIN — PIPERACILLIN AND TAZOBACTAM 25 GRAM(S): 4; .5 INJECTION, POWDER, LYOPHILIZED, FOR SOLUTION INTRAVENOUS at 02:11

## 2020-02-29 RX ADMIN — Medication 250 MILLIGRAM(S): at 06:16

## 2020-02-29 RX ADMIN — PIPERACILLIN AND TAZOBACTAM 25 GRAM(S): 4; .5 INJECTION, POWDER, LYOPHILIZED, FOR SOLUTION INTRAVENOUS at 17:37

## 2020-02-29 RX ADMIN — Medication 650 MILLIGRAM(S): at 02:07

## 2020-02-29 NOTE — PROGRESS NOTE ADULT - SUBJECTIVE AND OBJECTIVE BOX
74 year old male was seen for right foot/ankle cellulitis. Patient reports significant improvement in pain, redness and swelling in the right foot. Denies any acute overnight events. Denies any constitutional symptoms.     ICU Vital Signs Last 24 Hrs  T(C): 36.2 (29 Feb 2020 07:50), Max: 36.6 (29 Feb 2020 00:03)  T(F): 97.2 (29 Feb 2020 07:50), Max: 97.8 (29 Feb 2020 00:03)  HR: 60 (29 Feb 2020 07:50) (60 - 76)  BP: 134/86 (29 Feb 2020 07:50) (108/64 - 134/86)  BP(mean): --  ABP: --  ABP(mean): --  RR: 19 (29 Feb 2020 07:50) (17 - 20)  SpO2: 96% (29 Feb 2020 07:50) (96% - 98%)                          13.7   6.85  )-----------( 214      ( 29 Feb 2020 07:35 )             42.4     02-29    139  |  104  |  24<H>  ----------------------------<  142<H>  3.6   |  25  |  1.60<H>    Ca    9.2      29 Feb 2020 07:35  Phos  3.1     02-28  Mg     1.9     02-28    TPro  7.6  /  Alb  3.5  /  TBili  0.7  /  DBili  x   /  AST  20  /  ALT  17  /  AlkPhos  50  02-29    Physical Exam:   Vasc: DP/PT pulses palpable left foot, DP/PT pulses nonpalpable right foot. +edema right foot and ankle.   Neuro: Grossly intact, cora.   Derm: 0.5 cm x 0.6 cm x 0.1 cm ulcer at the right lateral malleolus x 2, granular base, periwound erythema, serous drainage. +fluctuance at lateral aspect of right heel  MSK: 5/5 strength at all muscle groups crossing ankle joint, cora.     MEDICATIONS  (STANDING):  amLODIPine   Tablet 10 milliGRAM(s) Oral daily  dextrose 5%. 1000 milliLiter(s) (50 mL/Hr) IV Continuous <Continuous>  dextrose 50% Injectable 12.5 Gram(s) IV Push once  dextrose 50% Injectable 25 Gram(s) IV Push once  dextrose 50% Injectable 25 Gram(s) IV Push once  insulin lispro (HumaLOG) corrective regimen sliding scale   SubCutaneous three times a day before meals  insulin lispro (HumaLOG) corrective regimen sliding scale   SubCutaneous at bedtime  lactated ringers. 1000 milliLiter(s) (75 mL/Hr) IV Continuous <Continuous>  mesalamine DR Capsule 800 milliGRAM(s) Oral daily  metoprolol succinate  milliGRAM(s) Oral daily  piperacillin/tazobactam IVPB.. 3.375 Gram(s) IV Intermittent every 8 hours  vancomycin  IVPB 1000 milliGRAM(s) IV Intermittent every 24 hours    MEDICATIONS  (PRN):  dextrose 40% Gel 15 Gram(s) Oral once PRN Blood Glucose LESS THAN 70 milliGRAM(s)/deciliter  glucagon  Injectable 1 milliGRAM(s) IntraMuscular once PRN Glucose LESS THAN 70 milligrams/deciliter

## 2020-02-29 NOTE — PHARMACOTHERAPY INTERVENTION NOTE - COMMENTS
Counseled patient regarding antibiotics and reviewed hospital medications. Called pharmacy to confirm doses after speaking with patient. Mesalamine home dose 2400mg daily. Dr. Ocampo made aware, changed order to mesalamine 1600mg daily.

## 2020-02-29 NOTE — PROGRESS NOTE ADULT - ASSESSMENT
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/L frontal craniotomy/RT, hx R foot/ankle chronic nonhealing ulcer; MRI w/no abscess, no osteomyelitis - per Podiatry, for bedside debridement, on IV abxs  -nonhealing R foot/ankle ulcer - on IV abxs - zosyn, vancomycin - follow vanco levels - trough as no random level option avlbl in lab  -elev Cr - KIM  - stop HCTZ, stop losartan, decrease vanco dosing until establish vanco level - monitor on gentle IVFs; if Cr continues to increase, check renal imaging  -pAfib - continue metoprolol; not on a/c 2/2 hx GIB  -HTN - on multidrug antiHTNve tx: losartan, amlodipine, hctz  -dvt prophy - scds 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/L frontal craniotomy/RT, hx R foot/ankle chronic nonhealing ulcer; MRI w/no abscess, no osteomyelitis - per Podiatry, s/p bedside debridement 2/28, on IV abxs  -nonhealing R foot/ankle ulcer/cellulitis - MRI R foot 2/27 - shows cellulitis; neg for osteomyelitis or abscess on IV abxs - s/p bedside I&D by Podiatry 2/28 - per ID, IV abxs: zosyn, vancomycin - following vanco levels - f/u trough as no random level option avlbl in lab  -elev Cr - KIM  - Cr increased - d/c'd HCTZ and losartan, and decreased vanco dosing from q12h->q24h until establish vanco level - monitoring on gentle IVFs  -pAfib - continue metoprolol; not on a/c 2/2 hx GIB  -3-sec cardiac pause noted on monitor overnight 2/27 - Cards consulted - impression w/idiopathic sick sinus syndrome, up to 3-sec pause acceptable; recomm no further intervention at this time, and to continue metoprolol  -HTN - on multidrug antiHTNve tx: metoprolol, amlodipine; for now, hctz and losartan on hold for increased Cr  -hx seizures in setting of meningioma - ?not on antieptileptic - d/w pt  -DM - glycemic mgmt w/insulin  -dvt prophy - scds

## 2020-02-29 NOTE — PROGRESS NOTE ADULT - ASSESSMENT
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 There is no clear evidence of acute ischemia; EKG shows A fib with PVCs but no acute ST/T wave changes.     Afib w/ pause  -rate controlled  -Metoprolol succinate  mg and monitor off AC due to history of GI bleeds.  -In idiopathic conduction disease-sick sinus syndrome, a pause up to 3 seconds is acceptable and no further therapy is warranted at this time.     HTN  - continue Metoprolol, Amlodipine 10 mg  - would resume  Losartan 100 mg.    - Monitor and replete lytes, keep K>4, Mg>2.  - If needed, may proceed with surgical podiatric intervention from a cardiac point of view with no evidence of active ischemic heart disease, decompensated heart failure, severe obstructive valvular disease, or uncontrolled arrhythmia.  - BP well controlled, monitor routine hemodynamic   Rest of management of foot ulcer per podiatry/primary team.   - Other cardiovascular workup will depend on clinical course. Will follow.  [facility]al[facility]  Cardiology   Spectra #[phone]

## 2020-02-29 NOTE — PROGRESS NOTE ADULT - SUBJECTIVE AND OBJECTIVE BOX
CC/F/U for:     HPI:  74 year old male w/ PMHx of Afib (not on AC 2/2 h/o GIB), CAD, HLD, pulmonary HTN, HTN, T2DM, 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 Patient developed the ulcer in October, when he had increased LE edema 2/2 switching diuretics. The increased edema caused his shoes to fit poorly, and the ulcer developed. Patient has been following with his PCP for treatment of the ulcer. His PCP diagnosed cellulitis overlying the wound, and he completed a course of doxycycline and is presently taking ampicillin, without improvement. He had an appointment with [facility] today due to worsening cellulitis despite PO abx, and the [facility] was concerned for abscess and possible osteomyelitis. Patient reports that the ulcer and surrounding area is tender, erythematous, warm, and edematous. He denies purulent drainage from the wound, but does report that it is non-healing and occasionally drains a clear liquid. Denies fever, chills, N/V, chest pain, SOB, abdominal pain.    ED Course: Vitals: Temp 97.7, HR 66, /83, RR 18, O2 sat 98 on RA.  Labs: WBC 7.18, H/h 14.6/44.4, BUN/Cr 28/1.3 (baseline Cr 1.5 per HIE), GFR 53  VA duplex LE arterial b/l: Left peroneal artery not well seen in the distal left calf with a blunted monophasic waveform, possibly secondary to a stenosis. Mild scattered plaque in the visualized lower extremity arteries.  XR foot: No evidence of osteomyelitis  CXR: No acute finding or change  EKG: Atrial fibrillation at 63 bpm w/ PVCs  Patient s/p vanc/zosyn (27 Feb 2020 17:27)        INTERVAL HPI/OVERNIGHT EVENTS:  Pt seen and examined at bedside.     Allergies/Intolerance: No Known Allergies      MEDICATIONS  (STANDING):  amLODIPine   Tablet 10 milliGRAM(s) Oral daily  dextrose 5%. 1000 milliLiter(s) (50 mL/Hr) IV Continuous <Continuous>  dextrose 50% Injectable 12.5 Gram(s) IV Push once  dextrose 50% Injectable 25 Gram(s) IV Push once  dextrose 50% Injectable 25 Gram(s) IV Push once  insulin lispro (HumaLOG) corrective regimen sliding scale   SubCutaneous three times a day before meals  insulin lispro (HumaLOG) corrective regimen sliding scale   SubCutaneous at bedtime  lactated ringers. 1000 milliLiter(s) (75 mL/Hr) IV Continuous <Continuous>  mesalamine DR Capsule 800 milliGRAM(s) Oral daily  metoprolol succinate  milliGRAM(s) Oral daily  piperacillin/tazobactam IVPB.. 3.375 Gram(s) IV Intermittent every 8 hours  vancomycin  IVPB 1000 milliGRAM(s) IV Intermittent every 24 hours    MEDICATIONS  (PRN):  dextrose 40% Gel 15 Gram(s) Oral once PRN Blood Glucose LESS THAN 70 milliGRAM(s)/deciliter  glucagon  Injectable 1 milliGRAM(s) IntraMuscular once PRN Glucose LESS THAN 70 milligrams/deciliter        ROS: as above; all other systems reviewed and wnl      PHYSICAL EXAMINATION:  Vital Signs Last 24 Hrs  T(C): 36.2 (29 Feb 2020 07:50), Max: 36.6 (29 Feb 2020 00:03)  T(F): 97.2 (29 Feb 2020 07:50), Max: 97.8 (29 Feb 2020 00:03)  HR: 60 (29 Feb 2020 07:50) (60 - 76)  BP: 134/86 (29 Feb 2020 07:50) (108/64 - 134/86)  BP(mean): --  RR: 19 (29 Feb 2020 07:50) (17 - 20)  SpO2: 96% (29 Feb 2020 07:50) (96% - 98%)  CAPILLARY BLOOD GLUCOSE      POCT Blood Glucose.: 143 mg/dL (29 Feb 2020 07:41)  POCT Blood Glucose.: 121 mg/dL (28 Feb 2020 21:21)  POCT Blood Glucose.: 106 mg/dL (28 Feb 2020 17:21)  POCT Blood Glucose.: 240 mg/dL (28 Feb 2020 12:00)      GENERAL: NAD  HEAD:  atraumatic  EYES: sclera anicteric  ENMT: mucous membranes dry  NECK: supple  CHEST/LUNG: respirations unlabored; air entry symmetric; no wheezing  HEART: normal S1, S2  ABDOMEN: BS+, soft, ND, NT   EXTREMITIES:  no edema b/l LEs, no calf tenderness  NEURO: awake, alert, interactive; moves all extremities      LABS:                        13.7   6.85  )-----------( 214      ( 29 Feb 2020 07:35 )             42.4     02-29    139  |  104  |  24<H>  ----------------------------<  142<H>  3.6   |  25  |  1.60<H>    Ca    9.2      29 Feb 2020 07:35  Phos  3.1     02-28  Mg     1.9     02-28    TPro  7.6  /  Alb  3.5  /  TBili  0.7  /  DBili  x   /  AST  20  /  ALT  17  /  AlkPhos  50  02-29            RADIOLOGY & ADDITIONAL TESTS:      ASSESSMENT AND PLAN:  74y Male CC/F/U for: R foot cellulitis, KIM    HPI:  74 year old male w/ PMHx of Afib (not on AC 2/2 h/o GIB), CAD, HLD, pulmonary HTN, HTN, T2DM, 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 Patient developed the ulcer in October, when he had increased LE edema 2/2 switching diuretics. The increased edema caused his shoes to fit poorly, and the ulcer developed. Patient has been following with his PCP for treatment of the ulcer. His PCP diagnosed cellulitis overlying the wound, and he completed a course of doxycycline and is presently taking ampicillin, without improvement. He had an appointment with the [facility] today due to worsening cellulitis despite PO abx, and the [facility] was concerned for abscess and possible osteomyelitis. Patient reports that the ulcer and surrounding area is tender, erythematous, warm, and edematous. He denies purulent drainage from the wound, but does report that it is non-healing and occasionally drains a clear liquid. Denies fever, chills, N/V, chest pain, SOB, abdominal pain.    ED Course: Vitals: Temp 97.7, HR 66, /83, RR 18, O2 sat 98 on RA.  Labs: WBC 7.18, H/h 14.6/44.4, BUN/Cr 28/1.3 (baseline Cr 1.5 per HIE), GFR 53  VA duplex LE arterial b/l: Left peroneal artery not well seen in the distal left calf with a blunted monophasic waveform, possibly secondary to a stenosis. Mild scattered plaque in the visualized lower extremity arteries.  XR foot: No evidence of osteomyelitis  CXR: No acute finding or change  EKG: Atrial fibrillation at 63 bpm w/ PVCs  Patient s/p vanc/zosyn (27 Feb 2020 17:27)        INTERVAL HPI/OVERNIGHT EVENTS:  Pt seen and examined at bedside - Cr 1.6 today.     Allergies/Intolerance: No Known Allergies      MEDICATIONS  (STANDING):  amLODIPine   Tablet 10 milliGRAM(s) Oral daily  dextrose 5%. 1000 milliLiter(s) (50 mL/Hr) IV Continuous <Continuous>  dextrose 50% Injectable 12.5 Gram(s) IV Push once  dextrose 50% Injectable 25 Gram(s) IV Push once  dextrose 50% Injectable 25 Gram(s) IV Push once  insulin lispro (HumaLOG) corrective regimen sliding scale   SubCutaneous three times a day before meals  insulin lispro (HumaLOG) corrective regimen sliding scale   SubCutaneous at bedtime  lactated ringers. 1000 milliLiter(s) (75 mL/Hr) IV Continuous <Continuous>  mesalamine DR Capsule 800 milliGRAM(s) Oral daily  metoprolol succinate  milliGRAM(s) Oral daily  piperacillin/tazobactam IVPB.. 3.375 Gram(s) IV Intermittent every 8 hours  vancomycin  IVPB 1000 milliGRAM(s) IV Intermittent every 24 hours    MEDICATIONS  (PRN):  dextrose 40% Gel 15 Gram(s) Oral once PRN Blood Glucose LESS THAN 70 milliGRAM(s)/deciliter  glucagon  Injectable 1 milliGRAM(s) IntraMuscular once PRN Glucose LESS THAN 70 milligrams/deciliter        ROS: as above; all other systems reviewed and wnl      PHYSICAL EXAMINATION:  Vital Signs Last 24 Hrs  T(C): 36.2 (29 Feb 2020 07:50), Max: 36.6 (29 Feb 2020 00:03)  T(F): 97.2 (29 Feb 2020 07:50), Max: 97.8 (29 Feb 2020 00:03)  HR: 60 (29 Feb 2020 07:50) (60 - 76)  BP: 134/86 (29 Feb 2020 07:50) (108/64 - 134/86)  RR: 19 (29 Feb 2020 07:50) (17 - 20)  SpO2: 96% (29 Feb 2020 07:50) (96% - 98%)  CAPILLARY BLOOD GLUCOSE    POCT Blood Glucose.: 143 mg/dL (29 Feb 2020 07:41)  POCT Blood Glucose.: 121 mg/dL (28 Feb 2020 21:21)  POCT Blood Glucose.: 106 mg/dL (28 Feb 2020 17:21)  POCT Blood Glucose.: 240 mg/dL (28 Feb 2020 12:00)      GENERAL: elderly male, NAD  HEAD:  atraumatic  EYES: sclera anicteric  ENMT: mucous membranes dry  NECK: supple  CHEST/LUNG: respirations unlabored; air entry symmetric; no wheezing  HEART: normal S1, S2  ABDOMEN: BS+, soft, ND, NT   EXTREMITIES:  R foot bandaged, R pedal edema; no LLE edema, no calf tenderness b/l  NEURO: awake, alert, interactive; moves all extremities      LABS:                        13.7   6.85  )-----------( 214      ( 29 Feb 2020 07:35 )             42.4     02-29    139  |  104  |  24<H>  ----------------------------<  142<H>  3.6   |  25  |  1.60<H>    Ca    9.2      29 Feb 2020 07:35  Phos  3.1     02-28  Mg     1.9     02-28    TPro  7.6  /  Alb  3.5  /  TBili  0.7  /  DBili  x   /  AST  20  /  ALT  17  /  AlkPhos  50  02-29

## 2020-02-29 NOTE — PROGRESS NOTE ADULT - SUBJECTIVE AND OBJECTIVE BOX
[facility], Division of Infectious Diseases  MARIBEL Han A. Lee  [phone]  Name: NANY HOUGH  Age: 74y  Gender: Male  MRN: [id number]    Interval History--  Notes reviewed  right foot still hurts and swollen  but feels better in general    Past Medical History--  CAD (coronary artery disease)  GI bleed  Atrial fibrillation  Pulmonary hypertension  DM (diabetes mellitus)  Seizure  Meningioma  Other hyperlipidemia  Essential hypertension  H/O cataract removal with insertion of prosthetic lens  H/O right inguinal hernia repair      For details regarding the patient's social history, family history, and other miscellaneous elements, please refer the initial infectious diseases consultation and/or the admitting history and physical examination for this admission.    Allergies    No Known Allergies    Intolerances        Medications--  Antibiotics:  piperacillin/tazobactam IVPB.. 3.375 Gram(s) IV Intermittent every 8 hours  vancomycin  IVPB 1000 milliGRAM(s) IV Intermittent every 24 hours    Immunologic:    Other:  amLODIPine   Tablet  dextrose 40% Gel PRN  dextrose 5%.  dextrose 50% Injectable  dextrose 50% Injectable  dextrose 50% Injectable  glucagon  Injectable PRN  insulin lispro (HumaLOG) corrective regimen sliding scale  insulin lispro (HumaLOG) corrective regimen sliding scale  lactated ringers.  mesalamine DR Capsule  metoprolol succinate ER      Review of Systems--  A 10-point review of systems was obtained.     Pertinent positives and negatives--  Constitutional: No fevers. No Chills. No Rigors.   Cardiovascular: No chest pain. No palpitations.  Respiratory: No shortness of breath. No cough.  Gastrointestinal: No nausea or vomiting. No diarrhea or constipation.   Psychiatric: no depression    Review of systems otherwise negative except as previously noted.    Physical Examination--  Vital Signs: T(F): 97.2 (02-29-20 @ 20:15), Max: 97.8 (02-29-20 @ 00:03)  HR: 80 (02-29-20 @ 20:15)  BP: 136/73 (02-29-20 @ 20:15)  RR: 18 (02-29-20 @ 20:15)  SpO2: 97% (02-29-20 @ 20:15)  Wt(kg): --  General: Nontoxic-appearing Male in no acute distress.  HEENT: AT/NC. PAnicteric. Conjunctiva pink and moist. Oropharynx clear. Dentition fair.  Neck: Not rigid. No sense of mass.  Nodes: None palpable.  Lungs: Clear bilaterally without rales, wheezing or rhonchi  Heart: Regular rate and rhythm. No Murmur.   Abdomen: Bowel sounds present and normoactive. Soft. Nondistended. Nontender.  Back: No spinal tenderness. No costovertebral angle tenderness.   Extremities: No cyanosis or clubbing. rle wrapped edema  Skin: Warm. Dry. Good turgor. No rash. No vasculitic stigmata.  Psychiatric: Appropriate affect and mood for situation.         Laboratory Studies--  CBC                        13.7   6.85  )-----------( 214      ( 29 Feb 2020 07:35 )             42.4       Chemistries  02-29    139  |  104  |  24<H>  ----------------------------<  142<H>  3.6   |  25  |  1.60<H>    Ca    9.2      29 Feb 2020 07:35  Phos  3.1     02-28  Mg     1.9     02-28    TPro  7.6  /  Alb  3.5  /  TBili  0.7  /  DBili  x   /  AST  20  /  ALT  17  /  AlkPhos  50  02-29      Culture Data    Culture - Other (collected 28 Feb 2020 17:18)  Source: .Other right heel I&amp;D  culture  Preliminary Report (29 Feb 2020 11:08):    No growth    Culture - Other (collected 27 Feb 2020 22:02)  Source: .Other right foot wound culture  Final Report (29 Feb 2020 17:40):    No growth    Culture - Blood (collected 27 Feb 2020 21:57)  Source: .Blood Blood  Gram Stain (28 Feb 2020 21:26):    Growth in aerobic bottle: Gram Positive Cocci in Clusters  Final Report (29 Feb 2020 21:27):    Growth in aerobic bottle: Coag Negative Staphylococcus    Coag Negative Staphylococcus    Single set isolate, possible contaminant. Contact    Microbiology if susceptibility testing clinically    indicated.    ***Blood Panel PCR results on this specimen are available    approximately 3 hours after the Gram stain result.***    Gram stain, PCR, and/or culture results may not always    correspond due to difference in methodologies.    ************************************************************    This PCR assay was performed using [employer].    The following targets are tested for: Enterococcus,    vancomycin resistant enterococci, Listeria monocytogenes,    coagulase negative staphylococci, S. aureus,    methicillin resistant S. aureus, Streptococcus agalactiae    (Group B), S. pneumoniae, S. pyogenes (Group A),    Acinetobacter baumannii, Enterobacter cloacae, E. coli,    Klebsiella oxytoca, K. pneumoniae, Proteus sp.,    Serratia marcescens, Haemophilus influenzae,    Neisseria meningitidis, Pseudomonas aeruginosa,Candida    albicans, C. glabrata, C krusei, C parapsilosis,    C. tropicalis and the KPC resistance gene.  Organism: Blood Culture PCR (29 Feb 2020 21:27)  Organism: Blood Culture PCR (29 Feb 2020 21:27)    Culture - Blood (collected 27 Feb 2020 21:57)  Source: .Blood Blood  Preliminary Report (28 Feb 2020 22:02):    No growth to date.

## 2020-02-29 NOTE — PROGRESS NOTE ADULT - SUBJECTIVE AND OBJECTIVE BOX
[facility] -- Daina Stevens, Keny Whitaker Pannella, Patel, Savella  Office # [phone]      Follow Up:      Subjective/Observations:       REVIEW OF SYSTEMS: All other review of systems is negative unless indicated above    PAST MEDICAL & SURGICAL HISTORY:  CAD (coronary artery disease)  GI bleed  Atrial fibrillation  Pulmonary hypertension  DM (diabetes mellitus)  Seizure: 2/2 meningioma  Meningioma: left frontal, s/p resection and radiation  Other hyperlipidemia  Essential hypertension  H/O cataract removal with insertion of prosthetic lens: B/L  H/O right inguinal hernia repair      MEDICATIONS  (STANDING):  amLODIPine   Tablet 10 milliGRAM(s) Oral daily  dextrose 5%. 1000 milliLiter(s) (50 mL/Hr) IV Continuous <Continuous>  dextrose 50% Injectable 12.5 Gram(s) IV Push once  dextrose 50% Injectable 25 Gram(s) IV Push once  dextrose 50% Injectable 25 Gram(s) IV Push once  insulin lispro (HumaLOG) corrective regimen sliding scale   SubCutaneous three times a day before meals  insulin lispro (HumaLOG) corrective regimen sliding scale   SubCutaneous at bedtime  lactated ringers. 1000 milliLiter(s) (75 mL/Hr) IV Continuous <Continuous>  mesalamine DR Capsule 800 milliGRAM(s) Oral daily  metoprolol succinate  milliGRAM(s) Oral daily  piperacillin/tazobactam IVPB.. 3.375 Gram(s) IV Intermittent every 8 hours  vancomycin  IVPB 1000 milliGRAM(s) IV Intermittent every 24 hours    MEDICATIONS  (PRN):  dextrose 40% Gel 15 Gram(s) Oral once PRN Blood Glucose LESS THAN 70 milliGRAM(s)/deciliter  glucagon  Injectable 1 milliGRAM(s) IntraMuscular once PRN Glucose LESS THAN 70 milligrams/deciliter      Allergies    No Known Allergies    Intolerances        Vital Signs Last 24 Hrs  T(C): 36.2 (29 Feb 2020 07:50), Max: 36.6 (29 Feb 2020 00:03)  T(F): 97.2 (29 Feb 2020 07:50), Max: 97.8 (29 Feb 2020 00:03)  HR: 60 (29 Feb 2020 07:50) (60 - 76)  BP: 134/86 (29 Feb 2020 07:50) (108/64 - 134/86)  BP(mean): --  RR: 19 (29 Feb 2020 07:50) (17 - 20)  SpO2: 96% (29 Feb 2020 07:50) (96% - 98%)    I&O's Summary    28 Feb 2020 07:01  -  29 Feb 2020 07:00  --------------------------------------------------------  IN: 925 mL / OUT: 300 mL / NET: 625 mL          PHYSICAL EXAM:  TELE:   Constitutional: NAD, awake and alert, well-developed  HEENT: Moist Mucous Membranes, Anicteric  Pulmonary: Non-labored, breath sounds are clear bilaterally, No wheezing, crackles or rhonchi  Cardiovascular: Regular, S1 and S2 nl, No murmurs, rubs, gallops or clicks  Gastrointestinal: Bowel Sounds present, soft, nontender.   Lymph: No lymphadenopathy. No peripheral edema.  Skin: No visible rashes or ulcers.  Psych:  Mood & affect appropriate    LABS: All Labs Reviewed:                        13.7   6.85  )-----------( 214      ( 29 Feb 2020 07:35 )             42.4                         12.9   6.69  )-----------( 187      ( 28 Feb 2020 06:42 )             39.5                         14.6   7.18  )-----------( 208      ( 27 Feb 2020 16:39 )             44.4     29 Feb 2020 07:35    139    |  104    |  24     ----------------------------<  142    3.6     |  25     |  1.60   28 Feb 2020 06:42    141    |  106    |  27     ----------------------------<  147    3.6     |  25     |  1.50   27 Feb 2020 16:39    139    |  105    |  28     ----------------------------<  98     3.9     |  25     |  1.30     Ca    9.2        29 Feb 2020 07:35  Ca    9.2        28 Feb 2020 06:42  Ca    9.8        27 Feb 2020 16:39  Phos  3.1       28 Feb 2020 06:42  Mg     1.9       28 Feb 2020 06:42    TPro  7.6    /  Alb  3.5    /  TBili  0.7    /  DBili  x      /  AST  20     /  ALT  17     /  AlkPhos  50     29 Feb 2020 07:35  TPro  8.3    /  Alb  3.8    /  TBili  0.5    /  DBili  x      /  AST  21     /  ALT  19     /  AlkPhos  59     27 Feb 2020 16:39             ECG:  < from: 12 Lead ECG (02.27.20 @ 17:26) >  Ventricular Rate 63 BPM    Atrial Rate 87 BPM    QRS Duration 98 ms    Q-T Interval 410 ms    QTC Calculation(Bezet) 419 ms    R Axis 29 degrees    T Axis 39 degrees    Diagnosis Line Atrial fibrillation with premature ventricular or aberrantly conducted complexes  Abnormal ECG  No previous ECGs available  Confirmed by Julianne BISWAS, Daljit ([id number]) on 2/28/2020 10:59:34 AM    < end of copied text >    Echo:    Radiology: [facility] -- Daina Stevens, Keny Whitaker Pannella, Patel, Savella  Office # [phone]      Follow Up:    Afib w/ pauses  Subjective/Observations:   No events overnight resting comfortably in bed.  No complaints of chest pain, dyspnea, or palpitations reported. No signs of orthopnea or PND.     REVIEW OF SYSTEMS: All other review of systems is negative unless indicated above    PAST MEDICAL & SURGICAL HISTORY:  CAD (coronary artery disease)  GI bleed  Atrial fibrillation  Pulmonary hypertension  DM (diabetes mellitus)  Seizure: 2/2 meningioma  Meningioma: left frontal, s/p resection and radiation  Other hyperlipidemia  Essential hypertension  H/O cataract removal with insertion of prosthetic lens: B/L  H/O right inguinal hernia repair      MEDICATIONS  (STANDING):  amLODIPine   Tablet 10 milliGRAM(s) Oral daily  dextrose 5%. 1000 milliLiter(s) (50 mL/Hr) IV Continuous <Continuous>  dextrose 50% Injectable 12.5 Gram(s) IV Push once  dextrose 50% Injectable 25 Gram(s) IV Push once  dextrose 50% Injectable 25 Gram(s) IV Push once  insulin lispro (HumaLOG) corrective regimen sliding scale   SubCutaneous three times a day before meals  insulin lispro (HumaLOG) corrective regimen sliding scale   SubCutaneous at bedtime  lactated ringers. 1000 milliLiter(s) (75 mL/Hr) IV Continuous <Continuous>  mesalamine DR Capsule 800 milliGRAM(s) Oral daily  metoprolol succinate  milliGRAM(s) Oral daily  piperacillin/tazobactam IVPB.. 3.375 Gram(s) IV Intermittent every 8 hours  vancomycin  IVPB 1000 milliGRAM(s) IV Intermittent every 24 hours    MEDICATIONS  (PRN):  dextrose 40% Gel 15 Gram(s) Oral once PRN Blood Glucose LESS THAN 70 milliGRAM(s)/deciliter  glucagon  Injectable 1 milliGRAM(s) IntraMuscular once PRN Glucose LESS THAN 70 milligrams/deciliter      Allergies    No Known Allergies    Intolerances        Vital Signs Last 24 Hrs  T(C): 36.2 (29 Feb 2020 07:50), Max: 36.6 (29 Feb 2020 00:03)  T(F): 97.2 (29 Feb 2020 07:50), Max: 97.8 (29 Feb 2020 00:03)  HR: 60 (29 Feb 2020 07:50) (60 - 76)  BP: 134/86 (29 Feb 2020 07:50) (108/64 - 134/86)  BP(mean): --  RR: 19 (29 Feb 2020 07:50) (17 - 20)  SpO2: 96% (29 Feb 2020 07:50) (96% - 98%)    I&O's Summary    28 Feb 2020 07:01  -  29 Feb 2020 07:00  --------------------------------------------------------  IN: 925 mL / OUT: 300 mL / NET: 625 mL          PHYSICAL EXAM:  TELE: Afib w/ pauses  Constitutional: NAD, awake and alert, well-developed  HEENT: Moist Mucous Membranes, Anicteric  Pulmonary: Non-labored, breath sounds are clear bilaterally, No wheezing, crackles or rhonchi  Cardiovascular: Irregular, S1 and S2 nl, No murmurs, rubs, gallops or clicks  Gastrointestinal: Bowel Sounds present, soft, nontender.   Lymph: No lymphadenopathy. No peripheral edema.  Skin: No visible rashes or ulcers.  Psych:  Mood & affect appropriate    LABS: All Labs Reviewed:                        13.7   6.85  )-----------( 214      ( 29 Feb 2020 07:35 )             42.4                         12.9   6.69  )-----------( 187      ( 28 Feb 2020 06:42 )             39.5                         14.6   7.18  )-----------( 208      ( 27 Feb 2020 16:39 )             44.4     29 Feb 2020 07:35    139    |  104    |  24     ----------------------------<  142    3.6     |  25     |  1.60   28 Feb 2020 06:42    141    |  106    |  27     ----------------------------<  147    3.6     |  25     |  1.50   27 Feb 2020 16:39    139    |  105    |  28     ----------------------------<  98     3.9     |  25     |  1.30     Ca    9.2        29 Feb 2020 07:35  Ca    9.2        28 Feb 2020 06:42  Ca    9.8        27 Feb 2020 16:39  Phos  3.1       28 Feb 2020 06:42  Mg     1.9       28 Feb 2020 06:42    TPro  7.6    /  Alb  3.5    /  TBili  0.7    /  DBili  x      /  AST  20     /  ALT  17     /  AlkPhos  50     29 Feb 2020 07:35  TPro  8.3    /  Alb  3.8    /  TBili  0.5    /  DBili  x      /  AST  21     /  ALT  19     /  AlkPhos  59     27 Feb 2020 16:39             ECG:  < from: 12 Lead ECG (02.27.20 @ 17:26) >  Ventricular Rate 63 BPM    Atrial Rate 87 BPM    QRS Duration 98 ms    Q-T Interval 410 ms    QTC Calculation(Bezet) 419 ms    R Axis 29 degrees    T Axis 39 degrees    Diagnosis Line Atrial fibrillation with premature ventricular or aberrantly conducted complexes  Abnormal ECG  No previous ECGs available  Confirmed by Julianne BISWAS, Daljit ([id number]) on 2/28/2020 10:59:34 AM    < end of copied text >    Echo:    Radiology:

## 2020-03-01 LAB
ANION GAP SERPL CALC-SCNC: 8 MMOL/L — SIGNIFICANT CHANGE UP (ref 5–17)
BUN SERPL-MCNC: 26 MG/DL — HIGH (ref 7–23)
CALCIUM SERPL-MCNC: 8.9 MG/DL — SIGNIFICANT CHANGE UP (ref 8.5–10.1)
CHLORIDE SERPL-SCNC: 108 MMOL/L — SIGNIFICANT CHANGE UP (ref 96–108)
CO2 SERPL-SCNC: 24 MMOL/L — SIGNIFICANT CHANGE UP (ref 22–31)
CREAT SERPL-MCNC: 1.5 MG/DL — HIGH (ref 0.5–1.3)
CULTURE RESULTS: SIGNIFICANT CHANGE UP
GLUCOSE SERPL-MCNC: 123 MG/DL — HIGH (ref 70–99)
POTASSIUM SERPL-MCNC: 3.7 MMOL/L — SIGNIFICANT CHANGE UP (ref 3.5–5.3)
POTASSIUM SERPL-SCNC: 3.7 MMOL/L — SIGNIFICANT CHANGE UP (ref 3.5–5.3)
SODIUM SERPL-SCNC: 140 MMOL/L — SIGNIFICANT CHANGE UP (ref 135–145)
SPECIMEN SOURCE: SIGNIFICANT CHANGE UP
VANCOMYCIN TROUGH SERPL-MCNC: 7.9 UG/ML — LOW (ref 10–20)

## 2020-03-01 PROCEDURE — 99233 SBSQ HOSP IP/OBS HIGH 50: CPT

## 2020-03-01 PROCEDURE — 99232 SBSQ HOSP IP/OBS MODERATE 35: CPT

## 2020-03-01 RX ADMIN — Medication 100 MILLIGRAM(S): at 05:27

## 2020-03-01 RX ADMIN — PIPERACILLIN AND TAZOBACTAM 25 GRAM(S): 4; .5 INJECTION, POWDER, LYOPHILIZED, FOR SOLUTION INTRAVENOUS at 18:07

## 2020-03-01 RX ADMIN — AMLODIPINE BESYLATE 10 MILLIGRAM(S): 2.5 TABLET ORAL at 05:26

## 2020-03-01 RX ADMIN — PIPERACILLIN AND TAZOBACTAM 25 GRAM(S): 4; .5 INJECTION, POWDER, LYOPHILIZED, FOR SOLUTION INTRAVENOUS at 12:11

## 2020-03-01 RX ADMIN — PIPERACILLIN AND TAZOBACTAM 25 GRAM(S): 4; .5 INJECTION, POWDER, LYOPHILIZED, FOR SOLUTION INTRAVENOUS at 01:19

## 2020-03-01 RX ADMIN — Medication 1600 MILLIGRAM(S): at 12:12

## 2020-03-01 NOTE — PROGRESS NOTE ADULT - SUBJECTIVE AND OBJECTIVE BOX
patient seen and examined at bedside. reports right foot pain    vitals stable afebrile  I and D two days ago with negative cx  On iV zosyn    Spoke to podiatry. they will need to evaluate tomorrow for possible second round of I and D    Review of Systems:  General:denies fever chills, headache, weakness  HEENT: denies blurry vision,diffculty swallowing, difficulty hearing, tinnitus  Cardiovascular: denies chest pain  ,palpitations  Pulmonary:denies shortness of breath, cough, wheezing, hemoptysis  Gastrointestinal: denies abdominal pain, constipation, diarrhea,nausea , vomiting, hematochezia  : denies hematuria, dysuria, or incontinence  Neurological: denies weakness, numbness , tingling, dizziness, tremors  MSK: denies muscle pain, difficulty ambulating, swelling, back pain, ++right sided foot pain  skin: denies skin rash, itching, burning, or  skin lesions  Psychiatrical: denies mood disturbances, anxierty, feeling depressed, depression , or difficulty sleeping    Objective:  Vitals  T(C): 36.3 (03-01-20 @ 12:30), Max: 36.6 (03-01-20 @ 00:05)  HR: 78 (03-01-20 @ 12:30) (65 - 80)  BP: 133/77 (03-01-20 @ 12:30) (116/76 - 145/85)  RR: 18 (03-01-20 @ 12:30) (18 - 18)  SpO2: 97% (03-01-20 @ 12:30) (96% - 99%)    Physical Exam:  General: comfortable, no acute distress, well nourished  HEENT: Atraumatic, no LAD, trachea midline, PERRLA  Cardiovascular: normal s1s2, no murmurs, gallops or fricition rubs  Pulmonary: clear to ausculation Bilaterally, no wheezing , rhonchi  Gastrointestinal: soft non tender non distended, no masses felt, no organomegally  Muscloskeletal: no lower extremity edema, intact bilateral lower extremity pulses  Neurological: CN II-12 intact. No focal weakness  Psychiatrical: normal mood, cooperative  SKIN: RIGHT FOOT CELLULITIS NOTED;; ++ dorsum ulcer  ~  Labs:                          13.7   6.85  )-----------( 214      ( 29 Feb 2020 07:35 )             42.4     03-01    140  |  108  |  26<H>  ----------------------------<  123<H>  3.7   |  24  |  1.50<H>    Ca    8.9      01 Mar 2020 06:28    TPro  7.6  /  Alb  3.5  /  TBili  0.7  /  DBili  x   /  AST  20  /  ALT  17  /  AlkPhos  50  02-29    LIVER FUNCTIONS - ( 29 Feb 2020 07:35 )  Alb: 3.5 g/dL / Pro: 7.6 g/dL / ALK PHOS: 50 U/L / ALT: 17 U/L / AST: 20 U/L / GGT: x                 Active Medications  MEDICATIONS  (STANDING):  amLODIPine   Tablet 10 milliGRAM(s) Oral daily  dextrose 5%. 1000 milliLiter(s) (50 mL/Hr) IV Continuous <Continuous>  dextrose 50% Injectable 12.5 Gram(s) IV Push once  dextrose 50% Injectable 25 Gram(s) IV Push once  dextrose 50% Injectable 25 Gram(s) IV Push once  insulin lispro (HumaLOG) corrective regimen sliding scale   SubCutaneous three times a day before meals  insulin lispro (HumaLOG) corrective regimen sliding scale   SubCutaneous at bedtime  lactated ringers. 1000 milliLiter(s) (75 mL/Hr) IV Continuous <Continuous>  mesalamine DR Capsule 1600 milliGRAM(s) Oral daily  metoprolol succinate  milliGRAM(s) Oral daily  piperacillin/tazobactam IVPB.. 3.375 Gram(s) IV Intermittent every 8 hours    MEDICATIONS  (PRN):  dextrose 40% Gel 15 Gram(s) Oral once PRN Blood Glucose LESS THAN 70 milliGRAM(s)/deciliter  glucagon  Injectable 1 milliGRAM(s) IntraMuscular once PRN Glucose LESS THAN 70 milligrams/deciliter

## 2020-03-01 NOTE — PROGRESS NOTE ADULT - ASSESSMENT
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/L frontal craniotomy/RT, hx R foot/ankle chronic nonhealing ulcer sent from wound care clinic for evaluation of worsening right foot ulcer.  MRI w/no abscess, negative for osteomyelitis - s/p bedside debridement 2/28, on IV abxs. followed by ID    Nonhealing R foot/ankle ulcer/cellulitis   MRI R foot 2/27 - shows cellulitis; neg for osteomyelitis or abscess on IV abxs   s/p bedside I&D by Podiatry 2/28   patient s/p vanc --> off vanc  per ID, IV abxs: zosyn      KIM likelly ATN   d/c'd HCTZ and losartan,    paroxysmal Afib   continue metoprolol; not on a/c 2/2 hx GIB    3-sec cardiac pause noted on monitor   overnight 2/27 - Cards consulted - impression w/idiopathic sick sinus syndrome, up to 3-sec pause acceptable; recomm no further intervention at this time, and to continue metoprolol    HTN -  on metoprolol, amlodipine; for now  ctz and losartan on hold for increased Cr    hx seizures in setting of meningioma   ?not on antieptileptic - d/w pt    -DM - glycemic mgmt w/insulin    -dvt prophy - scds

## 2020-03-01 NOTE — PROGRESS NOTE ADULT - SUBJECTIVE AND OBJECTIVE BOX
[facility] -- Daina Stevens, Keny Whitaker, Kamari Reynaga Savella  Office # [phone]      Follow Up:      afib   Subjective/Observations:   No events overnight resting comfortably in bed.  No complaints of chest pain, dyspnea, or palpitations reported. No signs of orthopnea or PND.      REVIEW OF SYSTEMS: All other review of systems is negative unless indicated above    PAST MEDICAL & SURGICAL HISTORY:  CAD (coronary artery disease)  GI bleed  Atrial fibrillation  Pulmonary hypertension  DM (diabetes mellitus)  Seizure: 2/2 meningioma  Meningioma: left frontal, s/p resection and radiation  Other hyperlipidemia  Essential hypertension  H/O cataract removal with insertion of prosthetic lens: B/L  H/O right inguinal hernia repair      MEDICATIONS  (STANDING):  amLODIPine   Tablet 10 milliGRAM(s) Oral daily  dextrose 5%. 1000 milliLiter(s) (50 mL/Hr) IV Continuous <Continuous>  dextrose 50% Injectable 12.5 Gram(s) IV Push once  dextrose 50% Injectable 25 Gram(s) IV Push once  dextrose 50% Injectable 25 Gram(s) IV Push once  insulin lispro (HumaLOG) corrective regimen sliding scale   SubCutaneous three times a day before meals  insulin lispro (HumaLOG) corrective regimen sliding scale   SubCutaneous at bedtime  lactated ringers. 1000 milliLiter(s) (75 mL/Hr) IV Continuous <Continuous>  mesalamine DR Capsule 1600 milliGRAM(s) Oral daily  metoprolol succinate  milliGRAM(s) Oral daily  piperacillin/tazobactam IVPB.. 3.375 Gram(s) IV Intermittent every 8 hours    MEDICATIONS  (PRN):  dextrose 40% Gel 15 Gram(s) Oral once PRN Blood Glucose LESS THAN 70 milliGRAM(s)/deciliter  glucagon  Injectable 1 milliGRAM(s) IntraMuscular once PRN Glucose LESS THAN 70 milligrams/deciliter      Allergies    No Known Allergies    Intolerances        Vital Signs Last 24 Hrs  T(C): 36.2 (01 Mar 2020 08:35), Max: 36.6 (01 Mar 2020 00:05)  T(F): 97.1 (01 Mar 2020 08:35), Max: 97.9 (01 Mar 2020 00:05)  HR: 69 (01 Mar 2020 08:35) (65 - 80)  BP: 128/87 (01 Mar 2020 08:35) (116/76 - 145/85)  BP(mean): --  RR: 18 (01 Mar 2020 08:35) (18 - 18)  SpO2: 96% (01 Mar 2020 08:35) (96% - 99%)    I&O's Summary    29 Feb 2020 07:01  -  01 Mar 2020 07:00  --------------------------------------------------------  IN: 400 mL / OUT: 0 mL / NET: 400 mL          PHYSICAL EXAM:  TELE: afib   Constitutional: NAD, awake and alert, well-developed  HEENT: Moist Mucous Membranes, Anicteric  Pulmonary: Non-labored, breath sounds are clear bilaterally, No wheezing, crackles or rhonchi  Cardiovascular: IRRegular, S1 and S2 nl, No murmurs, rubs, gallops or clicks  Gastrointestinal: Bowel Sounds present, soft, nontender.   Lymph: RIGHT leg with erythema , edema and dressing in place   Skin: No visible rashes or ulcers.  Psych:  Mood & affect appropriate    LABS: All Labs Reviewed:                        13.7   6.85  )-----------( 214      ( 29 Feb 2020 07:35 )             42.4                         12.9   6.69  )-----------( 187      ( 28 Feb 2020 06:42 )             39.5                         14.6   7.18  )-----------( 208      ( 27 Feb 2020 16:39 )             44.4     01 Mar 2020 06:28    140    |  108    |  26     ----------------------------<  123    3.7     |  24     |  1.50   29 Feb 2020 07:35    139    |  104    |  24     ----------------------------<  142    3.6     |  25     |  1.60   28 Feb 2020 06:42    141    |  106    |  27     ----------------------------<  147    3.6     |  25     |  1.50     Ca    8.9        01 Mar 2020 06:28  Ca    9.2        29 Feb 2020 07:35  Ca    9.2        28 Feb 2020 06:42  Phos  3.1       28 Feb 2020 06:42  Mg     1.9       28 Feb 2020 06:42    TPro  7.6    /  Alb  3.5    /  TBili  0.7    /  DBili  x      /  AST  20     /  ALT  17     /  AlkPhos  50     29 Feb 2020 07:35  TPro  8.3    /  Alb  3.8    /  TBili  0.5    /  DBili  x      /  AST  21     /  ALT  19     /  AlkPhos  59     27 Feb 2020 16:39             ECG:  < from: 12 Lead ECG (02.27.20 @ 17:26) >    Ventricular Rate 63 BPM    Atrial Rate 87 BPM    QRS Duration 98 ms    Q-T Interval 410 ms    QTC Calculation(Bezet) 419 ms    R Axis 29 degrees    T Axis 39 degrees    Diagnosis Line Atrial fibrillation with premature ventricular or aberrantly conducted complexes  Abnormal ECG  No previous ECGs available    < end of copied text >      Echo:  < from: Echocardiogram (05.10.17 @ 11:25) >  ormal left ventricular size and wall thickness.  The left ventricular wall motion is normal.  The left ventricular ejection fraction is estimated to be 55-60%  Left Atrium  The left atrial size is normal.  The left atrial volume index is 29 cc/m2 (normal <34cc/m2)  Right Atrium  Right atrial size is normal.  Right Ventricle  The right ventricle is normal in size and function.  Aortic Valve  There is mild aortic valve thickening.  There is trace aortic regurgitation.  Mitral Valve  Structurally normal mitral valve.  There is mild mitral regurgitation.  Tricuspid Valve  Structurally normaltricuspid valve.  There is mild tricuspid regurgitation.  The pulmonary artery systolic pressure is estimated to be     < end of copied text >      Radiology:

## 2020-03-01 NOTE — PROGRESS NOTE ADULT - ATTENDING COMMENTS
patient acute and requires inpatient stay  Podiatry will need to reevaluate patient in AM for second round of I and D  c/w on IV zosyn for now patient acute and requires inpatient stay  Podiatry will need to reevaluate patient in AM for second round of I and D  c/w on IV zosyn for now  will check tomorrows cr although is stable for now, last  baseline checked was almost 3 years ago.  cr is improving now 1.5. I suspect its around 1.3 to 1.4    +/- dc tomorrow vs I and D

## 2020-03-01 NOTE — PROGRESS NOTE ADULT - ASSESSMENT
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 There is no clear evidence of acute ischemia; EKG shows A fib with PVCs but no acute ST/T wave changes.     Afib w/ pause  -Telemetry reveiwed revealing arfib 72 with 3.7 sec pause  -Metoprolol succinate  mg  -not on AC given GIB in past       HTN  -/87  - continue Metoprolol, Amlodipine 10 mg  -when cr stable resume losartan     Dm  Management as per primary     Cellulitis   Followed by podiatry ID follow up recs          - Monitor and replete lytes, keep K>4, Mg>2.  - If needed, may proceed with surgical podiatric intervention from a cardiac point of view with no evidence of active ischemic heart disease, decompensated heart failure, severe obstructive valvular disease, or uncontrolled arrhythmia.  - BP well controlled, monitor routine hemodynamic   Rest of management of foot ulcer per podiatry/primary team.   Digna Bhakta FNP-C  Cardiology NP  [id number] [phone]

## 2020-03-02 ENCOUNTER — TRANSCRIPTION ENCOUNTER (OUTPATIENT)
Age: 75
End: 2020-03-02

## 2020-03-02 VITALS
DIASTOLIC BLOOD PRESSURE: 85 MMHG | RESPIRATION RATE: 18 BRPM | HEART RATE: 77 BPM | SYSTOLIC BLOOD PRESSURE: 150 MMHG | OXYGEN SATURATION: 98 % | TEMPERATURE: 98 F

## 2020-03-02 LAB
ANION GAP SERPL CALC-SCNC: 9 MMOL/L — SIGNIFICANT CHANGE UP (ref 5–17)
BUN SERPL-MCNC: 27 MG/DL — HIGH (ref 7–23)
CALCIUM SERPL-MCNC: 9.1 MG/DL — SIGNIFICANT CHANGE UP (ref 8.5–10.1)
CHLORIDE SERPL-SCNC: 107 MMOL/L — SIGNIFICANT CHANGE UP (ref 96–108)
CO2 SERPL-SCNC: 25 MMOL/L — SIGNIFICANT CHANGE UP (ref 22–31)
CREAT SERPL-MCNC: 1.6 MG/DL — HIGH (ref 0.5–1.3)
GLUCOSE SERPL-MCNC: 125 MG/DL — HIGH (ref 70–99)
HCT VFR BLD CALC: 39.9 % — SIGNIFICANT CHANGE UP (ref 39–50)
HGB BLD-MCNC: 12.9 G/DL — LOW (ref 13–17)
MAGNESIUM SERPL-MCNC: 2 MG/DL — SIGNIFICANT CHANGE UP (ref 1.6–2.6)
MCHC RBC-ENTMCNC: 28.9 PG — SIGNIFICANT CHANGE UP (ref 27–34)
MCHC RBC-ENTMCNC: 32.3 GM/DL — SIGNIFICANT CHANGE UP (ref 32–36)
MCV RBC AUTO: 89.5 FL — SIGNIFICANT CHANGE UP (ref 80–100)
NRBC # BLD: 0 /100 WBCS — SIGNIFICANT CHANGE UP (ref 0–0)
PHOSPHATE SERPL-MCNC: 3 MG/DL — SIGNIFICANT CHANGE UP (ref 2.5–4.5)
PLATELET # BLD AUTO: 203 K/UL — SIGNIFICANT CHANGE UP (ref 150–400)
POTASSIUM SERPL-MCNC: 3.6 MMOL/L — SIGNIFICANT CHANGE UP (ref 3.5–5.3)
POTASSIUM SERPL-SCNC: 3.6 MMOL/L — SIGNIFICANT CHANGE UP (ref 3.5–5.3)
RBC # BLD: 4.46 M/UL — SIGNIFICANT CHANGE UP (ref 4.2–5.8)
RBC # FLD: 14.2 % — SIGNIFICANT CHANGE UP (ref 10.3–14.5)
SODIUM SERPL-SCNC: 141 MMOL/L — SIGNIFICANT CHANGE UP (ref 135–145)
WBC # BLD: 7.4 K/UL — SIGNIFICANT CHANGE UP (ref 3.8–10.5)
WBC # FLD AUTO: 7.4 K/UL — SIGNIFICANT CHANGE UP (ref 3.8–10.5)

## 2020-03-02 PROCEDURE — 93923 UPR/LXTR ART STDY 3+ LVLS: CPT

## 2020-03-02 PROCEDURE — 93923 UPR/LXTR ART STDY 3+ LVLS: CPT | Mod: 26

## 2020-03-02 PROCEDURE — 73630 X-RAY EXAM OF FOOT: CPT

## 2020-03-02 PROCEDURE — 99024 POSTOP FOLLOW-UP VISIT: CPT

## 2020-03-02 PROCEDURE — 87150 DNA/RNA AMPLIFIED PROBE: CPT

## 2020-03-02 PROCEDURE — 85652 RBC SED RATE AUTOMATED: CPT

## 2020-03-02 PROCEDURE — 84100 ASSAY OF PHOSPHORUS: CPT

## 2020-03-02 PROCEDURE — 73720 MRI LWR EXTREMITY W/O&W/DYE: CPT

## 2020-03-02 PROCEDURE — 71045 X-RAY EXAM CHEST 1 VIEW: CPT

## 2020-03-02 PROCEDURE — 83605 ASSAY OF LACTIC ACID: CPT

## 2020-03-02 PROCEDURE — 82962 GLUCOSE BLOOD TEST: CPT

## 2020-03-02 PROCEDURE — 93005 ELECTROCARDIOGRAM TRACING: CPT

## 2020-03-02 PROCEDURE — 36415 COLL VENOUS BLD VENIPUNCTURE: CPT

## 2020-03-02 PROCEDURE — 99239 HOSP IP/OBS DSCHRG MGMT >30: CPT | Mod: GC

## 2020-03-02 PROCEDURE — 83036 HEMOGLOBIN GLYCOSYLATED A1C: CPT

## 2020-03-02 PROCEDURE — 87070 CULTURE OTHR SPECIMN AEROBIC: CPT

## 2020-03-02 PROCEDURE — 80202 ASSAY OF VANCOMYCIN: CPT

## 2020-03-02 PROCEDURE — 80048 BASIC METABOLIC PNL TOTAL CA: CPT

## 2020-03-02 PROCEDURE — 80053 COMPREHEN METABOLIC PANEL: CPT

## 2020-03-02 PROCEDURE — 86140 C-REACTIVE PROTEIN: CPT

## 2020-03-02 PROCEDURE — 99285 EMERGENCY DEPT VISIT HI MDM: CPT

## 2020-03-02 PROCEDURE — 84134 ASSAY OF PREALBUMIN: CPT

## 2020-03-02 PROCEDURE — 99232 SBSQ HOSP IP/OBS MODERATE 35: CPT

## 2020-03-02 PROCEDURE — A9579: CPT

## 2020-03-02 PROCEDURE — 83735 ASSAY OF MAGNESIUM: CPT

## 2020-03-02 PROCEDURE — 87040 BLOOD CULTURE FOR BACTERIA: CPT

## 2020-03-02 PROCEDURE — 96374 THER/PROPH/DIAG INJ IV PUSH: CPT

## 2020-03-02 PROCEDURE — 85027 COMPLETE CBC AUTOMATED: CPT

## 2020-03-02 RX ORDER — LOSARTAN POTASSIUM 100 MG/1
1 TABLET, FILM COATED ORAL
Qty: 0 | Refills: 0 | DISCHARGE

## 2020-03-02 RX ORDER — METOPROLOL TARTRATE 50 MG
100 TABLET ORAL DAILY
Refills: 0 | Status: DISCONTINUED | OUTPATIENT
Start: 2020-03-02 | End: 2020-03-02

## 2020-03-02 RX ORDER — MESALAMINE 400 MG
800 TABLET, DELAYED RELEASE (ENTERIC COATED) ORAL DAILY
Refills: 0 | Status: DISCONTINUED | OUTPATIENT
Start: 2020-03-02 | End: 2020-03-02

## 2020-03-02 RX ORDER — CEPHALEXIN 500 MG
1 CAPSULE ORAL
Qty: 28 | Refills: 0
Start: 2020-03-02 | End: 2020-03-08

## 2020-03-02 RX ORDER — AMLODIPINE BESYLATE 2.5 MG/1
1 TABLET ORAL
Qty: 0 | Refills: 0 | DISCHARGE
Start: 2020-03-02

## 2020-03-02 RX ORDER — MESALAMINE 400 MG
2 TABLET, DELAYED RELEASE (ENTERIC COATED) ORAL
Qty: 0 | Refills: 0 | DISCHARGE
Start: 2020-03-02

## 2020-03-02 RX ORDER — METOPROLOL TARTRATE 50 MG
1 TABLET ORAL
Qty: 0 | Refills: 0 | DISCHARGE

## 2020-03-02 RX ORDER — CLOTRIMAZOLE AND BETAMETHASONE DIPROPIONATE 10; .5 MG/G; MG/G
1 CREAM TOPICAL DAILY
Refills: 0 | Status: DISCONTINUED | OUTPATIENT
Start: 2020-03-02 | End: 2020-03-02

## 2020-03-02 RX ORDER — METOPROLOL TARTRATE 50 MG
1 TABLET ORAL
Qty: 0 | Refills: 0 | DISCHARGE
Start: 2020-03-02

## 2020-03-02 RX ORDER — AMLODIPINE BESYLATE 2.5 MG/1
1 TABLET ORAL
Qty: 0 | Refills: 0 | DISCHARGE

## 2020-03-02 RX ORDER — MESALAMINE 400 MG
2 TABLET, DELAYED RELEASE (ENTERIC COATED) ORAL
Qty: 0 | Refills: 0 | DISCHARGE

## 2020-03-02 RX ORDER — CEPHALEXIN 500 MG
1 CAPSULE ORAL
Qty: 14 | Refills: 0
Start: 2020-03-02 | End: 2020-03-08

## 2020-03-02 RX ADMIN — PIPERACILLIN AND TAZOBACTAM 25 GRAM(S): 4; .5 INJECTION, POWDER, LYOPHILIZED, FOR SOLUTION INTRAVENOUS at 11:27

## 2020-03-02 RX ADMIN — PIPERACILLIN AND TAZOBACTAM 25 GRAM(S): 4; .5 INJECTION, POWDER, LYOPHILIZED, FOR SOLUTION INTRAVENOUS at 01:09

## 2020-03-02 RX ADMIN — Medication 800 MILLIGRAM(S): at 15:20

## 2020-03-02 RX ADMIN — Medication 100 MILLIGRAM(S): at 10:15

## 2020-03-02 NOTE — DISCHARGE NOTE PROVIDER - CARE PROVIDER_API CALL
Anthony Andrew (DPM)  Podiatric Medicine and Surgery  [address] [place] [address]  Phone: [phone]  Fax: [phone]  Follow Up Time:

## 2020-03-02 NOTE — DISCHARGE NOTE PROVIDER - NSDCFUADDINST_GEN_ALL_CORE_FT
Wound Care Instructions:   Keep dressing clean, dry and intact to the right foot.   Patient may weightbear to the right foot to tolerance  Patient is to follow up with Dr. Andrew/Dr. Ocampo one week after discharge at [facility] [place].

## 2020-03-02 NOTE — PROGRESS NOTE ADULT - SUBJECTIVE AND OBJECTIVE BOX
IGANLUCANANY SALDANA is a 74yMale , patient examined and chart reviewed.    INTERVAL HPI/ OVERNIGHT EVENTS:   Feeling better. Afebrile.    PAST MEDICAL & SURGICAL HISTORY:  CAD (coronary artery disease)  GI bleed  Atrial fibrillation  Pulmonary hypertension  DM (diabetes mellitus)  Seizure: 2/2 meningioma  Meningioma: left frontal, s/p resection and radiation  Other hyperlipidemia  Essential hypertension  H/O cataract removal with insertion of prosthetic lens: B/L  H/O right inguinal hernia repair      For details regarding the patient's social history, family history, and other miscellaneous elements, please refer the initial infectious diseases consultation and/or the admitting history and physical examination for this admission.      ROS:  CONSTITUTIONAL:  Negative fever or chills  EYES:  Negative  blurry vision or double vision  CARDIOVASCULAR:  Negative for chest pain or palpitations  RESPIRATORY:  Negative for cough, wheezing, or SOB   GASTROINTESTINAL:  Negative for nausea, vomiting, diarrhea, constipation, or abdominal pain  GENITOURINARY:  Negative frequency, urgency or dysuria  NEUROLOGIC:  No headache, confusion, dizziness, lightheadedness  All other systems were reviewed and are negative       Current inpatient medications :    ANTIBIOTICS/RELEVANT:  piperacillin/tazobactam IVPB.. 3.375 Gram(s) IV Intermittent every 8 hours      amLODIPine   Tablet 10 milliGRAM(s) Oral daily  clotrimazole/betamethasone Cream 1 Application(s) Topical daily  dextrose 40% Gel 15 Gram(s) Oral once PRN  dextrose 5%. 1000 milliLiter(s) IV Continuous <Continuous>  dextrose 50% Injectable 12.5 Gram(s) IV Push once  dextrose 50% Injectable 25 Gram(s) IV Push once  dextrose 50% Injectable 25 Gram(s) IV Push once  glucagon  Injectable 1 milliGRAM(s) IntraMuscular once PRN  insulin lispro (HumaLOG) corrective regimen sliding scale   SubCutaneous three times a day before meals  insulin lispro (HumaLOG) corrective regimen sliding scale   SubCutaneous at bedtime  lactated ringers. 1000 milliLiter(s) IV Continuous <Continuous>  mesalamine DR Capsule 1600 milliGRAM(s) Oral daily  metoprolol succinate  milliGRAM(s) Oral daily      Objective:    03-01 @ 07:01  -  03-02 @ 07:00  --------------------------------------------------------  IN: 400 mL / OUT: 0 mL / NET: 400 mL      T(C): 36.4 (03-02-20 @ 07:53), Max: 36.7 (03-01-20 @ 19:48)  HR: 82 (03-02-20 @ 07:53) (69 - 83)  BP: 144/81 (03-02-20 @ 07:53) (111/69 - 149/92)  RR: 18 (03-02-20 @ 07:53) (18 - 18)  SpO2: 97% (03-02-20 @ 07:53) (97% - 99%)    Physical Exam:  General: no acute distress  Eyes: sclera anicteric, pupils equal and reactive to light  ENMT: buccal mucosa moist, pharynx not injected  Neck: supple, trachea midline  Lungs: clear, no wheeze/rhonchi  Cardiovascular: regular rate and rhythm, S1 S2  Abdomen: soft, nontender, no organomegaly present, bowel sounds normal  Neurological: alert and oriented x3, Cranial Nerves II-XII grossly intact  Skin: no increased ecchymosis/petechiae/purpura  Lymph Nodes: no palpable cervical/supraclavicular lymph nodes enlargements  Extremities: Right heel ulcer/abscess site c/d/i Descreased erythema   Right foot with chronic stasis and dry skin    LABS:                          12.9   7.40  )-----------( 203      ( 02 Mar 2020 06:28 )             39.9       03-02    141  |  107  |  27<H>  ----------------------------<  125<H>  3.6   |  25  |  1.60<H>    Ca    9.1      02 Mar 2020 06:28  Phos  3.0     03-02  Mg     2.0     03-02    Vancomycin Level, Trough: 7.9 ug/mL (03-01 @ 06:28)    MICROBIOLOGY:    Culture - Blood (collected 29 Feb 2020 11:30)  Source: .Blood Blood  Preliminary Report (01 Mar 2020 12:01):    No growth to date.    Culture - Other (collected 28 Feb 2020 17:18)  Source: .Other right heel I&amp;D  culture  Final Report (01 Mar 2020 10:41):    No growth at 48 hours    Culture - Blood (02.27.20 @ 21:57)    -  Coagulase negative Staphylococcus: Detec    Gram Stain:   Growth in aerobic bottle: Gram Positive Cocci in Clusters    Specimen Source: .Blood Blood    Organism: Blood Culture PCR    Culture Results:   Growth in aerobic bottle: Coag Negative Staphylococcus  Coag Negative Staphylococcus  Single set isolate, possible contaminant. Contact  Microbiology if susceptibility testing clinically  indicated.  ***Blood Panel PCR results on this specimen are available  approximately 3 hours after the Gram stain result.***  Gram stain, PCR, and/or culture results may not always  correspond due to difference in methodologies.  ************************************************************  This PCR assay was performed using [employer].  The following targets are tested for: Enterococcus,  vancomycin resistant enterococci, Listeria monocytogenes,  coagulase negative staphylococci, S. aureus,  methicillin resistant S. aureus, Streptococcus agalactiae  (Group B), S. pneumoniae, S. pyogenes (Group A),  Acinetobacter baumannii, Enterobacter cloacae, E. coli,  Klebsiella oxytoca, K. pneumoniae, Proteus sp.,  Serratia marcescens, Haemophilus influenzae,  Neisseria meningitidis, Pseudomonas aeruginosa,Candida  albicans, C. glabrata, C krusei, C parapsilosis,  C. tropicalis and the KPC resistance gene.    Organism Identification: Blood Culture PCR    Method Type: PCR    RADIOLOGY & ADDITIONAL STUDIES:    EXAM:  MR FOOT WAW IC RT                            PROCEDURE DATE:  02/27/2020          INTERPRETATION:    PROCEDURE INFORMATION:   Exam: MR Right Lower Extremity Other Than Joint Without and With Contrast;   Hindfoot   Exam date and time: 2/27/2020 6:44 PM   Age: 74 years old   Clinical indication: Cellulitis and difficulty in walking; Patient HX: R/O   abscess right heel issues 4 months     TECHNIQUE:   Imaging protocol: MR of the Right foot without and with intravenous contrast.   Contrast material: GADAVIST; Contrast volume: 10 ml; Contrast route: BOLUS;    Other technique: Exam focused on the hindfoot.     COMPARISON:   DX XR FOOT 3 VIEWS BILATERAL 2/27/2020 4:49 PM     FINDINGS:   Diffuse subcutaneous edema/inflammation, more prominent   medially. No focal fluid collection or abscess.    No areas of bone marrow signal alteration that are suspicious for osteomyelitis. Navicular-cuneiform and first and second tarsometatarsal arthrosis with marginating subchondral cystic changes. Nojoint effusions. No acute tendon or muscle tears. No acute ankle ligamentous injuries. Thickening of the anterior talofibular ligament consistent with scar remodeling from remote injury. Thickening of the central cord of plantar fascia with a plantarcalcaneal enthesophyte, consistent with chronic plantar fasciitis. Moderate fatty atrophy of the abductor digiti minimi muscle.    IMPRESSION:   Soft tissue edema/inflammation consistent with cellulitis. No abscess or   osteomyelitis.      Assessment :  74 year old male w/ PMHx of Afib (not on AC 2/2 h/o GIB), CAD, HLD, pulmonary HTN, HTN, T2DM, 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 (s/p left frontal craniotomy, 2017 and 3 months radiation), admitted with Right DM foot ulcer and abscess with cellulitis sp I &D. cultures ngtd, Bacteremia with CNS contaminant. Overall better    Plan :   On Zosyn  Will change to po Keflex x 7 days on dc  Wound care per podiatry  Lachydrin cream  Elevate leg    D/w Dr Fernandez      Continue with present regiment.  Appropriate use of antibiotics and adverse effects reviewed.      I have discussed the above plan of care with patient/ family in detail. They expressed understanding of the  treatment plan . Risks, benefits and alternatives discussed in detail. I have asked if they have any questions or concerns and appropriately addressed them to the best of my ability .    > 35 minutes were spent in direct patient care reviewing notes, medications ,labs data/ imaging , discussion with multidisciplinary team.    Thank you for allowing me to participate in care of your patient .    Constantin Cosme MD  Infectious Disease  [phone]

## 2020-03-02 NOTE — PROGRESS NOTE ADULT - SUBJECTIVE AND OBJECTIVE BOX
[facility] -- Daina Stevens Grossman, W[place]man, Kamari Reynaga Savella, Goodger: Office # [phone]    Follow Up:  A fib     Subjective/Observations: Patient awake and alert. Resting comfortably in bed. No complaints of chest pain, SOB, LE edema, cough. No signs of orthopnea or PND.    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 1000 milliLiter(s) (50 mL/Hr) IV Continuous <Continuous>  dextrose 50% Injectable 12.5 Gram(s) IV Push once  dextrose 50% Injectable 25 Gram(s) IV Push once  dextrose 50% Injectable 25 Gram(s) IV Push once  insulin lispro (HumaLOG) corrective regimen sliding scale   SubCutaneous three times a day before meals  insulin lispro (HumaLOG) corrective regimen sliding scale   SubCutaneous at bedtime  lactated ringers. 1000 milliLiter(s) (75 mL/Hr) IV Continuous <Continuous>  mesalamine DR Capsule 1600 milliGRAM(s) Oral daily  metoprolol succinate  milliGRAM(s) Oral daily  piperacillin/tazobactam IVPB.. 3.375 Gram(s) IV Intermittent every 8 hours    MEDICATIONS  (PRN):  dextrose 40% Gel 15 Gram(s) Oral once PRN Blood Glucose LESS THAN 70 milliGRAM(s)/deciliter  glucagon  Injectable 1 milliGRAM(s) IntraMuscular once PRN Glucose LESS THAN 70 milligrams/deciliter    Allergies  No Known Allergies    Vital Signs Last 24 Hrs  T(C): 36.4 (02 Mar 2020 07:53), Max: 36.7 (01 Mar 2020 19:48)  T(F): 97.6 (02 Mar 2020 07:53), Max: 98.1 (01 Mar 2020 19:48)  HR: 82 (02 Mar 2020 07:53) (69 - 83)  BP: 144/81 (02 Mar 2020 07:53) (111/69 - 149/92)  BP(mean): --  RR: 18 (02 Mar 2020 07:53) (18 - 18)  SpO2: 97% (02 Mar 2020 07:53) (97% - 99%)    I&O's Summary    01 Mar 2020 07:01  -  02 Mar 2020 07:00  --------------------------------------------------------  IN: 400 mL / OUT: 0 mL / NET: 400 mL    TELE: A fib 60-100s   PHYSICAL EXAM:  Constitutional: NAD, awake and alert, well-developed  HEENT: Moist Mucous Membranes, Anicteric  Pulmonary: Non-labored, breath sounds are clear bilaterally, No wheezing, rales or rhonchi  Cardiovascular: Regular, S1 and S2, No murmurs, rubs, gallops or clicks  Gastrointestinal: Bowel Sounds present, soft, nontender.   Lymph: No peripheral edema. No lymphadenopathy.  Musculoskeletal: Rt LE wound with erythema, edema and dressing in place. No cyanosis, No joint swelling, No joint pain   Skin: No visible rashes or ulcers.  Psych:  Mood & affect appropriate    LABS: All Labs Reviewed:                        12.9   7.40  )-----------( 203      ( 02 Mar 2020 06:28 )             39.9                         13.7   6.85  )-----------( 214      ( 29 Feb 2020 07:35 )             42.4     02 Mar 2020 06:28    141    |  107    |  27     ----------------------------<  125    3.6     |  25     |  1.60   01 Mar 2020 06:28    140    |  108    |  26     ----------------------------<  123    3.7     |  24     |  1.50   29 Feb 2020 07:35    139    |  104    |  24     ----------------------------<  142    3.6     |  25     |  1.60     Ca    9.1        02 Mar 2020 06:28  Ca    8.9        01 Mar 2020 06:28  Ca    9.2        29 Feb 2020 07:35  Phos  3.0       02 Mar 2020 06:28  Mg     2.0       02 Mar 2020 06:28    TPro  7.6    /  Alb  3.5    /  TBili  0.7    /  DBili  x      /  AST  20     /  ALT  17     /  AlkPhos  50     29 Feb 2020 07:35    12 Lead ECG:   Ventricular Rate 63 BPM  Atrial Rate 87 BPM  QRS Duration 98 ms  Q-T Interval 410 ms  QTC Calculation(Bezet) 419 ms  R Axis 29 degrees  T Axis 39 degrees  Diagnosis Line Atrial fibrillation with premature ventricular or aberrantly conducted complexes  Abnormal ECG  No previous ECGs available  Confirmed by Julianne BISWAS, Daljit ([id number]) on 2/28/2020 10:59:34 AM (02-27-20 @ 17:26)      < from: Xray Chest 1 View AP/PA (02.27.20 @ 16:50) >  INTERPRETATION:  Frontal chest on February 27, 2020 at 4:52 PM. Patient is scheduled for admission. Patient has history of diabetes and hypertension. Patient has a wound of the right lower leg and is admitted for wound therapy.    Heart is magnified by technique.    The lung fields and pleural surfaces are unremarkable.    Chest is similar to November 21, 2017.    IMPRESSION: No acute finding or change.    < end of copied text >    ECHOCARDIOGRAM      CATH REPORT

## 2020-03-02 NOTE — PROGRESS NOTE ADULT - PROBLEM SELECTOR PLAN 3
Rate controlled, not on a/c 2/2 hx GIB  - continue metoprolol  - 3-sec cardiac pause noted on monitor overnight 2/27 - Cards consulted - impression w/idiopathic sick sinus syndrome, up to 3-sec pause acceptable; recomm no further intervention at this time

## 2020-03-02 NOTE — PROGRESS NOTE ADULT - ASSESSMENT
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/L frontal craniotomy/RT, hx R foot/ankle chronic nonhealing ulcer sent from wound care clinic for evaluation of worsening right foot ulcer.  MRI w/no abscess, negative for osteomyelitis - s/p bedside debridement 2/28, on IV abxs. followed by ID

## 2020-03-02 NOTE — DISCHARGE NOTE PROVIDER - NSDCMRMEDTOKEN_GEN_ALL_CORE_FT
amLODIPine 10 mg oral tablet: 1 tab(s) orally once a day  glimepiride 2 mg oral tablet: 1 tab(s) orally 2 times a day  hydroCHLOROthiazide 25 mg oral tablet: 1 tab(s) orally once a day  Keflex 500 mg oral capsule: 1 cap(s) orally 4 times a day   losartan 100 mg oral tablet: 1 tab(s) orally once a day  mesalamine 400 mg oral delayed release capsule: 2 cap(s) orally once a day  metoprolol succinate 100 mg oral tablet, extended release: 1 tab(s) orally once a day  omeprazole 40 mg oral delayed release capsule: 1 cap(s) orally once a day amLODIPine 10 mg oral tablet: 1 tab(s) orally once a day  glimepiride 2 mg oral tablet: 1 tab(s) orally 2 times a day  hydroCHLOROthiazide 25 mg oral tablet: 1 tab(s) orally once a day  Keflex 500 mg oral capsule: 1 cap(s) orally 4 times a day   Keflex 500 mg oral capsule: 1 cap(s) orally every 12 hours   mesalamine 400 mg oral delayed release capsule: 2 cap(s) orally once a day  metoprolol succinate 100 mg oral tablet, extended release: 1 tab(s) orally once a day  omeprazole 40 mg oral delayed release capsule: 1 cap(s) orally once a day

## 2020-03-02 NOTE — DISCHARGE NOTE PROVIDER - HOSPITAL COURSE
FROM ADMISSION H+P:     HPI:    74 year old male w/ PMHx of Afib (not on AC 2/2 h/o GIB), CAD, HLD, pulmonary HTN, HTN, T2DM, 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 Patient developed the ulcer in October, when he had increased LE edema 2/2 switching diuretics. The increased edema caused his shoes to fit poorly, and the ulcer developed. Patient has been following with his PCP for treatment of the ulcer. His PCP diagnosed cellulitis overlying the wound, and he completed a course of doxycycline and is presently taking ampicillin, without improvement. He had an appointment with the [facility] today due to worsening cellulitis despite PO abx, and the [facility] Care Center was concerned for abscess and possible osteomyelitis. Patient reports that the ulcer and surrounding area is tender, erythematous, warm, and edematous. He denies purulent drainage from the wound, but does report that it is non-healing and occasionally drains a clear liquid. Denies fever, chills, N/V, chest pain, SOB, abdominal pain.        ED Course: Vitals: Temp 97.7, HR 66, /83, RR 18, O2 sat 98 on RA.    Labs: WBC 7.18, H/h 14.6/44.4, BUN/Cr 28/1.3 (baseline Cr 1.5 per HIE), GFR 53    VA duplex LE arterial b/l: Left peroneal artery not well seen in the distal left calf with a blunted monophasic waveform, possibly secondary to a stenosis. Mild scattered plaque in the visualized lower extremity arteries.    XR foot: No evidence of osteomyelitis    CXR: No acute finding or change    EKG: Atrial fibrillation at 63 bpm w/ PVCs    Patient s/p vanc/zosyn (27 Feb 2020 17:27)            ---    HOSPITAL COURSE:         Patient transferred to telemetry for further workup. Dr. Ocampo, podiatry consulted. MRI foot: no evidence of osteomyelitis. I&D at bedside performed by podiatry on 2/28. Patient continued on vancomycin and was eventually switched to zosyn as there was no evidence of MRSA infection. Patient developed KIM likely ATN, Cr. trended up to 1.6. Patient had 3.7 sec pause on tele monitor. Cardiology, Dr. Hernandez consulted. Found pause to likely be secondary to sick sinus syndrome. Vascular surgery, Dr. Spencer consulted. Vascular supply to foot intact. Bedside dressing and cleaning of wound on day of discharge. Patient hemodynamically stable and medically optimized for discharge home with follow up at wound care center.        T(C): 36.4 (03-02-20 @ 15:51), Max: 36.7 (03-01-20 @ 19:48)    HR: 79 (03-02-20 @ 15:51) (69 - 83)    BP: 166/76 (03-02-20 @ 15:51) (111/69 - 166/76)    RR: 18 (03-02-20 @ 15:51) (18 - 18)    SpO2: 97% (03-02-20 @ 15:51) (97% - 99%)        Physical Exam:    Gen: well appearing, NAD    HEENT: NCAT, PEERLA b/l, EOMI b/l, no conjunctival erythema    Cardio: regular rate and rhythm, +s1s2, no murmurs, rubs, or gallops    Pulm: CTA b/l, no wheezes, rales or rhonchi    Abdomen: soft, nontender, nondistended, +BS x4 quadrants, no guarding    Extremities: no clubbing, cyanosis or edema, + right foot ulcer with dressings    Neuro: AAOx3, CNII-XII intact grossly, 5/5 strength all extremities, sensation intact    Skin: warm and dry        --- FROM ADMISSION H+P:     HPI:    74 year old male w/ PMHx of Afib (not on AC 2/2 h/o GIB), CAD, HLD, pulmonary HTN, HTN, T2DM, 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 Patient developed the ulcer in October, when he had increased LE edema 2/2 switching diuretics. The increased edema caused his shoes to fit poorly, and the ulcer developed. Patient has been following with his PCP for treatment of the ulcer. His PCP diagnosed cellulitis overlying the wound, and he completed a course of doxycycline and is presently taking ampicillin, without improvement. He had an appointment with the [facility] today due to worsening cellulitis despite PO abx, and the Wound Care Center was concerned for abscess and possible osteomyelitis. Patient reports that the ulcer and surrounding area is tender, erythematous, warm, and edematous. He denies purulent drainage from the wound, but does report that it is non-healing and occasionally drains a clear liquid. Denies fever, chills, N/V, chest pain, SOB, abdominal pain.        ED Course: Vitals: Temp 97.7, HR 66, /83, RR 18, O2 sat 98 on RA.    Labs: WBC 7.18, H/h 14.6/44.4, BUN/Cr 28/1.3 (baseline Cr 1.5 per HIE), GFR 53    VA duplex LE arterial b/l: Left peroneal artery not well seen in the distal left calf with a blunted monophasic waveform, possibly secondary to a stenosis. Mild scattered plaque in the visualized lower extremity arteries.    XR foot: No evidence of osteomyelitis    CXR: No acute finding or change    EKG: Atrial fibrillation at 63 bpm w/ PVCs    Patient s/p vanc/zosyn (27 Feb 2020 17:27)            ---    HOSPITAL COURSE:         Patient transferred to telemetry for further workup. Dr. Oacmpo, podiatry consulted. MRI foot: no evidence of osteomyelitis. I&D at bedside performed by podiatry on 2/28. Patient continued on vancomycin and was eventually switched to zosyn as there was no evidence of MRSA infection. Patient developed KIM likely ATN, Cr. trended up to 1.6. Patient had 3.7 sec pause on tele monitor. Cardiology, Dr. Hernandez consulted. Found pause to likely be secondary to sick sinus syndrome. Vascular surgery, Dr. Spencer consulted. Vascular supply to foot intact. ALEX negative for ischemia.  Bedside dressing and cleaning of wound on day of discharge. Patient hemodynamically stable and medically optimized for discharge home with follow up at wound care center. Patient to be discharged on PO keflex.        T(C): 36.4 (03-02-20 @ 15:51), Max: 36.7 (03-01-20 @ 19:48)    HR: 79 (03-02-20 @ 15:51) (69 - 83)    BP: 166/76 (03-02-20 @ 15:51) (111/69 - 166/76)    RR: 18 (03-02-20 @ 15:51) (18 - 18)    SpO2: 97% (03-02-20 @ 15:51) (97% - 99%)        Physical Exam:    Gen: well appearing, NAD    HEENT: NCAT, PEERLA b/l, EOMI b/l, no conjunctival erythema    Cardio: regular rate and rhythm, +s1s2, no murmurs, rubs, or gallops    Pulm: CTA b/l, no wheezes, rales or rhonchi    Abdomen: soft, nontender, nondistended, +BS x4 quadrants, no guarding    Extremities: no clubbing, cyanosis or edema, + right foot ulcer with dressings    Neuro: AAOx3, CNII-XII intact grossly, 5/5 strength all extremities, sensation intact    Skin: warm and dry            ---    TIME SPENT:    I, the attending physician, was physically present for the key portions of the evaluation and management (E/M) service provided.  I agree with the above findings which I have reviewed and edited where appropriate.  The total amount of time spent reviewing the hospital notes, laboratory values, imaging findings, assessing/counseling the patient, discussing with consultant physicians, social work, nursing staff was  greater than minutes        ---

## 2020-03-02 NOTE — PROGRESS NOTE ADULT - REASON FOR ADMISSION
Foot ulcer

## 2020-03-02 NOTE — PROGRESS NOTE ADULT - SUBJECTIVE AND OBJECTIVE BOX
Patient is a 74y old  Male who presents with a chief complaint of Foot ulcer (02 Mar 2020 09:28)    ----  INTERVAL HPI/OVERNIGHT EVENTS: Pt seen and evaluated at the bedside. No acute overnight events occurred. Patient states he is doing well overall. Admits to some continued pain in the right foot. Denies any chest pain, palpitations, fevers, chills.    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  Meningioma: left frontal, s/p resection and radiation  Other hyperlipidemia  Essential hypertension  H/O cataract removal with insertion of prosthetic lens: B/L  H/O right inguinal hernia repair      FAMILY HISTORY:  FH: hypertension: Father      Allergies    No Known Allergies    Intolerances        ----  REVIEW OF SYSTEMS:  CONSTITUTIONAL: denies fever, chills, fatigue, weakness  HEENT: denies blurred vision, changes in vision  SKIN: denies new lesions, rash  CARDIOVASCULAR: denies chest pain, chest pressure, palpitations  RESPIRATORY: denies shortness of breath, sputum production  GASTROINTESTINAL: denies nausea, vomiting, diarrhea, abdominal pain  GENITOURINARY: denies dysuria, discharge  NEUROLOGICAL: denies numbness, headache, focal weakness  MUSCULOSKELETAL: denies new joint pain, muscle aches  HEMATOLOGIC: denies gross bleeding, bruising  LYMPHATICS: denies enlarged lymph nodes, extremity swelling  PSYCHIATRIC: denies recent changes in anxiety, depression  ENDOCRINOLOGIC: denies sweating, cold or heat intolerance    ----  PHYSICAL	  General: No acute distress, well nourished  HEENT: Atraumatic, PERRLA, EOMI b/l  Cardiovascular: normal s1s2, no murmurs, gallops or fricition rubs  Pulmonary: clear to ausculation Bilaterally, no wheezing , rhonchi  Gastrointestinal: soft non tender non distended, no masses felt, no organomegally  Muscloskeletal: no lower extremity edema, intact bilateral lower extremity pulses  Neurological: CN II-12 intact. No focal weakness  Psychiatrical: normal mood, cooperative  SKIN: RIGHT FOOT CELLULITIS NOTED;; ++ dorsum ulcer  ~    T(C): 36.4 (03-02-20 @ 07:53), Max: 36.7 (03-01-20 @ 19:48)  HR: 82 (03-02-20 @ 07:53) (69 - 83)  BP: 144/81 (03-02-20 @ 07:53) (111/69 - 149/92)  RR: 18 (03-02-20 @ 07:53) (18 - 18)  SpO2: 97% (03-02-20 @ 07:53) (97% - 99%)  Wt(kg): --    ----  I&O's Summary    01 Mar 2020 07:01  -  02 Mar 2020 07:00  --------------------------------------------------------  IN: 400 mL / OUT: 0 mL / NET: 400 mL        LABS:                        12.9   7.40  )-----------( 203      ( 02 Mar 2020 06:28 )             39.9     03-02    141  |  107  |  27<H>  ----------------------------<  125<H>  3.6   |  25  |  1.60<H>    Ca    9.1      02 Mar 2020 06:28  Phos  3.0     03-02  Mg     2.0     03-02          CAPILLARY BLOOD GLUCOSE      POCT Blood Glucose.: 139 mg/dL (02 Mar 2020 07:51)  POCT Blood Glucose.: 152 mg/dL (01 Mar 2020 22:07)  POCT Blood Glucose.: 109 mg/dL (01 Mar 2020 16:59)  POCT Blood Glucose.: 144 mg/dL (01 Mar 2020 12:18)      02-29 @ 11:30   No growth to date.  --  --  02-28 @ 17:18   No growth at 48 hours  --  --  02-27 @ 22:02   No growth  --  --  02-27 @ 21:57   No growth to date.  --  Blood Culture PCR            ----

## 2020-03-02 NOTE — PHARMACOTHERAPY INTERVENTION NOTE - COMMENTS
Discussed current medications with patient, answered any questions. Reviewed indications, side effects.

## 2020-03-02 NOTE — PROGRESS NOTE ADULT - PROBLEM SELECTOR PLAN 6
DVT prophylaxis: SCDS and encourage ambulation, low risk with history of GIB    IMPROVE VTE Individual Risk Assessment          RISK                                                          Points  [  ] Previous VTE                                                3  [  ] Thrombophilia                                             2  [  ] Lower limb paralysis                                   2        (unable to hold up >15 seconds)    [  ] Current Cancer                                             2         (within 6 months)  [  ] Immobilization > 24 hrs                              1  [  ] ICU/CCU stay > 24 hours                             1  [ x ] Age > 60                                                         1    IMPROVE VTE Score: 1

## 2020-03-02 NOTE — PROGRESS NOTE ADULT - ASSESSMENT
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 There is no clear evidence of acute ischemia; EKG shows A fib with PVCs but no acute ST/T wave changes.     Afib w/ pause  - Telemetry reviewed revealing a fib 60-100s. No pause overnight. Had prior 3.7 sec pause  - Metoprolol succinate  mg  - not on AC given GIB in past     HTN  -/87  - Continue Metoprolol, Amlodipine 10 mg  - when Creatinine stable resume losartan. Creatinine, Serum: 1.60 (03-02-20); 1.50 (03-01-20); 1.60 (02-29-20); 1.50 (02-28-20); 1.30 (02-27-20)    Dm  - Management as per primary     Cellulitis   - Continue antibiotics   - Followed by podiatry ID follow up recs        - Monitor and replete lytes, keep K>4, Mg>2.  - All other medical needs as per primary team.  - Other cardiovascular workup will depend on clinical course.  - Will continue to follow.      Joshua Ocampo MS FNP, [facility]  Nurse Practitioner- Cardiology   Spectra #[phone]

## 2020-03-02 NOTE — PROGRESS NOTE ADULT - PROVIDER SPECIALTY LIST ADULT
Hospitalist Tiffani Mandujanojune    6/25/2019   1943    Donte Ruvalcaba MD  76 year old female       Diagnosis/Plan:  1. Acute kidney injury-creatinine still elevated as if [Na]. Will give d5w x 10 hrs.  2. Stage 3 chronic kidney disease at baseline Scr 1.2 mg /dl at baseline   3. htn - controlled   4. Meningitis from Varicella on acyclovir   5. E.coli UTI  6. pulmonary htn ([id number]) lvef normal  7. atrial fibrillation   8. Shad?  9. Anemia- on iv fe   10 hypernatremia 2/2 diuresis - as above         Chief complaint: Chronic kidney disease - Stage II, Acute renal failure and Fluid overload    Subjective:   denies sob     Reviewed: Allergies, Medical History, Surgical History and Social History         Vital Last Value 24 Hour Range   Temperature 97.9 °F (36.6 °C) (06/25/19 0745) Temp  Min: 97.5 °F (36.4 °C)  Max: 97.9 °F (36.6 °C)   Pulse 74 (06/25/19 0745) Pulse  Min: 66  Max: 74   Respiratory 24 (06/25/19 0745) Resp  Min: 18  Max: 24   Non-Invasive  Blood Pressure 133/61 (06/25/19 0745) BP  Min: 118/64  Max: 133/61   Arterial  Blood Pressure   No data recorded   Pulse Oximetry 95 % (06/25/19 0745) SpO2  Min: 95 %  Max: 97 %     Vital Today Admission   Weight 122.8 kg (06/24/19 0600) Weight: (!) 138.3 kg (06/03/19 0940)     Weight over the past 48 Hours:  Patient Vitals for the past 48 hrs:   Weight   06/24/19 0600 122.8 kg       Intake/Output:         No intake/output data recorded.    I/O last 3 completed shifts:  In: 400 [P.O.:400]  Out: 600 [Urine:600]      Intake/Output Summary (Last 24 hours) at 6/25/2019 0823  Last data filed at 6/25/2019 0613  Gross per 24 hour   Intake --   Output 600 ml   Net -600 ml         Medications/Infusions:  Scheduled:   • [START ON 6/26/2019] levothyroxine  50 mcg Oral QAM AC   • sodium chloride (PF)  10 mL Injection 3 times per day   • acetaminophen  500 mg Oral BID   • nystatin   Topical 2 times per day   • albuterol-ipratropium 2.5 mg/0.5 mg  3 mL Nebulization BID Resp   • apixaBAN   2.5 mg Oral 2 times per day   • calamine   Topical BID   • cholecalciferol  2,000 Units Oral QAM   • clobetasol   Topical 2 times per day   • lactobacillus acidophilus  1 tablet Oral TID   • vitamin - therapeutic multivitamins w/minerals  1 tablet Oral QAM   • ketotifen  1 drop Both Eyes BID   • pravastatin  40 mg Oral QHS   • sertraline  200 mg Oral QAM   • sodium chloride (PF)  2 mL Injection 2 times per day       Continuous Infusions:   • dextrose 5 % infusion         Physical Exam:  Alert 0n 2L 02  Chest coarse    s1s2 no murmur  Soft large abs bs+ , nontender  Minimal  dep edema       Laboratory Results:    Recent Labs   Lab 06/25/19  0535 06/24/19  0606 06/23/19  0555  06/21/19  0544   SODIUM 150* 146* 145   < > 143   POTASSIUM 4.5 4.0 4.0   < > 3.4   CHLORIDE 116* 117* 115*   < > 114*   CO2 27 27 27   < > 23   ANIONGAP 12 6* 7*   < > 9*   BUN 70* 70* 75*   < > 79*   CREATININE 1.59* 1.49* 1.67*   < > 1.66*   CALCIUM 9.0 8.8 8.8   < > 7.7*   GLUCOSE 93 91 93   < > 84   LIPA  --   --   --   --  106   WBC 7.6 7.7 8.0   < > 6.5   HCT 30.6* 30.6* 29.6*   < > 25.5*    < > = values in this interval not displayed.     Recent Labs   Lab 06/25/19  0535 06/24/19  0606 06/23/19  0555 06/22/19  0600 06/21/19  0544   HGB 8.7* 8.8* 8.5* 9.1* 7.5*    217 241 257 236   MG 2.8*  --   --  2.8* 2.5*   PHOS 4.6  --   --   --   --    ALKPT 68 65 60 66 54   VB12  --   --  684  --   --    BILIRUBIN 0.2 0.3 0.2 0.2 0.2   AST <5 9 8 7 6   GPT 7 7 9 10 6   ALBUMIN 2.3* 2.4* 2.3* 2.6* 2.0*   GFRA 36 39 34 30 34   GFRNA 31 33 29 26 29   PST  --   --  23  --   --          Urine Panel  Lab Results   Component Value Date    JOYCE 14 06/03/2019    UKET NEGATIVE 06/07/2019    USPG 1.018 06/07/2019    UPROT 100 (A) 06/07/2019    UWBC LARGE (A) 06/07/2019    URBC LARGE (A) 06/07/2019    UBILI NEGATIVE 06/07/2019    UPH 5.5 06/07/2019    UBACTR FEW (A) 06/07/2019    UTPELC 42 (H) 05/05/2014

## 2020-03-02 NOTE — PROGRESS NOTE ADULT - PROBLEM SELECTOR PLAN 1
- MRI: negative for osteomyelitis  - Continue vanc/zosyn at this time  - Vanco through pre 3rd dose - if supratherapeutic or kidney function declines will switch to renal dosing  - Patient underwent bedside debridement w/ podiatry - wound cultures pending  - F/u blood cultures  - regular diet at this time  - Wound dressings per Podiatry  - Podiatry consulted, Dr. 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
Nonhealing R foot/ankle ulcer/cellulitis   MRI R foot 2/27 - shows cellulitis; neg for osteomyelitis or abscess on IV abxs   - s/p bedside I&D by Podiatry 2/28  - patient s/p vanco  - continue IV abxs: zosyn at this time - ID, Dr. Thompson following  - Podiatry, Dr. Ocampo following - wound to be cleaned today and treated with topical lotrisone
Patient seen and evaluated  Discussed with patient the MRI findings-fluid accumulation at the lateral aspect of the right heel/kager's triangle area, discussed need to perform an I&D. All risks, benefits and complications discussed with patient. A written, informed consent was obtained and placed into chart for an incision and drainage of right foot abscess  Right foot was anesthestized with 10 cc 0.5% marcaine   Prepped right foot with betadine, and I&D performed with a sterile #15 blade, wound culture was obtained and submitted to lab. Irrigated I&D site with normal saline, packed with 1/4 inch iodoform packing, dressed with dry, sterile dressing  Patient tolerated procedure well  Podiatry will continue to follow
Patient seen and evaluated  Redressed right foot with aquacel and dry, sterile dressing  wound cultures pending  Podiatry will continue to follow patient
Patient seen and evaluated with Dr. Andrew and Dr. Ocampo  Redressed right foot with acticoat 7 flex, and dry, sterile dressing  Patient is stable from podiatry standpoint for discharge  Podiatry will continue to follow patient    Wound Care Instructions:   Keep dressing clean, dry and intact to the right foot.   Patient may weightbear to the right foot to tolerance  Patient is to follow up with Dr. Andrew/Dr. Ocampo one week after discharge at [facility]
blood cx cns likely contaminant  wound cx neg to date  cont local care  d/c vancomycin -- no mrsa isolated

## 2020-03-02 NOTE — DISCHARGE NOTE PROVIDER - NSDCCPCAREPLAN_GEN_ALL_CORE_FT
PRINCIPAL DISCHARGE DIAGNOSIS  Diagnosis: Cellulitis and abscess  Assessment and Plan of Treatment: MRI showed that you do not have osteomyelitis (bone infection)  Wound Care Instructions:   Keep dressing clean, dry and intact to the right foot.   Patient may weightbear to the right foot to tolerance  Patient is to follow up with Dr. Andrew/Dr. Ocampo one week after discharge at [facility] ([place]

## 2020-03-02 NOTE — PROGRESS NOTE ADULT - PROBLEM SELECTOR PLAN 2
blood sugar control per primary team
- Chronic, stable  - 3 second pause on tele monitor last night  - will continue metoprolol pending cardio recs  - Cardiology, Dr. Tom group consulted  - No on AC due to history of lower GIB
continue to hold HCTZ and losartan in setting of KIM, likely ATN secondary to these medications and vancomycin  - avoid nephrotoxic meds  - continue to encourage PO hydration

## 2020-03-02 NOTE — PHARMACOTHERAPY INTERVENTION NOTE - COMMENTS
Noticed metoprolol and amlodipine held for parameters this AM.  SBP currently 140's.  Due to patient specific cardiac factors suggested we give the metoprolol now instead of holding for entire 24 hrs.  Dr. Ocampo agree, modified metoprolol order and changed to start now.

## 2020-03-02 NOTE — PROGRESS NOTE ADULT - PROBLEM SELECTOR PROBLEM 1
Cellulitis and abscess
Cellulitis of right lower extremity
Cellulitis and abscess
Cellulitis of right lower extremity

## 2020-03-02 NOTE — DISCHARGE NOTE NURSING/CASE MANAGEMENT/SOCIAL WORK - PATIENT PORTAL LINK FT
You can access the FollowMyHealth Patient Portal offered by [facility] by registering at the following website: [email or web address]. By joining [employer]’s FollowMyHealth portal, you will also be able to view your health information using other applications (apps) compatible with our system.

## 2020-03-03 PROBLEM — I25.10 ATHEROSCLEROTIC HEART DISEASE OF NATIVE CORONARY ARTERY WITHOUT ANGINA PECTORIS: Chronic | Status: ACTIVE | Noted: 2020-02-27

## 2020-03-03 PROBLEM — K92.2 GASTROINTESTINAL HEMORRHAGE, UNSPECIFIED: Chronic | Status: ACTIVE | Noted: 2020-02-27

## 2020-03-03 PROBLEM — R56.9 UNSPECIFIED CONVULSIONS: Chronic | Status: ACTIVE | Noted: 2017-05-08

## 2020-03-03 LAB
CULTURE RESULTS: SIGNIFICANT CHANGE UP
SPECIMEN SOURCE: SIGNIFICANT CHANGE UP

## 2020-03-04 DIAGNOSIS — I83.893 VARICOSE VEINS OF BILATERAL LOWER EXTREMITIES WITH OTHER COMPLICATIONS: ICD-10-CM

## 2020-03-04 DIAGNOSIS — Z98.49 CATARACT EXTRACTION STATUS, UNSPECIFIED EYE: ICD-10-CM

## 2020-03-04 DIAGNOSIS — L03.115 CELLULITIS OF RIGHT LOWER LIMB: ICD-10-CM

## 2020-03-04 DIAGNOSIS — I11.9 HYPERTENSIVE HEART DISEASE WITHOUT HEART FAILURE: ICD-10-CM

## 2020-03-04 DIAGNOSIS — E78.5 HYPERLIPIDEMIA, UNSPECIFIED: ICD-10-CM

## 2020-03-04 DIAGNOSIS — Z82.49 FAMILY HISTORY OF ISCHEMIC HEART DISEASE AND OTHER DISEASES OF THE CIRCULATORY SYSTEM: ICD-10-CM

## 2020-03-04 DIAGNOSIS — Z79.84 LONG TERM (CURRENT) USE OF ORAL HYPOGLYCEMIC DRUGS: ICD-10-CM

## 2020-03-04 DIAGNOSIS — I48.91 UNSPECIFIED ATRIAL FIBRILLATION: ICD-10-CM

## 2020-03-04 DIAGNOSIS — I83.11 VARICOSE VEINS OF RIGHT LOWER EXTREMITY WITH INFLAMMATION: ICD-10-CM

## 2020-03-04 DIAGNOSIS — G47.30 SLEEP APNEA, UNSPECIFIED: ICD-10-CM

## 2020-03-04 DIAGNOSIS — Z82.3 FAMILY HISTORY OF STROKE: ICD-10-CM

## 2020-03-04 DIAGNOSIS — N20.0 CALCULUS OF KIDNEY: ICD-10-CM

## 2020-03-04 DIAGNOSIS — Z79.899 OTHER LONG TERM (CURRENT) DRUG THERAPY: ICD-10-CM

## 2020-03-04 DIAGNOSIS — E87.6 HYPOKALEMIA: ICD-10-CM

## 2020-03-04 DIAGNOSIS — E11.59 TYPE 2 DIABETES MELLITUS WITH OTHER CIRCULATORY COMPLICATIONS: ICD-10-CM

## 2020-03-04 DIAGNOSIS — Z79.82 LONG TERM (CURRENT) USE OF ASPIRIN: ICD-10-CM

## 2020-03-04 DIAGNOSIS — R00.2 PALPITATIONS: ICD-10-CM

## 2020-03-04 NOTE — REVIEW OF SYSTEMS
[Joint Stiffness] : joint stiffness [Skin Lesions] : skin lesion [Easy Bleeding] : a tendency for easy bleeding [Fever] : no fever [Chills] : no chills [Loss Of Hearing] : no hearing loss [Abdominal Pain] : no abdominal pain [Shortness Of Breath] : no shortness of breath [Wheezing] : no wheezing [Vomiting] : no vomiting [Confused] : no confusion [Anxiety] : no anxiety [FreeTextEntry5] : HTN, HLD , coronary artery disease  [de-identified] : erythema , stasis dermatitis and fluctuance of the right lateral posterior foot  [de-identified] : NIDDM

## 2020-03-04 NOTE — ASSESSMENT
[Verbal] : Verbal [Patient] : Patient [Fair - mild discomfort, physical impairment, low acceptance] : Fair - mild discomfort, physical impairment, low acceptance [Verbalizes knowledge/Understanding] : Verbalizes knowledge/understanding [Dressing changes] : dressing changes [Skin Care] : skin care [Signs and symptoms of infection] : sign and symptoms of infection [How and When to Call] : how and when to call [Labs and Tests] : labs and tests [Patient responsibility to plan of care] : patient responsibility to plan of care [Stable] : stable [Not Applicable - Long Term Care/Home Health Agency] : Long Term Care/Home Health Agency: Not Applicable [Emergency Room] : Emergency Room [Wheelchair] : Wheelchair [] : No [FreeTextEntry2] : Infection Control\par Hospital Admittance \par F/U after discharge from [facility] \par  [FreeTextEntry3] : Initial visit  [FreeTextEntry4] : DPM recommended patient be transferred to [place] ED for further evaluation.\par F/U after discharge from [facility]

## 2020-03-04 NOTE — HISTORY OF PRESENT ILLNESS
[FreeTextEntry1] : 74 year old male presents to the [facility] with a right lateral ankle wound. The patient reports that he in may 2019 his nephrologist removed a diuretic from his medication regimen which caused edema in his legs. He stated that as his legs began swelling, his ankles began rubbing on his shoe which created a wound on his right lateral ankle.  October 2019 a wound developed and he followed up with his PCP who treated with Doxycycline and Bacitracin, two rounds of Doxycycline was prescribed. The wound still had not healed and the wound was cultured in December, the antibiotic was then changed to Ampicillin. Two rounds of ampicillin was prescribed with no change. The patient's PCP then recommended he follow up with the [facility] for care.

## 2020-03-04 NOTE — PLAN
[FreeTextEntry1] : Patient to be admitted to the hospital for IV anti biotics and I&D of the right foot

## 2020-03-04 NOTE — PHYSICAL EXAM
[4 x 4] : 4 x 4  [1+] : left 1+ [Ankle Swelling (On Exam)] : present [Ankle Swelling On The Left] : moderate [Varicose Veins Of Lower Extremities] : bilaterally [] : present [Ankle Swelling Bilaterally] : severe [Skin Induration] : induration [Oriented to Person] : oriented to person [Alert] : alert [Oriented to Place] : oriented to place [Oriented to Time] : oriented to time [Calm] : calm [Purpura] : no purpura  [Petechiae] : no petechiae [Skin Ulcer] : no ulcer [de-identified] : comfortable  [de-identified] : WNL [de-identified] : HTN , HLD , CAD [de-identified] : hammer toes  [de-identified] : Severe stasis dermatitis and erythema/ cellulits right foot and ankle  [de-identified] : wnl [FreeTextEntry1] : Lateral  Ankle  [FreeTextEntry2] : 0.4 [FreeTextEntry3] : 0.3 [FreeTextEntry4] : 0.2 [de-identified] : moderate with dry flaky epithelium  [de-identified] : Cleansed with Normal saline\par  [de-identified] : Dry dressing  [TWNoteComboBox1] : Right [TWNoteComboBox4] : Small [TWNoteComboBox5] : No [TWNoteComboBox6] : Other [de-identified] : No [de-identified] : None [de-identified] : Erythema [de-identified] : None [de-identified] : 100% [de-identified] : No

## 2020-03-05 LAB
CULTURE RESULTS: SIGNIFICANT CHANGE UP
SPECIMEN SOURCE: SIGNIFICANT CHANGE UP

## 2020-03-09 ENCOUNTER — OUTPATIENT (OUTPATIENT)
Dept: OUTPATIENT SERVICES | Facility: HOSPITAL | Age: 75
LOS: 1 days | Discharge: HOME | End: 2020-03-09
Payer: COMMERCIAL

## 2020-03-09 ENCOUNTER — APPOINTMENT (OUTPATIENT)
Dept: WOUND CARE | Facility: HOSPITAL | Age: 75
End: 2020-03-09

## 2020-03-09 ENCOUNTER — APPOINTMENT (OUTPATIENT)
Dept: WOUND CARE | Facility: HOSPITAL | Age: 75
End: 2020-03-09
Payer: MEDICARE

## 2020-03-09 VITALS
OXYGEN SATURATION: 98 % | BODY MASS INDEX: 29.4 KG/M2 | WEIGHT: 210 LBS | HEIGHT: 71 IN | TEMPERATURE: 97 F | SYSTOLIC BLOOD PRESSURE: 147 MMHG | HEART RATE: 81 BPM | DIASTOLIC BLOOD PRESSURE: 82 MMHG | RESPIRATION RATE: 18 BRPM

## 2020-03-09 DIAGNOSIS — Z98.890 OTHER SPECIFIED POSTPROCEDURAL STATES: Chronic | ICD-10-CM

## 2020-03-09 DIAGNOSIS — Z98.49 CATARACT EXTRACTION STATUS, UNSPECIFIED EYE: Chronic | ICD-10-CM

## 2020-03-09 DIAGNOSIS — L03.115 CELLULITIS OF RIGHT LOWER LIMB: ICD-10-CM

## 2020-03-09 PROCEDURE — G0463: CPT

## 2020-03-09 PROCEDURE — 99213 OFFICE O/P EST LOW 20 MIN: CPT | Mod: 24

## 2020-03-09 RX ORDER — CLOTRIMAZOLE AND BETAMETHASONE DIPROPIONATE 10; .5 MG/G; MG/G
1-0.05 CREAM TOPICAL TWICE DAILY
Qty: 3 | Refills: 3 | Status: COMPLETED | COMMUNITY
Start: 2020-03-09 | End: 2020-07-07

## 2020-03-09 NOTE — REVIEW OF SYSTEMS
[Joint Stiffness] : joint stiffness [Skin Lesions] : skin lesion [Easy Bleeding] : a tendency for easy bleeding [Fever] : no fever [Chills] : no chills [Loss Of Hearing] : no hearing loss [Shortness Of Breath] : no shortness of breath [Wheezing] : no wheezing [Abdominal Pain] : no abdominal pain [Vomiting] : no vomiting [Confused] : no confusion [Anxiety] : no anxiety [FreeTextEntry5] : HTN, HLD , coronary artery disease  [de-identified] : erythema , stasis dermatitis right ankle improving [de-identified] : NIDDM

## 2020-03-09 NOTE — PLAN
[FreeTextEntry1] : Patient examined and evaluated at this time.\par Pt to apply lotrisone twice a day and apply ACE bandage.\par Pt to see PCP.\par Continue local wound care and offloading.

## 2020-03-09 NOTE — HISTORY OF PRESENT ILLNESS
[FreeTextEntry1] : pt seen recently discharged from [facility] for cellulitis and abscess s/p incision and drainage. Pt relates that he feels much better and notes that he has kept the dressing on as advised.

## 2020-03-09 NOTE — PHYSICAL EXAM
[4 x 4] : 4 x 4  [1+] : left 1+ [Ankle Swelling (On Exam)] : present [Varicose Veins Of Lower Extremities] : bilaterally [Ankle Swelling On The Left] : moderate [] : of the right leg [Ankle Swelling Bilaterally] : severe [Skin Induration] : induration [Alert] : alert [Oriented to Person] : oriented to person [Oriented to Place] : oriented to place [Oriented to Time] : oriented to time [Calm] : calm [Purpura] : no purpura  [Petechiae] : no petechiae [Skin Ulcer] : no ulcer [de-identified] : comfortable  [de-identified] : WNL [de-identified] : HTN , HLD , CAD [de-identified] : hammer toes  [de-identified] : stasis dermatitis and erythema right foot and ankle resolving [de-identified] : wnl [FreeTextEntry1] : Lateral ankle- Closed dry eschar  [de-identified] : Dry klaky skin  [de-identified] : Circulatory and neuromuscular function WNL post ACE application. Patient verbalizes they feel 'comfortable' post ACE application to the lower extremities.  [de-identified] : MICHA, Lotrisone,DD  [TWNoteComboBox1] : Right [TWNoteComboBox4] : None [TWNoteComboBox5] : No [de-identified] : Erythema [de-identified] : None [de-identified] : None [de-identified] : 100% [de-identified] : No [de-identified] : Ace wraps [de-identified] : 2x Daily [de-identified] : Secondary Dressing

## 2020-03-09 NOTE — ASSESSMENT
[Verbal] : Verbal [Patient] : Patient [Fair - mild discomfort, physical impairment, low acceptance] : Fair - mild discomfort, physical impairment, low acceptance [Verbalizes knowledge/Understanding] : Verbalizes knowledge/understanding [Dressing changes] : dressing changes [Skin Care] : skin care [Signs and symptoms of infection] : sign and symptoms of infection [How and When to Call] : how and when to call [Labs and Tests] : labs and tests [Patient responsibility to plan of care] : patient responsibility to plan of care [Stable] : stable [Emergency Room] : Emergency Room [Wheelchair] : Wheelchair [Not Applicable - Long Term Care/Home Health Agency] : Long Term Care/Home Health Agency: Not Applicable [] : No [FreeTextEntry2] : Infection prevention\par Restoration of skin integrity\par offloading/ pressure relief \par Achieving pain tolerance levels within the Pts limits of 0/10.\par Focus on maintaining pain level within acceptable limits. \par  [FreeTextEntry4] : Pt still on oral antibiotics and verbalized he will complete them \par DPM escribed lotrisone \par PT to follow up to [facility] in two weeks

## 2020-03-10 DIAGNOSIS — Z82.3 FAMILY HISTORY OF STROKE: ICD-10-CM

## 2020-03-10 DIAGNOSIS — E11.59 TYPE 2 DIABETES MELLITUS WITH OTHER CIRCULATORY COMPLICATIONS: ICD-10-CM

## 2020-03-10 DIAGNOSIS — Z79.84 LONG TERM (CURRENT) USE OF ORAL HYPOGLYCEMIC DRUGS: ICD-10-CM

## 2020-03-10 DIAGNOSIS — L03.115 CELLULITIS OF RIGHT LOWER LIMB: ICD-10-CM

## 2020-03-10 DIAGNOSIS — I83.11 VARICOSE VEINS OF RIGHT LOWER EXTREMITY WITH INFLAMMATION: ICD-10-CM

## 2020-03-10 DIAGNOSIS — G47.30 SLEEP APNEA, UNSPECIFIED: ICD-10-CM

## 2020-03-10 DIAGNOSIS — E78.5 HYPERLIPIDEMIA, UNSPECIFIED: ICD-10-CM

## 2020-03-10 DIAGNOSIS — R00.2 PALPITATIONS: ICD-10-CM

## 2020-03-10 DIAGNOSIS — I48.91 UNSPECIFIED ATRIAL FIBRILLATION: ICD-10-CM

## 2020-03-10 DIAGNOSIS — Z79.82 LONG TERM (CURRENT) USE OF ASPIRIN: ICD-10-CM

## 2020-03-10 DIAGNOSIS — I83.893 VARICOSE VEINS OF BILATERAL LOWER EXTREMITIES WITH OTHER COMPLICATIONS: ICD-10-CM

## 2020-03-10 DIAGNOSIS — Z82.49 FAMILY HISTORY OF ISCHEMIC HEART DISEASE AND OTHER DISEASES OF THE CIRCULATORY SYSTEM: ICD-10-CM

## 2020-03-10 DIAGNOSIS — Z79.899 OTHER LONG TERM (CURRENT) DRUG THERAPY: ICD-10-CM

## 2020-03-10 DIAGNOSIS — I11.9 HYPERTENSIVE HEART DISEASE WITHOUT HEART FAILURE: ICD-10-CM

## 2020-03-10 DIAGNOSIS — Z98.49 CATARACT EXTRACTION STATUS, UNSPECIFIED EYE: ICD-10-CM

## 2020-03-10 DIAGNOSIS — N20.0 CALCULUS OF KIDNEY: ICD-10-CM

## 2020-03-10 DIAGNOSIS — E87.6 HYPOKALEMIA: ICD-10-CM

## 2020-03-23 ENCOUNTER — APPOINTMENT (OUTPATIENT)
Dept: WOUND CARE | Facility: HOSPITAL | Age: 75
End: 2020-03-23

## 2020-03-23 ENCOUNTER — APPOINTMENT (OUTPATIENT)
Dept: PODIATRY | Facility: HOSPITAL | Age: 75
End: 2020-03-23

## 2020-05-12 ENCOUNTER — APPOINTMENT (OUTPATIENT)
Dept: WOUND CARE | Facility: HOSPITAL | Age: 75
End: 2020-05-12

## 2020-05-12 ENCOUNTER — OUTPATIENT (OUTPATIENT)
Dept: OUTPATIENT SERVICES | Facility: HOSPITAL | Age: 75
LOS: 1 days | Discharge: HOME | End: 2020-05-12
Payer: COMMERCIAL

## 2020-05-12 ENCOUNTER — APPOINTMENT (OUTPATIENT)
Dept: OBGYN | Facility: HOSPITAL | Age: 75
End: 2020-05-12
Payer: MEDICARE

## 2020-05-12 VITALS
RESPIRATION RATE: 18 BRPM | SYSTOLIC BLOOD PRESSURE: 152 MMHG | OXYGEN SATURATION: 97 % | HEART RATE: 78 BPM | DIASTOLIC BLOOD PRESSURE: 86 MMHG | TEMPERATURE: 96.8 F

## 2020-05-12 DIAGNOSIS — L03.115 CELLULITIS OF RIGHT LOWER LIMB: ICD-10-CM

## 2020-05-12 DIAGNOSIS — Z98.890 OTHER SPECIFIED POSTPROCEDURAL STATES: Chronic | ICD-10-CM

## 2020-05-12 DIAGNOSIS — Z98.49 CATARACT EXTRACTION STATUS, UNSPECIFIED EYE: Chronic | ICD-10-CM

## 2020-05-12 PROCEDURE — 99213 OFFICE O/P EST LOW 20 MIN: CPT

## 2020-05-12 PROCEDURE — G0463: CPT

## 2020-05-12 NOTE — ASSESSMENT
[Verbal] : Verbal [Patient] : Patient [Family member] : Family member [Written] : Written [Good - alert, interested, motivated] : Good - alert, interested, motivated [Verbalizes knowledge/Understanding] : Verbalizes knowledge/understanding [Signs and symptoms of infection] : sign and symptoms of infection [Venous Disease] : venous disease [Nutrition] : nutrition [Compression Therapy] : compression therapy [Discharge Planning] : discharge planning [Patient responsibility to plan of care] : patient responsibility to plan of care [] : Yes [Stable] : stable [Home] : Home [Not Applicable - Long Term Care/Home Health Agency] : Long Term Care/Home Health Agency: Not Applicable [Cane] : Cane [FreeTextEntry4] : Patient discharged [FreeTextEntry2] : Compression therapy\par Promote optimal skin integrity \par Goal of remaining pain free regarding wounds.\par

## 2020-05-12 NOTE — HISTORY OF PRESENT ILLNESS
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 The ulcer has completely healed.

## 2020-05-12 NOTE — PHYSICAL EXAM
[Normal Thyroid] : the thyroid was normal [Normal Heart Sounds] : normal heart sounds [Normal Rate and Rhythm] : normal rate and rhythm [Normal Breath Sounds] : Normal breath sounds [Ankle Swelling (On Exam)] : present [Ankle Swelling Bilaterally] : bilaterally  [Ankle Swelling On The Right] : mild [Varicose Veins Of Lower Extremities] : bilaterally [Oriented to Person] : oriented to person [Alert] : alert [Oriented to Place] : oriented to place [Calm] : calm [Oriented to Time] : oriented to time [JVD] : no jugular venous distention  [] : not present [Abdomen Masses] : No abdominal massess [Abdomen Tenderness] : ~T ~M No abdominal tenderness [Tender] : nontender [Enlarged] : not enlarged [de-identified] : elderly WM, NAD, WD, WN, alert, Ox3. [FreeTextEntry1] : Right lateral ankle - No open wound  [de-identified] : No treatment

## 2020-05-12 NOTE — REVIEW OF SYSTEMS
[Joint Stiffness] : joint stiffness [Skin Lesions] : skin lesion [Easy Bleeding] : a tendency for easy bleeding [Chills] : no chills [Fever] : no fever [Loss Of Hearing] : no hearing loss [Shortness Of Breath] : no shortness of breath [Wheezing] : no wheezing [Abdominal Pain] : no abdominal pain [Vomiting] : no vomiting [Confused] : no confusion [Anxiety] : no anxiety [de-identified] : erythema , stasis dermatitis right ankle improving [FreeTextEntry5] : HTN, HLD , coronary artery disease  [de-identified] : NIDDM

## 2020-05-14 DIAGNOSIS — I83.893 VARICOSE VEINS OF BILATERAL LOWER EXTREMITIES WITH OTHER COMPLICATIONS: ICD-10-CM

## 2020-05-14 DIAGNOSIS — Z79.82 LONG TERM (CURRENT) USE OF ASPIRIN: ICD-10-CM

## 2020-05-14 DIAGNOSIS — Z82.3 FAMILY HISTORY OF STROKE: ICD-10-CM

## 2020-05-14 DIAGNOSIS — E11.59 TYPE 2 DIABETES MELLITUS WITH OTHER CIRCULATORY COMPLICATIONS: ICD-10-CM

## 2020-05-14 DIAGNOSIS — E87.6 HYPOKALEMIA: ICD-10-CM

## 2020-05-14 DIAGNOSIS — N20.0 CALCULUS OF KIDNEY: ICD-10-CM

## 2020-05-14 DIAGNOSIS — I48.91 UNSPECIFIED ATRIAL FIBRILLATION: ICD-10-CM

## 2020-05-14 DIAGNOSIS — I11.9 HYPERTENSIVE HEART DISEASE WITHOUT HEART FAILURE: ICD-10-CM

## 2020-05-14 DIAGNOSIS — E78.5 HYPERLIPIDEMIA, UNSPECIFIED: ICD-10-CM

## 2020-05-14 DIAGNOSIS — I83.11 VARICOSE VEINS OF RIGHT LOWER EXTREMITY WITH INFLAMMATION: ICD-10-CM

## 2020-05-14 DIAGNOSIS — R00.2 PALPITATIONS: ICD-10-CM

## 2020-05-14 DIAGNOSIS — G47.30 SLEEP APNEA, UNSPECIFIED: ICD-10-CM

## 2020-05-14 DIAGNOSIS — Z79.899 OTHER LONG TERM (CURRENT) DRUG THERAPY: ICD-10-CM

## 2020-05-14 DIAGNOSIS — Z98.49 CATARACT EXTRACTION STATUS, UNSPECIFIED EYE: ICD-10-CM

## 2020-05-14 DIAGNOSIS — Z79.84 LONG TERM (CURRENT) USE OF ORAL HYPOGLYCEMIC DRUGS: ICD-10-CM

## 2020-05-14 DIAGNOSIS — Z82.49 FAMILY HISTORY OF ISCHEMIC HEART DISEASE AND OTHER DISEASES OF THE CIRCULATORY SYSTEM: ICD-10-CM

## 2020-05-28 ENCOUNTER — APPOINTMENT (OUTPATIENT)
Dept: WOUND CARE | Facility: HOSPITAL | Age: 75
End: 2020-05-28
Payer: MEDICARE

## 2020-05-28 ENCOUNTER — OUTPATIENT (OUTPATIENT)
Dept: OUTPATIENT SERVICES | Facility: HOSPITAL | Age: 75
LOS: 1 days | Discharge: HOME | End: 2020-05-28
Payer: COMMERCIAL

## 2020-05-28 VITALS
OXYGEN SATURATION: 97 % | SYSTOLIC BLOOD PRESSURE: 150 MMHG | BODY MASS INDEX: 29.4 KG/M2 | DIASTOLIC BLOOD PRESSURE: 82 MMHG | WEIGHT: 210 LBS | TEMPERATURE: 97.3 F | HEIGHT: 71 IN | RESPIRATION RATE: 20 BRPM | HEART RATE: 91 BPM

## 2020-05-28 DIAGNOSIS — Z98.49 CATARACT EXTRACTION STATUS, UNSPECIFIED EYE: Chronic | ICD-10-CM

## 2020-05-28 DIAGNOSIS — Z98.890 OTHER SPECIFIED POSTPROCEDURAL STATES: Chronic | ICD-10-CM

## 2020-05-28 DIAGNOSIS — L97.301 NON-PRESSURE CHRONIC ULCER OF UNSPECIFIED ANKLE LIMITED TO BREAKDOWN OF SKIN: ICD-10-CM

## 2020-05-28 PROCEDURE — 99213 OFFICE O/P EST LOW 20 MIN: CPT

## 2020-05-28 PROCEDURE — G0463: CPT

## 2020-05-28 NOTE — ASSESSMENT
[Verbal] : Verbal [Patient] : Patient [Good - alert, interested, motivated] : Good - alert, interested, motivated [Verbalizes knowledge/Understanding] : Verbalizes knowledge/understanding [Dressing changes] : dressing changes [Skin Care] : skin care [Signs and symptoms of infection] : sign and symptoms of infection [How and When to Call] : how and when to call [Compression Therapy] : compression therapy [Patient responsibility to plan of care] : patient responsibility to plan of care [Stable] : stable [Home] : Home [Ambulatory] : Ambulatory [Not Applicable - Long Term Care/Home Health Agency] : Long Term Care/Home Health Agency: Not Applicable [] : No [FreeTextEntry2] : Maintain optimal skin integrity\par  [FreeTextEntry4] : Pt to discontinue use of Lotrisone, may use OTC Lotrimin, Pt stated understanding\par Pt to call next week and speak with .

## 2020-05-28 NOTE — PHYSICAL EXAM
[1+] : left 1+ [Ankle Swelling (On Exam)] : present [Varicose Veins Of Lower Extremities] : present [] : present [Ankle Swelling On The Left] : moderate [Ankle Swelling Bilaterally] : severe [Skin Induration] : induration [Alert] : alert [Oriented to Person] : oriented to person [Calm] : calm [Oriented to Place] : oriented to place [Oriented to Time] : oriented to time [Purpura] : no purpura  [Petechiae] : no petechiae [Skin Ulcer] : no ulcer [de-identified] : comfortable  [de-identified] : WNL [de-identified] : HTN , HLD , CAD [de-identified] : hammer toes  [de-identified] : stasis dermatitis and erythema right foot and ankle resolving [de-identified] : wnl [FreeTextEntry1] : Right Lateral Ankle- Closed [de-identified] : Erythema [de-identified] : Cleansed with Normal Saline\par

## 2020-05-28 NOTE — REVIEW OF SYSTEMS
[Joint Stiffness] : joint stiffness [Skin Lesions] : skin lesion [Easy Bleeding] : a tendency for easy bleeding [Fever] : no fever [Chills] : no chills [Loss Of Hearing] : no hearing loss [Shortness Of Breath] : no shortness of breath [Wheezing] : no wheezing [Abdominal Pain] : no abdominal pain [Vomiting] : no vomiting [Confused] : no confusion [Anxiety] : no anxiety [FreeTextEntry5] : HTN, HLD , coronary artery disease  [de-identified] : erythema , stasis dermatitis right ankle [de-identified] : NIDDM

## 2020-05-28 NOTE — PLAN
[FreeTextEntry1] : Patient examined and evaluated at this time.\par Pt to d/c lotrisone at this time and to use lotrimin.\par Patient to follow up in 1 week.

## 2020-05-28 NOTE — HISTORY OF PRESENT ILLNESS
[FreeTextEntry1] : pt relates that 4 days prior, he noticed redness and mild swelling to the right ankle. denies any pain at this time. states that he has been using the lotrisone cream since discharge from the hospital 2 months prior. denies any other complaints at this time.

## 2020-05-31 DIAGNOSIS — Z79.84 LONG TERM (CURRENT) USE OF ORAL HYPOGLYCEMIC DRUGS: ICD-10-CM

## 2020-05-31 DIAGNOSIS — I83.893 VARICOSE VEINS OF BILATERAL LOWER EXTREMITIES WITH OTHER COMPLICATIONS: ICD-10-CM

## 2020-05-31 DIAGNOSIS — R21 RASH AND OTHER NONSPECIFIC SKIN ERUPTION: ICD-10-CM

## 2020-05-31 DIAGNOSIS — G47.30 SLEEP APNEA, UNSPECIFIED: ICD-10-CM

## 2020-05-31 DIAGNOSIS — I48.91 UNSPECIFIED ATRIAL FIBRILLATION: ICD-10-CM

## 2020-05-31 DIAGNOSIS — E11.59 TYPE 2 DIABETES MELLITUS WITH OTHER CIRCULATORY COMPLICATIONS: ICD-10-CM

## 2020-05-31 DIAGNOSIS — N20.0 CALCULUS OF KIDNEY: ICD-10-CM

## 2020-05-31 DIAGNOSIS — Z79.899 OTHER LONG TERM (CURRENT) DRUG THERAPY: ICD-10-CM

## 2020-05-31 DIAGNOSIS — Z79.82 LONG TERM (CURRENT) USE OF ASPIRIN: ICD-10-CM

## 2020-05-31 DIAGNOSIS — Z82.3 FAMILY HISTORY OF STROKE: ICD-10-CM

## 2020-05-31 DIAGNOSIS — R00.2 PALPITATIONS: ICD-10-CM

## 2020-05-31 DIAGNOSIS — I11.9 HYPERTENSIVE HEART DISEASE WITHOUT HEART FAILURE: ICD-10-CM

## 2020-05-31 DIAGNOSIS — E87.6 HYPOKALEMIA: ICD-10-CM

## 2020-05-31 DIAGNOSIS — E78.5 HYPERLIPIDEMIA, UNSPECIFIED: ICD-10-CM

## 2020-05-31 DIAGNOSIS — Z98.49 CATARACT EXTRACTION STATUS, UNSPECIFIED EYE: ICD-10-CM

## 2020-05-31 DIAGNOSIS — Z82.49 FAMILY HISTORY OF ISCHEMIC HEART DISEASE AND OTHER DISEASES OF THE CIRCULATORY SYSTEM: ICD-10-CM

## 2020-05-31 DIAGNOSIS — I83.11 VARICOSE VEINS OF RIGHT LOWER EXTREMITY WITH INFLAMMATION: ICD-10-CM

## 2020-07-29 ENCOUNTER — OUTPATIENT (OUTPATIENT)
Dept: OUTPATIENT SERVICES | Facility: HOSPITAL | Age: 75
LOS: 1 days | Discharge: HOME | End: 2020-07-29
Payer: COMMERCIAL

## 2020-07-29 ENCOUNTER — APPOINTMENT (OUTPATIENT)
Dept: WOUND CARE | Facility: HOSPITAL | Age: 75
End: 2020-07-29
Payer: MEDICARE

## 2020-07-29 VITALS — BODY MASS INDEX: 29.4 KG/M2 | WEIGHT: 210 LBS | HEIGHT: 71 IN

## 2020-07-29 VITALS
SYSTOLIC BLOOD PRESSURE: 137 MMHG | DIASTOLIC BLOOD PRESSURE: 76 MMHG | HEART RATE: 73 BPM | RESPIRATION RATE: 16 BRPM | TEMPERATURE: 97 F | OXYGEN SATURATION: 99 %

## 2020-07-29 DIAGNOSIS — N52.1 TYPE 2 DIABETES MELLITUS WITH OTHER CIRCULATORY COMPLICATIONS: ICD-10-CM

## 2020-07-29 DIAGNOSIS — Z98.890 OTHER SPECIFIED POSTPROCEDURAL STATES: Chronic | ICD-10-CM

## 2020-07-29 DIAGNOSIS — Z98.49 CATARACT EXTRACTION STATUS, UNSPECIFIED EYE: Chronic | ICD-10-CM

## 2020-07-29 DIAGNOSIS — E11.59 TYPE 2 DIABETES MELLITUS WITH OTHER CIRCULATORY COMPLICATIONS: ICD-10-CM

## 2020-07-29 DIAGNOSIS — S91.309D UNSPECIFIED OPEN WOUND, UNSPECIFIED FOOT, SUBSEQUENT ENCOUNTER: ICD-10-CM

## 2020-07-29 PROCEDURE — 29581 APPL MULTLAYER CMPRN SYS LEG: CPT | Mod: RT

## 2020-07-29 PROCEDURE — 99213 OFFICE O/P EST LOW 20 MIN: CPT

## 2020-07-29 NOTE — ASSESSMENT
[Written] : Written [Verbal] : Verbal [Patient] : Patient [Spouse] : Spouse [Verbalizes knowledge/Understanding] : Verbalizes knowledge/understanding [Good - alert, interested, motivated] : Good - alert, interested, motivated [Foot Care] : foot care [Dressing changes] : dressing changes [Skin Care] : skin care [Signs and symptoms of infection] : sign and symptoms of infection [How and When to Call] : how and when to call [Compression Therapy] : compression therapy [Patient responsibility to plan of care] : patient responsibility to plan of care [Stable] : stable [Home] : Home [Ambulatory] : Ambulatory [Not Applicable - Long Term Care/Home Health Agency] : Long Term Care/Home Health Agency: Not Applicable [] : No [FreeTextEntry2] : Infection prevention\par Maintain optimal skin integrity \par Compression therapy  [FreeTextEntry3] : Patient developed erythema and edema  [FreeTextEntry4] : F/U at [facility] in 1 week\par Patient was advised have vascular consult with Dr. Coburn\par Patient was advised to elevate throughout the day as tolerated \par

## 2020-07-29 NOTE — HISTORY OF PRESENT ILLNESS
[FreeTextEntry1] : pt seen for right ankle erythema and edema. pt relates that the redness and swelling comes and goes. pt relates that the swelling is less in the morning. relates that he had a neurological procedure that required an incision and access from his right groin toward his brain. pt relate that his right lower leg and ankle has intermittently become swollen and red on and off. relates he has minimal pain and denies any fever or nausea.

## 2020-07-29 NOTE — PLAN
[FreeTextEntry1] : Patient examined and evaluated at this time.\par Pt's symptoms appear to be less from an infectious process and more from a circulatory vs lymphedema. Pt also expresses that he has had issues with diuretics in the past.\par Referral to vascular surgery for evaluation.\par Patient to follow up in 1 week.

## 2020-07-29 NOTE — REVIEW OF SYSTEMS
[Fever] : no fever [Chills] : no chills [Loss Of Hearing] : no hearing loss [Shortness Of Breath] : no shortness of breath [Wheezing] : no wheezing [Abdominal Pain] : no abdominal pain [Vomiting] : no vomiting [Joint Stiffness] : joint stiffness [Skin Lesions] : skin lesion [Confused] : no confusion [Anxiety] : no anxiety [Easy Bleeding] : a tendency for easy bleeding [FreeTextEntry5] : HTN, HLD , coronary artery disease, right ankle edema [de-identified] : erythema, edema, stasis dermatitis right ankle [de-identified] : NIDDM

## 2020-07-29 NOTE — PHYSICAL EXAM
[1+] : left 1+ [Ankle Swelling (On Exam)] : present [Varicose Veins Of Lower Extremities] : bilaterally [Ankle Swelling On The Left] : moderate [] : of the right leg [Ankle Swelling Bilaterally] : severe [Purpura] : no purpura  [Petechiae] : no petechiae [Skin Ulcer] : no ulcer [Skin Induration] : induration [Alert] : alert [Oriented to Person] : oriented to person [Oriented to Place] : oriented to place [Oriented to Time] : oriented to time [Calm] : calm [de-identified] : comfortable  [de-identified] : WNL [de-identified] : HTN , HLD , CAD [de-identified] : hammer toes  [de-identified] : stasis dermatitis and erythema and edema of right ankle and lower leg [de-identified] : wnl [de-identified] : Edema noted \par No neurovascular deficit noted [FreeTextEntry1] : Lower Leg - no active wound [de-identified] : Dryness/flaky [de-identified] : Patient denies any pain or discomfort post Coban application  [de-identified] : No Medication  [de-identified] : Cleansed with Normal Saline  [TWNoteComboBox1] : Right [TWNoteComboBox4] : None [TWNoteComboBox5] : No [de-identified] : No [de-identified] : Erythema [de-identified] : None [de-identified] : None [de-identified] : No [de-identified] : Multilayer other compression wrap [de-identified] : Weekly [de-identified] : Compression

## 2020-07-30 DIAGNOSIS — R00.2 PALPITATIONS: ICD-10-CM

## 2020-07-30 DIAGNOSIS — E78.5 HYPERLIPIDEMIA, UNSPECIFIED: ICD-10-CM

## 2020-07-30 DIAGNOSIS — R21 RASH AND OTHER NONSPECIFIC SKIN ERUPTION: ICD-10-CM

## 2020-07-30 DIAGNOSIS — G47.30 SLEEP APNEA, UNSPECIFIED: ICD-10-CM

## 2020-07-30 DIAGNOSIS — I11.9 HYPERTENSIVE HEART DISEASE WITHOUT HEART FAILURE: ICD-10-CM

## 2020-07-30 DIAGNOSIS — Z98.49 CATARACT EXTRACTION STATUS, UNSPECIFIED EYE: ICD-10-CM

## 2020-07-30 DIAGNOSIS — Z79.82 LONG TERM (CURRENT) USE OF ASPIRIN: ICD-10-CM

## 2020-07-30 DIAGNOSIS — I48.91 UNSPECIFIED ATRIAL FIBRILLATION: ICD-10-CM

## 2020-07-30 DIAGNOSIS — Z79.899 OTHER LONG TERM (CURRENT) DRUG THERAPY: ICD-10-CM

## 2020-07-30 DIAGNOSIS — Z79.84 LONG TERM (CURRENT) USE OF ORAL HYPOGLYCEMIC DRUGS: ICD-10-CM

## 2020-07-30 DIAGNOSIS — I83.893 VARICOSE VEINS OF BILATERAL LOWER EXTREMITIES WITH OTHER COMPLICATIONS: ICD-10-CM

## 2020-07-30 DIAGNOSIS — E87.6 HYPOKALEMIA: ICD-10-CM

## 2020-07-30 DIAGNOSIS — E11.59 TYPE 2 DIABETES MELLITUS WITH OTHER CIRCULATORY COMPLICATIONS: ICD-10-CM

## 2020-07-30 DIAGNOSIS — N20.0 CALCULUS OF KIDNEY: ICD-10-CM

## 2020-07-30 DIAGNOSIS — Z82.49 FAMILY HISTORY OF ISCHEMIC HEART DISEASE AND OTHER DISEASES OF THE CIRCULATORY SYSTEM: ICD-10-CM

## 2020-07-30 DIAGNOSIS — I83.11 VARICOSE VEINS OF RIGHT LOWER EXTREMITY WITH INFLAMMATION: ICD-10-CM

## 2020-07-30 DIAGNOSIS — Z82.3 FAMILY HISTORY OF STROKE: ICD-10-CM

## 2020-08-05 ENCOUNTER — APPOINTMENT (OUTPATIENT)
Dept: WOUND CARE | Facility: HOSPITAL | Age: 75
End: 2020-08-05

## 2020-08-10 ENCOUNTER — OUTPATIENT (OUTPATIENT)
Dept: OUTPATIENT SERVICES | Facility: HOSPITAL | Age: 75
LOS: 1 days | Discharge: HOME | End: 2020-08-10
Payer: COMMERCIAL

## 2020-08-10 ENCOUNTER — APPOINTMENT (OUTPATIENT)
Dept: VASCULAR SURGERY | Facility: CLINIC | Age: 75
End: 2020-08-10
Payer: MEDICARE

## 2020-08-10 VITALS
TEMPERATURE: 97.1 F | BODY MASS INDEX: 29.4 KG/M2 | DIASTOLIC BLOOD PRESSURE: 75 MMHG | SYSTOLIC BLOOD PRESSURE: 138 MMHG | RESPIRATION RATE: 18 BRPM | HEART RATE: 65 BPM | WEIGHT: 210 LBS | HEIGHT: 71 IN | OXYGEN SATURATION: 99 %

## 2020-08-10 DIAGNOSIS — Z98.890 OTHER SPECIFIED POSTPROCEDURAL STATES: Chronic | ICD-10-CM

## 2020-08-10 DIAGNOSIS — R21 RASH AND OTHER NONSPECIFIC SKIN ERUPTION: ICD-10-CM

## 2020-08-10 DIAGNOSIS — Z98.49 CATARACT EXTRACTION STATUS, UNSPECIFIED EYE: Chronic | ICD-10-CM

## 2020-08-10 PROCEDURE — 99214 OFFICE O/P EST MOD 30 MIN: CPT

## 2020-08-10 PROCEDURE — 99203 OFFICE O/P NEW LOW 30 MIN: CPT

## 2020-08-10 PROCEDURE — 29581 APPL MULTLAYER CMPRN SYS LEG: CPT | Mod: RT

## 2020-08-10 NOTE — ASSESSMENT
[FreeTextEntry1] : 74 yo M with R ankle swelling and cellulitis. No clear etiology for swelling. No venous US available.

## 2020-08-10 NOTE — PHYSICAL EXAM
[2+] : right 2+ [Ankle Swelling (On Exam)] : present [Ankle Swelling On The Left] : moderate [Varicose Veins Of Lower Extremities] : bilaterally [Ankle Swelling On The Right] : mild [] : not present [Skin Induration] : induration [Alert] : alert [Oriented to Person] : oriented to person [Oriented to Place] : oriented to place [Oriented to Time] : oriented to time [Calm] : calm [FreeTextEntry1] : R foot-ankle erythema

## 2020-08-10 NOTE — HISTORY OF PRESENT ILLNESS
[FreeTextEntry1] : 74 yo M with hx of R ankle swelling and cellulitis requiring hospitalization and prolonged course of ab. Patient denies LLE swelling and does not have hx of chronic problems. 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 He has not had venous USs but PVRs that do not show severe PVD

## 2020-08-12 ENCOUNTER — APPOINTMENT (OUTPATIENT)
Dept: WOUND CARE | Facility: HOSPITAL | Age: 75
End: 2020-08-12
Payer: MEDICARE

## 2020-08-12 ENCOUNTER — OUTPATIENT (OUTPATIENT)
Dept: OUTPATIENT SERVICES | Facility: HOSPITAL | Age: 75
LOS: 1 days | Discharge: HOME | End: 2020-08-12
Payer: COMMERCIAL

## 2020-08-12 VITALS
TEMPERATURE: 97.3 F | HEIGHT: 71 IN | BODY MASS INDEX: 26.88 KG/M2 | WEIGHT: 192 LBS | DIASTOLIC BLOOD PRESSURE: 78 MMHG | OXYGEN SATURATION: 98 % | RESPIRATION RATE: 16 BRPM | SYSTOLIC BLOOD PRESSURE: 127 MMHG | HEART RATE: 68 BPM

## 2020-08-12 DIAGNOSIS — Z98.890 OTHER SPECIFIED POSTPROCEDURAL STATES: Chronic | ICD-10-CM

## 2020-08-12 DIAGNOSIS — Z98.49 CATARACT EXTRACTION STATUS, UNSPECIFIED EYE: Chronic | ICD-10-CM

## 2020-08-12 DIAGNOSIS — R21 RASH AND OTHER NONSPECIFIC SKIN ERUPTION: ICD-10-CM

## 2020-08-12 PROCEDURE — G0463: CPT

## 2020-08-12 PROCEDURE — 99213 OFFICE O/P EST LOW 20 MIN: CPT

## 2020-08-12 NOTE — REVIEW OF SYSTEMS
[Fever] : no fever [Chills] : no chills [Loss Of Hearing] : no hearing loss [Shortness Of Breath] : no shortness of breath [Wheezing] : no wheezing [Abdominal Pain] : no abdominal pain [Vomiting] : no vomiting [Joint Stiffness] : joint stiffness [Skin Lesions] : skin lesion [Confused] : no confusion [Anxiety] : no anxiety [Easy Bleeding] : a tendency for easy bleeding [FreeTextEntry5] : HTN, HLD , coronary artery disease, right ankle edema [de-identified] : erythema, edema, stasis dermatitis right ankle [de-identified] : NIDDM

## 2020-08-12 NOTE — VITALS
[de-identified] : Pain scale:  0/10 - Patient reports no c/o pains or discomforts at present.  Patient reports right lower leg / foot is tender to the touch.

## 2020-08-12 NOTE — PLAN
[FreeTextEntry1] : Patient examined and evaluated at this time.\par Patient to obtain venous vascular studies.\par Patient relates that he will be going on vacation for one week. Will obtain venous studies after vacation.\par Patient to follow up after patient's vacation and venous study with vascular follow up.

## 2020-08-12 NOTE — VITALS
[de-identified] : Pain scale:  0/10 - Patient reports no c/o pains or discomforts at present.  Patient reports right lower leg / foot is tender to the touch.

## 2020-08-12 NOTE — ASSESSMENT
[Verbal] : Verbal [Handout] : Handout [Demo] : Demo [Good - alert, interested, motivated] : Good - alert, interested, motivated [Patient] : Patient [Spouse] : Spouse [Verbalizes knowledge/Understanding] : Verbalizes knowledge/understanding [Dressing changes] : dressing changes [Skin Care] : skin care [Signs and symptoms of infection] : sign and symptoms of infection [Venous Disease] : venous disease [Patient responsibility to plan of care] : patient responsibility to plan of care [How and When to Call] : how and when to call [Compression Therapy] : compression therapy [] : No [FreeTextEntry2] : Promote optimal skin integrity, infection prevention, edema control [FreeTextEntry4] : Patient saw Dr. Brothers on Monday and reports that he is scheduled to have a venous US performed on 8/26/20\par Patient reports that he is going on vacation and would like to substitute ace bandage instead of Coban.   Ace bandage applied today as per DPM.\par Patient reports that he will not be able to return for 3 weeks.  DPM aware. Instructed patient to contact [facility] if condition worsens.  Patient verbalized understanding.\par f/u 3 weeks

## 2020-08-12 NOTE — PHYSICAL EXAM
[4 x 4] : 4 x 4  [1+] : left 1+ [Ankle Swelling (On Exam)] : present [Varicose Veins Of Lower Extremities] : bilaterally [Ankle Swelling On The Left] : moderate [] : of the right leg [Ankle Swelling Bilaterally] : severe [Purpura] : no purpura  [Petechiae] : no petechiae [Skin Ulcer] : no ulcer [Skin Induration] : induration [Alert] : alert [Oriented to Person] : oriented to person [Oriented to Place] : oriented to place [Oriented to Time] : oriented to time [Calm] : calm [de-identified] : comfortable  [de-identified] : WNL [de-identified] : HTN , HLD , CAD [de-identified] : hammer toes  [de-identified] : stasis dermatitis and erythema and edema of right ankle and lower leg [de-identified] : wnl [FreeTextEntry1] : Right lower leg / foot - no open wound - mild erythema - flaky skin [de-identified] : gauze / danika [de-identified] : NSC [TWNoteComboBox4] : None [de-identified] : Ace wraps

## 2020-08-12 NOTE — PHYSICAL EXAM
[4 x 4] : 4 x 4  [1+] : left 1+ [Ankle Swelling (On Exam)] : present [Varicose Veins Of Lower Extremities] : bilaterally [Ankle Swelling On The Left] : moderate [] : of the right leg [Ankle Swelling Bilaterally] : severe [Purpura] : no purpura  [Petechiae] : no petechiae [Skin Ulcer] : no ulcer [Skin Induration] : induration [Alert] : alert [Oriented to Person] : oriented to person [Oriented to Place] : oriented to place [Oriented to Time] : oriented to time [Calm] : calm [de-identified] : comfortable  [de-identified] : WNL [de-identified] : HTN , HLD , CAD [de-identified] : hammer toes  [de-identified] : stasis dermatitis and erythema and edema of right ankle and lower leg [de-identified] : wnl [FreeTextEntry1] : Right lower leg / foot - no open wound - mild erythema - flaky skin [de-identified] : gauze / danika [de-identified] : NSC [TWNoteComboBox4] : None [de-identified] : Ace wraps

## 2020-08-12 NOTE — ASSESSMENT
[Verbal] : Verbal [Handout] : Handout [Demo] : Demo [Patient] : Patient [Spouse] : Spouse [Good - alert, interested, motivated] : Good - alert, interested, motivated [Dressing changes] : dressing changes [Verbalizes knowledge/Understanding] : Verbalizes knowledge/understanding [Signs and symptoms of infection] : sign and symptoms of infection [Skin Care] : skin care [Venous Disease] : venous disease [Compression Therapy] : compression therapy [How and When to Call] : how and when to call [Patient responsibility to plan of care] : patient responsibility to plan of care [] : No [FreeTextEntry2] : Promote optimal skin integrity, infection prevention, edema control [FreeTextEntry4] : Patient saw Dr. Brothers on Monday and reports that he is scheduled to have a venous US performed on 8/26/20\par Patient reports that he is going on vacation and would like to substitute ace bandage instead of Coban.   Ace bandage applied today as per DPM.\par Patient reports that he will not be able to return for 3 weeks.  DPM aware. Instructed patient to contact [facility] if condition worsens.  Patient verbalized understanding.\par f/u 3 weeks

## 2020-08-12 NOTE — REVIEW OF SYSTEMS
[Fever] : no fever [Chills] : no chills [Loss Of Hearing] : no hearing loss [Shortness Of Breath] : no shortness of breath [Wheezing] : no wheezing [Abdominal Pain] : no abdominal pain [Vomiting] : no vomiting [Joint Stiffness] : joint stiffness [Skin Lesions] : skin lesion [Confused] : no confusion [Anxiety] : no anxiety [Easy Bleeding] : a tendency for easy bleeding [FreeTextEntry5] : HTN, HLD , coronary artery disease, right ankle edema [de-identified] : erythema, edema, stasis dermatitis right ankle [de-identified] : NIDDM

## 2020-08-13 DIAGNOSIS — I83.893 VARICOSE VEINS OF BILATERAL LOWER EXTREMITIES WITH OTHER COMPLICATIONS: ICD-10-CM

## 2020-08-13 DIAGNOSIS — Z79.84 LONG TERM (CURRENT) USE OF ORAL HYPOGLYCEMIC DRUGS: ICD-10-CM

## 2020-08-13 DIAGNOSIS — G47.30 SLEEP APNEA, UNSPECIFIED: ICD-10-CM

## 2020-08-13 DIAGNOSIS — I83.11 VARICOSE VEINS OF RIGHT LOWER EXTREMITY WITH INFLAMMATION: ICD-10-CM

## 2020-08-13 DIAGNOSIS — R00.2 PALPITATIONS: ICD-10-CM

## 2020-08-13 DIAGNOSIS — I11.9 HYPERTENSIVE HEART DISEASE WITHOUT HEART FAILURE: ICD-10-CM

## 2020-08-13 DIAGNOSIS — E78.5 HYPERLIPIDEMIA, UNSPECIFIED: ICD-10-CM

## 2020-08-13 DIAGNOSIS — N20.0 CALCULUS OF KIDNEY: ICD-10-CM

## 2020-08-13 DIAGNOSIS — E87.6 HYPOKALEMIA: ICD-10-CM

## 2020-08-13 DIAGNOSIS — I48.91 UNSPECIFIED ATRIAL FIBRILLATION: ICD-10-CM

## 2020-08-13 DIAGNOSIS — E11.59 TYPE 2 DIABETES MELLITUS WITH OTHER CIRCULATORY COMPLICATIONS: ICD-10-CM

## 2020-08-13 DIAGNOSIS — Z79.82 LONG TERM (CURRENT) USE OF ASPIRIN: ICD-10-CM

## 2020-08-13 DIAGNOSIS — Z79.899 OTHER LONG TERM (CURRENT) DRUG THERAPY: ICD-10-CM

## 2020-08-13 DIAGNOSIS — Z98.49 CATARACT EXTRACTION STATUS, UNSPECIFIED EYE: ICD-10-CM

## 2020-08-13 DIAGNOSIS — R21 RASH AND OTHER NONSPECIFIC SKIN ERUPTION: ICD-10-CM

## 2020-08-13 DIAGNOSIS — Z82.49 FAMILY HISTORY OF ISCHEMIC HEART DISEASE AND OTHER DISEASES OF THE CIRCULATORY SYSTEM: ICD-10-CM

## 2020-08-13 DIAGNOSIS — Z82.3 FAMILY HISTORY OF STROKE: ICD-10-CM

## 2020-08-26 ENCOUNTER — OUTPATIENT (OUTPATIENT)
Dept: OUTPATIENT SERVICES | Facility: HOSPITAL | Age: 75
LOS: 1 days | End: 2020-08-26
Payer: COMMERCIAL

## 2020-08-26 ENCOUNTER — RESULT REVIEW (OUTPATIENT)
Age: 75
End: 2020-08-26

## 2020-08-26 DIAGNOSIS — I83.893 VARICOSE VEINS OF BILATERAL LOWER EXTREMITIES WITH OTHER COMPLICATIONS: ICD-10-CM

## 2020-08-26 DIAGNOSIS — Z98.49 CATARACT EXTRACTION STATUS, UNSPECIFIED EYE: Chronic | ICD-10-CM

## 2020-08-26 DIAGNOSIS — Z98.890 OTHER SPECIFIED POSTPROCEDURAL STATES: Chronic | ICD-10-CM

## 2020-08-26 PROCEDURE — 93971 EXTREMITY STUDY: CPT | Mod: 26

## 2020-08-26 PROCEDURE — 93970 EXTREMITY STUDY: CPT

## 2020-08-31 ENCOUNTER — APPOINTMENT (OUTPATIENT)
Dept: VASCULAR SURGERY | Facility: CLINIC | Age: 75
End: 2020-08-31
Payer: MEDICARE

## 2020-08-31 ENCOUNTER — OUTPATIENT (OUTPATIENT)
Dept: OUTPATIENT SERVICES | Facility: HOSPITAL | Age: 75
LOS: 1 days | Discharge: HOME | End: 2020-08-31
Payer: COMMERCIAL

## 2020-08-31 VITALS
HEIGHT: 71 IN | DIASTOLIC BLOOD PRESSURE: 83 MMHG | TEMPERATURE: 97.4 F | HEART RATE: 82 BPM | OXYGEN SATURATION: 99 % | RESPIRATION RATE: 18 BRPM | BODY MASS INDEX: 26.88 KG/M2 | SYSTOLIC BLOOD PRESSURE: 135 MMHG | WEIGHT: 192 LBS

## 2020-08-31 DIAGNOSIS — R21 RASH AND OTHER NONSPECIFIC SKIN ERUPTION: ICD-10-CM

## 2020-08-31 DIAGNOSIS — I83.893 VARICOSE VEINS OF BILATERAL LOWER EXTREMITIES WITH OTHER COMPLICATIONS: ICD-10-CM

## 2020-08-31 DIAGNOSIS — Z98.49 CATARACT EXTRACTION STATUS, UNSPECIFIED EYE: ICD-10-CM

## 2020-08-31 DIAGNOSIS — Z87.891 PERSONAL HISTORY OF NICOTINE DEPENDENCE: ICD-10-CM

## 2020-08-31 DIAGNOSIS — Z98.49 CATARACT EXTRACTION STATUS, UNSPECIFIED EYE: Chronic | ICD-10-CM

## 2020-08-31 DIAGNOSIS — L03.119 CELLULITIS OF UNSPECIFIED PART OF LIMB: ICD-10-CM

## 2020-08-31 DIAGNOSIS — L03.115 CELLULITIS OF RIGHT LOWER LIMB: ICD-10-CM

## 2020-08-31 DIAGNOSIS — Z79.82 LONG TERM (CURRENT) USE OF ASPIRIN: ICD-10-CM

## 2020-08-31 DIAGNOSIS — Z79.899 OTHER LONG TERM (CURRENT) DRUG THERAPY: ICD-10-CM

## 2020-08-31 DIAGNOSIS — Z98.890 OTHER SPECIFIED POSTPROCEDURAL STATES: Chronic | ICD-10-CM

## 2020-08-31 PROCEDURE — G0463: CPT

## 2020-08-31 PROCEDURE — 99213 OFFICE O/P EST LOW 20 MIN: CPT

## 2020-08-31 NOTE — DATA REVIEWED
[FreeTextEntry1] : LEV 8/2020: No DVT nor venous insuf. \par \par EXAM: PHYSIOL EXTREM LOW 3+ LEV BI \par \par \par PROCEDURE DATE: 03/02/2020 \par \par \par \par INTERPRETATION: History:Nonpalpable pulses. Prior smoker with hypertension \par and diabetes. Right lower extremity claudication \par \par Technique: Bilateral ALEX/PVR \par \par Comparison: None \par \par Findings: \par \par RIGHT \par ALEX: 1.26 \par Flow study: Good flow from the high thigh through the great toe. \par \par LEFT \par ALEX: 1.27 \par Flow study: Good flow from the high thigh through the great toe. \par \par \par Impression: \par \par Calcified lower extremity arteries \par \par No evidence of large vessel hemodynamically significant lower extremity \par arterial disease at rest.

## 2020-08-31 NOTE — ASSESSMENT
[FreeTextEntry1] : 76 yo M with R ankle swelling and cellulitis. No clear etiology for swelling. No venous US available. \par \par US does not show venous insuf. Cellulitis likely represents chronic infection. Patient does not report chronic swelling in that leg prior to episode. No hx of lymphedema

## 2020-08-31 NOTE — PHYSICAL EXAM
[2+] : right 2+ [Ankle Swelling (On Exam)] : present [Ankle Swelling On The Left] : moderate [Varicose Veins Of Lower Extremities] : present [Ankle Swelling On The Right] : mild [] : not present [Skin Induration] : induration [Alert] : alert [Oriented to Person] : oriented to person [Oriented to Place] : oriented to place [Oriented to Time] : oriented to time [Calm] : calm [de-identified] : R ankle erythema, + 2 swelling, dry skin, no open wound, normal temp, non tender

## 2020-08-31 NOTE — REASON FOR VISIT
[Follow-Up: _____] : a [unfilled] follow-up visit [Consultation] : a consultation visit [Spouse] : spouse [FreeTextEntry1] : R ankle swelling

## 2020-08-31 NOTE — HISTORY OF PRESENT ILLNESS
[FreeTextEntry1] : 74 yo M with hx of R ankle swelling and cellulitis requiring hospitalization and prolonged course of ab. Patient denies LLE swelling and does not have hx of chronic problems. 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 He has not had venous USs but PVRs that do not show severe PVD [de-identified] : Today to review US. RLE is still erythematous. No significant clinical changes from last visit.

## 2020-09-02 ENCOUNTER — OUTPATIENT (OUTPATIENT)
Dept: OUTPATIENT SERVICES | Facility: HOSPITAL | Age: 75
LOS: 1 days | Discharge: HOME | End: 2020-09-02
Payer: COMMERCIAL

## 2020-09-02 ENCOUNTER — APPOINTMENT (OUTPATIENT)
Dept: WOUND CARE | Facility: HOSPITAL | Age: 75
End: 2020-09-02
Payer: MEDICARE

## 2020-09-02 VITALS
HEART RATE: 75 BPM | HEIGHT: 71 IN | SYSTOLIC BLOOD PRESSURE: 161 MMHG | OXYGEN SATURATION: 99 % | DIASTOLIC BLOOD PRESSURE: 76 MMHG | WEIGHT: 192 LBS | TEMPERATURE: 98.5 F | BODY MASS INDEX: 26.88 KG/M2 | RESPIRATION RATE: 20 BRPM

## 2020-09-02 DIAGNOSIS — Z98.890 OTHER SPECIFIED POSTPROCEDURAL STATES: Chronic | ICD-10-CM

## 2020-09-02 DIAGNOSIS — Z98.49 CATARACT EXTRACTION STATUS, UNSPECIFIED EYE: Chronic | ICD-10-CM

## 2020-09-02 DIAGNOSIS — R21 RASH AND OTHER NONSPECIFIC SKIN ERUPTION: ICD-10-CM

## 2020-09-02 PROCEDURE — 99213 OFFICE O/P EST LOW 20 MIN: CPT

## 2020-09-02 PROCEDURE — G0463: CPT

## 2020-09-02 NOTE — REVIEW OF SYSTEMS
[Joint Stiffness] : joint stiffness [Skin Lesions] : skin lesion [Easy Bleeding] : a tendency for easy bleeding [Chills] : no chills [Fever] : no fever [Shortness Of Breath] : no shortness of breath [Loss Of Hearing] : no hearing loss [Abdominal Pain] : no abdominal pain [Wheezing] : no wheezing [Vomiting] : no vomiting [Confused] : no confusion [Anxiety] : no anxiety [FreeTextEntry5] : HTN, HLD , coronary artery disease, right ankle edema [de-identified] : erythema, edema, stasis dermatitis right ankle [de-identified] : NIDDM

## 2020-09-02 NOTE — PLAN
[FreeTextEntry1] : Patient examined and evaluated at this time.\par Referral to ID at this time.\par Pt to follow up in 1 week.

## 2020-09-02 NOTE — PHYSICAL EXAM
[1+] : left 1+ [Ankle Swelling (On Exam)] : present [Ankle Swelling On The Left] : moderate [Varicose Veins Of Lower Extremities] : bilaterally [] : of the right leg [Ankle Swelling Bilaterally] : severe [Skin Induration] : induration [Alert] : alert [Oriented to Person] : oriented to person [Oriented to Place] : oriented to place [Calm] : calm [Oriented to Time] : oriented to time [Purpura] : no purpura  [Petechiae] : no petechiae [Skin Ulcer] : no ulcer [de-identified] : HTN , HLD , CAD [de-identified] : comfortable  [de-identified] : WNL [de-identified] : stasis dermatitis and erythema and edema of right ankle and lower leg [de-identified] : hammer toes  [de-identified] : wnl [de-identified] : No Product [FreeTextEntry1] : Right Lower Leg/Foot- No open Wound [de-identified] : Cleansed with Normal Saline\par Kerlix [de-identified] : Erythema

## 2020-09-02 NOTE — ASSESSMENT
[Verbal] : Verbal [Patient] : Patient [Good - alert, interested, motivated] : Good - alert, interested, motivated [Verbalizes knowledge/Understanding] : Verbalizes knowledge/understanding [Dressing changes] : dressing changes [Foot Care] : foot care [Skin Care] : skin care [Signs and symptoms of infection] : sign and symptoms of infection [Patient responsibility to plan of care] : patient responsibility to plan of care [How and When to Call] : how and when to call [] : Yes [Stable] : stable [Home] : Home [Ambulatory] : Ambulatory [Not Applicable - Long Term Care/Home Health Agency] : Long Term Care/Home Health Agency: Not Applicable [FreeTextEntry2] : Restore Skin Integrity\par Infection Control\par Localized wound care\par Maintain acceptable pain levels at satisfactory relief.\par Demonstrates use of both nonpharmacological and pharmacological pain relief strategies [FreeTextEntry4] : Photos Taken\par DPM discussed ID consult with Pt. ID Dr. Haywood unavailable today, Consult submitted for next [facility] Visit.\par F/U to [facility] in 1 week

## 2020-09-03 DIAGNOSIS — R00.2 PALPITATIONS: ICD-10-CM

## 2020-09-03 DIAGNOSIS — Z82.49 FAMILY HISTORY OF ISCHEMIC HEART DISEASE AND OTHER DISEASES OF THE CIRCULATORY SYSTEM: ICD-10-CM

## 2020-09-03 DIAGNOSIS — I11.9 HYPERTENSIVE HEART DISEASE WITHOUT HEART FAILURE: ICD-10-CM

## 2020-09-03 DIAGNOSIS — N20.0 CALCULUS OF KIDNEY: ICD-10-CM

## 2020-09-03 DIAGNOSIS — E78.5 HYPERLIPIDEMIA, UNSPECIFIED: ICD-10-CM

## 2020-09-03 DIAGNOSIS — E11.59 TYPE 2 DIABETES MELLITUS WITH OTHER CIRCULATORY COMPLICATIONS: ICD-10-CM

## 2020-09-03 DIAGNOSIS — I48.91 UNSPECIFIED ATRIAL FIBRILLATION: ICD-10-CM

## 2020-09-03 DIAGNOSIS — Z98.49 CATARACT EXTRACTION STATUS, UNSPECIFIED EYE: ICD-10-CM

## 2020-09-03 DIAGNOSIS — E87.6 HYPOKALEMIA: ICD-10-CM

## 2020-09-03 DIAGNOSIS — Z79.84 LONG TERM (CURRENT) USE OF ORAL HYPOGLYCEMIC DRUGS: ICD-10-CM

## 2020-09-03 DIAGNOSIS — Z79.82 LONG TERM (CURRENT) USE OF ASPIRIN: ICD-10-CM

## 2020-09-03 DIAGNOSIS — Z79.899 OTHER LONG TERM (CURRENT) DRUG THERAPY: ICD-10-CM

## 2020-09-03 DIAGNOSIS — I83.11 VARICOSE VEINS OF RIGHT LOWER EXTREMITY WITH INFLAMMATION: ICD-10-CM

## 2020-09-03 DIAGNOSIS — I83.893 VARICOSE VEINS OF BILATERAL LOWER EXTREMITIES WITH OTHER COMPLICATIONS: ICD-10-CM

## 2020-09-03 DIAGNOSIS — G47.30 SLEEP APNEA, UNSPECIFIED: ICD-10-CM

## 2020-09-03 DIAGNOSIS — Z82.3 FAMILY HISTORY OF STROKE: ICD-10-CM

## 2020-10-20 ENCOUNTER — INPATIENT (INPATIENT)
Facility: HOSPITAL | Age: 75
LOS: 0 days | Discharge: HOME | DRG: 644 | End: 2020-10-21
Attending: INTERNAL MEDICINE | Admitting: INTERNAL MEDICINE
Payer: COMMERCIAL

## 2020-10-20 VITALS
HEART RATE: 89 BPM | RESPIRATION RATE: 19 BRPM | DIASTOLIC BLOOD PRESSURE: 91 MMHG | TEMPERATURE: 98 F | SYSTOLIC BLOOD PRESSURE: 168 MMHG | WEIGHT: 188.94 LBS | HEIGHT: 71 IN | OXYGEN SATURATION: 100 %

## 2020-10-20 DIAGNOSIS — Z98.890 OTHER SPECIFIED POSTPROCEDURAL STATES: Chronic | ICD-10-CM

## 2020-10-20 DIAGNOSIS — Z98.49 CATARACT EXTRACTION STATUS, UNSPECIFIED EYE: Chronic | ICD-10-CM

## 2020-10-20 DIAGNOSIS — R42 DIZZINESS AND GIDDINESS: ICD-10-CM

## 2020-10-20 LAB
ALBUMIN SERPL ELPH-MCNC: 3.4 G/DL — SIGNIFICANT CHANGE UP (ref 3.3–5)
ALBUMIN SERPL ELPH-MCNC: 3.6 G/DL — SIGNIFICANT CHANGE UP (ref 3.3–5)
ALP SERPL-CCNC: 49 U/L — SIGNIFICANT CHANGE UP (ref 40–120)
ALP SERPL-CCNC: 49 U/L — SIGNIFICANT CHANGE UP (ref 40–120)
ALT FLD-CCNC: 12 U/L — SIGNIFICANT CHANGE UP (ref 10–45)
ALT FLD-CCNC: 16 U/L — SIGNIFICANT CHANGE UP (ref 10–45)
ANION GAP SERPL CALC-SCNC: 8 MMOL/L — SIGNIFICANT CHANGE UP (ref 5–17)
ANION GAP SERPL CALC-SCNC: 9 MMOL/L — SIGNIFICANT CHANGE UP (ref 5–17)
APPEARANCE UR: CLEAR — SIGNIFICANT CHANGE UP
APTT BLD: 32.1 SEC — SIGNIFICANT CHANGE UP (ref 27.5–35.5)
AST SERPL-CCNC: 22 U/L — SIGNIFICANT CHANGE UP (ref 10–40)
AST SERPL-CCNC: 22 U/L — SIGNIFICANT CHANGE UP (ref 10–40)
BACTERIA # UR AUTO: ABNORMAL /HPF
BASOPHILS # BLD AUTO: 0.06 K/UL — SIGNIFICANT CHANGE UP (ref 0–0.2)
BASOPHILS NFR BLD AUTO: 0.7 % — SIGNIFICANT CHANGE UP (ref 0–2)
BILIRUB SERPL-MCNC: 0.4 MG/DL — SIGNIFICANT CHANGE UP (ref 0.2–1.2)
BILIRUB SERPL-MCNC: 0.5 MG/DL — SIGNIFICANT CHANGE UP (ref 0.2–1.2)
BILIRUB UR-MCNC: NEGATIVE — SIGNIFICANT CHANGE UP
BUN SERPL-MCNC: 35 MG/DL — HIGH (ref 7–23)
BUN SERPL-MCNC: 41 MG/DL — HIGH (ref 7–23)
CALCIUM SERPL-MCNC: 8.9 MG/DL — SIGNIFICANT CHANGE UP (ref 8.4–10.5)
CALCIUM SERPL-MCNC: 9.5 MG/DL — SIGNIFICANT CHANGE UP (ref 8.4–10.5)
CHLORIDE SERPL-SCNC: 101 MMOL/L — SIGNIFICANT CHANGE UP (ref 96–108)
CHLORIDE SERPL-SCNC: 104 MMOL/L — SIGNIFICANT CHANGE UP (ref 96–108)
CO2 SERPL-SCNC: 22 MMOL/L — SIGNIFICANT CHANGE UP (ref 22–31)
CO2 SERPL-SCNC: 29 MMOL/L — SIGNIFICANT CHANGE UP (ref 22–31)
COLOR SPEC: YELLOW — SIGNIFICANT CHANGE UP
CREAT SERPL-MCNC: 1.92 MG/DL — HIGH (ref 0.5–1.3)
CREAT SERPL-MCNC: 2.12 MG/DL — HIGH (ref 0.5–1.3)
DIFF PNL FLD: ABNORMAL
EOSINOPHIL # BLD AUTO: 0.25 K/UL — SIGNIFICANT CHANGE UP (ref 0–0.5)
EOSINOPHIL NFR BLD AUTO: 2.8 % — SIGNIFICANT CHANGE UP (ref 0–6)
EPI CELLS # UR: SIGNIFICANT CHANGE UP
FERRITIN SERPL-MCNC: 120 NG/ML — SIGNIFICANT CHANGE UP (ref 30–400)
FOLATE SERPL-MCNC: >20 NG/ML — SIGNIFICANT CHANGE UP
GLUCOSE BLDC GLUCOMTR-MCNC: 123 MG/DL — HIGH (ref 70–99)
GLUCOSE BLDC GLUCOMTR-MCNC: 134 MG/DL — HIGH (ref 70–99)
GLUCOSE BLDC GLUCOMTR-MCNC: 136 MG/DL — HIGH (ref 70–99)
GLUCOSE BLDC GLUCOMTR-MCNC: 181 MG/DL — HIGH (ref 70–99)
GLUCOSE BLDC GLUCOMTR-MCNC: 223 MG/DL — HIGH (ref 70–99)
GLUCOSE BLDC GLUCOMTR-MCNC: 48 MG/DL — LOW (ref 70–99)
GLUCOSE BLDC GLUCOMTR-MCNC: 56 MG/DL — LOW (ref 70–99)
GLUCOSE SERPL-MCNC: 181 MG/DL — HIGH (ref 70–99)
GLUCOSE SERPL-MCNC: 55 MG/DL — LOW (ref 70–99)
GLUCOSE UR QL: NEGATIVE — SIGNIFICANT CHANGE UP
HCT VFR BLD CALC: 41.6 % — SIGNIFICANT CHANGE UP (ref 39–50)
HCT VFR BLD CALC: 44.3 % — SIGNIFICANT CHANGE UP (ref 39–50)
HGB BLD-MCNC: 14 G/DL — SIGNIFICANT CHANGE UP (ref 13–17)
HGB BLD-MCNC: 14.7 G/DL — SIGNIFICANT CHANGE UP (ref 13–17)
IMM GRANULOCYTES NFR BLD AUTO: 1.3 % — SIGNIFICANT CHANGE UP (ref 0–1.5)
INR BLD: 0.96 RATIO — SIGNIFICANT CHANGE UP (ref 0.88–1.16)
IRON SATN MFR SERPL: 69 UG/DL — SIGNIFICANT CHANGE UP (ref 45–165)
KETONES UR-MCNC: NEGATIVE — SIGNIFICANT CHANGE UP
LEUKOCYTE ESTERASE UR-ACNC: NEGATIVE — SIGNIFICANT CHANGE UP
LYMPHOCYTES # BLD AUTO: 0.89 K/UL — LOW (ref 1–3.3)
LYMPHOCYTES # BLD AUTO: 9.9 % — LOW (ref 13–44)
MAGNESIUM SERPL-MCNC: 1.9 MG/DL — SIGNIFICANT CHANGE UP (ref 1.6–2.6)
MCHC RBC-ENTMCNC: 31.7 PG — SIGNIFICANT CHANGE UP (ref 27–34)
MCHC RBC-ENTMCNC: 32 PG — SIGNIFICANT CHANGE UP (ref 27–34)
MCHC RBC-ENTMCNC: 33.2 GM/DL — SIGNIFICANT CHANGE UP (ref 32–36)
MCHC RBC-ENTMCNC: 33.7 GM/DL — SIGNIFICANT CHANGE UP (ref 32–36)
MCV RBC AUTO: 95 FL — SIGNIFICANT CHANGE UP (ref 80–100)
MCV RBC AUTO: 95.5 FL — SIGNIFICANT CHANGE UP (ref 80–100)
MONOCYTES # BLD AUTO: 0.65 K/UL — SIGNIFICANT CHANGE UP (ref 0–0.9)
MONOCYTES NFR BLD AUTO: 7.2 % — SIGNIFICANT CHANGE UP (ref 2–14)
NEUTROPHILS # BLD AUTO: 7.04 K/UL — SIGNIFICANT CHANGE UP (ref 1.8–7.4)
NEUTROPHILS NFR BLD AUTO: 78.1 % — HIGH (ref 43–77)
NITRITE UR-MCNC: NEGATIVE — SIGNIFICANT CHANGE UP
NRBC # BLD: 0 /100 WBCS — SIGNIFICANT CHANGE UP (ref 0–0)
NRBC # BLD: 0 /100 WBCS — SIGNIFICANT CHANGE UP (ref 0–0)
PH UR: 6 — SIGNIFICANT CHANGE UP (ref 5–8)
PHOSPHATE SERPL-MCNC: 3 MG/DL — SIGNIFICANT CHANGE UP (ref 2.5–4.5)
PLATELET # BLD AUTO: 264 K/UL — SIGNIFICANT CHANGE UP (ref 150–400)
PLATELET # BLD AUTO: 280 K/UL — SIGNIFICANT CHANGE UP (ref 150–400)
POTASSIUM SERPL-MCNC: 3.4 MMOL/L — LOW (ref 3.5–5.3)
POTASSIUM SERPL-MCNC: 4.3 MMOL/L — SIGNIFICANT CHANGE UP (ref 3.5–5.3)
POTASSIUM SERPL-SCNC: 3.4 MMOL/L — LOW (ref 3.5–5.3)
POTASSIUM SERPL-SCNC: 4.3 MMOL/L — SIGNIFICANT CHANGE UP (ref 3.5–5.3)
PROT SERPL-MCNC: 7.8 G/DL — SIGNIFICANT CHANGE UP (ref 6–8.3)
PROT SERPL-MCNC: 8.3 G/DL — SIGNIFICANT CHANGE UP (ref 6–8.3)
PROT UR-MCNC: 15
PROTHROM AB SERPL-ACNC: 11.6 SEC — SIGNIFICANT CHANGE UP (ref 10.6–13.6)
RBC # BLD: 4.38 M/UL — SIGNIFICANT CHANGE UP (ref 4.2–5.8)
RBC # BLD: 4.64 M/UL — SIGNIFICANT CHANGE UP (ref 4.2–5.8)
RBC # FLD: 13.8 % — SIGNIFICANT CHANGE UP (ref 10.3–14.5)
RBC # FLD: 13.9 % — SIGNIFICANT CHANGE UP (ref 10.3–14.5)
RBC CASTS # UR COMP ASSIST: SIGNIFICANT CHANGE UP /HPF (ref 0–4)
SARS-COV-2 IGG SERPL QL IA: NEGATIVE — SIGNIFICANT CHANGE UP
SARS-COV-2 IGM SERPL IA-ACNC: <0.1 INDEX — SIGNIFICANT CHANGE UP
SARS-COV-2 RNA SPEC QL NAA+PROBE: SIGNIFICANT CHANGE UP
SODIUM SERPL-SCNC: 135 MMOL/L — SIGNIFICANT CHANGE UP (ref 135–145)
SODIUM SERPL-SCNC: 138 MMOL/L — SIGNIFICANT CHANGE UP (ref 135–145)
SP GR SPEC: 1.01 — SIGNIFICANT CHANGE UP (ref 1.01–1.02)
TROPONIN I SERPL-MCNC: <.017 NG/ML — LOW (ref 0.02–0.06)
TSH SERPL-MCNC: 1.36 UIU/ML — SIGNIFICANT CHANGE UP (ref 0.27–4.2)
UROBILINOGEN FLD QL: NEGATIVE — SIGNIFICANT CHANGE UP
VIT B12 SERPL-MCNC: 832 PG/ML — SIGNIFICANT CHANGE UP (ref 232–1245)
WBC # BLD: 8.31 K/UL — SIGNIFICANT CHANGE UP (ref 3.8–10.5)
WBC # BLD: 9.01 K/UL — SIGNIFICANT CHANGE UP (ref 3.8–10.5)
WBC # FLD AUTO: 8.31 K/UL — SIGNIFICANT CHANGE UP (ref 3.8–10.5)
WBC # FLD AUTO: 9.01 K/UL — SIGNIFICANT CHANGE UP (ref 3.8–10.5)
WBC UR QL: NEGATIVE /HPF — SIGNIFICANT CHANGE UP (ref 0–5)

## 2020-10-20 PROCEDURE — 93971 EXTREMITY STUDY: CPT | Mod: 26,RT

## 2020-10-20 PROCEDURE — 76775 US EXAM ABDO BACK WALL LIM: CPT | Mod: 26

## 2020-10-20 PROCEDURE — 71045 X-RAY EXAM CHEST 1 VIEW: CPT | Mod: 26

## 2020-10-20 PROCEDURE — 93880 EXTRACRANIAL BILAT STUDY: CPT | Mod: 26

## 2020-10-20 PROCEDURE — 93010 ELECTROCARDIOGRAM REPORT: CPT

## 2020-10-20 PROCEDURE — 99285 EMERGENCY DEPT VISIT HI MDM: CPT

## 2020-10-20 PROCEDURE — 70450 CT HEAD/BRAIN W/O DYE: CPT | Mod: 26

## 2020-10-20 PROCEDURE — 99223 1ST HOSP IP/OBS HIGH 75: CPT

## 2020-10-20 RX ORDER — DEXTROSE 50 % IN WATER 50 %
15 SYRINGE (ML) INTRAVENOUS ONCE
Refills: 0 | Status: DISCONTINUED | OUTPATIENT
Start: 2020-10-20 | End: 2020-10-21

## 2020-10-20 RX ORDER — METOPROLOL TARTRATE 50 MG
100 TABLET ORAL DAILY
Refills: 0 | Status: DISCONTINUED | OUTPATIENT
Start: 2020-10-20 | End: 2020-10-21

## 2020-10-20 RX ORDER — POTASSIUM CHLORIDE 20 MEQ
40 PACKET (EA) ORAL ONCE
Refills: 0 | Status: COMPLETED | OUTPATIENT
Start: 2020-10-20 | End: 2020-10-20

## 2020-10-20 RX ORDER — SODIUM CHLORIDE 9 MG/ML
1000 INJECTION, SOLUTION INTRAVENOUS
Refills: 0 | Status: COMPLETED | OUTPATIENT
Start: 2020-10-20 | End: 2020-10-20

## 2020-10-20 RX ORDER — ACETAMINOPHEN 500 MG
650 TABLET ORAL EVERY 6 HOURS
Refills: 0 | Status: DISCONTINUED | OUTPATIENT
Start: 2020-10-20 | End: 2020-10-21

## 2020-10-20 RX ORDER — SODIUM CHLORIDE 9 MG/ML
1000 INJECTION INTRAMUSCULAR; INTRAVENOUS; SUBCUTANEOUS ONCE
Refills: 0 | Status: COMPLETED | OUTPATIENT
Start: 2020-10-20 | End: 2020-10-20

## 2020-10-20 RX ORDER — SODIUM CHLORIDE 9 MG/ML
1000 INJECTION, SOLUTION INTRAVENOUS
Refills: 0 | Status: DISCONTINUED | OUTPATIENT
Start: 2020-10-20 | End: 2020-10-21

## 2020-10-20 RX ORDER — DEXTROSE 50 % IN WATER 50 %
12.5 SYRINGE (ML) INTRAVENOUS ONCE
Refills: 0 | Status: DISCONTINUED | OUTPATIENT
Start: 2020-10-20 | End: 2020-10-21

## 2020-10-20 RX ORDER — ONDANSETRON 8 MG/1
4 TABLET, FILM COATED ORAL ONCE
Refills: 0 | Status: COMPLETED | OUTPATIENT
Start: 2020-10-20 | End: 2020-10-20

## 2020-10-20 RX ORDER — SODIUM CHLORIDE 9 MG/ML
1000 INJECTION INTRAMUSCULAR; INTRAVENOUS; SUBCUTANEOUS ONCE
Refills: 0 | Status: DISCONTINUED | OUTPATIENT
Start: 2020-10-20 | End: 2020-10-20

## 2020-10-20 RX ORDER — ENOXAPARIN SODIUM 100 MG/ML
30 INJECTION SUBCUTANEOUS DAILY
Refills: 0 | Status: DISCONTINUED | OUTPATIENT
Start: 2020-10-21 | End: 2020-10-21

## 2020-10-20 RX ORDER — DEXTROSE 50 % IN WATER 50 %
25 SYRINGE (ML) INTRAVENOUS ONCE
Refills: 0 | Status: DISCONTINUED | OUTPATIENT
Start: 2020-10-20 | End: 2020-10-21

## 2020-10-20 RX ORDER — INSULIN LISPRO 100/ML
VIAL (ML) SUBCUTANEOUS AT BEDTIME
Refills: 0 | Status: DISCONTINUED | OUTPATIENT
Start: 2020-10-20 | End: 2020-10-21

## 2020-10-20 RX ORDER — MESALAMINE 400 MG
800 TABLET, DELAYED RELEASE (ENTERIC COATED) ORAL DAILY
Refills: 0 | Status: DISCONTINUED | OUTPATIENT
Start: 2020-10-20 | End: 2020-10-21

## 2020-10-20 RX ORDER — GLUCAGON INJECTION, SOLUTION 0.5 MG/.1ML
1 INJECTION, SOLUTION SUBCUTANEOUS ONCE
Refills: 0 | Status: DISCONTINUED | OUTPATIENT
Start: 2020-10-20 | End: 2020-10-21

## 2020-10-20 RX ORDER — INSULIN LISPRO 100/ML
VIAL (ML) SUBCUTANEOUS
Refills: 0 | Status: DISCONTINUED | OUTPATIENT
Start: 2020-10-20 | End: 2020-10-21

## 2020-10-20 RX ORDER — OMEPRAZOLE 10 MG/1
1 CAPSULE, DELAYED RELEASE ORAL
Qty: 0 | Refills: 0 | DISCHARGE

## 2020-10-20 RX ORDER — AMLODIPINE BESYLATE 2.5 MG/1
10 TABLET ORAL DAILY
Refills: 0 | Status: DISCONTINUED | OUTPATIENT
Start: 2020-10-20 | End: 2020-10-21

## 2020-10-20 RX ORDER — DEXTROSE 50 % IN WATER 50 %
50 SYRINGE (ML) INTRAVENOUS ONCE
Refills: 0 | Status: COMPLETED | OUTPATIENT
Start: 2020-10-20 | End: 2020-10-20

## 2020-10-20 RX ADMIN — ONDANSETRON 4 MILLIGRAM(S): 8 TABLET, FILM COATED ORAL at 01:49

## 2020-10-20 RX ADMIN — AMLODIPINE BESYLATE 10 MILLIGRAM(S): 2.5 TABLET ORAL at 05:17

## 2020-10-20 RX ADMIN — SODIUM CHLORIDE 100 MILLILITER(S): 9 INJECTION, SOLUTION INTRAVENOUS at 03:30

## 2020-10-20 RX ADMIN — Medication 1: at 16:46

## 2020-10-20 RX ADMIN — Medication 100 MILLIGRAM(S): at 05:17

## 2020-10-20 RX ADMIN — Medication 50 MILLILITER(S): at 03:28

## 2020-10-20 RX ADMIN — Medication 2: at 11:58

## 2020-10-20 RX ADMIN — Medication 40 MILLIEQUIVALENT(S): at 03:33

## 2020-10-20 RX ADMIN — SODIUM CHLORIDE 1000 MILLILITER(S): 9 INJECTION INTRAMUSCULAR; INTRAVENOUS; SUBCUTANEOUS at 03:25

## 2020-10-20 RX ADMIN — Medication 800 MILLIGRAM(S): at 11:58

## 2020-10-20 RX ADMIN — SODIUM CHLORIDE 1000 MILLILITER(S): 9 INJECTION INTRAMUSCULAR; INTRAVENOUS; SUBCUTANEOUS at 01:52

## 2020-10-20 NOTE — CHART NOTE - NSCHARTNOTEFT_GEN_A_CORE
Pt. seen and examined at bedside.   Currently feels ok, denies any dizziness. Denies other symptoms.   Vital Signs Last 24 Hrs  T(C): 37.1 (20 Oct 2020 05:19), Max: 37.1 (20 Oct 2020 05:19)  T(F): 98.7 (20 Oct 2020 05:19), Max: 98.7 (20 Oct 2020 05:19)  HR: 74 (20 Oct 2020 05:19) (71 - 89)  BP: 115/93 (20 Oct 2020 05:19) (107/69 - 168/91)  BP(mean): 82 (20 Oct 2020 01:45) (82 - 82)  RR: 15 (20 Oct 2020 05:19) (14 - 19)  SpO2: 99% (20 Oct 2020 05:19) (99% - 100%)    A/P     75 year old male w/HTN, HLD, T2DM, Ulcerative Colitis, Afib (not on AC 2/2 h/o GIB, UC), hx of meningioma (s/p left frontal craniotomy in 2017and s/p radiation), chronic right leg wound (1 year), presents with lightheadedness, generalized weakness, and intermittent visual disturbance ( c/o intermittent flashing lights) for the past 2 days.  CT head with no acute CVA  Above symptoms likely due to hypoglycemia (due to sulfonyurea, Glimepiride), acute on chronic renal insuff due to dehydration (pt on diuretics - apparently takes HCTZ and Lasix prn)    s/p IVF   cr trending down  cont IVF gentle hydration for now  Will d/c Glimepiride  Hold off  diuretics for now  Cont Metoprolol, Amlodipine  Cont Mesalalmine  pending carotid dopplers, renal sono, right lower ext leg doppler  Pending Neuro eval    will follow up Pt. seen and examined at bedside.   Currently feels ok, denies any dizziness. Denies other symptoms.   Vital Signs Last 24 Hrs  T(C): 37.1 (20 Oct 2020 05:19), Max: 37.1 (20 Oct 2020 05:19)  T(F): 98.7 (20 Oct 2020 05:19), Max: 98.7 (20 Oct 2020 05:19)  HR: 74 (20 Oct 2020 05:19) (71 - 89)  BP: 115/93 (20 Oct 2020 05:19) (107/69 - 168/91)  BP(mean): 82 (20 Oct 2020 01:45) (82 - 82)  RR: 15 (20 Oct 2020 05:19) (14 - 19)  SpO2: 99% (20 Oct 2020 05:19) (99% - 100%)    A/P     75 year old male w/HTN, HLD, T2DM, Ulcerative Colitis, Afib (not on AC 2/2 h/o GIB, UC), hx of meningioma (s/p left frontal craniotomy in 2017and s/p radiation), chronic right leg wound (1 year), presents with lightheadedness, generalized weakness, and intermittent visual disturbance ( c/o intermittent flashing lights) for the past 2 days.  CT head with no acute CVA  Above symptoms likely due to hypoglycemia (due to sulfonyurea, Glimepiride), acute on chronic renal insuff due to dehydration (pt on diuretics - apparently takes HCTZ and Lasix prn)    s/p IVF   cr trending down  cont IVF gentle hydration for now  Will d/c Glimepiride, only ISS for now  Hold off  diuretics for now  Cont Metoprolol, Amlodipine  Cont Mesalalmine  pending carotid dopplers, renal sono, right lower ext leg doppler  Pending Neuro eval    will follow up

## 2020-10-20 NOTE — ED PROVIDER NOTE - OBJECTIVE STATEMENT
pt reports 2 episodes of acute onset of dizziness/fogginess with flashing lights and difficulty walking over the past 24 hours. the most recent episode was just prior to arrival at that time associated with diaphoresis. denies cp/sob.  dizziness is now currently resolved.  pt has h/o ulcerative colitis with frequent episodes of rectal bleeding.  pt has h/o afib x10 yrs never put on an AC 2/2 to UC.

## 2020-10-20 NOTE — H&P ADULT - ASSESSMENT
75 year old male w/HTN, HLD, T2DM, Ulcerative Colitis, Afib (not on AC 2/2 h/o GIB, UC), hx of meningioma (s/p left frontal craniotomy in 2017and s/p radiation), chronic right leg wound (1 year), presents to the ED with lightheadedness, generalized weakness, and intermittent visual disturbance ( c/o intermittent flashing lights) for the past 2 days. 75 year old male w/HTN, HLD, T2DM, Ulcerative Colitis, Afib (not on AC 2/2 h/o GIB, UC), hx of meningioma (s/p left frontal craniotomy in 2017and s/p radiation), chronic right leg wound (1 year), presents with lightheadedness, generalized weakness, and intermittent visual disturbance ( c/o intermittent flashing lights) for the past 2 days.  CT head with no acute CVA  Above symptoms likely due to hypoglycemia (due to sulfonyurea, Glimepiride), acute on chronic renal insuff due to dehydration (pt on diuretics - apparently takes HCTZ and Lasix prn)    Admit to tele  IV hydration, monitor FS  Will d/c Glimepiride  Will d/c diuretics for now  Cont Metoprolol, Amlodipine  Cont Mesalalmine  Will get carotid dopplers, renal sono, right lower ext leg doppler  Will try to get echo report from his cardio  FFup labs - CBC, BMP, check Cortisol, Anemia studies, TSH  Neuro eval - Dr Campos  recommend to see Derm as outpt for chronic leg wound  PT eval  DVT prophylaxis    IMPROVE VTE Individual Risk Assessment  RISK                                                                Points  [  ] Previous VTE                                                  3  [  ] Thrombophilia                                               2  [  ] Lower limb paralysis                                      2        (unable to hold up >15 seconds)    [  ] Current Cancer                                              2         (within 6 months)  [  ] Immobilization > 24 hrs                                1  [  ] ICU/CCU stay > 24 hours                              1  [ x ] Age > 60                                                      1  IMPROVE VTE Score __1__  IMPROVE Score 0-1: Low Risk, No VTE prophylaxis required for most patients, encourage ambulation.   IMPROVE Score 2-3: At risk, pharmacologic VTE prophylaxis is indicated for most patients (in the absence of a contraindication)  IMPROVE Score > or = 4: High Risk, pharmacologic VTE prophylaxis is indicated for most patients (in the absence of a contraindication)

## 2020-10-20 NOTE — PHYSICAL THERAPY INITIAL EVALUATION ADULT - ADDITIONAL COMMENTS
pt lives in a split level house w/wife, multiple steps to enter w/rail. pt uses a straight cane to ambulate, also has a rolling walker from previous surgeries. pt is independent w/ADL's including driving

## 2020-10-20 NOTE — ED PROVIDER NOTE - PHYSICAL EXAMINATION
Gen:  alert, awake, no acute distress  HEENT:  atraumatic head, airway clear, pupils equal and round  CV:  rrr, nl S1, S2, no m/r/g  Pulm:  lungs CTA b/l  Abd: s/nt/nd, +BS  Ext:  moving all extremities  Neuro:  grossly intact, no focal deficits, NIHSS=0  Skin:  clear, dry, intact  Psych: AOx3, normal affect, no apparent risk to self or others

## 2020-10-20 NOTE — ED ADULT NURSE NOTE - NSIMPLEMENTINTERV_GEN_ALL_ED
Implemented All Fall Risk Interventions:  [place] to call system. Call bell, personal items and telephone within reach. Instruct patient to call for assistance. Room bathroom lighting operational. Non-slip footwear when patient is off stretcher. Physically safe environment: no spills, clutter or unnecessary equipment. Stretcher in lowest position, wheels locked, appropriate side rails in place. Provide visual cue, wrist band, yellow gown, etc. Monitor gait and stability. Monitor for mental status changes and reorient to person, place, and time. Review medications for side effects contributing to fall risk. Reinforce activity limits and safety measures with patient and family.

## 2020-10-20 NOTE — PHYSICAL THERAPY INITIAL EVALUATION ADULT - PERTINENT HX OF CURRENT PROBLEM, REHAB EVAL
5 year old male w/HTN, HLD, T2DM, Ulcerative Colitis, Afib (not on AC 2/2 h/o GIB, UC), hx of meningioma (s/p left frontal craniotomy in 2017and s/p radiation), chronic right leg wound (1 year), presents to the ED with lightheadedness, generalized weakness, and intermittent visual disturbance ( c/o intermittent flashing lights) for the past 2 days

## 2020-10-20 NOTE — ED ADULT TRIAGE NOTE - CHIEF COMPLAINT QUOTE
Pt presents to ED w/ c/o feeling dizzy since last night. Pt felt better throughout the day then started feeling dizzy again at 11pm with increased perspiration. Pt denies nausea, vomiting, numbness/tingling.

## 2020-10-20 NOTE — ED ADULT NURSE NOTE - OBJECTIVE STATEMENT
Pt alert and oriented, reporting acute onset of dizziness for the past 2 days; last episode occurring prior to arrival. Patient reports episodes on and off during the day, but noticed them getting stronger at night. Pt notes that episodes subside for the most part as he lays down, but once he stands up he reports blurry vision and difficulty walking. Patient endorses nausea, and reports that dizziness is currently resolved. Pt denies chest pain, SOB, headache, numbness/ tingling, chest palpitations.

## 2020-10-20 NOTE — H&P ADULT - NSHPPHYSICALEXAM_GEN_ALL_CORE
Vital Signs (24 Hrs):  T(C): 36.4 (10-20-20 @ 01:00), Max: 36.4 (10-20-20 @ 01:00)  HR: 89 (10-20-20 @ 01:00) (89 - 89)  BP: 168/91 (10-20-20 @ 01:00) (168/91 - 168/91)  RR: 19 (10-20-20 @ 01:00) (19 - 19)  SpO2: 100% (10-20-20 @ 01:00) (100% - 100%)  Daily Height in cm: 180.34 (20 Oct 2020 01:00)
adhered to soft diet PTA

## 2020-10-20 NOTE — H&P ADULT - NEUROLOGICAL DETAILS
sensation intact/responds to pain/deep reflexes intact/responds to verbal commands/alert and oriented x 3

## 2020-10-20 NOTE — ED PROVIDER NOTE - CLINICAL SUMMARY MEDICAL DECISION MAKING FREE TEXT BOX
pt with episodes of dizziness over the last 24 hours, afib but no on AC, risk of clot and possible showering of emboli.

## 2020-10-20 NOTE — PATIENT PROFILE ADULT - PACKS PER DAY
1 Double O-Z Plasty Text: The defect edges were debeveled with a #15 scalpel blade.  Given the location of the defect, shape of the defect and the proximity to free margins a Double O-Z plasty (double transposition flap) was deemed most appropriate.  Using a sterile surgical marker, the appropriate transposition flaps were drawn incorporating the defect and placing the expected incisions within the relaxed skin tension lines where possible. The area thus outlined was incised deep to adipose tissue with a #15 scalpel blade.  The skin margins were undermined to an appropriate distance in all directions utilizing iris scissors.  Hemostasis was achieved with electrocautery.  The flaps were then transposed into place, one clockwise and the other counterclockwise, and anchored with interrupted buried subcutaneous sutures.

## 2020-10-20 NOTE — ED PROVIDER NOTE - PMH
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

## 2020-10-20 NOTE — ED ADULT NURSE REASSESSMENT NOTE - NS ED NURSE REASSESS COMMENT FT1
Pt alert and oriented, hypoglycemia resolved at this time. Pt still connected to IV medication per order. Report given to FLEX Vargas. Transfer of care occurred with no incident.

## 2020-10-20 NOTE — H&P ADULT - HISTORY OF PRESENT ILLNESS
74 year old male w/ PMHx of Afib (not on AC 2/2 h/o GIB), CAD, HLD, pulmonary  HTN, HTN, T2DM, 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 (s/p left frontal craniotomy, 2017 and 3  months radiation), who was sent to ED by Wound Care for non-healing right  foot/ankle ulcer. Patient developed the ulcer in October,   75 year old male w/HTN, HLD, T2DM, Ulcerative Colitis, Afib (not on AC 2/2 h/o GIB, UC), hx of meningioma (s/p left frontal craniotomy in 2017and s/p radiation), chronic right leg wound (1 year), presents to the ED with lightheadedness, generalized weakness, and intermittent visual disturbance ( c/o intermittent flashing lights) for the past 2 days. Pt decided to come in tonight because he felt it was getting worse. He denies blurred vision, denies headache, chest pain, nausea, vomiting, diarrhea, fever, chills.  Pt states he has occ scant blood in his stool but that is not unusual.  He denies any change in his diet.  CT head with no acute CVA, nor hgge, +parenchymal volume loss is noted with prominent ventricles and sulci. Chronic microvascular ischemic changes are identified. Gliosis and encephalomalacia is seen in the left frontoparietal region likely postsurgical in nature.  Pt noted to have hypoglycemia - FS 48, and was given an amp of D50.     75 year old male w/HTN, HLD, T2DM, Ulcerative Colitis, Afib (not on AC 2/2 h/o GIB, UC), hx of meningioma (s/p left frontal craniotomy in 2017and s/p radiation), chronic right leg wound (1 year), presents to the ED with lightheadedness, generalized weakness, and intermittent visual disturbance ( c/o intermittent flashing lights) for the past 2 days. Pt decided to come in tonight because he felt it was getting worse. He denies blurred vision, denies headache, chest pain, nausea, vomiting, diarrhea, fever, chills.  Pt states he has occ scant blood in his stool but that is not unusual.  He denies any change in his diet.  CT head with no acute CVA, nor hgge, +parenchymal volume loss is noted with prominent ventricles and sulci. Chronic microvascular ischemic changes are identified. Gliosis and encephalomalacia is seen in the left frontoparietal region likely postsurgical in nature.  Pt noted to have hypoglycemia - FS 48, and was given an amp of D50.  EKG, shows Afib 70/min, unchanged from prior.  Pt states he saw his Cardio 2 weeks ago and had an echo and EKG.  He has been following up with a vascular surgeon fro his right lower leg chronic wound, and was referred to see an ID MD.  He has not made an appt as of yet.

## 2020-10-20 NOTE — ED ADULT NURSE REASSESSMENT NOTE - NS ED NURSE REASSESS COMMENT FT1
Initial blood glucose read 56, second BG reading is 48. Dr. Booth notified and hypoglycemia protocol initiated. Will continue to monitor and treat per protocol and orders.

## 2020-10-21 ENCOUNTER — TRANSCRIPTION ENCOUNTER (OUTPATIENT)
Age: 75
End: 2020-10-21

## 2020-10-21 VITALS
TEMPERATURE: 98 F | HEART RATE: 85 BPM | DIASTOLIC BLOOD PRESSURE: 77 MMHG | RESPIRATION RATE: 17 BRPM | WEIGHT: 198.42 LBS | SYSTOLIC BLOOD PRESSURE: 109 MMHG | OXYGEN SATURATION: 100 %

## 2020-10-21 LAB
A1C WITH ESTIMATED AVERAGE GLUCOSE RESULT: 5.8 % — HIGH (ref 4–5.6)
ANION GAP SERPL CALC-SCNC: 9 MMOL/L — SIGNIFICANT CHANGE UP (ref 5–17)
BUN SERPL-MCNC: 39 MG/DL — HIGH (ref 7–23)
CALCIUM SERPL-MCNC: 9 MG/DL — SIGNIFICANT CHANGE UP (ref 8.4–10.5)
CHLORIDE SERPL-SCNC: 106 MMOL/L — SIGNIFICANT CHANGE UP (ref 96–108)
CO2 SERPL-SCNC: 24 MMOL/L — SIGNIFICANT CHANGE UP (ref 22–31)
CORTIS AM PEAK SERPL-MCNC: 18.8 UG/DL — HIGH (ref 6–18.4)
CREAT SERPL-MCNC: 1.86 MG/DL — HIGH (ref 0.5–1.3)
ESTIMATED AVERAGE GLUCOSE: 120 MG/DL — HIGH (ref 68–114)
GLUCOSE BLDC GLUCOMTR-MCNC: 137 MG/DL — HIGH (ref 70–99)
GLUCOSE BLDC GLUCOMTR-MCNC: 92 MG/DL — SIGNIFICANT CHANGE UP (ref 70–99)
GLUCOSE SERPL-MCNC: 101 MG/DL — HIGH (ref 70–99)
HCT VFR BLD CALC: 37.7 % — LOW (ref 39–50)
HGB BLD-MCNC: 12.3 G/DL — LOW (ref 13–17)
MCHC RBC-ENTMCNC: 31.3 PG — SIGNIFICANT CHANGE UP (ref 27–34)
MCHC RBC-ENTMCNC: 32.6 GM/DL — SIGNIFICANT CHANGE UP (ref 32–36)
MCV RBC AUTO: 95.9 FL — SIGNIFICANT CHANGE UP (ref 80–100)
NRBC # BLD: 0 /100 WBCS — SIGNIFICANT CHANGE UP (ref 0–0)
PLATELET # BLD AUTO: 235 K/UL — SIGNIFICANT CHANGE UP (ref 150–400)
POTASSIUM SERPL-MCNC: 4.1 MMOL/L — SIGNIFICANT CHANGE UP (ref 3.5–5.3)
POTASSIUM SERPL-SCNC: 4.1 MMOL/L — SIGNIFICANT CHANGE UP (ref 3.5–5.3)
RBC # BLD: 3.93 M/UL — LOW (ref 4.2–5.8)
RBC # FLD: 13.9 % — SIGNIFICANT CHANGE UP (ref 10.3–14.5)
SODIUM SERPL-SCNC: 139 MMOL/L — SIGNIFICANT CHANGE UP (ref 135–145)
WBC # BLD: 7.21 K/UL — SIGNIFICANT CHANGE UP (ref 3.8–10.5)
WBC # FLD AUTO: 7.21 K/UL — SIGNIFICANT CHANGE UP (ref 3.8–10.5)

## 2020-10-21 PROCEDURE — 93971 EXTREMITY STUDY: CPT

## 2020-10-21 PROCEDURE — 82533 TOTAL CORTISOL: CPT

## 2020-10-21 PROCEDURE — 86769 SARS-COV-2 COVID-19 ANTIBODY: CPT

## 2020-10-21 PROCEDURE — 84484 ASSAY OF TROPONIN QUANT: CPT

## 2020-10-21 PROCEDURE — 85025 COMPLETE CBC W/AUTO DIFF WBC: CPT

## 2020-10-21 PROCEDURE — 80048 BASIC METABOLIC PNL TOTAL CA: CPT

## 2020-10-21 PROCEDURE — 96374 THER/PROPH/DIAG INJ IV PUSH: CPT

## 2020-10-21 PROCEDURE — 83540 ASSAY OF IRON: CPT

## 2020-10-21 PROCEDURE — 71045 X-RAY EXAM CHEST 1 VIEW: CPT

## 2020-10-21 PROCEDURE — 82728 ASSAY OF FERRITIN: CPT

## 2020-10-21 PROCEDURE — 87635 SARS-COV-2 COVID-19 AMP PRB: CPT

## 2020-10-21 PROCEDURE — 70450 CT HEAD/BRAIN W/O DYE: CPT

## 2020-10-21 PROCEDURE — 85610 PROTHROMBIN TIME: CPT

## 2020-10-21 PROCEDURE — 82746 ASSAY OF FOLIC ACID SERUM: CPT

## 2020-10-21 PROCEDURE — 99285 EMERGENCY DEPT VISIT HI MDM: CPT | Mod: 25

## 2020-10-21 PROCEDURE — 82607 VITAMIN B-12: CPT

## 2020-10-21 PROCEDURE — 97162 PT EVAL MOD COMPLEX 30 MIN: CPT

## 2020-10-21 PROCEDURE — 85730 THROMBOPLASTIN TIME PARTIAL: CPT

## 2020-10-21 PROCEDURE — 83735 ASSAY OF MAGNESIUM: CPT

## 2020-10-21 PROCEDURE — 84100 ASSAY OF PHOSPHORUS: CPT

## 2020-10-21 PROCEDURE — 99239 HOSP IP/OBS DSCHRG MGMT >30: CPT

## 2020-10-21 PROCEDURE — 81001 URINALYSIS AUTO W/SCOPE: CPT

## 2020-10-21 PROCEDURE — 84443 ASSAY THYROID STIM HORMONE: CPT

## 2020-10-21 PROCEDURE — 93880 EXTRACRANIAL BILAT STUDY: CPT

## 2020-10-21 PROCEDURE — 36415 COLL VENOUS BLD VENIPUNCTURE: CPT

## 2020-10-21 PROCEDURE — 93005 ELECTROCARDIOGRAM TRACING: CPT

## 2020-10-21 PROCEDURE — 82962 GLUCOSE BLOOD TEST: CPT

## 2020-10-21 PROCEDURE — 99233 SBSQ HOSP IP/OBS HIGH 50: CPT

## 2020-10-21 PROCEDURE — 80053 COMPREHEN METABOLIC PANEL: CPT

## 2020-10-21 PROCEDURE — 76775 US EXAM ABDO BACK WALL LIM: CPT

## 2020-10-21 PROCEDURE — 83036 HEMOGLOBIN GLYCOSYLATED A1C: CPT

## 2020-10-21 PROCEDURE — 85027 COMPLETE CBC AUTOMATED: CPT

## 2020-10-21 RX ORDER — GLIMEPIRIDE 1 MG
1 TABLET ORAL
Qty: 0 | Refills: 0 | DISCHARGE

## 2020-10-21 RX ORDER — EMPAGLIFLOZIN 10 MG/1
1 TABLET, FILM COATED ORAL
Qty: 30 | Refills: 0
Start: 2020-10-21 | End: 2020-11-19

## 2020-10-21 RX ORDER — FUROSEMIDE 40 MG
1 TABLET ORAL
Qty: 0 | Refills: 0 | DISCHARGE

## 2020-10-21 RX ADMIN — Medication 800 MILLIGRAM(S): at 12:08

## 2020-10-21 RX ADMIN — ENOXAPARIN SODIUM 30 MILLIGRAM(S): 100 INJECTION SUBCUTANEOUS at 12:08

## 2020-10-21 NOTE — DISCHARGE NOTE NURSING/CASE MANAGEMENT/SOCIAL WORK - NSDCFUADDAPPT_GEN_ALL_CORE_FT
Follow up appointment with Edi RODGERS 10/22/2020 at 9:45AM   [address] [id number] [phone]

## 2020-10-21 NOTE — CONSULT NOTE ADULT - SUBJECTIVE AND OBJECTIVE BOX
75 M who was consulted For lightheadedness upon going to the bathroom at night.  This started 2 nights prior to presentation.  Patient's blood glucose has been better controlled however he continues to take the same dose of the Metformin.  After adjusting the dose of Metformin by the primary team patient states that he feels much better today.      Vital Signs Last 24 Hrs  T(C): 36.7 (21 Oct 2020 05:23), Max: 36.9 (20 Oct 2020 21:10)  T(F): 98.1 (21 Oct 2020 05:23), Max: 98.4 (20 Oct 2020 21:10)  HR: 85 (21 Oct 2020 05:23) (73 - 85)  BP: 109/77 (21 Oct 2020 05:23) (104/58 - 110/78)  BP(mean): --  RR: 17 (21 Oct 2020 05:23) (17 - 18)  SpO2: 100% (21 Oct 2020 05:23) (98% - 100%)    AAOX 3 speech fluent lang intact  PERRL, eomi v1-3 intact face sym tup midline  motor: 5/5 upper and lower ext  sens intact lt  coord intact fnf  gait walked steadily with cane.    hCT no acute changes.    75-year-old gentleman who is consulted for lightheadedness at night and in the morning.  I think this may be due to hypoglycemia versus hypotension.  Will recommend follow-up with PMD for management of his diabetic control and continued observation of his blood pressure.  Discussed with Dr. Miller

## 2020-10-21 NOTE — PROGRESS NOTE ADULT - SUBJECTIVE AND OBJECTIVE BOX
Patient is a 75y old  Male who presents with a chief complaint of lightheadedness, generalized weakness, c/o visual disturbance (flashing lights) (20 Oct 2020 03:12)    Interval Hx  Patient seen and examined at bedside.   No overnight events reported.     ALLERGIES:  No Known Allergies    MEDICATIONS  (STANDING)  amLODIPine   Tablet 10 milliGRAM(s) Oral daily  dextrose 5%. 1000 milliLiter(s) (50 mL/Hr) IV Continuous <Continuous>  dextrose 50% Injectable 12.5 Gram(s) IV Push once  dextrose 50% Injectable 25 Gram(s) IV Push once  dextrose 50% Injectable 25 Gram(s) IV Push once  enoxaparin Injectable 30 milliGRAM(s) SubCutaneous daily  insulin lispro (ADMELOG) corrective regimen sliding scale   SubCutaneous three times a day before meals  insulin lispro (ADMELOG) corrective regimen sliding scale   SubCutaneous at bedtime  mesalamine DR Capsule 800 milliGRAM(s) Oral daily  metoprolol succinate  milliGRAM(s) Oral daily    MEDICATIONS  (PRN):  acetaminophen   Tablet .. 650 milliGRAM(s) Oral every 6 hours PRN Temp greater or equal to 38C (100.4F), Mild Pain (1 - 3)  dextrose 40% Gel 15 Gram(s) Oral once PRN Blood Glucose LESS THAN 70 milliGRAM(s)/deciliter  glucagon  Injectable 1 milliGRAM(s) IntraMuscular once PRN Glucose LESS THAN 70 milligrams/deciliter    Vital Signs Last 24 Hrs  T(F): 98.1 (21 Oct 2020 05:23), Max: 98.4 (20 Oct 2020 21:10)  HR: 85 (21 Oct 2020 05:23) (73 - 85)  BP: 109/77 (21 Oct 2020 05:23) (104/58 - 110/78)  RR: 17 (21 Oct 2020 05:23) (17 - 18)  SpO2: 100% (21 Oct 2020 05:23) (98% - 100%)  I&O's Summary    20 Oct 2020 07:01  -  21 Oct 2020 07:00  --------------------------------------------------------  IN: 1440 mL / OUT: 1151 mL / NET: 289 mL      PHYSICAL EXAM:  General: NAD, A/O x 3  ENT: MMM, no thrush  Neck: Supple, No JVD  Lungs: Clear to auscultation bilaterally, good air entry, non-labored breathing  Cardio: RRR, S1/S2, No murmur  Abdomen: Soft, Nontender, Nondistended; Bowel sounds present  Extremities: No calf tenderness, No pitting edema    LABS:                        12.3   7.21  )-----------( 235      ( 21 Oct 2020 05:45 )             37.7     10-    139  |  106  |  39  ----------------------------<  101  4.1   |  24  |  1.86    Ca    9.0      21 Oct 2020 05:45  Phos  3.0     10-20  Mg     1.9     10-20    TPro  7.8  /  Alb  3.4  /  TBili  0.5  /  DBili  x   /  AST  22  /  ALT  12  /  AlkPhos  49  10-20        eGFR if African American: 40 mL/min/1.73M2 (10-21-20 @ 05:45)  eGFR if Non African American: 34 mL/min/1.73M2 (10-21-20 @ 05:45)    PT/INR - ( 20 Oct 2020 02:03 )   PT: 11.6 sec;   INR: 0.96 ratio         PTT - ( 20 Oct 2020 02:03 )  PTT:32.1 sec      CARDIAC MARKERS ( 20 Oct 2020 01:52 )  <.017 ng/mL / x     / x     / x     / x            TSH 1.36   TSH with FT4 reflex --  Total T3 --              POCT Blood Glucose.: 123 mg/dL (20 Oct 2020 21:08)  POCT Blood Glucose.: 181 mg/dL (20 Oct 2020 16:41)  POCT Blood Glucose.: 223 mg/dL (20 Oct 2020 11:53)  POCT Blood Glucose.: 136 mg/dL (20 Oct 2020 08:01)      Urinalysis Basic - ( 20 Oct 2020 19:30 )    Color: Yellow / Appearance: Clear / S.015 / pH: x  Gluc: x / Ketone: Negative  / Bili: Negative / Urobili: Negative   Blood: x / Protein: 15 / Nitrite: Negative   Leuk Esterase: Negative / RBC: 0-4 /HPF / WBC Negative /HPF   Sq Epi: x / Non Sq Epi: Neg.-Few / Bacteria: Trace /HPF        RADIOLOGY & ADDITIONAL TESTS:    Care Discussed with Consultants/Other Providers:    Patient is a 75y old  Male who presents with a chief complaint of lightheadedness, generalized weakness, c/o visual disturbance (flashing lights) (20 Oct 2020 03:12)    Interval Hx  Patient seen and examined at bedside.   No overnight events reported.   Pt feels symptomatically better. wants to go home.    ALLERGIES:  No Known Allergies    MEDICATIONS  (STANDING)  amLODIPine   Tablet 10 milliGRAM(s) Oral daily  dextrose 5%. 1000 milliLiter(s) (50 mL/Hr) IV Continuous <Continuous>  dextrose 50% Injectable 12.5 Gram(s) IV Push once  dextrose 50% Injectable 25 Gram(s) IV Push once  dextrose 50% Injectable 25 Gram(s) IV Push once  enoxaparin Injectable 30 milliGRAM(s) SubCutaneous daily  insulin lispro (ADMELOG) corrective regimen sliding scale   SubCutaneous three times a day before meals  insulin lispro (ADMELOG) corrective regimen sliding scale   SubCutaneous at bedtime  mesalamine DR Capsule 800 milliGRAM(s) Oral daily  metoprolol succinate  milliGRAM(s) Oral daily    MEDICATIONS  (PRN):  acetaminophen   Tablet .. 650 milliGRAM(s) Oral every 6 hours PRN Temp greater or equal to 38C (100.4F), Mild Pain (1 - 3)  dextrose 40% Gel 15 Gram(s) Oral once PRN Blood Glucose LESS THAN 70 milliGRAM(s)/deciliter  glucagon  Injectable 1 milliGRAM(s) IntraMuscular once PRN Glucose LESS THAN 70 milligrams/deciliter    Vital Signs Last 24 Hrs  T(F): 98.1 (21 Oct 2020 05:23), Max: 98.4 (20 Oct 2020 21:10)  HR: 85 (21 Oct 2020 05:23) (73 - 85)  BP: 109/77 (21 Oct 2020 05:23) (104/58 - 110/78)  RR: 17 (21 Oct 2020 05:23) (17 - 18)  SpO2: 100% (21 Oct 2020 05:23) (98% - 100%)  I&O's Summary    20 Oct 2020 07:01  -  21 Oct 2020 07:00  --------------------------------------------------------  IN: 1440 mL / OUT: 1151 mL / NET: 289 mL      PHYSICAL EXAM:  General: NAD, A/O x 3  ENT: MMM, no thrush  Neck: Supple, No JVD  Lungs: Clear to auscultation bilaterally, good air entry, non-labored breathing  Cardio: RRR, S1/S2, No murmur  Abdomen: Soft, Nontender, Nondistended; Bowel sounds present  Extremities: Right ankle/leg with redness/scaling, chronic changes.    LABS:                        12.3   7.21  )-----------( 235      ( 21 Oct 2020 05:45 )             37.7     10-    139  |  106  |  39  ----------------------------<  101  4.1   |  24  |  1.86    Ca    9.0      21 Oct 2020 05:45  Phos  3.0     10-20  Mg     1.9     10-20    TPro  7.8  /  Alb  3.4  /  TBili  0.5  /  DBili  x   /  AST  22  /  ALT  12  /  AlkPhos  49  10-20        eGFR if African American: 40 mL/min/1.73M2 (10-21-20 @ 05:45)  eGFR if Non African American: 34 mL/min/1.73M2 (10-21-20 @ 05:45)    PT/INR - ( 20 Oct 2020 02:03 )   PT: 11.6 sec;   INR: 0.96 ratio         PTT - ( 20 Oct 2020 02:03 )  PTT:32.1 sec      CARDIAC MARKERS ( 20 Oct 2020 01:52 )  <.017 ng/mL / x     / x     / x     / x            TSH 1.36   TSH with FT4 reflex --  Total T3 --              POCT Blood Glucose.: 123 mg/dL (20 Oct 2020 21:08)  POCT Blood Glucose.: 181 mg/dL (20 Oct 2020 16:41)  POCT Blood Glucose.: 223 mg/dL (20 Oct 2020 11:53)  POCT Blood Glucose.: 136 mg/dL (20 Oct 2020 08:01)      Urinalysis Basic - ( 20 Oct 2020 19:30 )    Color: Yellow / Appearance: Clear / S.015 / pH: x  Gluc: x / Ketone: Negative  / Bili: Negative / Urobili: Negative   Blood: x / Protein: 15 / Nitrite: Negative   Leuk Esterase: Negative / RBC: 0-4 /HPF / WBC Negative /HPF   Sq Epi: x / Non Sq Epi: Neg.-Few / Bacteria: Trace /HPF        RADIOLOGY & ADDITIONAL TESTS:    Care Discussed with Consultants/Other Providers: Dr. Bennett

## 2020-10-21 NOTE — DISCHARGE NOTE PROVIDER - NSDCMRMEDTOKEN_GEN_ALL_CORE_FT
amLODIPine 10 mg oral tablet: 1 tab(s) orally once a day  Jardiance 10 mg oral tablet: 1 tab(s) orally once a day   mesalamine 400 mg oral delayed release capsule: 2 cap(s) orally once a day  metoprolol succinate 100 mg oral tablet, extended release: 1 tab(s) orally once a day

## 2020-10-21 NOTE — DISCHARGE NOTE PROVIDER - NSDCFUADDAPPT_GEN_ALL_CORE_FT
Follow up appointment with Edi RODGERS 10/22/2020 at 9:45AM   [address] [id number] [phone]

## 2020-10-21 NOTE — DISCHARGE NOTE NURSING/CASE MANAGEMENT/SOCIAL WORK - PATIENT PORTAL LINK FT
You can access the FollowMyHealth Patient Portal offered by [facility] by registering at the following website: [email or web address]. By joining [employer]’s FollowMyHealth portal, you will also be able to view your health information using other applications (apps) compatible with our system.

## 2020-10-21 NOTE — DISCHARGE NOTE PROVIDER - NSDCCPCAREPLAN_GEN_ALL_CORE_FT
PRINCIPAL DISCHARGE DIAGNOSIS  Diagnosis: Dizziness  Assessment and Plan of Treatment: Improved. All w/u including CThead/US carotids/US LE were unremarkable.  Your cr was stilightly elevated but improved on IV hydration, your diuretics have been stopped.   Your glimeperide has also been stopped given kidney function derangement.  You have been started on Jardiance , please follow up with endocrinology and your pmd upon discharge .      SECONDARY DISCHARGE DIAGNOSES  Diagnosis: Atrial fibrillation  Assessment and Plan of Treatment:

## 2020-10-21 NOTE — CONSULT NOTE ADULT - ASSESSMENT
75 yrs. old male admitted with hypoglycemia due to Amaryl therapy.  T2 DM with ckd3, HTN before.  Suggest: stop amaryl. trial of SGLT2i like Jardiance 10mg po daily Am.   Pt. advised to follow with his Physician after discharge.

## 2020-10-21 NOTE — CONSULT NOTE ADULT - REASON FOR ADMISSION
lightheadedness, generalized weakness, c/o visual disturbance (flashing lights)
lightheadedness, generalized weakness, c/o visual disturbance (flashing lights)

## 2020-10-21 NOTE — PROGRESS NOTE ADULT - ASSESSMENT
75 year old male w/HTN, HLD, T2DM, Ulcerative Colitis, Afib (not on AC 2/2 h/o GIB, UC), hx of meningioma (s/p left frontal craniotomy in 2017and s/p radiation), chronic right leg wound (1 year), presents with lightheadedness, generalized weakness, and intermittent visual disturbance ( c/o intermittent flashing lights) for the past 2 days. CT head with no acute CVA  Above symptoms likely due to hypoglycemia (due to sulfonyurea, Glimepiride), acute on chronic renal insuff due to dehydration (pt on diuretics - apparently takes HCTZ and Lasix prn).     75 year old male w/HTN, HLD, T2DM, Ulcerative Colitis, Afib (not on AC 2/2 h/o GIB, UC), hx of meningioma (s/p left frontal craniotomy in 2017and s/p radiation), chronic right leg wound (1 year), presents with lightheadedness, generalized weakness, and intermittent visual disturbance ( c/o intermittent flashing lights) for the past 2 days. CT head with no acute CVA.   Above symptoms likely due to  #Hypoglycemia (due to sulfonyurea, Glimepiride)  #Acute on chronic renal insuff due to dehydration (pt on diuretics - apparently takes HCTZ and Lasix prn)  s/p IVF   cr trending down  D/c Glimepiride, only ISS for now  Hold off  diuretics for now  Cont Metoprolol, Amlodipine  Cont Mesalalmine  Reviewed carotid dopplers, renal sono, right lower ext leg doppler--- Unremarkable  seen by neurology : No further w/u recommended  seen by Endocrinology--recommended to d/c on Jardiance     D/c home today. Case d/w IDT and housestaff.

## 2020-10-21 NOTE — DISCHARGE NOTE PROVIDER - HOSPITAL COURSE
75 year old male w/HTN, HLD, T2DM, Ulcerative Colitis, Afib (not on AC 2/2 h/o GIB, UC), hx of meningioma (s/p left frontal craniotomy in 2017and s/p radiation), chronic right leg wound (1 year), presented with lightheadedness, generalized weakness, and intermittent visual disturbance ( c/o intermittent flashing lights) .   Above symptoms likely due to  #Hypoglycemia (due to sulfonyurea, Glimepiride)  #Acute on chronic renal insuff due to dehydration (pt on diuretics - apparently takes HCTZ and Lasix prn)  s/p IVF , cr trended down. Glimepiride was discontinued   diuretics was held  CT head with no acute CVA. Reviewed carotid dopplers, renal sono, right lower ext leg doppler--- Unremarkable  seen by neurology : No further w/u recommended  seen by Endocrinology--recommended to d/c on Jardiance .    All medications and plan reviewed with patient upon discharge and he ackowledges understanding .    Time spent on d/c > 32 minutes.    
57 y/o male presents with CP for 5 days, worse today. Exam was unremarkable. Will check labs, CXR, and reassess for admission.

## 2020-10-21 NOTE — SBIRT NOTE ADULT - NSSBIRTALCPOSREINDET_GEN_A_CORE
Provided SBIRT services: Full screen positive. Screening results were reviewed with the patient. Patient declined having any issues with alcohol.  As per patient, he drinks a small glass ( with ice) of either vodka, bourbon or scotch, usually before dinner (approximately 5x per week) when he is home.  Patient. states he has been doing this for approximately 20 years. Patient states he has never attended any treatment program for alcohol use.   SW encourage patient to continue maintain a safe levels of alcohol use.

## 2020-12-01 ENCOUNTER — OUTPATIENT (OUTPATIENT)
Dept: OUTPATIENT SERVICES | Facility: HOSPITAL | Age: 75
LOS: 1 days | Discharge: HOME | End: 2020-12-01
Payer: COMMERCIAL

## 2020-12-01 ENCOUNTER — APPOINTMENT (OUTPATIENT)
Dept: SURGERY | Facility: HOSPITAL | Age: 75
End: 2020-12-01

## 2020-12-01 ENCOUNTER — APPOINTMENT (OUTPATIENT)
Dept: OBGYN | Facility: HOSPITAL | Age: 75
End: 2020-12-01
Payer: MEDICARE

## 2020-12-01 ENCOUNTER — APPOINTMENT (OUTPATIENT)
Dept: WOUND CARE | Facility: HOSPITAL | Age: 75
End: 2020-12-01

## 2020-12-01 VITALS
RESPIRATION RATE: 20 BRPM | WEIGHT: 192 LBS | OXYGEN SATURATION: 98 % | SYSTOLIC BLOOD PRESSURE: 127 MMHG | HEIGHT: 71 IN | TEMPERATURE: 97.2 F | BODY MASS INDEX: 26.88 KG/M2 | HEART RATE: 88 BPM | DIASTOLIC BLOOD PRESSURE: 81 MMHG

## 2020-12-01 DIAGNOSIS — I83.893 VARICOSE VEINS OF BILATERAL LOWER EXTREMITIES WITH OTHER COMPLICATIONS: ICD-10-CM

## 2020-12-01 DIAGNOSIS — L03.115 CELLULITIS OF RIGHT LOWER LIMB: ICD-10-CM

## 2020-12-01 DIAGNOSIS — R21 RASH AND OTHER NONSPECIFIC SKIN ERUPTION: ICD-10-CM

## 2020-12-01 DIAGNOSIS — I83.12 VARICOSE VEINS OF RIGHT LOWER EXTREMITY WITH INFLAMMATION: ICD-10-CM

## 2020-12-01 DIAGNOSIS — Z98.890 OTHER SPECIFIED POSTPROCEDURAL STATES: Chronic | ICD-10-CM

## 2020-12-01 DIAGNOSIS — Z98.49 CATARACT EXTRACTION STATUS, UNSPECIFIED EYE: Chronic | ICD-10-CM

## 2020-12-01 DIAGNOSIS — L02.415 CELLULITIS OF RIGHT LOWER LIMB: ICD-10-CM

## 2020-12-01 DIAGNOSIS — E11.59 TYPE 2 DIABETES MELLITUS WITH OTHER CIRCULATORY COMPLICATIONS: ICD-10-CM

## 2020-12-01 DIAGNOSIS — I83.11 VARICOSE VEINS OF RIGHT LOWER EXTREMITY WITH INFLAMMATION: ICD-10-CM

## 2020-12-01 PROCEDURE — 99213 OFFICE O/P EST LOW 20 MIN: CPT

## 2020-12-01 PROCEDURE — 99203 OFFICE O/P NEW LOW 30 MIN: CPT

## 2020-12-01 PROCEDURE — G0463: CPT

## 2020-12-01 RX ORDER — GLIMEPIRIDE 2 MG/1
2 TABLET ORAL DAILY
Refills: 0 | Status: DISCONTINUED | COMMUNITY
End: 2020-12-01

## 2020-12-01 RX ORDER — LOSARTAN POTASSIUM AND HYDROCHLOROTHIAZIDE 25; 100 MG/1; MG/1
100-25 TABLET ORAL DAILY
Refills: 0 | Status: DISCONTINUED | COMMUNITY
Start: 2019-03-13 | End: 2020-12-01

## 2020-12-01 NOTE — REVIEW OF SYSTEMS
[Joint Stiffness] : joint stiffness [Skin Lesions] : skin lesion [Easy Bleeding] : a tendency for easy bleeding [Negative] : Heme/Lymph [Fever] : no fever [Chills] : no chills [Loss Of Hearing] : no hearing loss [Shortness Of Breath] : no shortness of breath [Wheezing] : no wheezing [Abdominal Pain] : no abdominal pain [Vomiting] : no vomiting [Confused] : no confusion [Anxiety] : no anxiety [FreeTextEntry5] : HTN, HLD , coronary artery disease, right ankle edema [de-identified] : NIDDM [FreeTextEntry9] : R lower leg swelling  [de-identified] : R lower leg with redness of skin

## 2020-12-01 NOTE — HISTORY OF PRESENT ILLNESS
[FreeTextEntry1] : 76y/o man with PMH of HTN, controlled DM, Sleep apnea, varicose veins with chronic skin changes in R lower leg for seen in wound center for ID consult to make sure there is no cellulitis. \par He has no fever, the swelling and erythema stared about one year ago waxing and waning, as per patient lately it looks better. No pain or warmth, no open wound. He has seen vascular in 8/2020 and had PVRs that do not show severe PVD, Seems like he has venous insufficiency causing edema and chronic skin changes.  \par    ABIs from 3/20 showed 1.2 bilaterallly according to Dr. Coburn's note of 8/10/20.

## 2020-12-01 NOTE — VITALS
[Sharp] : sharp [Occasional] : occasional [] : No [de-identified] : Pt rates pain 0/10.  Patient denies any pain or discomfort at the present, but will feel sharp pain when moving ankle around  [FreeTextEntry3] : Right Ankle  [FreeTextEntry5] : Medication list updated

## 2020-12-01 NOTE — VITALS
[Sharp] : sharp [Occasional] : occasional [] : No [de-identified] : Pt rates pain 0/10.  Patient denies any pain or discomfort at the present, but will feel sharp pain when moving ankle around  [FreeTextEntry3] : Right Ankle  [FreeTextEntry5] : Medication list updated

## 2020-12-01 NOTE — PHYSICAL EXAM
[Normal Thyroid] : the thyroid was normal [Normal Breath Sounds] : Normal breath sounds [Normal Heart Sounds] : normal heart sounds [Normal Rate and Rhythm] : normal rate and rhythm [Ankle Swelling (On Exam)] : present [Ankle Swelling On The Right] : of the right ankle [Ankle Swelling Bilaterally] : severe [Varicose Veins Of Lower Extremities] : bilaterally [Alert] : alert [Oriented to Person] : oriented to person [Oriented to Place] : oriented to place [Oriented to Time] : oriented to time [Calm] : calm [General Appearance - Alert] : alert [General Appearance - In No Acute Distress] : in no acute distress [Sclera] : the sclera and conjunctiva were normal [PERRL With Normal Accommodation] : pupils were equal in size, round, reactive to light [Extraocular Movements] : extraocular movements were intact [Outer Ear] : the ears and nose were normal in appearance [Oropharynx] : the oropharynx was normal with no thrush [Neck Appearance] : the appearance of the neck was normal [Neck Cervical Mass (___cm)] : no neck mass was observed [Jugular Venous Distention Increased] : there was no jugular-venous distention [Thyroid Diffuse Enlargement] : the thyroid was not enlarged [Auscultation Breath Sounds / Voice Sounds] : lungs were clear to auscultation bilaterally [Heart Rate And Rhythm] : heart rate was normal and rhythm regular [Heart Sounds] : normal S1 and S2 [Full Pulse] : the pedal pulses are present [Edema] : there was no peripheral edema [Bowel Sounds] : normal bowel sounds [Abdomen Soft] : soft [Abdomen Tenderness] : non-tender [Abdomen Mass (___ Cm)] : no abdominal mass palpated [Costovertebral Angle Tenderness] : no CVA tenderness [No Palpable Adenopathy] : no palpable adenopathy [Skin Color & Pigmentation] : normal skin color and pigmentation [Deep Tendon Reflexes (DTR)] : deep tendon reflexes were 2+ and symmetric [Sensation] : the sensory exam was normal to light touch and pinprick [No Focal Deficits] : no focal deficits [Oriented To Time, Place, And Person] : oriented to person, place, and time [Affect] : the affect was normal [JVD] : no jugular venous distention  [] : not present [Abdomen Masses] : No abdominal massess [Abdomen Tenderness] : ~T ~M No abdominal tenderness [Tender] : nontender [Enlarged] : not enlarged [de-identified] : elderly WM, NAD, alert, Ox3. [de-identified] : Dry / Flaky [de-identified] : NONE [de-identified] : Erythema [FreeTextEntry1] : as above, for RLE otherwise no rash

## 2020-12-01 NOTE — ASSESSMENT
[Verbal] : Verbal [Patient] : Patient [Good - alert, interested, motivated] : Good - alert, interested, motivated [Verbalizes knowledge/Understanding] : Verbalizes knowledge/understanding [Dressing changes] : dressing changes [Skin Care] : skin care [Signs and symptoms of infection] : sign and symptoms of infection [How and When to Call] : how and when to call [Pain Management] : pain management [Patient responsibility to plan of care] : patient responsibility to plan of care [Stable] : stable [Home] : Home [Cane] : Cane [Not Applicable - Long Term Care/Home Health Agency] : Long Term Care/Home Health Agency: Not Applicable [Treatment Education] : treatment education [Risk Reduction] : risk reduction [Anticipatory Guidance] : anticipatory guidance [] : No [FreeTextEntry2] : Infection Prevention\par Localized Wound Care\par Edema Control - Compression and Elevation strongly advised \par  [FreeTextEntry3] : No change in erythema  [FreeTextEntry4] : Pt had consult with Dr. Haywood during today's visit and reports no infection at the present time \par Pt was strongly encouraged to use compression therapy and to elevate legs throughout the day, pt states that both methods of edema control are non-effective [FreeTextEntry1] : Chronic skin changes seen in RLE due to possibly venous insufficiency. \par No open ulcer. No sign of cellulitis but due to chronic edema and venous insufficiency he is high risk for cellulitis. \par No antibiotics needed at this time. \par Will watch closely for any sign of infection or fever and will go to ED if any changes. \par Elevate leg. \par Pressure stocking will be helpful.\par Follow up with vascular for work up for venous insufficiency. \par Return PRN. \par \par Patient was given the opportunity to ask questions and all questions were answered to their satisfaction.\par Counseling included lab results, differential diagnosis, treatment options, risks and benefits, lifestyle changes, current condition, medications and dose adjustments.\par The patient verbalized understanding.\par

## 2020-12-01 NOTE — REVIEW OF SYSTEMS
[Joint Stiffness] : joint stiffness [Skin Lesions] : skin lesion [Easy Bleeding] : a tendency for easy bleeding [Negative] : Heme/Lymph [Fever] : no fever [Chills] : no chills [Loss Of Hearing] : no hearing loss [Shortness Of Breath] : no shortness of breath [Wheezing] : no wheezing [Abdominal Pain] : no abdominal pain [Vomiting] : no vomiting [Confused] : no confusion [Anxiety] : no anxiety [FreeTextEntry5] : HTN, HLD , coronary artery disease, right ankle edema [de-identified] : NIDDM [FreeTextEntry9] : R lower leg swelling  [de-identified] : R lower leg with redness of skin

## 2020-12-01 NOTE — PHYSICAL EXAM
[Normal Thyroid] : the thyroid was normal [Normal Breath Sounds] : Normal breath sounds [Normal Heart Sounds] : normal heart sounds [Normal Rate and Rhythm] : normal rate and rhythm [Ankle Swelling (On Exam)] : present [Ankle Swelling On The Right] : of the right ankle [Ankle Swelling Bilaterally] : severe [Varicose Veins Of Lower Extremities] : bilaterally [Alert] : alert [Oriented to Person] : oriented to person [Oriented to Place] : oriented to place [Oriented to Time] : oriented to time [Calm] : calm [General Appearance - Alert] : alert [General Appearance - In No Acute Distress] : in no acute distress [Sclera] : the sclera and conjunctiva were normal [PERRL With Normal Accommodation] : pupils were equal in size, round, reactive to light [Extraocular Movements] : extraocular movements were intact [Outer Ear] : the ears and nose were normal in appearance [Oropharynx] : the oropharynx was normal with no thrush [Neck Appearance] : the appearance of the neck was normal [Neck Cervical Mass (___cm)] : no neck mass was observed [Jugular Venous Distention Increased] : there was no jugular-venous distention [Thyroid Diffuse Enlargement] : the thyroid was not enlarged [Auscultation Breath Sounds / Voice Sounds] : lungs were clear to auscultation bilaterally [Heart Rate And Rhythm] : heart rate was normal and rhythm regular [Heart Sounds] : normal S1 and S2 [Full Pulse] : the pedal pulses are present [Edema] : there was no peripheral edema [Bowel Sounds] : normal bowel sounds [Abdomen Soft] : soft [Abdomen Tenderness] : non-tender [Abdomen Mass (___ Cm)] : no abdominal mass palpated [Costovertebral Angle Tenderness] : no CVA tenderness [No Palpable Adenopathy] : no palpable adenopathy [Skin Color & Pigmentation] : normal skin color and pigmentation [Deep Tendon Reflexes (DTR)] : deep tendon reflexes were 2+ and symmetric [Sensation] : the sensory exam was normal to light touch and pinprick [No Focal Deficits] : no focal deficits [Oriented To Time, Place, And Person] : oriented to person, place, and time [Affect] : the affect was normal [JVD] : no jugular venous distention  [] : not present [Abdomen Masses] : No abdominal massess [Abdomen Tenderness] : ~T ~M No abdominal tenderness [Tender] : nontender [Enlarged] : not enlarged [de-identified] : elderly WM, NAD, alert, Ox3. [de-identified] : Dry / Flaky [de-identified] : NONE [de-identified] : Erythema [FreeTextEntry1] : as above, for RLE otherwise no rash

## 2020-12-02 DIAGNOSIS — I83.11 VARICOSE VEINS OF RIGHT LOWER EXTREMITY WITH INFLAMMATION: ICD-10-CM

## 2020-12-02 DIAGNOSIS — E78.5 HYPERLIPIDEMIA, UNSPECIFIED: ICD-10-CM

## 2020-12-02 DIAGNOSIS — Z79.84 LONG TERM (CURRENT) USE OF ORAL HYPOGLYCEMIC DRUGS: ICD-10-CM

## 2020-12-02 DIAGNOSIS — G47.30 SLEEP APNEA, UNSPECIFIED: ICD-10-CM

## 2020-12-02 DIAGNOSIS — Z82.3 FAMILY HISTORY OF STROKE: ICD-10-CM

## 2020-12-02 DIAGNOSIS — Z82.49 FAMILY HISTORY OF ISCHEMIC HEART DISEASE AND OTHER DISEASES OF THE CIRCULATORY SYSTEM: ICD-10-CM

## 2020-12-02 DIAGNOSIS — I48.91 UNSPECIFIED ATRIAL FIBRILLATION: ICD-10-CM

## 2020-12-02 DIAGNOSIS — Z79.82 LONG TERM (CURRENT) USE OF ASPIRIN: ICD-10-CM

## 2020-12-02 DIAGNOSIS — N20.0 CALCULUS OF KIDNEY: ICD-10-CM

## 2020-12-02 DIAGNOSIS — I83.893 VARICOSE VEINS OF BILATERAL LOWER EXTREMITIES WITH OTHER COMPLICATIONS: ICD-10-CM

## 2020-12-02 DIAGNOSIS — E11.59 TYPE 2 DIABETES MELLITUS WITH OTHER CIRCULATORY COMPLICATIONS: ICD-10-CM

## 2020-12-02 DIAGNOSIS — I11.9 HYPERTENSIVE HEART DISEASE WITHOUT HEART FAILURE: ICD-10-CM

## 2020-12-02 DIAGNOSIS — E87.6 HYPOKALEMIA: ICD-10-CM

## 2020-12-02 DIAGNOSIS — R00.2 PALPITATIONS: ICD-10-CM

## 2020-12-02 DIAGNOSIS — Z98.49 CATARACT EXTRACTION STATUS, UNSPECIFIED EYE: ICD-10-CM

## 2020-12-02 DIAGNOSIS — Z79.899 OTHER LONG TERM (CURRENT) DRUG THERAPY: ICD-10-CM

## 2021-02-11 NOTE — PHARMACOTHERAPY INTERVENTION NOTE - INTERVENTION CATEGORIES
Patient Education Therapy                            Cancellation/No-show Note      Date:  2021  Patient Name:  Carmela Leone  :     MRN:  [id number]  Referring Practitioner: Ady Dixon MD  Diagnosis: Chronic right-sided low back pain with sciatica, sciatica laterality unspecified    Visit Information:  PT Visit Information  Onset Date: 21  PT Insurance Information: Medicare  Total # of Visits Approved: 99  Total # of Visits to Date: 9  Plan of Care/Certification Expiration Date: 21  No Show: 0  Canceled Appointment: 2  Progress Note Counter: 9/10 (CX 21)    For today's appointment patient:  [x]  Cancelled  []  Rescheduled appointment  []  No-show   []  Called pt to remind of next appointment     Reason given by patient:  []  Patient ill  []  Conflicting appointment  []  No transportation    []  Conflict with work  [x]  No reason given  []  Other:       [] Pt has future appointments scheduled, no follow up needed  [] Pt requests to be on hold.     Reason:   If > 2 weeks please discuss with therapist.  [] Therapist to call pt for follow up     Comments:   \"would like to reschedule\"    Signature: Electronically signed by Tanner Scott PTA on 21 at 1:20 PM EST

## 2021-08-27 NOTE — OCCUPATIONAL THERAPY INITIAL EVALUATION ADULT - AMBULATORY DEVICES NEEDED
Magdalena Correa called requesting a refill of the below medication which has been pended for you:     Requested Prescriptions     Pending Prescriptions Disp Refills    vitamin D (ERGOCALCIFEROL) 1.25 MG (63201 UT) CAPS capsule [Pharmacy Med Name: VITAMIN D2 50,000IU (ERGO) CAP RX] 4 capsule 2     Sig: TAKE 1 CAPSULE BY MOUTH 1 TIME WEEKLY       Last Appointment Date: 6/7/2021  Next Appointment Date: 12/7/2021    Allergies   Allergen Reactions    Morphine And Related Patient denies use of AD/no

## 2021-10-14 NOTE — PROGRESS NOTE ADULT - SUBJECTIVE AND OBJECTIVE BOX
-- DO NOT REPLY / DO NOT REPLY ALL --  -- Message is from the [employer]--    Transfer to RN      Chief Complaint:  ABDOMINAL DISCOMFORT     Caller Information       Type Contact Phone    10/14/2021 08:29 AM CDT Phone (Incoming) Melany Choudhury (Self) [phone] (A)          Provider Name:  Luanne Bowers MD  .  [facility] Site Name: [facility] Phone Number: [phone] 74 year old male was seen for right foot/ankle cellulitis. Patient reports significant improvement in pain. States that ever since Sunday, it looks like the swelling and redness has been returning to the leg. Denies any acute overnight events. Denies any constitutional symptoms.     Vital Signs Last 24 Hrs  T(C): 36.6 (02 Mar 2020 11:35), Max: 36.7 (01 Mar 2020 19:48)  T(F): 97.8 (02 Mar 2020 11:35), Max: 98.1 (01 Mar 2020 19:48)  HR: 76 (02 Mar 2020 11:35) (69 - 83)  BP: 144/79 (02 Mar 2020 11:35) (111/69 - 149/92)  BP(mean): --  RR: 18 (02 Mar 2020 11:35) (18 - 18)  SpO2: 98% (02 Mar 2020 11:35) (97% - 99%)                          12.9   7.40  )-----------( 203      ( 02 Mar 2020 06:28 )             39.9     03-02    141  |  107  |  27<H>  ----------------------------<  125<H>  3.6   |  25  |  1.60<H>    Ca    9.1      02 Mar 2020 06:28  Phos  3.0     03-02  Mg     2.0     03-02    Physical Exam:   Vasc: DP/PT pulses palpable left foot, DP/PT pulses nonpalpable right foot. +edema right foot and ankle.   Neuro: Grossly intact, cora.   Derm: 0.5 cm x 0.6 cm x 0.1 cm ulcer at the right lateral malleolus x 2, granular base, periwound erythema, serous drainage.   MSK: 5/5 strength at all muscle groups crossing ankle joint, cora.     MEDICATIONS  (STANDING):  amLODIPine   Tablet 10 milliGRAM(s) Oral daily  clotrimazole/betamethasone Cream 1 Application(s) Topical daily  dextrose 5%. 1000 milliLiter(s) (50 mL/Hr) IV Continuous <Continuous>  dextrose 50% Injectable 12.5 Gram(s) IV Push once  dextrose 50% Injectable 25 Gram(s) IV Push once  dextrose 50% Injectable 25 Gram(s) IV Push once  insulin lispro (HumaLOG) corrective regimen sliding scale   SubCutaneous three times a day before meals  insulin lispro (HumaLOG) corrective regimen sliding scale   SubCutaneous at bedtime  lactated ringers. 1000 milliLiter(s) (75 mL/Hr) IV Continuous <Continuous>  mesalamine DR Capsule 800 milliGRAM(s) Oral daily  metoprolol succinate  milliGRAM(s) Oral daily  piperacillin/tazobactam IVPB.. 3.375 Gram(s) IV Intermittent every 8 hours    MEDICATIONS  (PRN):  dextrose 40% Gel 15 Gram(s) Oral once PRN Blood Glucose LESS THAN 70 milliGRAM(s)/deciliter  glucagon  Injectable 1 milliGRAM(s) IntraMuscular once PRN Glucose LESS THAN 70 milligrams/deciliter

## 2021-10-20 ENCOUNTER — INPATIENT (INPATIENT)
Facility: HOSPITAL | Age: 76
LOS: 4 days | Discharge: NURSING, REHAB OR LONG-TERM CARE | DRG: 24 | End: 2021-10-25
Attending: PSYCHIATRY & NEUROLOGY | Admitting: PSYCHIATRY & NEUROLOGY
Payer: COMMERCIAL

## 2021-10-20 ENCOUNTER — EMERGENCY (EMERGENCY)
Facility: HOSPITAL | Age: 76
LOS: 1 days | Discharge: ANOTHER HOSPITAL | End: 2021-10-20
Attending: INTERNAL MEDICINE | Admitting: INTERNAL MEDICINE
Payer: COMMERCIAL

## 2021-10-20 VITALS
DIASTOLIC BLOOD PRESSURE: 69 MMHG | TEMPERATURE: 97 F | RESPIRATION RATE: 18 BRPM | SYSTOLIC BLOOD PRESSURE: 148 MMHG | HEIGHT: 70 IN | HEART RATE: 78 BPM | OXYGEN SATURATION: 99 % | WEIGHT: 184.09 LBS

## 2021-10-20 VITALS
SYSTOLIC BLOOD PRESSURE: 121 MMHG | HEART RATE: 74 BPM | TEMPERATURE: 97 F | WEIGHT: 184.09 LBS | OXYGEN SATURATION: 99 % | DIASTOLIC BLOOD PRESSURE: 79 MMHG | HEIGHT: 70 IN | RESPIRATION RATE: 17 BRPM

## 2021-10-20 VITALS
OXYGEN SATURATION: 99 % | WEIGHT: 184.09 LBS | HEIGHT: 70 IN | RESPIRATION RATE: 17 BRPM | TEMPERATURE: 97 F | HEART RATE: 78 BPM | SYSTOLIC BLOOD PRESSURE: 128 MMHG | DIASTOLIC BLOOD PRESSURE: 79 MMHG

## 2021-10-20 DIAGNOSIS — Z98.49 CATARACT EXTRACTION STATUS, UNSPECIFIED EYE: Chronic | ICD-10-CM

## 2021-10-20 DIAGNOSIS — Z98.890 OTHER SPECIFIED POSTPROCEDURAL STATES: Chronic | ICD-10-CM

## 2021-10-20 DIAGNOSIS — G46.3 BRAIN STEM STROKE SYNDROME: ICD-10-CM

## 2021-10-20 LAB
ALBUMIN SERPL ELPH-MCNC: 3.8 G/DL — SIGNIFICANT CHANGE UP (ref 3.3–5)
ALP SERPL-CCNC: 56 U/L — SIGNIFICANT CHANGE UP (ref 40–120)
ALT FLD-CCNC: 21 U/L — SIGNIFICANT CHANGE UP (ref 10–45)
ANION GAP SERPL CALC-SCNC: 17 MMOL/L — SIGNIFICANT CHANGE UP (ref 5–17)
ANION GAP SERPL CALC-SCNC: 7 MMOL/L — SIGNIFICANT CHANGE UP (ref 5–17)
APTT BLD: 31.5 SEC — SIGNIFICANT CHANGE UP (ref 27.5–35.5)
AST SERPL-CCNC: 29 U/L — SIGNIFICANT CHANGE UP (ref 10–40)
BASOPHILS # BLD AUTO: 0.04 K/UL — SIGNIFICANT CHANGE UP (ref 0–0.2)
BASOPHILS NFR BLD AUTO: 0.9 % — SIGNIFICANT CHANGE UP (ref 0–2)
BILIRUB SERPL-MCNC: 0.7 MG/DL — SIGNIFICANT CHANGE UP (ref 0.2–1.2)
BLD GP AB SCN SERPL QL: NEGATIVE — SIGNIFICANT CHANGE UP
BUN SERPL-MCNC: 24 MG/DL — HIGH (ref 7–23)
BUN SERPL-MCNC: 27 MG/DL — HIGH (ref 7–23)
CALCIUM SERPL-MCNC: 9.2 MG/DL — SIGNIFICANT CHANGE UP (ref 8.4–10.5)
CALCIUM SERPL-MCNC: 9.2 MG/DL — SIGNIFICANT CHANGE UP (ref 8.4–10.5)
CHLORIDE SERPL-SCNC: 101 MMOL/L — SIGNIFICANT CHANGE UP (ref 96–108)
CHLORIDE SERPL-SCNC: 104 MMOL/L — SIGNIFICANT CHANGE UP (ref 96–108)
CO2 SERPL-SCNC: 20 MMOL/L — LOW (ref 22–31)
CO2 SERPL-SCNC: 26 MMOL/L — SIGNIFICANT CHANGE UP (ref 22–31)
CREAT SERPL-MCNC: 0.85 MG/DL — SIGNIFICANT CHANGE UP (ref 0.5–1.3)
CREAT SERPL-MCNC: 1.17 MG/DL — SIGNIFICANT CHANGE UP (ref 0.5–1.3)
EOSINOPHIL # BLD AUTO: 0.21 K/UL — SIGNIFICANT CHANGE UP (ref 0–0.5)
EOSINOPHIL NFR BLD AUTO: 4.5 % — SIGNIFICANT CHANGE UP (ref 0–6)
GLUCOSE BLDC GLUCOMTR-MCNC: 103 MG/DL — HIGH (ref 70–99)
GLUCOSE BLDC GLUCOMTR-MCNC: 181 MG/DL — HIGH (ref 70–99)
GLUCOSE SERPL-MCNC: 115 MG/DL — HIGH (ref 70–99)
GLUCOSE SERPL-MCNC: 115 MG/DL — HIGH (ref 70–99)
HCT VFR BLD CALC: 44.3 % — SIGNIFICANT CHANGE UP (ref 39–50)
HCT VFR BLD CALC: 49.4 % — SIGNIFICANT CHANGE UP (ref 39–50)
HGB BLD-MCNC: 14.8 G/DL — SIGNIFICANT CHANGE UP (ref 13–17)
HGB BLD-MCNC: 16.2 G/DL — SIGNIFICANT CHANGE UP (ref 13–17)
IMM GRANULOCYTES NFR BLD AUTO: 0.6 % — SIGNIFICANT CHANGE UP (ref 0–1.5)
INR BLD: 0.96 RATIO — SIGNIFICANT CHANGE UP (ref 0.88–1.16)
LYMPHOCYTES # BLD AUTO: 1.31 K/UL — SIGNIFICANT CHANGE UP (ref 1–3.3)
LYMPHOCYTES # BLD AUTO: 28.3 % — SIGNIFICANT CHANGE UP (ref 13–44)
MAGNESIUM SERPL-MCNC: 1.8 MG/DL — SIGNIFICANT CHANGE UP (ref 1.6–2.6)
MCHC RBC-ENTMCNC: 30.1 PG — SIGNIFICANT CHANGE UP (ref 27–34)
MCHC RBC-ENTMCNC: 30.1 PG — SIGNIFICANT CHANGE UP (ref 27–34)
MCHC RBC-ENTMCNC: 32.8 GM/DL — SIGNIFICANT CHANGE UP (ref 32–36)
MCHC RBC-ENTMCNC: 33.4 GM/DL — SIGNIFICANT CHANGE UP (ref 32–36)
MCV RBC AUTO: 90 FL — SIGNIFICANT CHANGE UP (ref 80–100)
MCV RBC AUTO: 91.8 FL — SIGNIFICANT CHANGE UP (ref 80–100)
MONOCYTES # BLD AUTO: 0.39 K/UL — SIGNIFICANT CHANGE UP (ref 0–0.9)
MONOCYTES NFR BLD AUTO: 8.4 % — SIGNIFICANT CHANGE UP (ref 2–14)
NEUTROPHILS # BLD AUTO: 2.65 K/UL — SIGNIFICANT CHANGE UP (ref 1.8–7.4)
NEUTROPHILS NFR BLD AUTO: 57.3 % — SIGNIFICANT CHANGE UP (ref 43–77)
NRBC # BLD: 0 /100 WBCS — SIGNIFICANT CHANGE UP (ref 0–0)
NRBC # BLD: 0 /100 WBCS — SIGNIFICANT CHANGE UP (ref 0–0)
PHOSPHATE SERPL-MCNC: 3.3 MG/DL — SIGNIFICANT CHANGE UP (ref 2.5–4.5)
PLATELET # BLD AUTO: 203 K/UL — SIGNIFICANT CHANGE UP (ref 150–400)
PLATELET # BLD AUTO: 227 K/UL — SIGNIFICANT CHANGE UP (ref 150–400)
POTASSIUM SERPL-MCNC: 3.9 MMOL/L — SIGNIFICANT CHANGE UP (ref 3.5–5.3)
POTASSIUM SERPL-MCNC: 4.2 MMOL/L — SIGNIFICANT CHANGE UP (ref 3.5–5.3)
POTASSIUM SERPL-SCNC: 3.9 MMOL/L — SIGNIFICANT CHANGE UP (ref 3.5–5.3)
POTASSIUM SERPL-SCNC: 4.2 MMOL/L — SIGNIFICANT CHANGE UP (ref 3.5–5.3)
PROT SERPL-MCNC: 7.6 G/DL — SIGNIFICANT CHANGE UP (ref 6–8.3)
PROTHROM AB SERPL-ACNC: 11.6 SEC — SIGNIFICANT CHANGE UP (ref 10.6–13.6)
RBC # BLD: 4.92 M/UL — SIGNIFICANT CHANGE UP (ref 4.2–5.8)
RBC # BLD: 5.38 M/UL — SIGNIFICANT CHANGE UP (ref 4.2–5.8)
RBC # FLD: 13.9 % — SIGNIFICANT CHANGE UP (ref 10.3–14.5)
RBC # FLD: 14.1 % — SIGNIFICANT CHANGE UP (ref 10.3–14.5)
RH IG SCN BLD-IMP: POSITIVE — SIGNIFICANT CHANGE UP
SARS-COV-2 RNA SPEC QL NAA+PROBE: SIGNIFICANT CHANGE UP
SODIUM SERPL-SCNC: 137 MMOL/L — SIGNIFICANT CHANGE UP (ref 135–145)
SODIUM SERPL-SCNC: 138 MMOL/L — SIGNIFICANT CHANGE UP (ref 135–145)
TROPONIN I SERPL-MCNC: <.017 NG/ML — LOW (ref 0.02–0.06)
WBC # BLD: 4.63 K/UL — SIGNIFICANT CHANGE UP (ref 3.8–10.5)
WBC # BLD: 8.34 K/UL — SIGNIFICANT CHANGE UP (ref 3.8–10.5)
WBC # FLD AUTO: 4.63 K/UL — SIGNIFICANT CHANGE UP (ref 3.8–10.5)
WBC # FLD AUTO: 8.34 K/UL — SIGNIFICANT CHANGE UP (ref 3.8–10.5)

## 2021-10-20 PROCEDURE — 61645 PERQ ART M-THROMBECT &/NFS: CPT | Mod: RT

## 2021-10-20 PROCEDURE — 99291 CRITICAL CARE FIRST HOUR: CPT

## 2021-10-20 PROCEDURE — 0042T: CPT

## 2021-10-20 PROCEDURE — 84484 ASSAY OF TROPONIN QUANT: CPT

## 2021-10-20 PROCEDURE — 70496 CT ANGIOGRAPHY HEAD: CPT | Mod: MA

## 2021-10-20 PROCEDURE — 36415 COLL VENOUS BLD VENIPUNCTURE: CPT

## 2021-10-20 PROCEDURE — 71045 X-RAY EXAM CHEST 1 VIEW: CPT | Mod: 26

## 2021-10-20 PROCEDURE — 70450 CT HEAD/BRAIN W/O DYE: CPT | Mod: 26,59,MA

## 2021-10-20 PROCEDURE — 70496 CT ANGIOGRAPHY HEAD: CPT | Mod: 26,MA

## 2021-10-20 PROCEDURE — 82962 GLUCOSE BLOOD TEST: CPT

## 2021-10-20 PROCEDURE — 85025 COMPLETE CBC W/AUTO DIFF WBC: CPT

## 2021-10-20 PROCEDURE — 99291 CRITICAL CARE FIRST HOUR: CPT | Mod: 25

## 2021-10-20 PROCEDURE — 71045 X-RAY EXAM CHEST 1 VIEW: CPT

## 2021-10-20 PROCEDURE — 93005 ELECTROCARDIOGRAM TRACING: CPT

## 2021-10-20 PROCEDURE — 93010 ELECTROCARDIOGRAM REPORT: CPT

## 2021-10-20 PROCEDURE — 80053 COMPREHEN METABOLIC PANEL: CPT

## 2021-10-20 PROCEDURE — 70498 CT ANGIOGRAPHY NECK: CPT | Mod: 26,MA

## 2021-10-20 PROCEDURE — 93010 ELECTROCARDIOGRAM REPORT: CPT | Mod: 77

## 2021-10-20 PROCEDURE — 87635 SARS-COV-2 COVID-19 AMP PRB: CPT

## 2021-10-20 PROCEDURE — 37195 THROMBOLYTIC THERAPY STROKE: CPT

## 2021-10-20 PROCEDURE — 70450 CT HEAD/BRAIN W/O DYE: CPT | Mod: MA

## 2021-10-20 PROCEDURE — 85610 PROTHROMBIN TIME: CPT

## 2021-10-20 PROCEDURE — 93306 TTE W/DOPPLER COMPLETE: CPT | Mod: 26

## 2021-10-20 PROCEDURE — 85730 THROMBOPLASTIN TIME PARTIAL: CPT

## 2021-10-20 PROCEDURE — 70498 CT ANGIOGRAPHY NECK: CPT | Mod: MA

## 2021-10-20 RX ORDER — MESALAMINE 400 MG
400 TABLET, DELAYED RELEASE (ENTERIC COATED) ORAL DAILY
Refills: 0 | Status: DISCONTINUED | OUTPATIENT
Start: 2021-10-20 | End: 2021-10-20

## 2021-10-20 RX ORDER — SENNA PLUS 8.6 MG/1
1 TABLET ORAL DAILY
Refills: 0 | Status: DISCONTINUED | OUTPATIENT
Start: 2021-10-20 | End: 2021-10-25

## 2021-10-20 RX ORDER — CHLORHEXIDINE GLUCONATE 213 G/1000ML
1 SOLUTION TOPICAL
Refills: 0 | Status: DISCONTINUED | OUTPATIENT
Start: 2021-10-20 | End: 2021-10-25

## 2021-10-20 RX ORDER — MESALAMINE 400 MG
4.8 TABLET, DELAYED RELEASE (ENTERIC COATED) ORAL DAILY
Refills: 0 | Status: DISCONTINUED | OUTPATIENT
Start: 2021-10-20 | End: 2021-10-25

## 2021-10-20 RX ORDER — POLYETHYLENE GLYCOL 3350 17 G/17G
17 POWDER, FOR SOLUTION ORAL DAILY
Refills: 0 | Status: DISCONTINUED | OUTPATIENT
Start: 2021-10-20 | End: 2021-10-25

## 2021-10-20 RX ORDER — ALTEPLASE 100 MG
7.5 KIT INTRAVENOUS ONCE
Refills: 0 | Status: COMPLETED | OUTPATIENT
Start: 2021-10-20 | End: 2021-10-20

## 2021-10-20 RX ORDER — ATORVASTATIN CALCIUM 80 MG/1
40 TABLET, FILM COATED ORAL AT BEDTIME
Refills: 0 | Status: DISCONTINUED | OUTPATIENT
Start: 2021-10-20 | End: 2021-10-24

## 2021-10-20 RX ORDER — METOPROLOL TARTRATE 50 MG
50 TABLET ORAL
Refills: 0 | Status: DISCONTINUED | OUTPATIENT
Start: 2021-10-20 | End: 2021-10-21

## 2021-10-20 RX ORDER — INSULIN LISPRO 100/ML
VIAL (ML) SUBCUTANEOUS EVERY 6 HOURS
Refills: 0 | Status: DISCONTINUED | OUTPATIENT
Start: 2021-10-20 | End: 2021-10-21

## 2021-10-20 RX ORDER — ALTEPLASE 100 MG
67.6 KIT INTRAVENOUS ONCE
Refills: 0 | Status: COMPLETED | OUTPATIENT
Start: 2021-10-20 | End: 2021-10-20

## 2021-10-20 RX ORDER — MAGNESIUM SULFATE 500 MG/ML
1 VIAL (ML) INJECTION ONCE
Refills: 0 | Status: COMPLETED | OUTPATIENT
Start: 2021-10-20 | End: 2021-10-20

## 2021-10-20 RX ORDER — METOPROLOL TARTRATE 50 MG
50 TABLET ORAL
Refills: 0 | Status: DISCONTINUED | OUTPATIENT
Start: 2021-10-20 | End: 2021-10-20

## 2021-10-20 RX ORDER — ACETAMINOPHEN 500 MG
650 TABLET ORAL EVERY 6 HOURS
Refills: 0 | Status: DISCONTINUED | OUTPATIENT
Start: 2021-10-20 | End: 2021-10-25

## 2021-10-20 RX ORDER — AMLODIPINE BESYLATE 2.5 MG/1
10 TABLET ORAL DAILY
Refills: 0 | Status: DISCONTINUED | OUTPATIENT
Start: 2021-10-20 | End: 2021-10-20

## 2021-10-20 RX ORDER — AMLODIPINE BESYLATE 2.5 MG/1
5 TABLET ORAL DAILY
Refills: 0 | Status: DISCONTINUED | OUTPATIENT
Start: 2021-10-20 | End: 2021-10-22

## 2021-10-20 RX ADMIN — SENNA PLUS 1 TABLET(S): 8.6 TABLET ORAL at 21:22

## 2021-10-20 RX ADMIN — Medication 50 MILLIGRAM(S): at 19:45

## 2021-10-20 RX ADMIN — ATORVASTATIN CALCIUM 40 MILLIGRAM(S): 80 TABLET, FILM COATED ORAL at 21:45

## 2021-10-20 RX ADMIN — ALTEPLASE 67.6 MILLIGRAM(S): KIT at 10:58

## 2021-10-20 RX ADMIN — Medication 2: at 23:48

## 2021-10-20 RX ADMIN — Medication 100 GRAM(S): at 21:21

## 2021-10-20 RX ADMIN — ALTEPLASE 450 MILLIGRAM(S): KIT at 10:56

## 2021-10-20 RX ADMIN — AMLODIPINE BESYLATE 10 MILLIGRAM(S): 2.5 TABLET ORAL at 18:25

## 2021-10-20 RX ADMIN — CHLORHEXIDINE GLUCONATE 1 APPLICATION(S): 213 SOLUTION TOPICAL at 21:21

## 2021-10-20 NOTE — DISCHARGE NOTE NURSING/CASE MANAGEMENT/SOCIAL WORK - PATIENT PORTAL LINK FT
You can access the FollowMyHealth Patient Portal offered by [facility] by registering at the following website: [email or web address]. By joining [employer]’s FollowMyHealth portal, you will also be able to view your health information using other applications (apps) compatible with our system.

## 2021-10-20 NOTE — PRE-ANESTHESIA EVALUATION ADULT - NSANTHPMHFT_GEN_ALL_CORE
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

## 2021-10-20 NOTE — ED PROVIDER NOTE - CONSULTANT FREE TEXT FOR MDM DISCUSSED CASE WITH QUESTION
telestroke called though [facility], spoke with Dr. Clemente,  TPA reccd and given at 1058am telestroke called though [facility], spoke with Dr. Clemente,  TPA reccd and given at 1056am

## 2021-10-20 NOTE — PROGRESS NOTE ADULT - ATTENDING COMMENTS
s/p thrombectomy.  on my assessment, oriented x 3, FC, [place], 4/5 LUE, groin without hematoma, distal pulses intact.  monitor in ICU overnight, stroke neurology following.

## 2021-10-20 NOTE — H&P ADULT - ASSESSMENT
Patient is a 77yo Rt handed M with pmh of HTN on ASA 81mg, Ulcerative Colitis, Afib (not on AC 2/2 h/o GIB, UC), hx of meningioma (s/p left frontal craniotomy in 2017and s/p radiation),  Hx of seizure disorder on Keppra, DM-II presents to [facility] as a direct transfer to  from [place] for MT. Patient was a candidate for TPA and was given at 1058am. Patient is s/p MT of Rt M2 occlusion noted on angiogram with TICI2C. Will need to do further evaluation and workup.    Impression: Left sided hemiparesis with word finding difficulty 2/2 to Rt M2 occlusion. Due to ESUS. However given hx of Afib, may be etiology. s/p Thombectomy TICI 2C recannulization     Recommendations:  Neuro:  Keep SBP up to 180  Neuro- checks Q1hrs  Start Lipitor 80mg QHS with titration of LDL less than 70   Lipid panel, tsh. Hgb a1c, homocystiene, Methylmalonic acid level   NO AC/AP for 24hrs due to TPA  Repeat CT head w/o contrast in 24hrs s/p TPA  MRI head w/o contrast   MR Nova to assess intracranial flow   Continue home AEDs Keppra   Monitor for hematoma in the Rt groin s/p angio.     Pulmonology  No acute issues     Cardiology  HTN  hx of Afib not on AC   -180  Telemetry  Baseline EKG   Echo with bubble  Continue home medications  Lipitor 80mg QHS Titrate for LDL less than 70   lipid panel     GI  Hx of UC   Continue home medications  Dysphagia screen  BM    Renal  MIVF  Na goal 140-150  Correction of underlying electrolyte abnormalities   Monitor I/Os    Hematology:  Monitor CBC  PT/PTT/INR  LE dopplers     ENDO  DM-II   -180  ISS   Hgb a1c ordered   Tsh     ID  No acute issues noted   Patient is a 75yo Rt handed M with pmh of HTN on ASA 81mg, Ulcerative Colitis, Afib (not on AC 2/2 h/o GIB, UC), hx of meningioma (s/p left frontal craniotomy in 2017and s/p radiation),  Hx of seizure disorder, DM-II presents to [facility] as a direct transfer to  from [place] for MT. Patient was a candidate for TPA and was given at 1058am. Patient is s/p MT of Rt M2 occlusion noted on angiogram with TICI2C. Will need to do further evaluation and workup.    Impression: Left sided hemiparesis with word finding difficulty 2/2 to Rt M2 occlusion. Due to ESUS. However given hx of Afib, may be etiology. s/p Thombectomy TICI 2C recannulization     Recommendations:  Neuro:  Keep SBP up to 180  Neuro- checks Q1hrs  Start Lipitor 80mg QHS with titration of LDL less than 70   Lipid panel, tsh. Hgb a1c, homocystiene, Methylmalonic acid level   NO AC/AP for 24hrs due to TPA  Repeat CT head w/o contrast in 24hrs s/p TPA  MRI head w/o contrast   MR Nova to assess intracranial flow   Monitor for hematoma in the Rt groin s/p angio.     Pulmonology  No acute issues     Cardiology  HTN  hx of Afib not on AC   -180  Telemetry  Baseline EKG   Echo with bubble  Continue home medications  Lipitor 80mg QHS Titrate for LDL less than 70   lipid panel     GI  Hx of UC   Continue home medications  Dysphagia screen- advance diet as tolerated   BM regiment       Renal  MIVF  Na goal 140-150  Correction of underlying electrolyte abnormalities   Monitor I/Os    Hematology:  Monitor CBC  PT/PTT/INR  LE dopplers    ENDO  DM-II   -180  ISS   Hgb a1c ordered   Tsh     ID  No acute issues noted    Case to be seen with Stroke Attending, Dr. Boateng.   Patient is a 77yo Rt handed M with pmh of HTN on ASA 81mg, Ulcerative Colitis, Afib (not on AC 2/2 h/o GIB, UC), hx of meningioma (s/p left frontal craniotomy in 2017and s/p radiation),  Hx of seizure disorder not on AED, DM-II presents to [facility] as a direct transfer to  from [place] for MT. Patient was a candidate for TPA and was given at 1058am. Patient is s/p MT of Rt M2 occlusion noted on angiogram with TICI2C. Will need to do further evaluation and workup.    Impression: Left sided hemiparesis with word finding difficulty 2/2 to Rt M2 occlusion. Due to ESUS. However given hx of Afib, may be etiology. s/p Thombectomy TICI 2C recannulization     Recommendations:  Neuro:  Keep SBP up to 180  Neuro- checks Q1hrs  Start Lipitor 80mg QHS with titration of LDL less than 70   Lipid panel, tsh. Hgb a1c, homocystiene, Methylmalonic acid level   NO AC/AP for 24hrs due to TPA  Repeat CT head w/o contrast in 24hrs s/p TPA  MRI head w/o contrast   MR Nova to assess intracranial flow   Monitor for hematoma in the Rt groin s/p angio.   Not on AED, per chart review     Pulmonology  No acute issues     Cardiology  HTN  hx of Afib not on AC   -180  Telemetry  Baseline EKG   Echo with bubble  Continue home medications  Lipitor 80mg QHS Titrate for LDL less than 70   lipid panel     GI  Hx of UC   Continue home medications  Dysphagia screen- advance diet as tolerated   BM regiment       Renal  MIVF  Na goal 140-150  Correction of underlying electrolyte abnormalities   Monitor I/Os    Hematology:  Monitor CBC  PT/PTT/INR  LE dopplers    ENDO  DM-II   -180  ISS   Hgb a1c ordered   Tsh     ID  No acute issues noted    Case to be seen with Stroke Attending, Dr. Boateng.

## 2021-10-20 NOTE — ED ADULT NURSE NOTE - NSIMPLEMENTINTERV_GEN_ALL_ED
Implemented All Fall with Harm Risk Interventions:  [place] to call system. Call bell, personal items and telephone within reach. Instruct patient to call for assistance. Room bathroom lighting operational. Non-slip footwear when patient is off stretcher. Physically safe environment: no spills, clutter or unnecessary equipment. Stretcher in lowest position, wheels locked, appropriate side rails in place. Provide visual cue, wrist band, yellow gown, etc. Monitor gait and stability. Monitor for mental status changes and reorient to person, place, and time. Review medications for side effects contributing to fall risk. Reinforce activity limits and safety measures with patient and family. Provide visual clues: red socks.

## 2021-10-20 NOTE — ED ADULT NURSE NOTE - OBJECTIVE STATEMENT
As per wife, pt was talking with her this morning then became unable to verbalize-difficult formulating words; slurred speech about 9:30-9:45am today. As per wife pt has history of seizures.

## 2021-10-20 NOTE — PROGRESS NOTE ADULT - ASSESSMENT
76M hx of HTN, Afib (on ASA, not AC 2/2 UGIB), UC, meningioma (s/p L frontal crani and XRT 2017), NIDDM, who presented to [facility] from  for MT for dysarthria, word finding difficulty, and leaning towards L side at 0945, LWK 0900 10/20, w/ admission score NIHSS 14, imaging with RM2 occlusion with mismatch, given tPA 10:48 and transferred for MT.   76M hx of HTN, Afib (on ASA, not AC 2/2 UGIB), presented with acute right MCA ischemic stroke s/p tPA at 10:48 , admission score NIHSS 14, imaging with RM2 occlusion with mismatch s/p thrombectomy TICI 2 C   neuro:  neuro checks q 1 hr   lipitor 80 mg daily   MRI brain at some point CT head tomorrow   PT/OT   If CT head tomorrow shows no large stroke and no hemorrhage, would consider starting anticoagulation , however will need to consult GI to weigh the risk benefit of anticoagulation in the setting of UC   lipid profile, HBA 1 c   TTE   -140 mmhg for reperfusion injury   atrial fib continue metoprolol  HTN on amlodipine and metoprolol , hypotension, decrease to 5 mg given hypotension   stroke likely cardioembolic   GI swalllow eval and advance diet as tolerated   history of UC resume mesalamine   IVL   d/c tirado in am   hold chemoprophylaxis till 24 hours post- tPA   monitor for GI bleed given history of GI bleed and got tPA   right leg erythema, will get dopplers and monitor , wound care     30 critical care time  patient is at risk for hemorrhagic transformation of the ischemic stroke, GI bleed

## 2021-10-20 NOTE — PRE-ANESTHESIA EVALUATION ADULT - NSANTHOSAYNRD_GEN_A_CORE
No. ELKE screening performed.  STOP BANG Legend: 0-2 = LOW Risk; 3-4 = INTERMEDIATE Risk; 5-8 = HIGH Risk

## 2021-10-20 NOTE — H&P ADULT - HISTORY OF PRESENT ILLNESS
Patient is a 75yo Rt handed M with pmh of HTN on ASA 81mg, Ulcerative Colitis, Afib (not on AC 2/2 h/o GIB, UC), hx of meningioma (s/p left frontal craniotomy in 2017and s/p radiation),  Hx of seizure disorder on Keppra, DM-II presents to [facility] as a direct transfer to IR from [place] for MT. Per chart review, patient woke up around 0900am with no deficits. Around 930 am patient was having a conversation with his wife, around 945am patient started to slur his speech, having difficulty forming words and leaning to his left side. Patient’s wife called EMS. On arrival to [place], patient had NIHSS 14 and CT/CTA was noted to show Rt M1 occlusion with Core 44 and Penumbra 113. Patient was given TPA at 1058 am and transferred to [facility] for MT. Patient’s wife states the event was not a seizure.  NIHSS 14 at [place] report  Preop NIHSS 11 per [facility] report   MRS 0         Patient is a 75yo Rt handed M with pmh of HTN on ASA 81mg, Ulcerative Colitis, Afib (not on AC 2/2 h/o GIB, UC), hx of meningioma (s/p left frontal craniotomy in 2017and s/p radiation),  Hx of seizure disorder, DM-II presents to [facility] as a direct transfer to IR from [place] for MT. Per chart review, patient woke up around 0900am with no deficits. Around 930 am patient was having a conversation with his wife, around 945am patient started to slur his speech, having difficulty forming words and leaning to his left side. Patient’s wife called EMS. On arrival to [place], patient had NIHSS 14 and CT/CTA was noted to show Rt M1 occlusion with Core 44 and Penumbra 113. Patient was given TPA at 1058 am and transferred to [facility] for MT. Patient’s wife states the event was not a seizure.  NIHSS 14 at [place] report  Preop NIHSS 11 per [facility] report   MRS 0

## 2021-10-20 NOTE — ED ADULT NURSE REASSESSMENT NOTE - NS ED NURSE REASSESS COMMENT FT1
Patient back from CT via stretcher. Patient received TPA bolus at 1056 given by Dr. Gleason. TPA drip started at 1058. Tele stroke at bedside. Dr. Cunningham evaluating.

## 2021-10-20 NOTE — PROGRESS NOTE ADULT - ASSESSMENT
ASSESSMENT/PLAN:    76M hx of HTN, Afib (on ASA, not AC 2/2 UGIB), UC, meningioma (s/p L frontal crani and XRT 2017), seizure d/o, NIDDM, who presented to [facility] from [facility] for MT for dysarthria, word finding difficulty, and leaning towards L side at 0945, LWK 0900 10/20, w/ admission score NIHSS 14, imaging with RM1 occlusion with mismatch, given tPA 10:48 and transferred for MT.    Admission Score   GC: NIHSS 14   [facility] Preop: NIHSS11  mRS0    NEURO:  s/p tPA 10/20 10:58am  s/p MT with TICI2  - neuro checks q1h  - lipitor 80  - risk stratification labs  - No AC/AP for 24 hours (s/p tpa)  - MRI WO  - MR NOVA  - c/w home Keppra      CVS:  Afib (not on home AC)  - SBP goal <140  - c/w amlodipine  - c/w lopressor 50qd  - TTE    PULM:  - Aspiration precautions    RENAL:  - Fluids: IVL  - daily IOs    GI:  - Diet: Speech/swallow  - Bowel regimen: colace, senna    ENDO:   - FS goal 120-180    HEME/ONC:  - SCDs  - Chemoppx: hold d/t tpa  - LED    ID:  - monitor for fevers     ASSESSMENT/PLAN:    76M hx of HTN, Afib (on ASA, not AC 2/2 UGIB), UC, meningioma (s/p L frontal crani and XRT 2017), NIDDM, who presented to [facility] from [place] for MT for dysarthria, word finding difficulty, and leaning towards L side at 0945, LWK 0900 10/20, w/ admission score NIHSS 14, imaging with RM1 occlusion with mismatch, given tPA 10:48 and transferred for MT.    Admission Score   GC: NIHSS 14   [facility] Preop: NIHSS11  mRS0    NEURO:  s/p tPA 10/20 10:58am  s/p MT with TICI2  no longer on home keppra, only on it for seizure prior to meningioma rsxn 2017  - neuro checks q1h  - lipitor 80  - risk stratification labs  - No AC/AP for 24 hours (s/p tpa)  - MRI WO  - MR NOVA        CVS:  Afib (not on home AC)  - SBP goal <140  - c/w amlodipine  - c/w lopressor 50qd  - TTE    PULM:  - Aspiration precautions    RENAL:  - Fluids: IVL  - daily IOs    GI:  - Diet: Speech/swallow  - Bowel regimen: colace, senna    ENDO:   - FS goal 120-180    HEME/ONC:  - SCDs  - Chemoppx: hold d/t tpa  - LED    ID:  - monitor for fevers     ASSESSMENT/PLAN:    76M hx of HTN, Afib (on ASA, not AC 2/2 UGIB), UC, meningioma (s/p L frontal crani and XRT 2017), NIDDM, who presented to [facility] from [place] for MT for dysarthria, word finding difficulty, and leaning towards L side at 0945, LWK 0900 10/20, w/ admission score NIHSS 14, imaging with RM2 occlusion with mismatch, given tPA 10:48 and transferred for MT.    Admission Score   GC: NIHSS 14   [facility] Preop: NIHSS11  mRS0    NEURO:  s/p tPA 10/20 10:58am  s/p MT withRM2 occlusion, TICI2  no longer on home keppra, only on it for seizure prior to meningioma rsxn 2017  - neuro checks q1h  - lipitor 80  - risk stratification labs  - No AC/AP for 24 hours (s/p tpa)  - MRI WO  - MR NOVA        CVS:  Afib (not on home AC)  - SBP goal <140  - c/w amlodipine  - c/w lopressor 50qd  - TTE    PULM:  - Aspiration precautions    RENAL:  - Fluids: IVL  - daily IOs    GI:  - Diet: Speech/swallow  - Bowel regimen: colace, senna    ENDO:   - FS goal 120-180    HEME/ONC:  - SCDs  - Chemoppx: hold d/t tpa  - LED    ID:  - monitor for fevers

## 2021-10-20 NOTE — H&P ADULT - ATTENDING COMMENTS
seen in ICU  Adelaida  75yo Rt handed M with HTN on ASA 81mg, Ulcerative Colitis, Afib (not on AC 2/2 h/o GIB, UC), hx of meningioma (s/p left frontal craniotomy in 2017and s/p radiation),  Hx of seizure disorder not on AED, DM-II presents to [facility] as a direct transfer to  from [place] for MT. Patient was a candidate for TPA and was given at 1058am. Patient is s/p MT of Rt M2 occlusion noted on angiogram with TICI2C. Will need to do further evaluation and workup.  A1c 5.9, LDL 65  Impression: Left sided hemiparesis with word finding difficulty 2/2 to Rt M2 occlusion. Due to CE source. likely AF H  s/p Thombectomy TICI 2C recannulization     Recommendations:  - done with 24hrs post tpa protocol   - ASA 81mg PO daily. MRI then decide when to start DOAC for AF   - High dose statin therapy - atorvastatin 40mg PO daily. LDL goal <70mg/dL.  - MRI brain w/o  - TTE  - telemetry  - PT/OT/SS/SLP, OOBC  - permissive HTN, -180mmHg.  - check FS, glucose control <180  - GI/DVT ppx  - cellulitis LE. ID   - Counseling on diet, exercise, and medication adherence was done  - Counseling on smoking cessation and alcohol consumption offered when appropriate.  - Pain assessed and judicious use of narcotics when appropriate was discussed.    - Stroke education given when appropriate.  - Importance of fall prevention discussed.   - Differential diagnosis and plan of care discussed with patient and/or family and primary team  - Thank you for allowing me to participate in the care of this patient. Call with questions.   - okay to tx to stroke service when bed available on 4cohen told ICU fellow   Parmjit Boateng MD  Vascular Neurology  Office: [phone]

## 2021-10-20 NOTE — PROGRESS NOTE ADULT - SUBJECTIVE AND OBJECTIVE BOX
NSCU Progress Note      24 Hour Events/Subjective:  -       REVIEW OF SYSTEMS:  - negative except as above    VITALS:   - Reviewed    IMAGING/DATA:   - Reviewed     ALLERGIES:   - No Known Allergies        PHYSICAL EXAM:    General: NAD  CVS: RRR  Pulm: CTAB  GI: Soft, NTND  Extremities: No LE Edema  Neuro: AOx3, PERRL, EOMI, trace dysarthria, facial symmetrical, fluent speech, LUE 4+/5, 5/5 rest, ?extinction of L arm, no PND   NSCU Progress Note      24 Hour Events/Subjective:  - s/p tPA 10/20 10:58am at OSH  - s/p MT with TICI2  - admitted to NSICU    REVIEW OF SYSTEMS:  - negative except as above    VITALS:   - Reviewed    IMAGING/DATA:   - Reviewed     ALLERGIES:   - No Known Allergies        PHYSICAL EXAM:    General: NAD  CVS: RRR  Pulm: CTAB  GI: Soft, NTND  Extremities: No LE Edema  Neuro: AOx3, PERRL, EOMI, trace dysarthria, facial symmetrical, fluent speech, LUE 4+/5, 5/5 rest, ?extinction of L arm, no PND   NSCU Progress Note      24 Hour Events/Subjective:  - s/p tPA 10/20 10:58am at OSH  - s/p MT RM2 occlusion with TICI2  - admitted to NSICU    REVIEW OF SYSTEMS:  - negative except as above    VITALS:   - Reviewed    IMAGING/DATA:   - Reviewed     ALLERGIES:   - No Known Allergies        PHYSICAL EXAM:    General: NAD  CVS: RRR  Pulm: CTAB  GI: Soft, NTND  Extremities: No LE Edema  Neuro: AOx3, PERRL, EOMI, trace dysarthria, facial symmetrical, fluent speech, LUE 4+/5, 5/5 rest, ?extinction of L arm, no PND

## 2021-10-20 NOTE — ED PROVIDER NOTE - ATTENDING CONTRIBUTION TO CARE
75 y/o M with hx of HTN, seizure on keppra, DM, takes ASA 81mg was BIB EMS as a code stroke for left sided weakness x 945am this morning witnessed by his wife. Per wife pt woke up at 9am normal,  between 930-945am they were in mid-conversation when he had difficulty forming his words and speaking then she noticed he was leaning to his left side and called EMS. She denies seizure like activity. At baseline he is fully functional and ambulatory. Reports he only takes Aspirin no other AC. Pts wife is unsure if he has an afib hx. PE as noted above. Pt has a left facial droop, deviated right gaze, LUE and LLE weakness. NIHSS is 13. TPA given at 1056am. Telestroke neurologist Dr. Clemente camera in, she noticed a Right M1 LVO, pt accepted for transfer to [facility] for stroke rescue. accepting neurologist at [facility] is Dr. libman, Accepting ED attending is Dr. Charles  Patient was critically ill with a high probability of imminent or life threatening deterioration.  direct patient care (not related to procedure), additional history taking, interpretation of diagnostic studies, documentation, consultation with other physicians, telephone consultation with the patient's family  I have personally and independently provided the amount of critical care time documented below excluding time spent on separate procedures.  40 mins  Dr. Gleason:  I have reviewed and discussed with the PA/ resident the case specifics, including the history, physical assessment, evaluation, conclusion, laboratory results, and medical plan. I agree with the contents, and conclusions. I have personally examined, and interviewed the patient.

## 2021-10-20 NOTE — PRE-ANESTHESIA EVALUATION ADULT - NSANTHINDVINFOSD_GEN_ALL_CORE
Ced Torres, PGY1  Internal Medicine  Pager #: [facility] [phone], NS [phone]    Patient is a 21y old  Female who presents with a chief complaint of DVT + PE + renal infarct (07 Jan 2021 19:11)      SUBJECTIVE / OVERNIGHT EVENTS:  ADDITIONAL REVIEW OF SYSTEMS:    MEDICATIONS  (STANDING):  heparin  Infusion.  Unit(s)/Hr (18 mL/Hr) IV Continuous <Continuous>  influenza   Vaccine 0.5 milliLiter(s) IntraMuscular once    MEDICATIONS  (PRN):  acetaminophen   Tablet .. 975 milliGRAM(s) Oral every 6 hours PRN Mild Pain (1 - 3), Moderate Pain (4 - 6)  heparin   Injectable 8000 Unit(s) IV Push every 6 hours PRN For aPTT less than 40  heparin   Injectable 4000 Unit(s) IV Push every 6 hours PRN For aPTT between 40 - 57  oxycodone    5 mG/acetaminophen 325 mG 2 Tablet(s) Oral every 6 hours PRN Severe Pain (7 - 10)      CAPILLARY BLOOD GLUCOSE        I&O's Summary      PHYSICAL EXAM:  Vital Signs Last 24 Hrs  T(C): 36.7 (08 Jan 2021 04:42), Max: 36.9 (07 Jan 2021 22:13)  T(F): 98 (08 Jan 2021 04:42), Max: 98.4 (07 Jan 2021 22:13)  HR: 108 (08 Jan 2021 04:42) (99 - 110)  BP: 115/72 (08 Jan 2021 04:42) (111/69 - 119/83)  BP(mean): --  RR: 18 (08 Jan 2021 04:42) (18 - 19)  SpO2: 100% (08 Jan 2021 04:42) (99% - 100%)  CONSTITUTIONAL: NAD, lying in bed comfortably  EYES: EOMI, PERRLA; conjunctiva and sclera clear  ENMT: Moist oral mucosa; normal dentition  NECK: Supple, no palpable masses  RESPIRATORY: Lungs clear to ascultation b/l; No rales, ronchi, or wheezing; Unlabored respirations  CARDIOVASCULAR: Regular rate and rhythm, normal S1 and S2, no murmurs, rubs, or gallops  ABDOMEN: Soft, nontender, nondistended, normal bowel sounds  MUSCULOSKELETAL: No joint swelling or tenderness to palpation  PSYCH: Affect appropriate  NEUROLOGY: AAOx3, CNs grossly intact  SKIN: No rashes; no palpable lesions    LABS:                        9.1    10.75 )-----------( 428      ( 07 Jan 2021 06:59 )             31.2     01-07    135  |  97<L>  |  9   ----------------------------<  70  3.7   |  23  |  0.98    Ca    9.8      07 Jan 2021 06:59  Phos  3.8     01-07  Mg     2.1     01-07    TPro  8.1  /  Alb  3.9  /  TBili  0.4  /  DBili  x   /  AST  15  /  ALT  22  /  AlkPhos  50  01-07    PTT - ( 07 Jan 2021 06:59 )  PTT:91.2 sec            RADIOLOGY & ADDITIONAL TESTS: Ced Torres, PGY1  Internal Medicine  Pager #: [facility] [phone], NS [phone]    Patient is a 21y old  Female who presents with a chief complaint of DVT + PE + renal infarct (07 Jan 2021 19:11)      SUBJECTIVE / OVERNIGHT EVENTS: No overnight events. Patient complains of pain in her left thigh, otherwise has no complaints. Denies headaches/dizziness, nausea/vomiting, chest pain, SOB, abdominal pain.     MEDICATIONS  (STANDING):  heparin  Infusion.  Unit(s)/Hr (18 mL/Hr) IV Continuous <Continuous>  influenza   Vaccine 0.5 milliLiter(s) IntraMuscular once    MEDICATIONS  (PRN):  acetaminophen   Tablet .. 975 milliGRAM(s) Oral every 6 hours PRN Mild Pain (1 - 3), Moderate Pain (4 - 6)  heparin   Injectable 8000 Unit(s) IV Push every 6 hours PRN For aPTT less than 40  heparin   Injectable 4000 Unit(s) IV Push every 6 hours PRN For aPTT between 40 - 57  oxycodone    5 mG/acetaminophen 325 mG 2 Tablet(s) Oral every 6 hours PRN Severe Pain (7 - 10)      CAPILLARY BLOOD GLUCOSE        I&O's Summary      PHYSICAL EXAM:  Vital Signs Last 24 Hrs  T(C): 36.7 (08 Jan 2021 04:42), Max: 36.9 (07 Jan 2021 22:13)  T(F): 98 (08 Jan 2021 04:42), Max: 98.4 (07 Jan 2021 22:13)  HR: 108 (08 Jan 2021 04:42) (99 - 110)  BP: 115/72 (08 Jan 2021 04:42) (111/69 - 119/83)  BP(mean): --  RR: 18 (08 Jan 2021 04:42) (18 - 19)  SpO2: 100% (08 Jan 2021 04:42) (99% - 100%)  CONSTITUTIONAL: NAD, lying in bed comfortably  EYES: EOMI, PERRLA; conjunctiva and sclera clear  ENMT: Moist oral mucosa; normal dentition  NECK: Supple, no palpable masses  RESPIRATORY: Lungs clear to ascultation b/l; No rales, ronchi, or wheezing; Unlabored respirations  CARDIOVASCULAR: Regular rate and rhythm, normal S1 and S2, no murmurs, rubs, or gallops, ?lower extremity edema  ABDOMEN: Soft, nontender, nondistended, normal bowel sounds  MUSCULOSKELETAL: No joint swelling or tenderness to palpation  PSYCH: Affect appropriate  NEUROLOGY: AAOx3, CNs grossly intact  SKIN: No rashes; no palpable lesions    LABS:                        9.1    10.75 )-----------( 428      ( 07 Jan 2021 06:59 )             31.2     01-07    135  |  97<L>  |  9   ----------------------------<  70  3.7   |  23  |  0.98    Ca    9.8      07 Jan 2021 06:59  Phos  3.8     01-07  Mg     2.1     01-07    TPro  8.1  /  Alb  3.9  /  TBili  0.4  /  DBili  x   /  AST  15  /  ALT  22  /  AlkPhos  50  01-07    PTT - ( 07 Jan 2021 06:59 )  PTT:91.2 sec            RADIOLOGY & ADDITIONAL TESTS: agent

## 2021-10-20 NOTE — PROGRESS NOTE ADULT - SUBJECTIVE AND OBJECTIVE BOX
INTERVAL HISTORY: HPI:  Patient is a 77yo Rt handed M with pmh of HTN on ASA 81mg, Ulcerative Colitis, Afib (not on AC 2/2 h/o GIB, UC), hx of meningioma (s/p left frontal craniotomy in 2017and s/p radiation),  Hx of seizure disorder, DM-II presents to [facility] as a direct transfer to IR from [place] for MT.  On arrival to [place], patient had NIHSS 14 and CT/CTA was noted to show Rt M1 occlusion with Core 44 and Penumbra 113. Patient was given TPA at 1058 am and transferred to [facility] for MT.    NIHSS 14 at [place] report  Preop NIHSS 11 per [facility] report   MRS 0     (20 Oct 2021 13:15)      MEDICATIONS  (STANDING):  amLODIPine   Tablet 10 milliGRAM(s) Oral daily  chlorhexidine 4% Liquid 1 Application(s) Topical <User Schedule>  insulin lispro (ADMELOG) corrective regimen sliding scale   SubCutaneous every 6 hours  magnesium sulfate  IVPB 1 Gram(s) IV Intermittent once  metoprolol tartrate 50 milliGRAM(s) Oral two times a day  polyethylene glycol 3350 17 Gram(s) Oral daily  senna 1 Tablet(s) Oral daily    MEDICATIONS  (PRN):  acetaminophen   Tablet .. 650 milliGRAM(s) Oral every 6 hours PRN Temp greater or equal to 38C (100.4F), Mild Pain (1 - 3)      Drug Dosing Weight  Height (cm): 177.8 (20 Oct 2021 12:45)  Weight (kg): 83.5 (20 Oct 2021 12:45)  BMI (kg/m2): 26.4 (20 Oct 2021 12:45)  BSA (m2): 2.02 (20 Oct 2021 12:45)    PAST MEDICAL & SURGICAL HISTORY:  Essential hypertension  Other hyperlipidemia  Meningioma  left frontal, s/p resection and radiation  Seizure  2/2 meningioma  DM (diabetes mellitus)  Pulmonary hypertension  Atrial fibrillation  GI bleed  CAD (coronary artery disease)  H/O right inguinal hernia repair  H/O cataract removal with insertion of prosthetic lens  B/L        REVIEW OF SYSTEMS: [ ] Unable to Assess due to neurologic exam   [ ] All ROS addressed below are non-contributory, except:  Neuro: [ ] Headache [ ] Back pain [ ] Numbness [ ] Weakness [ ] Ataxia [ ] Dizziness [ ] Aphasia [ ] Dysarthria [ ] Visual disturbance  Resp: [ ] Shortness of breath/dyspnea, [ ] Orthopnea [ ] Cough  CV: [ ] Chest pain [ ] Palpitation [ ] Lightheadedness [ ] Syncope  Renal: [ ] Thirst [ ] Edema  GI: [ ] Nausea [ ] Emesis [ ] Abdominal pain [ ] Constipation [ ] Diarrhea  Hem: [ ] Hematemesis [ ] bright red blood per rectum  ID: [ ] Fever [ ] Chills [ ] Dysuria  ENT: [ ] Rhinorrhea    PHYSICAL EXAM:    General: No Acute Distress   Neurological:  AOx3, PERRL, EOMI, trace dysarthria, facial symmetrical, fluent speech, LUE 4+/5, 5/5 rest, ?extinction of L arm, no PND  Pulmonary: Clear to Auscultation, No Rales, No Rhonchi, No Wheezes   Cardiovascular: S1, S2, Regular Rate and Rhythm   Gastrointestinal: Soft, Nontender, Nondistended   Extremities: No calf tenderness     ICU Vital Signs Last 24 Hrs  T(C): 36.4 (20 Oct 2021 19:00), Max: 36.8 (20 Oct 2021 15:00)  T(F): 97.6 (20 Oct 2021 19:00), Max: 98.3 (20 Oct 2021 15:00)  HR: 94 (20 Oct 2021 20:00) (70 - 94)  BP: 122/69 (20 Oct 2021 20:00) (113/66 - 148/69)  BP(mean): 87 (20 Oct 2021 20:00) (80 - 103)  RR: 15 (20 Oct 2021 20:00) (11 - 18)  SpO2: 97% (20 Oct 2021 20:00) (96% - 100%)      I&O's Detail    20 Oct 2021 07:01  -  20 Oct 2021 20:09  --------------------------------------------------------  IN:    Oral Fluid: 100 mL  Total IN: 100 mL    OUT:    Indwelling Catheter - Urethral (mL): 1000 mL  Total OUT: 1000 mL    Total NET: -900 mL      LABS:  CBC Full  -  ( 20 Oct 2021 17:49 )  WBC Count : 8.34 K/uL  RBC Count : 5.38 M/uL  Hemoglobin : 16.2 g/dL  Hematocrit : 49.4 %  Platelet Count - Automated : 227 K/uL  Mean Cell Volume : 91.8 fl  Mean Cell Hemoglobin : 30.1 pg  Mean Cell Hemoglobin Concentration : 32.8 gm/dL  Auto Neutrophil # : x  Auto Lymphocyte # : x  Auto Monocyte # : x  Auto Eosinophil # : x  Auto Basophil # : x  Auto Neutrophil % : x  Auto Lymphocyte % : x  Auto Monocyte % : x  Auto Eosinophil % : x  Auto Basophil % : x    10-20    138  |  101  |  24<H>  ----------------------------<  115<H>  4.2   |  20<L>  |  0.85    Ca    9.2      20 Oct 2021 17:49  Phos  3.3     10-20  Mg     1.8     10-20    TPro  7.6  /  Alb  3.8  /  TBili  0.7  /  DBili  x   /  AST  29  /  ALT  21  /  AlkPhos  56  10-20    PT/INR - ( 20 Oct 2021 10:40 )   PT: 11.6 sec;   INR: 0.96 ratio         PTT - ( 20 Oct 2021 10:40 )  PTT:31.5 sec      RADIOLOGY & ADDITIONAL STUDIES:   O:  HYPOTENSION AFTER RECEIVING METOPROLOL       T(C): 36.4 (10-20-21 @ 19:00), Max: 36.8 (10-20-21 @ 15:00)  HR: 72 (10-20-21 @ 21:05) (70 - 94)  BP: 92/58 (10-20-21 @ 21:05) (84/48 - 148/69)  RR: 13 (10-20-21 @ 21:05) (11 - 18)  SpO2: 94% (10-20-21 @ 21:05) (93% - 100%)  10-20-21 @ 07:01  -  10-20-21 @ 21:13  --------------------------------------------------------  IN: 100 mL / OUT: 1030 mL / NET: -930 mL    acetaminophen   Tablet .. 650 milliGRAM(s) Oral every 6 hours PRN  amLODIPine   Tablet 10 milliGRAM(s) Oral daily  chlorhexidine 4% Liquid 1 Application(s) Topical <User Schedule>  insulin lispro (ADMELOG) corrective regimen sliding scale   SubCutaneous every 6 hours  magnesium sulfate  IVPB 1 Gram(s) IV Intermittent once  metoprolol tartrate 50 milliGRAM(s) Oral two times a day  polyethylene glycol 3350 17 Gram(s) Oral daily  senna 1 Tablet(s) Oral daily      PHYSICAL EXAM:    General: No Acute Distress   Neurological:  AOx3, PERRL, RIGHT GAZE PREFERENCE, CAN EASILY OVERCOME IT, EOMI, trace dysarthria, facial symmetrical, fluent speech, 5/5 all over, mild left side neglect   Pulmonary: Clear to Auscultation, No Rales, No Rhonchi, No Wheezes   Cardiovascular: S1, S2, Regular Rate and Rhythm   Gastrointestinal: Soft, Nontender, Nondistended   Extremities: right leg erythema and edema, nl DP pulses  no groin hematoma     ICU Vital Signs Last 24 Hrs  T(C): 36.4 (20 Oct 2021 19:00), Max: 36.8 (20 Oct 2021 15:00)  T(F): 97.6 (20 Oct 2021 19:00), Max: 98.3 (20 Oct 2021 15:00)  HR: 94 (20 Oct 2021 20:00) (70 - 94)  BP: 122/69 (20 Oct 2021 20:00) (113/66 - 148/69)  BP(mean): 87 (20 Oct 2021 20:00) (80 - 103)  RR: 15 (20 Oct 2021 20:00) (11 - 18)  SpO2: 97% (20 Oct 2021 20:00) (96% - 100%)      I&O's Detail    20 Oct 2021 07:01  -  20 Oct 2021 20:09  --------------------------------------------------------  IN:    Oral Fluid: 100 mL  Total IN: 100 mL    OUT:    Indwelling Catheter - Urethral (mL): 1000 mL  Total OUT: 1000 mL    Total NET: -900 mL      LABS:  CBC Full  -  ( 20 Oct 2021 17:49 )  WBC Count : 8.34 K/uL  RBC Count : 5.38 M/uL  Hemoglobin : 16.2 g/dL  Hematocrit : 49.4 %  Platelet Count - Automated : 227 K/uL  Mean Cell Volume : 91.8 fl  Mean Cell Hemoglobin : 30.1 pg  Mean Cell Hemoglobin Concentration : 32.8 gm/dL  Auto Neutrophil # : x  Auto Lymphocyte # : x  Auto Monocyte # : x  Auto Eosinophil # : x  Auto Basophil # : x  Auto Neutrophil % : x  Auto Lymphocyte % : x  Auto Monocyte % : x  Auto Eosinophil % : x  Auto Basophil % : x    10-20    138  |  101  |  24<H>  ----------------------------<  115<H>  4.2   |  20<L>  |  0.85    Ca    9.2      20 Oct 2021 17:49  Phos  3.3     10-20  Mg     1.8     10-20    TPro  7.6  /  Alb  3.8  /  TBili  0.7  /  DBili  x   /  AST  29  /  ALT  21  /  AlkPhos  56  10-20    PT/INR - ( 20 Oct 2021 10:40 )   PT: 11.6 sec;   INR: 0.96 ratio         PTT - ( 20 Oct 2021 10:40 )  PTT:31.5 sec      RADIOLOGY & ADDITIONAL STUDIES:   O:  HYPOTENSION AFTER RECEIVING METOPROLOL       T(C): 36.4 (10-20-21 @ 19:00), Max: 36.8 (10-20-21 @ 15:00)  HR: 72 (10-20-21 @ 21:05) (70 - 94),   BP: 92/58 (10-20-21 @ 21:05) (84/48 - 148/69)  RR: 13 (10-20-21 @ 21:05) (11 - 18)  SpO2: 94% (10-20-21 @ 21:05) (93% - 100%)  10-20-21 @ 07:01  -  10-20-21 @ 21:13  --------------------------------------------------------  IN: 100 mL / OUT: 1030 mL / NET: -930 mL    acetaminophen   Tablet .. 650 milliGRAM(s) Oral every 6 hours PRN  amLODIPine   Tablet 10 milliGRAM(s) Oral daily  chlorhexidine 4% Liquid 1 Application(s) Topical <User Schedule>  insulin lispro (ADMELOG) corrective regimen sliding scale   SubCutaneous every 6 hours  magnesium sulfate  IVPB 1 Gram(s) IV Intermittent once  metoprolol tartrate 50 milliGRAM(s) Oral two times a day  polyethylene glycol 3350 17 Gram(s) Oral daily  senna 1 Tablet(s) Oral daily      PHYSICAL EXAM:    General: No Acute Distress   Neurological:  AOx3, PERRL, RIGHT GAZE PREFERENCE, CAN EASILY OVERCOME IT, EOMI, trace dysarthria, facial symmetrical, fluent speech, 5/5 all over, mild left side neglect   Pulmonary: Clear to Auscultation, No Rales, No Rhonchi, No Wheezes   Cardiovascular: S1, S2, Regular Rate and Rhythm   Gastrointestinal: Soft, Nontender, Nondistended   Extremities: right leg erythema and edema, nl DP pulses  no groin hematoma     ICU Vital Signs Last 24 Hrs  T(C): 36.4 (20 Oct 2021 19:00), Max: 36.8 (20 Oct 2021 15:00)  T(F): 97.6 (20 Oct 2021 19:00), Max: 98.3 (20 Oct 2021 15:00)  HR: 94 (20 Oct 2021 20:00) (70 - 94)  BP: 122/69 (20 Oct 2021 20:00) (113/66 - 148/69)  BP(mean): 87 (20 Oct 2021 20:00) (80 - 103)  RR: 15 (20 Oct 2021 20:00) (11 - 18)  SpO2: 97% (20 Oct 2021 20:00) (96% - 100%)      I&O's Detail    20 Oct 2021 07:01  -  20 Oct 2021 20:09  --------------------------------------------------------  IN:    Oral Fluid: 100 mL  Total IN: 100 mL    OUT:    Indwelling Catheter - Urethral (mL): 1000 mL  Total OUT: 1000 mL    Total NET: -900 mL      LABS:  CBC Full  -  ( 20 Oct 2021 17:49 )  WBC Count : 8.34 K/uL  RBC Count : 5.38 M/uL  Hemoglobin : 16.2 g/dL  Hematocrit : 49.4 %  Platelet Count - Automated : 227 K/uL  Mean Cell Volume : 91.8 fl  Mean Cell Hemoglobin : 30.1 pg  Mean Cell Hemoglobin Concentration : 32.8 gm/dL  Auto Neutrophil # : x  Auto Lymphocyte # : x  Auto Monocyte # : x  Auto Eosinophil # : x  Auto Basophil # : x  Auto Neutrophil % : x  Auto Lymphocyte % : x  Auto Monocyte % : x  Auto Eosinophil % : x  Auto Basophil % : x    10-20    138  |  101  |  24<H>  ----------------------------<  115<H>  4.2   |  20<L>  |  0.85    Ca    9.2      20 Oct 2021 17:49  Phos  3.3     10-20  Mg     1.8     10-20    TPro  7.6  /  Alb  3.8  /  TBili  0.7  /  DBili  x   /  AST  29  /  ALT  21  /  AlkPhos  56  10-20    PT/INR - ( 20 Oct 2021 10:40 )   PT: 11.6 sec;   INR: 0.96 ratio         PTT - ( 20 Oct 2021 10:40 )  PTT:31.5 sec      RADIOLOGY & ADDITIONAL STUDIES:

## 2021-10-20 NOTE — H&P ADULT - NSHPLABSRESULTS_GEN_ALL_CORE
Vital Signs Last 24 Hrs  T(C): 36.2 (20 Oct 2021 12:45), Max: 36.2 (20 Oct 2021 10:39)  T(F): 97.1 (20 Oct 2021 10:39), Max: 97.1 (20 Oct 2021 10:39)  HR: 74 (20 Oct 2021 12:45) (74 - 82)  BP: 121/79 (20 Oct 2021 12:45) (121/67 - 148/69)  BP(mean): 84 (20 Oct 2021 11:30) (80 - 89)  RR: 17 (20 Oct 2021 12:45) (16 - 18)  SpO2: 99% (20 Oct 2021 12:45) (99% - 100%)        Labs:   cbc                      14.8   4.63  )-----------( 203      ( 20 Oct 2021 10:40 )             44.3     [id number]    137  |  104  |  27<H>  ----------------------------<  115<H>  3.9   |  26  |  1.17    Ca    9.2      20 Oct 2021 10:40    TPro  7.6  /  Alb  3.8  /  TBili  0.7  /  DBili  x   /  AST  29  /  ALT  21  /  AlkPhos  56  10-20    CoagsPT/INR - ( 20 Oct 2021 10:40 )   PT: 11.6 sec;   INR: 0.96 ratio         PTT - ( 20 Oct 2021 10:40 )  PTT:31.5 sec  CardiacMarkersCARDIAC MARKERS ( 20 Oct 2021 10:40 )  <.017 ng/mL / x     / x     / x     / x          LFTsLIVER FUNCTIONS - ( 20 Oct 2021 10:40 )  Alb: 3.8 g/dL / Pro: 7.6 g/dL / ALK PHOS: 56 U/L / ALT: 21 U/L / AST: 29 U/L / GGT: x Vital Signs Last 24 Hrs  T(C): 36.2 (20 Oct 2021 12:45), Max: 36.2 (20 Oct 2021 10:39)  T(F): 97.1 (20 Oct 2021 10:39), Max: 97.1 (20 Oct 2021 10:39)  HR: 74 (20 Oct 2021 12:45) (74 - 82)  BP: 121/79 (20 Oct 2021 12:45) (121/67 - 148/69)  BP(mean): 84 (20 Oct 2021 11:30) (80 - 89)  RR: 17 (20 Oct 2021 12:45) (16 - 18)  SpO2: 99% (20 Oct 2021 12:45) (99% - 100%)    Labs:   cbc                      14.8   4.63  )-----------( 203      ( 20 Oct 2021 10:40 )             44.3     [id number]    137  |  104  |  27<H>  ----------------------------<  115<H>  3.9   |  26  |  1.17    Ca    9.2      20 Oct 2021 10:40    TPro  7.6  /  Alb  3.8  /  TBili  0.7  /  DBili  x   /  AST  29  /  ALT  21  /  AlkPhos  56  10-20    CoagsPT/INR - ( 20 Oct 2021 10:40 )   PT: 11.6 sec;   INR: 0.96 ratio         PTT - ( 20 Oct 2021 10:40 )  PTT:31.5 sec  CardiacMarkersCARDIAC MARKERS ( 20 Oct 2021 10:40 )  <.017 ng/mL / x     / x     / x     / x          LFTsLIVER FUNCTIONS - ( 20 Oct 2021 10:40 )  Alb: 3.8 g/dL / Pro: 7.6 g/dL / ALK PHOS: 56 U/L / ALT: 21 U/L / AST: 29 U/L / GGT: x    Radiology:   CT/CTA H/n: IMPRESSION:    No acute intracranial hemorrhage or acute territorial infarct. If acute ischemia is a strong clinical concern, MRI exam is recommended for further evaluation if there is no contraindication.  Lester notified 11:08 AM 10/20/2021.    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    Abnormal CT perfusion with ischemic penumbra in the right middle cerebral artery territory with mismatch volume of 69 mm and mismatch ratio of 2.6 as described above.

## 2021-10-20 NOTE — ED PROVIDER NOTE - CLINICAL SUMMARY MEDICAL DECISION MAKING FREE TEXT BOX
75 y/o M with hx of HTN, seizure on keppra, DM, takes ASA 81mg was BIB EMS as a code stroke for left sided weakness x 945am this morning witnessed by his wife. Per wife pt woke up at 9am normal,  between 930-945am they were in mid-conversation when he had difficulty forming his words and speaking then she noticed he was leaning to his left side and called EMS. She denies seizure like activity. At baseline he is fully functional and ambulatory. Reports he only takes Aspirin no other AC. Pts wife is unsure if he has an afib hx. PE as noted above. Pt has a left facial droop, deviated right gaze, LUE and LLE weakness. NIHSS is 13. TPA given at 1058am. Telestroke neurologist Dr. Clemente camera in, she noticed a Right M1 LVO, pt accepted for transfer to [facility] for stroke rescue. accepting neurologist at [facility] is Dr. libman, Accepting ED attending is Dr. Charles 75 y/o M with hx of HTN, seizure on keppra, DM, takes ASA 81mg was BIB EMS as a code stroke for left sided weakness x 945am this morning witnessed by his wife. Per wife pt woke up at 9am normal,  between 930-945am they were in mid-conversation when he had difficulty forming his words and speaking then she noticed he was leaning to his left side and called EMS. She denies seizure like activity. At baseline he is fully functional and ambulatory. Reports he only takes Aspirin no other AC. Pts wife is unsure if he has an afib hx. PE as noted above. Pt has a left facial droop, deviated right gaze, LUE and LLE weakness. NIHSS is 13. TPA given at 1056am. Telestroke neurologist Dr. Clemente camera in, she noticed a Right M1 LVO, pt accepted for transfer to [facility] for stroke rescue. accepting neurologist at [facility] is Dr. libman, Accepting ED attending is Dr. Charles

## 2021-10-20 NOTE — H&P ADULT - NSHPREVIEWOFSYSTEMS_GEN_ALL_CORE
REVIEW OF SYSTEMS  General:	  Skin/Breast:	  Ophthalmologic:  ENMT:	  Respiratory and Thorax:	  Cardiovascular:	  Gastrointestinal:	  Genitourinary:	  Musculoskeletal:	  Neurological:	  Psychiatric:		    ROS: Pertinent positives in HPI, all other ROS were reviewed and are negative. REVIEW OF SYSTEMS  General:	Denies fever and chills	  Ophthalmologic: Denies blurred vision   Respiratory and Thorax: Denies sob 	  Cardiovascular: Denies chest pain   Gastrointestinal: Denies N, V 	  Musculoskeletal: Denies joint pain   Neurological: Denies headaches, numbness and tingling 			    ROS: Pertinent positives in HPI, all other ROS were reviewed and are negative.

## 2021-10-20 NOTE — ED PROVIDER NOTE - OBJECTIVE STATEMENT
75 y/o M with hx of HTN, seizure on keppra, DM, takes ASA 81mg was BIB EMS as a code stroke for left sided weakness x 945am this morning witnessed by his wife. Per wife pt woke up at 9am normal,  between 930-945am they were in mid-conversation when he had difficulty forming his words and speaking then she noticed he was leaning to his left side and called EMS. She denies seizure like activity. At baseline he is fully functional and ambulatory. Reports he only takes Aspirin no other AC. Pts wife is unsure if he has an afib hx

## 2021-10-20 NOTE — H&P ADULT - NSHPPHYSICALEXAM_GEN_ALL_CORE
GENERAL EXAM:  Constitutional: awake and alert. NAD  HEENT: PERRLA, EOMI  Extremities: no edema, no cyanosis  Vascular: no carotid bruits  Musculoskeletal: no joint swelling/tenderness, no abnormal movements  Skin: no rashes    Neurological: (INCOMPLETE)      NIHSS  MRS 0 GENERAL EXAM:  Constitutional: awake and alert. NAD  HEENT: PERRL, EOMI  Extremities: no edema, no cyanosis  Musculoskeletal: no joint swelling/tenderness, no abnormal movements  Skin: Dermatitis noted in the RLE.     Neurological:   MS: AAOx3 and follows commands. Speech is fluent. There is mild dysarthria noted. No aphasia noted.   Cognitively patient is slow to respond to commands, however completes them correctly.     CN:   EOMI. PERRL. Pupils symmetric b/l.   No Facial palsy noted. No tongue deviation. Uvula elevates   Strength: 5/5 in the UE b/l.   Unable to assess Rt LE due to leg stabilizer   LLE 5/5 noted.   There is no pronation drift noted.   Sensation intact in al extremities.   Patient however is unable to decipher which LE is being touched on simultaneous testing. Extinction impairied.   Coordination: FTN intact b/l.   Gait:  Deferred.     NIHSS 2 ( Dysarthria and extinction)   MRS 0 GENERAL EXAM:  Constitutional: awake and alert. NAD  HEENT: PERRL, EOMI  Extremities: no edema, no cyanosis  Musculoskeletal: no joint swelling/tenderness, no abnormal movements  Skin: Dermatitis noted in the RLE.     Neurological:   MS: AAOx3 and follows commands. Speech is fluent. There is mild dysarthria noted. No aphasia noted.   Cognitively patient is slow to respond to commands, however completes them correctly.     CN:   EOMI. PERRL. Pupils symmetric b/l. Left sided Homonymous Hemianopsia noted.   No Facial palsy noted. No tongue deviation. Uvula elevates   Strength: 5/5 in the UE b/l.   Unable to assess Rt LE due to leg stabilizer   LLE 5/5 noted.   There is no pronation drift noted.   Sensation intact decreased in the LLE. Intact in all extremities.   Patient however is unable to decipher which LE is being touched on simultaneous testing. Extinction impaired.   Coordination: FTN intact b/l.   Gait:  Deferred.     NIHSS 4 ( Dysarthria, sensory deficit, Left homonymous hemianopsia and extinction)   MRS 0

## 2021-10-21 LAB
A1C WITH ESTIMATED AVERAGE GLUCOSE RESULT: 5.9 % — HIGH (ref 4–5.6)
A1C WITH ESTIMATED AVERAGE GLUCOSE RESULT: 5.9 % — HIGH (ref 4–5.6)
ANION GAP SERPL CALC-SCNC: 11 MMOL/L — SIGNIFICANT CHANGE UP (ref 5–17)
BUN SERPL-MCNC: 28 MG/DL — HIGH (ref 7–23)
CALCIUM SERPL-MCNC: 8.7 MG/DL — SIGNIFICANT CHANGE UP (ref 8.4–10.5)
CHLORIDE SERPL-SCNC: 101 MMOL/L — SIGNIFICANT CHANGE UP (ref 96–108)
CHOLEST SERPL-MCNC: 122 MG/DL — SIGNIFICANT CHANGE UP
CHOLEST SERPL-MCNC: 149 MG/DL — SIGNIFICANT CHANGE UP
CO2 SERPL-SCNC: 21 MMOL/L — LOW (ref 22–31)
CREAT SERPL-MCNC: 1.16 MG/DL — SIGNIFICANT CHANGE UP (ref 0.5–1.3)
ESTIMATED AVERAGE GLUCOSE: 123 MG/DL — HIGH (ref 68–114)
ESTIMATED AVERAGE GLUCOSE: 123 MG/DL — HIGH (ref 68–114)
GLUCOSE BLDC GLUCOMTR-MCNC: 117 MG/DL — HIGH (ref 70–99)
GLUCOSE BLDC GLUCOMTR-MCNC: 129 MG/DL — HIGH (ref 70–99)
GLUCOSE BLDC GLUCOMTR-MCNC: 138 MG/DL — HIGH (ref 70–99)
GLUCOSE BLDC GLUCOMTR-MCNC: 149 MG/DL — HIGH (ref 70–99)
GLUCOSE SERPL-MCNC: 140 MG/DL — HIGH (ref 70–99)
HCT VFR BLD CALC: 39.3 % — SIGNIFICANT CHANGE UP (ref 39–50)
HDLC SERPL-MCNC: 48 MG/DL — SIGNIFICANT CHANGE UP
HDLC SERPL-MCNC: 57 MG/DL — SIGNIFICANT CHANGE UP
HGB BLD-MCNC: 13.1 G/DL — SIGNIFICANT CHANGE UP (ref 13–17)
LIPID PNL WITH DIRECT LDL SERPL: 65 MG/DL — SIGNIFICANT CHANGE UP
LIPID PNL WITH DIRECT LDL SERPL: 78 MG/DL — SIGNIFICANT CHANGE UP
MAGNESIUM SERPL-MCNC: 2 MG/DL — SIGNIFICANT CHANGE UP (ref 1.6–2.6)
MCHC RBC-ENTMCNC: 30.3 PG — SIGNIFICANT CHANGE UP (ref 27–34)
MCHC RBC-ENTMCNC: 33.3 GM/DL — SIGNIFICANT CHANGE UP (ref 32–36)
MCV RBC AUTO: 90.8 FL — SIGNIFICANT CHANGE UP (ref 80–100)
NON HDL CHOLESTEROL: 74 MG/DL — SIGNIFICANT CHANGE UP
NON HDL CHOLESTEROL: 92 MG/DL — SIGNIFICANT CHANGE UP
NRBC # BLD: 0 /100 WBCS — SIGNIFICANT CHANGE UP (ref 0–0)
PHOSPHATE SERPL-MCNC: 1.8 MG/DL — LOW (ref 2.5–4.5)
PLATELET # BLD AUTO: 221 K/UL — SIGNIFICANT CHANGE UP (ref 150–400)
POTASSIUM SERPL-MCNC: 3.7 MMOL/L — SIGNIFICANT CHANGE UP (ref 3.5–5.3)
POTASSIUM SERPL-SCNC: 3.7 MMOL/L — SIGNIFICANT CHANGE UP (ref 3.5–5.3)
RBC # BLD: 4.33 M/UL — SIGNIFICANT CHANGE UP (ref 4.2–5.8)
RBC # FLD: 14 % — SIGNIFICANT CHANGE UP (ref 10.3–14.5)
SODIUM SERPL-SCNC: 133 MMOL/L — LOW (ref 135–145)
T3 SERPL-MCNC: 67 NG/DL — LOW (ref 80–200)
T3 SERPL-MCNC: 97 NG/DL — SIGNIFICANT CHANGE UP (ref 80–200)
T4 AB SER-ACNC: 5.9 UG/DL — SIGNIFICANT CHANGE UP (ref 4.6–12)
T4 AB SER-ACNC: 7.3 UG/DL — SIGNIFICANT CHANGE UP (ref 4.6–12)
TRIGL SERPL-MCNC: 43 MG/DL — SIGNIFICANT CHANGE UP
TRIGL SERPL-MCNC: 69 MG/DL — SIGNIFICANT CHANGE UP
TSH SERPL-MCNC: 0.33 UIU/ML — SIGNIFICANT CHANGE UP (ref 0.27–4.2)
TSH SERPL-MCNC: 0.44 UIU/ML — SIGNIFICANT CHANGE UP (ref 0.27–4.2)
WBC # BLD: 11.31 K/UL — HIGH (ref 3.8–10.5)
WBC # FLD AUTO: 11.31 K/UL — HIGH (ref 3.8–10.5)

## 2021-10-21 PROCEDURE — 70450 CT HEAD/BRAIN W/O DYE: CPT | Mod: 26

## 2021-10-21 PROCEDURE — 99233 SBSQ HOSP IP/OBS HIGH 50: CPT

## 2021-10-21 PROCEDURE — 99222 1ST HOSP IP/OBS MODERATE 55: CPT

## 2021-10-21 PROCEDURE — 93970 EXTREMITY STUDY: CPT | Mod: 26

## 2021-10-21 PROCEDURE — 70551 MRI BRAIN STEM W/O DYE: CPT | Mod: 26

## 2021-10-21 RX ORDER — TRAMADOL HYDROCHLORIDE 50 MG/1
0 TABLET ORAL
Qty: 0 | Refills: 0 | DISCHARGE

## 2021-10-21 RX ORDER — ASPIRIN/CALCIUM CARB/MAGNESIUM 324 MG
0 TABLET ORAL
Qty: 0 | Refills: 0 | DISCHARGE

## 2021-10-21 RX ORDER — POTASSIUM PHOSPHATE, MONOBASIC POTASSIUM PHOSPHATE, DIBASIC 236; 224 MG/ML; MG/ML
30 INJECTION, SOLUTION INTRAVENOUS ONCE
Refills: 0 | Status: DISCONTINUED | OUTPATIENT
Start: 2021-10-21 | End: 2021-10-21

## 2021-10-21 RX ORDER — SODIUM,POTASSIUM PHOSPHATES 278-250MG
2 POWDER IN PACKET (EA) ORAL ONCE
Refills: 0 | Status: COMPLETED | OUTPATIENT
Start: 2021-10-21 | End: 2021-10-22

## 2021-10-21 RX ORDER — INSULIN LISPRO 100/ML
VIAL (ML) SUBCUTANEOUS
Refills: 0 | Status: DISCONTINUED | OUTPATIENT
Start: 2021-10-21 | End: 2021-10-22

## 2021-10-21 RX ORDER — BUMETANIDE 0.25 MG/ML
1 INJECTION INTRAMUSCULAR; INTRAVENOUS
Qty: 0 | Refills: 0 | DISCHARGE

## 2021-10-21 RX ORDER — ASPIRIN/CALCIUM CARB/MAGNESIUM 324 MG
81 TABLET ORAL DAILY
Refills: 0 | Status: DISCONTINUED | OUTPATIENT
Start: 2021-10-21 | End: 2021-10-25

## 2021-10-21 RX ORDER — LEVETIRACETAM 250 MG/1
0 TABLET, FILM COATED ORAL
Qty: 0 | Refills: 0 | DISCHARGE

## 2021-10-21 RX ORDER — AMLODIPINE BESYLATE 2.5 MG/1
1 TABLET ORAL
Qty: 0 | Refills: 0 | DISCHARGE

## 2021-10-21 RX ORDER — TIZANIDINE 4 MG/1
0 TABLET ORAL
Qty: 0 | Refills: 0 | DISCHARGE

## 2021-10-21 RX ORDER — METOPROLOL TARTRATE 50 MG
25 TABLET ORAL
Refills: 0 | Status: DISCONTINUED | OUTPATIENT
Start: 2021-10-21 | End: 2021-10-25

## 2021-10-21 RX ORDER — PETROLATUM,WHITE
1 JELLY (GRAM) TOPICAL
Refills: 0 | Status: DISCONTINUED | OUTPATIENT
Start: 2021-10-21 | End: 2021-10-25

## 2021-10-21 RX ADMIN — Medication 81 MILLIGRAM(S): at 17:43

## 2021-10-21 RX ADMIN — Medication 25 MILLIGRAM(S): at 21:22

## 2021-10-21 RX ADMIN — CHLORHEXIDINE GLUCONATE 1 APPLICATION(S): 213 SOLUTION TOPICAL at 21:22

## 2021-10-21 RX ADMIN — ATORVASTATIN CALCIUM 40 MILLIGRAM(S): 80 TABLET, FILM COATED ORAL at 21:22

## 2021-10-21 RX ADMIN — Medication 4.8 GRAM(S): at 11:56

## 2021-10-21 RX ADMIN — POLYETHYLENE GLYCOL 3350 17 GRAM(S): 17 POWDER, FOR SOLUTION ORAL at 11:56

## 2021-10-21 RX ADMIN — Medication 25 MILLIGRAM(S): at 08:52

## 2021-10-21 RX ADMIN — Medication 1 APPLICATION(S): at 17:42

## 2021-10-21 RX ADMIN — AMLODIPINE BESYLATE 5 MILLIGRAM(S): 2.5 TABLET ORAL at 06:09

## 2021-10-21 RX ADMIN — SENNA PLUS 1 TABLET(S): 8.6 TABLET ORAL at 11:56

## 2021-10-21 NOTE — OCCUPATIONAL THERAPY INITIAL EVALUATION ADULT - LIGHT TOUCH SENSATION, LUE, REHAB EVAL
Reports no new numbness/tingling however RN states pt c/o R foot numbness. Intact to light touch./within normal limits

## 2021-10-21 NOTE — OCCUPATIONAL THERAPY INITIAL EVALUATION ADULT - LIVES WITH, PROFILE
Pt lives with his wife in a house, +6 steps to enter, +1 flight of stairs to bedroom, +walk-in shower. PTA pt states he was IND with ADL/mobility using cane. States he owns many walkers and wheelchairs from prior hospital/rehab admission (for rxn of meningioma).  Pt with questionable insight into deficits/poor historian.

## 2021-10-21 NOTE — SWALLOW BEDSIDE ASSESSMENT ADULT - SWALLOW EVAL: DIAGNOSIS
Attempted to see pt for bedside swallow evaluation - currently off the unit for MRI. Will f/u and re-attempt as schedule permits. Patient transferred from  for MT due to Rt M2 occlusion. Patient presents with an overtly functional oropharyngeal swallow sequence for a regular texture diet.

## 2021-10-21 NOTE — SWALLOW BEDSIDE ASSESSMENT ADULT - COMMENTS
Impression: Left sided hemiparesis with word finding difficulty 2/2 to Rt M2 occlusion. Due to ESUS. However given hx of Afib, may be etiology. s/p Thombectomy TICI 2C recannulization.

## 2021-10-21 NOTE — OCCUPATIONAL THERAPY INITIAL EVALUATION ADULT - PRECAUTIONS/LIMITATIONS, REHAB EVAL
CTA HEAD/NECK 10/20: No acute intracranial hemorrhage or acute territorial infarct. If acute ischemia is a strong clinical concern, MRI exam is recommended for further evaluation if there is no contraindication.  Lester notified 11:08 AM 10/20/2021. 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 Abnormal CT perfusion with ischemic penumbra in the right middle cerebral artery territory with mismatch volume of 69 mm and mismatch ratio of 2.6 as described above.      CT HEAD 10/21/21: New cytotoxic edema within the right parietal and temporal lobes and right insula. No CT evidence for hemorrhagic transformation./fall precautions

## 2021-10-21 NOTE — PROGRESS NOTE ADULT - ASSESSMENT
ASSESSMENT/PLAN:    76M hx of HTN, Afib (on ASA, not AC 2/2 UGIB), UC, meningioma (s/p L frontal crani and XRT 2017), NIDDM, who presented to [facility] from [facility] for MT for dysarthria, word finding difficulty, and leaning towards L side at 0945, LWK 0900 10/20, w/ admission score NIHSS 14, imaging with RM2 occlusion s/p tPA (10/20 10:48) s/p MT TICI2C.    Admission Score   GC: NIHSS 14   [facility] Preop: NIHSS11  mRS0    NEURO:  s/p tPA 10/20 10:58am  s/p MT with RM2 occlusion, TICI2  no longer on home keppra, only on it for seizure prior to meningioma rsxn 2017  - neuro checks q2h  - lipitor 80  - No AC/AP for 24 hours (s/p tpa)  - MR/NOVA, CTH today    CVS:  Afib (not on home AC)  - SBP goal <140  - c/w amlodipine  - c/w lopressor 50qd  - need to discuss with cardiology/GI r/b/a for AC    PULM:  - Aspiration precautions    RENAL:  - Fluids: IVL  - daily IOs    GI:  UC  - Diet: regular  - Bowel regimen: colace, senna  - c/w mesalamine  - GI input for AC in the s/o UC and prior UGIB    ENDO:   - FS goal 120-180    HEME/ONC:  - SCDs  - Chemoppx: hold d/t tpa for 24h  - LED    ID:  - monitor for fevers     ASSESSMENT/PLAN:    76M hx of HTN, Afib (on ASA, not AC 2/2 UC), meningioma (s/p L frontal crani and XRT 2017), NIDDM, who presented to [facility] from [facility] for MT for dysarthria, word finding difficulty, and leaning towards L side at 0945, LWK 0900 10/20, w/ admission score NIHSS 14, imaging with RM2 occlusion s/p tPA (10/20 10:48) s/p MT TICI2C.    Admission Score   GC: NIHSS 14   [facility] Preop: NIHSS11  mRS0    NEURO:  s/p tPA 10/20 10:58am  s/p MT with RM2 occlusion, TICI2  no longer on home keppra, only on it for seizure prior to meningioma rsxn 2017  - neuro checks q2h  - lipitor 80  - No AC/AP for 24 hours (s/p tpa)  - MR/NOVA, CTH today    CVS:  Afib (not on home AC)  - SBP goal <140  - c/w amlodipine  - c/w lopressor 50qd  - need to discuss with cardiology/GI r/b/a for AC, not started due to UC per patient    PULM:  - Aspiration precautions    RENAL:  - Fluids: IVL  - daily IOs    GI:  UC  - Diet: regular  - Bowel regimen: colace, senna  - c/w mesalamine  - GI input for AC in the s/o UC     ENDO:   - FS goal 120-180    HEME/ONC:  - SCDs  - Chemoppx: hold d/t tpa for 24h  - LED    ID:  - monitor for fevers

## 2021-10-21 NOTE — PHYSICAL THERAPY INITIAL EVALUATION ADULT - PLANNED THERAPY INTERVENTIONS, PT EVAL
Pap order faxed to [facility] on 09/28/2021 balance training/bed mobility training/gait training/strengthening/transfer training

## 2021-10-21 NOTE — OCCUPATIONAL THERAPY INITIAL EVALUATION ADULT - RANGE OF MOTION EXAMINATION, UPPER EXTREMITY
except L 5th digit, PIP flexion deformity/bilateral UE Active ROM was WFL  (within functional limits)

## 2021-10-21 NOTE — OCCUPATIONAL THERAPY INITIAL EVALUATION ADULT - DIAGNOSIS, OT EVAL
Pt presents with impaired cognition, decreased strength, endurance, balance, and coordination, all impacting ability to perform ADLs and functional mobility.

## 2021-10-21 NOTE — SWALLOW BEDSIDE ASSESSMENT ADULT - ADDITIONAL RECOMMENDATIONS
Maintain good oral hygiene.  If concerned for higher level cognitive deficits please order speech language evaluation.

## 2021-10-21 NOTE — OCCUPATIONAL THERAPY INITIAL EVALUATION ADULT - PERSONAL SAFETY AND JUDGMENT, REHAB EVAL
poor insight into deficits. Required constant reminders of why he is in hospital this admission./impaired

## 2021-10-21 NOTE — CONSULT NOTE ADULT - SUBJECTIVE AND OBJECTIVE BOX
Wound SURGERY CONSULT NOTE    HPI:   75yo Rt handed M with pmh of HTN on ASA 81mg, Ulcerative Colitis, Afib (not on AC 2/2 h/o GIB, UC), hx of meningioma (s/p left frontal craniotomy in 2017and s/p radiation),  Hx of seizure disorder, DM-II presents to [facility] as a direct transfer to  from [place] for MT. Per chart review, patient woke up around 0900am with no deficits. Around 930 am patient was having a conversation with his wife, around 945am patient started to slur his speech, having difficulty forming words and leaning to his left side. Patient’s wife called EMS. On arrival to [place], patient had NIHSS 14 and CT/CTA was noted to show Rt M1 occlusion with Core 44 and Penumbra 113. Patient was given TPA at 1058 am and transferred to [facility] for MT. Patient’s wife states the event was not a seizure.    Wound consult requested to assist w/ management of RLE dry, red, swollen leg w/o wound.   Pt w/ h/o PVD had gone to [facility] in [place]. he feels RLE redness is the same.  no N/V/D, palp/ sob/dyspnea/ cp,  or F/C/S.  Pt w/o c/o pain, drainage, odor, or worsening of leg swelling.  Pt had used compression garments in the past. not currently.  denies recent H/o falls, trauma. Appetite good w/o weight loss.      Current Diet: Diet, Pureed:   Consistent Carbohydrate No Snacks (CSTCHO) (10-20-21 @ 17:52)      PAST MEDICAL & SURGICAL HISTORY:  Essential hypertension    Hyperlipidemia    Meningioma left frontal, s/p resection and radiation    Seizure- 2/2 meningioma    DM (diabetes mellitus)    Pulmonary hypertension    Atrial fibrillation    GI bleed    CAD (coronary artery disease)    s/p right inguinal hernia repair    s/p Bilateral cataract removal with insertion of prosthetic lens    BLE PVD      REVIEW OF SYSTEMS: General/  Skin: see HPI  All other systems negative    MEDICATIONS  (STANDING):  amLODIPine   Tablet 5 milliGRAM(s) Oral daily  atorvastatin 40 milliGRAM(s) Oral at bedtime  chlorhexidine 4% Liquid 1 Application(s) Topical <User Schedule>  insulin lispro (ADMELOG) corrective regimen sliding scale   SubCutaneous Before meals and at bedtime  mesalamine DR (24-Hour) Tablet 4.8 Gram(s) Oral daily  metoprolol tartrate 25 milliGRAM(s) Oral two times a day  polyethylene glycol 3350 17 Gram(s) Oral daily  senna 1 Tablet(s) Oral daily    MEDICATIONS  (PRN):  acetaminophen   Tablet .. 650 milliGRAM(s) Oral every 6 hours PRN Temp greater or equal to 38C (100.4F), Mild Pain (1 - 3)    No Known Allergies    SOCIAL HISTORY:  ; Former smoker, Denies smoking, ETOH, drugs    FAMILY HISTORY:  no h/o PVD or skin/ wound healing issues      (+) FH: hypertension- Father        PHYSICAL EXAM:  Vital Signs Last 24 Hrs  T(C): 36.6 (21 Oct 2021 11:09), Max: 36.8 (20 Oct 2021 15:00)  T(F): 97.8 (21 Oct 2021 11:09), Max: 98.3 (20 Oct 2021 15:00)  HR: 87 (21 Oct 2021 13:01) (62 - 94)  BP: 113/92 (21 Oct 2021 13:01) (84/48 - 146/74)  BP(mean): 101 (21 Oct 2021 13:01) (61 - 103)  RR: 12 (21 Oct 2021 13:01) (11 - 20)  SpO2: 100% (21 Oct 2021 13:01) (93% - 100%)    guarded but stable,  A&Ox3, WD/ WN/ WG  Total Care Sport bed  HEENT:  NC/AT, PERRL, EOMI, mucosa moist, throat clear, trachea midline, neck supple  Cardiovascular: RRR   Respiratory: CTA  Gastrointestinal: soft NT/ND (+)BS    Neurology:  weakened strength & sensation grossly intact  Psych: appropriate  Musculoskeletal: FROM, no deformities/ contractures  Vascular: BLE equally warm,  no cyanosis, clubbing       R>LLE  edema but equal DP/PT pulses palpable       no acute ischemia noted      R>LLE hemosiderin staining w/ dry scale   No blistering drainage or open wounds w/ line drawn around area of blanchable erythema  No odor, erythema, increased warmth, tenderness, induration, fluctuance  Skin: dry, intact      LABS/ CULTURES/ RADIOLOGY:                        16.2   8.34  )-----------( 227      ( 20 Oct 2021 17:49 )             49.4       138  |  101  |  24  ----------------------------<  115      [10-20-21 @ 17:49]  4.2   |  20  |  0.85        Ca     9.2     [10-20-21 @ 17:49]      Mg     1.8     [10-20-21 @ 17:49]      Phos  3.3     [10-20-21 @ 17:49]    TPro  7.6  /  Alb  3.8  /  TBili  0.7  /  DBili  x   /  AST  29  /  ALT  21  /  AlkPhos  56  [10-20-21 @ 10:40]    < from: US Duplex Venous Lower Ext Ltd, Right (10.20.20 @ 09:30) >    COMPARISON: 8/26/2020    TECHNIQUE: Duplex sonography of the RIGHT LOWER extremity veins with color and spectral Doppler, with and without compression.    FINDINGS:    There is normal compressibility of the right common femoral, femoral and popliteal veins.  The contralateral common femoral vein is patent.  Doppler examination shows normal spontaneous and phasic flow.    No calf vein thrombosis is detected.    IMPRESSION:  No evidence of right lower extremity deep venous thrombosis.      < end of copied text >    PT/INR: PT 11.6 , INR 0.96       [10-20-21 @ 10:40]  PTT: 31.5       [10-20-21 @ 10:40]

## 2021-10-21 NOTE — PROGRESS NOTE ADULT - ATTENDING COMMENTS
unchanged exam s/p thrombectomy yesterday afternoon.  stroke neurology following.  plan for transfer to stroke service this afternoon/tomorrow.

## 2021-10-21 NOTE — PHYSICAL THERAPY INITIAL EVALUATION ADULT - PERTINENT HX OF CURRENT PROBLEM, REHAB EVAL
77yo Rt handed M with pmh of HTN on ASA 81mg, Ulcerative Colitis, Afib (not on AC 2/2 h/o GIB, UC), hx of meningioma (s/p left frontal craniotomy in 2017and s/p radiation),  Hx of seizure disorder, DM-II presents to [facility] as a direct transfer to  from [place] for MT. s/p MT with RM2 occlusion, TICI2

## 2021-10-21 NOTE — OCCUPATIONAL THERAPY INITIAL EVALUATION ADULT - PERTINENT HX OF CURRENT PROBLEM, REHAB EVAL
76M hx of HTN, Afib (on ASA, not AC 2/2 UGIB), UC, meningioma (s/p L frontal crani and XRT 2017), NIDDM, who presented to [facility] from  for MT for dysarthria, word finding difficulty, and leaning towards L side at 0945, LWK 0900 10/20, w/ admission score NIHSS 14, imaging with RM2 occlusion s/p tPA (10/20 10:48) s/p MT TICI2C.

## 2021-10-21 NOTE — PHARMACOTHERAPY INTERVENTION NOTE - COMMENTS
Spoke with patient and confirmed home medications.   Home Medications:    amLODIPine 10 mg oral tablet: 1 tab(s) orally once a day (21 Oct 2021 12:07)  Aspir 81 oral delayed release tablet: 1 tab(s) orally once a day (21 Oct 2021 12:07)  fenofibric acid 135 mg oral delayed release capsule: 1 cap(s) orally once a day (21 Oct 2021 12:07)  hydroCHLOROthiazide 12.5 mg oral tablet: 1 tab(s) orally once a day (21 Oct 2021 12:07)  mesalamine 1.2 g oral delayed release tablet: 4 tab(s) orally once a day (21 Oct 2021 12:07)  metoprolol succinate 200 mg oral tablet, extended release: 1 tab(s) orally once a day (21 Oct 2021 12:07)

## 2021-10-21 NOTE — PHYSICAL THERAPY INITIAL EVALUATION ADULT - ADDITIONAL COMMENTS
Patient lives in town house with spouse, 6 steps to enter, 14 steps inside. Patient ambulated with standard cane  independent. Pt. owns rw, standard cane ,w/c at home.

## 2021-10-21 NOTE — PROGRESS NOTE ADULT - SUBJECTIVE AND OBJECTIVE BOX
Patient seen and examined    T(C): 36.8 (10-21-21 @ 15:00), Max: 36.8 (10-21-21 @ 03:00)  HR: 72 (10-21-21 @ 18:00) (62 - 94)  BP: 140/61 (10-21-21 @ 18:00) (84/48 - 143/96)  RR: 20 (10-21-21 @ 18:00) (11 - 20)  SpO2: 99% (10-21-21 @ 18:00) (93% - 100%)  10-20-21 @ 07:01  -  10-21-21 @ 07:00  --------------------------------------------------------  IN: 300 mL / OUT: 1540 mL / NET: -1240 mL    10-21-21 @ 07:01  -  10-21-21 @ 19:10  --------------------------------------------------------  IN: 700 mL / OUT: 495 mL / NET: 205 mL    acetaminophen   Tablet .. 650 milliGRAM(s) Oral every 6 hours PRN  amLODIPine   Tablet 5 milliGRAM(s) Oral daily  AQUAPHOR (petrolatum Ointment) 1 Application(s) Topical two times a day  aspirin  chewable 81 milliGRAM(s) Oral daily  atorvastatin 40 milliGRAM(s) Oral at bedtime  chlorhexidine 4% Liquid 1 Application(s) Topical <User Schedule>  insulin lispro (ADMELOG) corrective regimen sliding scale   SubCutaneous Before meals and at bedtime  mesalamine DR (24-Hour) Tablet 4.8 Gram(s) Oral daily  metoprolol tartrate 25 milliGRAM(s) Oral two times a day  polyethylene glycol 3350 17 Gram(s) Oral daily  senna 1 Tablet(s) Oral daily        Exam stable    labs and imaging reviewed     Continue same management   not critical     Stella Alvarez [facility] attending

## 2021-10-21 NOTE — CONSULT NOTE ADULT - SUBJECTIVE AND OBJECTIVE BOX
C A R D I O L O G Y  *********************    DATE OF SERVICE: 10-21-21    HISTORY OF PRESENT ILLNESS: HPI:  Patient is a 77 y/o male with PMH of HTN, Ulcerative Colitis, chronic Afib (not on AC 2/2 h/o GIB, UC), hx of meningioma (s/p left frontal craniotomy in 2017 and s/p radiation), Hx of seizure disorder, and DM2 who presents to [facility] as a direct transfer to IR from [place] for acute CVA s/p TPA and thrombectomy. Cardiology consulted for management of afib and anticoagulation. Per chart review, patient woke up around 0900am with no deficits. Around 930 am patient was having a conversation with his wife, around 945am patient started to slur his speech, having difficulty forming words and leaning to his left side. Patient’s wife called EMS. Patient now with no complaints. Denies chest pain, SOB, palpitations, dizziness, or syncope.    PAST MEDICAL & SURGICAL HISTORY:  Essential hypertension    Other hyperlipidemia    Meningioma  left frontal, s/p resection and radiation    Seizure  2/2 meningioma    DM (diabetes mellitus)    Pulmonary hypertension    Atrial fibrillation    GI bleed    CAD (coronary artery disease)    H/O right inguinal hernia repair    H/O cataract removal with insertion of prosthetic lens  B/L            MEDICATIONS:  MEDICATIONS  (STANDING):  amLODIPine   Tablet 5 milliGRAM(s) Oral daily  AQUAPHOR (petrolatum Ointment) 1 Application(s) Topical two times a day  aspirin  chewable 81 milliGRAM(s) Oral daily  atorvastatin 40 milliGRAM(s) Oral at bedtime  chlorhexidine 4% Liquid 1 Application(s) Topical <User Schedule>  insulin lispro (ADMELOG) corrective regimen sliding scale   SubCutaneous Before meals and at bedtime  mesalamine DR (24-Hour) Tablet 4.8 Gram(s) Oral daily  metoprolol tartrate 25 milliGRAM(s) Oral two times a day  polyethylene glycol 3350 17 Gram(s) Oral daily  senna 1 Tablet(s) Oral daily      Allergies    No Known Allergies    Intolerances        FAMILY HISTORY:  FH: hypertension  Father      Non-contributary for premature coronary disease or sudden cardiac death    SOCIAL HISTORY:    [x ] Non-smoker  [ ] Smoker  [ ] Alcohol    FLU VACCINE THIS YEAR STARTS IN AUGUST:  [ ] Yes    [ ] No    IF OVER 65 HAVE YOU EVER HAD A PNA VACCINE:  [ ] Yes    [ ] No       [ ] N/A      REVIEW OF SYSTEMS:  [ ]chest pain  [  ]shortness of breath  [  ]palpitations  [  ]syncope  [ ]near syncope [x ]upper extremity weakness   [x ] lower extremity weakness  [  ]diplopia  [  ]altered mental status   [  ]fevers  [ ]chills [ ]nausea  [ ]vomitting  [  ]dysphagia    [ ]abdominal pain  [ ]melena  [ ]BRBPR    [  ]epistaxis  [  ]rash    [ ]lower extremity edema    +slurred speech    [X] All others negative	  [ ] Unable to obtain      LABS:	 	    CARDIAC MARKERS:  CARDIAC MARKERS ( 20 Oct 2021 10:40 )  <.017 ng/mL / x     / x     / x     / x                                  16.2   8.34  )-----------( 227      ( 20 Oct 2021 17:49 )             49.4     Hb Trend: 16.2<--    10-20    138  |  101  |  24<H>  ----------------------------<  115<H>  4.2   |  20<L>  |  0.85    Ca    9.2      20 Oct 2021 17:49  Phos  3.3     10-20  Mg     1.8     10-20    TPro  7.6  /  Alb  3.8  /  TBili  0.7  /  DBili  x   /  AST  29  /  ALT  21  /  AlkPhos  56  10-20    Creatinine Trend: 0.85<--, 1.17<--    Coags:      proBNP:   Lipid Profile:   HgA1c:   TSH: Thyroid Stimulating Hormone, Serum: 0.33 uIU/mL (10-21 @ 03:02)  Thyroid Stimulating Hormone, Serum: 0.44 uIU/mL (10-20 @ 23:55)          PHYSICAL EXAM:  T(C): 36.8 (10-21-21 @ 15:00), Max: 36.8 (10-21-21 @ 03:00)  HR: 71 (10-21-21 @ 17:00) (62 - 94)  BP: 112/94 (10-21-21 @ 17:00) (84/48 - 143/96)  RR: 14 (10-21-21 @ 17:00) (11 - 20)  SpO2: 98% (10-21-21 @ 17:00) (93% - 100%)  Wt(kg): --   BMI (kg/m2): 26.4 (10-20-21 @ 12:45)  I&O's Summary    20 Oct 2021 07:01  -  21 Oct 2021 07:00  --------------------------------------------------------  IN: 300 mL / OUT: 1540 mL / NET: -1240 mL    21 Oct 2021 07:01  -  21 Oct 2021 17:05  --------------------------------------------------------  IN: 700 mL / OUT: 495 mL / NET: 205 mL      Gen: Appears well in NAD  HEENT:  (-)icterus (-)pallor  CV: N S1 S2 1/6 GREG (+)2 Pulses B/l  Resp:  Clear to auscultation B/L, normal effort  GI: (+) BS Soft, NT, ND  Lymph:  (-)Edema, (-)obvious lymphadenopathy  Skin: Warm to touch, Normal turgor  Psych: Appropriate mood and affect      TELEMETRY: AF 70s	      ECG: Afib at 66 bpm    < from: Transthoracic Echocardiogram (10.20.21 @ 16:23) >  Conclusions:  1. Mitral annular calcification and calcified mitral  leaflets with normal diastolic opening.  2. Calcified trileaflet aortic valve with normal opening.  3. Normal left ventricular systolic function.  4. Normal right ventricular size and function.  5. Estimated pulmonary artery systolic pressure equals 31  mm Hg, assuming right atrial pressure equals 6 - 10 mm Hg,  consistent with normal pulmonary pressures.  6. Agitated saline injection demonstrates no evidence of a  patent foramen ovale.  *** No previous Echo exam.    < end of copied text >  	    RADIOLOGY:         CXR: < from: Xray Chest 1 View- PORTABLE-Urgent (10.20.21 @ 11:38) >  IMPRESSION: No radiographic evidence of active chest disease.    < end of copied text >    ASSESSMENT/PLAN: Patient is a 77 y/o male with PMH of HTN, Ulcerative Colitis, chronic Afib (not on AC 2/2 h/o GIB, UC), hx of meningioma (s/p left frontal craniotomy in 2017 and s/p radiation), Hx of seizure disorder, and DM2 who presents to [facility] as a direct transfer to  from [place] for acute CVA s/p TPA and thrombectomy. Cardiology consulted for management of afib and anticoagulation.    - Would ideally have patient on full AC especially given stroke with elevated chadsvasc  - Follow up GI eval to see if safe to start AC with Eliquis prior to discharge when okay with neurology if UC can be controlled to prevent flare ups/bleeding  - EP consult with Dr. Monroe called for outpatient watchman eval - patient/family requesting to discuss with EP while in hospital  - Continue rate control with metoprolol  - TTE noted with normal LV function, no PFO or sig valvular abnormalities  - Stroke management per neuro  - No further inpatient cardiac w/u expected  - [facility]    Kiet Rodriguez PA-C  Pager: [phone]

## 2021-10-21 NOTE — OCCUPATIONAL THERAPY INITIAL EVALUATION ADULT - SHORT TERM MEMORY, REHAB EVAL
Questionable. Pt scored 2/28 on Short Blessed Test (cog screen) indicating "normal" cognition  however, required reorientation to situation t/o session.

## 2021-10-21 NOTE — PROGRESS NOTE ADULT - SUBJECTIVE AND OBJECTIVE BOX
NSCU Progress Note      24 Hour Events/Subjective:  - exam improved overnight, transient episode of hypotension, though improved without intervention  - plan for MRI today    REVIEW OF SYSTEMS:  - negative except as above    VITALS:   - Reviewed    IMAGING/DATA:   - Reviewed     ALLERGIES:   - No Known Allergies        PHYSICAL EXAM:    General: NAD  CVS: RRR  Pulm: CTAB  GI: Soft, NTND  Extremities: No LE Edema  Neuro: AOx3, PERRL, EOMI, trace dysarthria, facial symmetrical, fluent speech, LUE 4+/5, 5/5 rest, no PND, SILT

## 2021-10-21 NOTE — SWALLOW BEDSIDE ASSESSMENT ADULT - SLP PERTINENT HISTORY OF CURRENT PROBLEM
75yo Rt handed M with pmh of HTN on ASA 81mg, Ulcerative Colitis, Afib (not on AC 2/2 h/o GIB, UC), hx of meningioma (s/p left frontal craniotomy in 2017and s/p radiation),  Hx of seizure disorder, DM-II presents to [facility] as a direct transfer to  from [place] for MT. Per chart review, patient woke up around 0900am with no deficits. Around 930 am patient was having a conversation with his wife, around 945am patient started to slur his speech, having difficulty forming words and leaning to his left side. Patient’s wife called EMS. On arrival to [place], patient had NIHSS 14 and CT/CTA was noted to show Rt M1 occlusion with Core 44 and Penumbra 113. Patient was given TPA at 1058 am and transferred to [facility] for MT. Patient’s wife states the event was not a seizure.

## 2021-10-21 NOTE — OCCUPATIONAL THERAPY INITIAL EVALUATION ADULT - ANTICIPATED DISCHARGE DISPOSITION, OT EVAL
Acute rehab. IF pt goes home, will require assist for ALL ADL/transfers/mobility & home OT to address ADL/IADL, AE/DME, and fall prevention.

## 2021-10-21 NOTE — OCCUPATIONAL THERAPY INITIAL EVALUATION ADULT - LEVEL OF INDEPENDENCE: EATING, OT EVAL
assist to prevent spilling soup. Able to complete without assist but messy due to poor UE coordination/minimum assist (75% patients effort)

## 2021-10-21 NOTE — SWALLOW BEDSIDE ASSESSMENT ADULT - SLP GENERAL OBSERVATIONS
Patient encountered awake and alert, upright in bed, on RA, A&Ox4. Vocal quality WNL. Able to follow commands and make wants/needs known.

## 2021-10-22 LAB
ANION GAP SERPL CALC-SCNC: 13 MMOL/L — SIGNIFICANT CHANGE UP (ref 5–17)
ANION GAP SERPL CALC-SCNC: 15 MMOL/L — SIGNIFICANT CHANGE UP (ref 5–17)
BUN SERPL-MCNC: 22 MG/DL — SIGNIFICANT CHANGE UP (ref 7–23)
BUN SERPL-MCNC: 25 MG/DL — HIGH (ref 7–23)
CALCIUM SERPL-MCNC: 8.6 MG/DL — SIGNIFICANT CHANGE UP (ref 8.4–10.5)
CALCIUM SERPL-MCNC: 8.9 MG/DL — SIGNIFICANT CHANGE UP (ref 8.4–10.5)
CHLORIDE SERPL-SCNC: 103 MMOL/L — SIGNIFICANT CHANGE UP (ref 96–108)
CHLORIDE SERPL-SCNC: 105 MMOL/L — SIGNIFICANT CHANGE UP (ref 96–108)
CO2 SERPL-SCNC: 19 MMOL/L — LOW (ref 22–31)
CO2 SERPL-SCNC: 20 MMOL/L — LOW (ref 22–31)
CREAT SERPL-MCNC: 1 MG/DL — SIGNIFICANT CHANGE UP (ref 0.5–1.3)
CREAT SERPL-MCNC: 1.1 MG/DL — SIGNIFICANT CHANGE UP (ref 0.5–1.3)
GLUCOSE BLDC GLUCOMTR-MCNC: 112 MG/DL — HIGH (ref 70–99)
GLUCOSE BLDC GLUCOMTR-MCNC: 119 MG/DL — HIGH (ref 70–99)
GLUCOSE SERPL-MCNC: 105 MG/DL — HIGH (ref 70–99)
GLUCOSE SERPL-MCNC: 124 MG/DL — HIGH (ref 70–99)
MAGNESIUM SERPL-MCNC: 1.9 MG/DL — SIGNIFICANT CHANGE UP (ref 1.6–2.6)
OSMOLALITY SERPL: 286 MOSMOL/KG — SIGNIFICANT CHANGE UP (ref 280–301)
OSMOLALITY UR: 633 MOS/KG — SIGNIFICANT CHANGE UP (ref 300–900)
PHOSPHATE SERPL-MCNC: 2.2 MG/DL — LOW (ref 2.5–4.5)
POTASSIUM SERPL-MCNC: 3.9 MMOL/L — SIGNIFICANT CHANGE UP (ref 3.5–5.3)
POTASSIUM SERPL-MCNC: 4.2 MMOL/L — SIGNIFICANT CHANGE UP (ref 3.5–5.3)
POTASSIUM SERPL-SCNC: 3.9 MMOL/L — SIGNIFICANT CHANGE UP (ref 3.5–5.3)
POTASSIUM SERPL-SCNC: 4.2 MMOL/L — SIGNIFICANT CHANGE UP (ref 3.5–5.3)
SODIUM SERPL-SCNC: 137 MMOL/L — SIGNIFICANT CHANGE UP (ref 135–145)
SODIUM SERPL-SCNC: 138 MMOL/L — SIGNIFICANT CHANGE UP (ref 135–145)
SODIUM UR-SCNC: 45 MMOL/L — SIGNIFICANT CHANGE UP

## 2021-10-22 PROCEDURE — 99221 1ST HOSP IP/OBS SF/LOW 40: CPT

## 2021-10-22 PROCEDURE — 93010 ELECTROCARDIOGRAM REPORT: CPT

## 2021-10-22 PROCEDURE — 99233 SBSQ HOSP IP/OBS HIGH 50: CPT

## 2021-10-22 RX ORDER — ENOXAPARIN SODIUM 100 MG/ML
40 INJECTION SUBCUTANEOUS
Refills: 0 | Status: DISCONTINUED | OUTPATIENT
Start: 2021-10-22 | End: 2021-10-25

## 2021-10-22 RX ORDER — POTASSIUM PHOSPHATE, MONOBASIC POTASSIUM PHOSPHATE, DIBASIC 236; 224 MG/ML; MG/ML
30 INJECTION, SOLUTION INTRAVENOUS ONCE
Refills: 0 | Status: COMPLETED | OUTPATIENT
Start: 2021-10-22 | End: 2021-10-22

## 2021-10-22 RX ORDER — SODIUM CHLORIDE 9 MG/ML
250 INJECTION INTRAMUSCULAR; INTRAVENOUS; SUBCUTANEOUS ONCE
Refills: 0 | Status: COMPLETED | OUTPATIENT
Start: 2021-10-22 | End: 2021-10-22

## 2021-10-22 RX ORDER — QUETIAPINE FUMARATE 200 MG/1
50 TABLET, FILM COATED ORAL AT BEDTIME
Refills: 0 | Status: DISCONTINUED | OUTPATIENT
Start: 2021-10-22 | End: 2021-10-22

## 2021-10-22 RX ORDER — MAGNESIUM SULFATE 500 MG/ML
1 VIAL (ML) INJECTION ONCE
Refills: 0 | Status: COMPLETED | OUTPATIENT
Start: 2021-10-22 | End: 2021-10-23

## 2021-10-22 RX ORDER — SODIUM CHLORIDE 9 MG/ML
500 INJECTION, SOLUTION INTRAVENOUS ONCE
Refills: 0 | Status: COMPLETED | OUTPATIENT
Start: 2021-10-22 | End: 2021-10-22

## 2021-10-22 RX ORDER — SODIUM,POTASSIUM PHOSPHATES 278-250MG
1 POWDER IN PACKET (EA) ORAL ONCE
Refills: 0 | Status: COMPLETED | OUTPATIENT
Start: 2021-10-22 | End: 2021-10-22

## 2021-10-22 RX ORDER — HALOPERIDOL DECANOATE 100 MG/ML
2 INJECTION INTRAMUSCULAR ONCE
Refills: 0 | Status: COMPLETED | OUTPATIENT
Start: 2021-10-22 | End: 2021-10-22

## 2021-10-22 RX ADMIN — HALOPERIDOL DECANOATE 2 MILLIGRAM(S): 100 INJECTION INTRAMUSCULAR at 02:00

## 2021-10-22 RX ADMIN — POLYETHYLENE GLYCOL 3350 17 GRAM(S): 17 POWDER, FOR SOLUTION ORAL at 11:02

## 2021-10-22 RX ADMIN — Medication 1 APPLICATION(S): at 05:00

## 2021-10-22 RX ADMIN — Medication 81 MILLIGRAM(S): at 11:01

## 2021-10-22 RX ADMIN — SENNA PLUS 1 TABLET(S): 8.6 TABLET ORAL at 11:02

## 2021-10-22 RX ADMIN — ENOXAPARIN SODIUM 40 MILLIGRAM(S): 100 INJECTION SUBCUTANEOUS at 17:03

## 2021-10-22 RX ADMIN — Medication 2 PACKET(S): at 00:10

## 2021-10-22 RX ADMIN — Medication 1 APPLICATION(S): at 17:02

## 2021-10-22 RX ADMIN — Medication 25 MILLIGRAM(S): at 17:03

## 2021-10-22 RX ADMIN — Medication 4.8 GRAM(S): at 11:02

## 2021-10-22 RX ADMIN — SODIUM CHLORIDE 1500 MILLILITER(S): 9 INJECTION, SOLUTION INTRAVENOUS at 04:00

## 2021-10-22 RX ADMIN — ATORVASTATIN CALCIUM 40 MILLIGRAM(S): 80 TABLET, FILM COATED ORAL at 21:28

## 2021-10-22 RX ADMIN — CHLORHEXIDINE GLUCONATE 1 APPLICATION(S): 213 SOLUTION TOPICAL at 21:36

## 2021-10-22 NOTE — PROGRESS NOTE ADULT - ATTENDING COMMENTS
neurologically unchanged.  d/w NSG, will keep nasal balloon in for the time being.  d/c a-line.  likely transfer to floor service. agitated overnight, got haldol x 1.  also with episode of transient bradycardia and hypotension.  normotensive and HR 90s on my assessment.  cards following, will re-consult, get 12 lead now.  d/c seroquel.  start SQL.

## 2021-10-22 NOTE — PROGRESS NOTE ADULT - ASSESSMENT
ASSESSMENT/PLAN:    76M hx of HTN, Afib (on ASA, not AC 2/2 UGIB), UC, meningioma (s/p L frontal crani and XRT 2017), NIDDM, who presented to [facility] from [facility] for MT for dysarthria, word finding difficulty, and leaning towards L side at 0945, LWK 0900 10/20, w/ admission score NIHSS 14, imaging with RM2 occlusion with mismatch, given tPA 10:48 and transferred for MT.    Admission Score   [facility]: NIHSS 14   [facility] Preop: [email or web address]  mRS0    NEURO:  s/p tPA 10/20 10:58am  s/p MT withRM2 occlusion, TICI2  no longer on home keppra, only on it for seizure prior to meningioma rsxn 2017  - neuro checks q1h  - lipitor 80  - risk stratification labs  - No AC/AP for 24 hours (s/p tpa)  - MRI WO  - MR NOVA        CVS:  Afib (not on home AC)  - SBP goal <140  - c/w amlodipine  - c/w lopressor 50qd  - TTE    PULM:  - Aspiration precautions    RENAL:  - Fluids: IVL  - daily IOs    GI:  - Diet: Speech/swallow  - Bowel regimen: colace, senna    ENDO:   - FS goal 120-180    HEME/ONC:  - SCDs  - Chemoppx: hold d/t tpa  - LED    ID:  - monitor for fevers     ASSESSMENT/PLAN:    76M hx of HTN, Afib (on ASA, not AC 2/2 UC), UC, meningioma (s/p L frontal crani and XRT 2017), NIDDM, who presented to [facility] from [facility] for MT for dysarthria, word finding difficulty, and leaning towards L side at 0945, LWK 0900 10/20, w/ admission score NIHSS 14, s/p tPA, now s/p MT with RM2 occlusion, TICI2B    Admission Score   [facility]: NIHSS 14   [facility] Preop: [email or web address]  mRS0    NEURO:  s/p tPA 10/20 10:58am  s/p MT withRM2 occlusion, TICI2B  no longer on home keppra, only on it for seizure prior to meningioma rsxn 2017  - neuro checks q2h  - lipitor 80  - risk stratification labs  - c/w asa 81    CVS:  Afib (not on home AC)  - SBP goal <140  - c/w amlodipine 5  - c/w lopressor 25qd  - TTE  - EKG  - discuss with cardiology medication adjustment in the s/o hypotension/bradycardia    PULM:  - Aspiration precautions    RENAL:  - Fluids: IVL  - daily IOs    GI:  - Diet: Speech/swallow  - Bowel regimen: colace, senna    ENDO:   - FS goal 120-180    HEME/ONC:  - SCDs  - Chemoppx: restart SQL  - LED    ID:  - monitor for fevers     ASSESSMENT/PLAN:    76M hx of HTN, Afib (on ASA, not AC 2/2 UC), UC, meningioma (s/p L frontal crani and XRT 2017), NIDDM, who presented to [facility] from [facility] for MT for dysarthria, word finding difficulty, and leaning towards L side at 0945, LWK 0900 10/20, w/ admission score NIHSS 14, s/p tPA, now s/p MT with RM2 occlusion, TICI2B    Admission Score   [facility]: NIHSS 14   [facility] Preop: [email or web address]  mRS0    NEURO:  s/p tPA 10/20 10:58am  s/p MT withRM2 occlusion, TICI2B  no longer on home keppra, only on it for seizure prior to meningioma rsxn 2017  - neuro checks q2h  - lipitor 80  - risk stratification labs  - c/w asa 81    CVS:  Afib (not on home AC)  - SBP goal <140  - c/w amlodipine 5  - c/w lopressor 25qd  - TTE  - EKG  - discuss with cardiology medication adjustment in the s/o hypotension/bradycardia    PULM:  - Aspiration precautions    RENAL:  - Fluids: IVL  - daily IOs    GI:  - Diet: Speech/swallow  - Bowel regimen: colace, senna    ENDO:   - FS goal 120-180    HEME/ONC:  - SCDs  - Chemoppx: restart SQL  - LED    ID:  - monitor for fevers    Dispo: pending stroke bed

## 2021-10-22 NOTE — PROGRESS NOTE ADULT - ASSESSMENT
75yo Rt handed M with pmh of HTN on ASA 81mg, Ulcerative Colitis, Afib (not on AC 2/2 h/o GIB, UC), hx of meningioma (s/p left frontal craniotomy in 2017and s/p radiation),  Hx of seizure disorder not on AED, DM-II presents to [facility] as a direct transfer to  from [place] for MT. Patient was a candidate for TPA and was given at 1058am. Patient is s/p MT of Rt M1/2 occlusion noted on angiogram with TICI2C.  S/p MT with improvement in neuro exam esepcially hemiparesis and word finding difficulty. Has extinction still present on exam.      Impression: Left sided hemiparesis with word finding difficulty 2/2 to Rt M2 occlusion. Due to ESUS. However given hx of Afib, may be etiology. S/p Thombectomy TICI 2C recannulization     MRI: Right temporal parietal acute infarct middle cerebral distribution. No significant hemorrhage. Left parietal encephalomalacia and gliosis consistent with old postoperative changes. Noninvasive flow MR angiography demonstrating flow in the right middle cerebral artery.    Recommendations:   [x] ASA 81mg PO daily for now, will plan for ac pending clinical course given hx of afib.   [x] High dose statin therapy - atorvastatin 40mg PO daily. LDL goal <70mg/dL.  [x] MRI brain w/o  [ ] TTE w/ bubble study   [x] VA duplex b/l LE negative for DVT   [x] telemetry, permissive htn 130-180, glucose <180, fall precautions   [ ] PT/OT/SS/SLP    Discussed with stroke neuro attending after being seen on rounds. Recommendations finalized once signed by attending.

## 2021-10-22 NOTE — PROGRESS NOTE ADULT - SUBJECTIVE AND OBJECTIVE BOX
C A R D I O L O G Y  **********************************     DATE OF SERVICE: 10-22-21    Patient denies chest pain, palpitations, dizziness, or shortness of breath.   Review of systems otherwise (-)  	  MEDICATIONS:  MEDICATIONS  (STANDING):  amLODIPine   Tablet 5 milliGRAM(s) Oral daily  AQUAPHOR (petrolatum Ointment) 1 Application(s) Topical two times a day  aspirin  chewable 81 milliGRAM(s) Oral daily  atorvastatin 40 milliGRAM(s) Oral at bedtime  chlorhexidine 4% Liquid 1 Application(s) Topical <User Schedule>  enoxaparin Injectable 40 milliGRAM(s) SubCutaneous <User Schedule>  mesalamine DR (24-Hour) Tablet 4.8 Gram(s) Oral daily  metoprolol tartrate 25 milliGRAM(s) Oral two times a day  polyethylene glycol 3350 17 Gram(s) Oral daily  senna 1 Tablet(s) Oral daily      LABS:	 	    CARDIAC MARKERS:  CARDIAC MARKERS ( 20 Oct 2021 10:40 )  <.017 ng/mL / x     / x     / x     / x                                    13.1   11.31 )-----------( 221      ( 21 Oct 2021 21:36 )             39.3     Hemoglobin: 13.1 g/dL (10-21 @ 21:36)  Hemoglobin: 16.2 g/dL (10-20 @ 17:49)  Hemoglobin: 14.8 g/dL (10-20 @ 10:40)      10-22    137  |  103  |  25<H>  ----------------------------<  105<H>  4.2   |  19<L>  |  1.10    Ca    8.9      22 Oct 2021 02:52  Phos  1.8     10-21  Mg     2.0     10-21      Creatinine Trend: 1.10<--, 1.16<--, 0.85<--, 1.17<--    COAGS:       proBNP:   Lipid Profile:   HgA1c:   TSH:       PHYSICAL EXAM:  T(C): 36.7 (10-22-21 @ 15:00), Max: 36.7 (10-22-21 @ 03:00)  HR: 81 (10-22-21 @ 15:00) (56 - 96)  BP: 138/61 (10-22-21 @ 15:00) (83/52 - 140/61)  RR: 17 (10-22-21 @ 15:00) (12 - 24)  SpO2: 98% (10-22-21 @ 15:00) (93% - 100%)  Wt(kg): --  I&O's Summary    21 Oct 2021 07:01  -  22 Oct 2021 07:00  --------------------------------------------------------  IN: 1200 mL / OUT: 1245 mL / NET: -45 mL    22 Oct 2021 07:01  -  22 Oct 2021 15:54  --------------------------------------------------------  IN: 550 mL / OUT: 650 mL / NET: -100 mL      Gen: Appears well in NAD  HEENT:  (-)icterus (-)pallor  CV: N S1 S2 1/6 GREG (+)2 Pulses B/l  Resp:  Clear to auscultation B/L, normal effort  GI: (+) BS Soft, NT, ND  Lymph:  (-)Edema, (-)obvious lymphadenopathy  Skin: Warm to touch, Normal turgor  Psych: Appropriate mood and affect      TELEMETRY: mainly AF 60-70, briefly to 38 and to 110s	    < from: Transthoracic Echocardiogram (10.20.21 @ 16:23) >  Conclusions:  1. Mitral annular calcification and calcified mitral  leaflets with normal diastolic opening.  2. Calcified trileaflet aortic valve with normal opening.  3. Normal left ventricular systolic function.  4. Normal right ventricular size and function.  5. Estimated pulmonary artery systolic pressure equals 31  mm Hg, assuming right atrial pressure equals 6 - 10 mm Hg,  consistent with normal pulmonary pressures.  6. Agitated saline injection demonstrates no evidence of a  patent foramen ovale.  *** No previous Echo exam.    < end of copied text >      ASSESSMENT/PLAN: Patient is a 77 y/o male with PMH of HTN, Ulcerative Colitis, chronic Afib (not on AC 2/2 h/o GIB, UC), hx of meningioma (s/p left frontal craniotomy in 2017 and s/p radiation), Hx of seizure disorder, and DM2 who presents to [facility] as a direct transfer to  from [place] for acute CVA s/p TPA and thrombectomy. Cardiology consulted for management of afib and anticoagulation.    - Would ideally have patient on full AC especially given stroke with elevated chadsvasc  - Follow up GI eval to see if safe to start AC with Eliquis prior to discharge when okay with neurology if UC can be controlled to prevent flare ups/bleeding  - EP consult appreciated regarding watchman eval  - Continue rate control with metoprolol  - TTE noted with normal LV function, no PFO or sig valvular abnormalities  - Stroke management per neuro  - No further inpatient cardiac w/u expected  - [facility]    Kiet Rodriguez PA-C  Pager: [phone]

## 2021-10-22 NOTE — PROGRESS NOTE ADULT - SUBJECTIVE AND OBJECTIVE BOX
NSCU Progress Note      24 Hour Events/Subjective:  - s/p tPA 10/20 10:58am at OSH  - s/p MT RM2 occlusion with TICI2  - admitted to NSICU    REVIEW OF SYSTEMS:  - negative except as above    VITALS:   - Reviewed    IMAGING/DATA:   - Reviewed     ALLERGIES:   - No Known Allergies        PHYSICAL EXAM:    General: NAD  CVS: RRR  Pulm: CTAB  GI: Soft, NTND  Extremities: No LE Edema  Neuro: AOx3, PERRL, EOMI, trace dysarthria, facial symmetrical, fluent speech, LUE 4+/5, 5/5 rest, ?extinction of L arm, no PND   NSCU Progress Note      24 Hour Events/Subjective:  - transient asymptomatic bradycardia and hypotension overnight responsive to IVB, EKG this am with afib rate controlled      REVIEW OF SYSTEMS:  - negative except as above    VITALS:   - Reviewed    IMAGING/DATA:   - Reviewed     ALLERGIES:   - No Known Allergies        PHYSICAL EXAM:    General: NAD  CVS: RRR  Pulm: CTAB  GI: Soft, NTND  Extremities: No LE Edema  Neuro: AOx3, PERRL, EOMI, trace dysarthria, facial symmetrical, fluent speech, 5/5 throughout NSCU Progress Note      24 Hour Events/Subjective:  - transient asymptomatic bradycardia and hypotension overnight responsive to IVB, EKG this am with afib rate controlled  - accepted to stroke      REVIEW OF SYSTEMS:  - negative except as above    VITALS:   - Reviewed    IMAGING/DATA:   - Reviewed     ALLERGIES:   - No Known Allergies        PHYSICAL EXAM:    General: NAD  CVS: RRR  Pulm: CTAB  GI: Soft, NTND  Extremities: No LE Edema  Neuro: AOx3, PERRL, EOMI, trace dysarthria, facial symmetrical, fluent speech, 5/5 throughout

## 2021-10-22 NOTE — PROGRESS NOTE ADULT - SUBJECTIVE AND OBJECTIVE BOX
Patient seen and examined    T(C): 36.6 (10-22-21 @ 23:00), Max: 36.7 (10-22-21 @ 15:00)  HR: 73 (10-22-21 @ 23:00) (56 - 92)  BP: 105/68 (10-22-21 @ 23:00) (83/52 - 148/79)  RR: 16 (10-22-21 @ 23:00) (12 - 20)  SpO2: 96% (10-22-21 @ 23:00) (94% - 100%)  10-21-21 @ 07:01  -  10-22-21 @ 07:00  --------------------------------------------------------  IN: 1200 mL / OUT: 1245 mL / NET: -45 mL    10-22-21 @ 07:01  -  10-23-21 @ 03:55  --------------------------------------------------------  IN: 916.6 mL / OUT: 1050 mL / NET: -133.4 mL    acetaminophen   Tablet .. 650 milliGRAM(s) Oral every 6 hours PRN  AQUAPHOR (petrolatum Ointment) 1 Application(s) Topical two times a day  aspirin  chewable 81 milliGRAM(s) Oral daily  atorvastatin 40 milliGRAM(s) Oral at bedtime  chlorhexidine 4% Liquid 1 Application(s) Topical <User Schedule>  enoxaparin Injectable 40 milliGRAM(s) SubCutaneous <User Schedule>  mesalamine DR (24-Hour) Tablet 4.8 Gram(s) Oral daily  metoprolol tartrate 25 milliGRAM(s) Oral two times a day  polyethylene glycol 3350 17 Gram(s) Oral daily  senna 1 Tablet(s) Oral daily        Exam stable    labs and imaging reviewed     Continue same management     Stella Alvarez [facility] attending

## 2021-10-22 NOTE — CONSULT NOTE ADULT - SUBJECTIVE AND OBJECTIVE BOX
EP Attending    HISTORY OF PRESENT ILLNESS: HPI:  Patient is a 75yo Rt handed M with pmh of HTN on ASA 81mg, Ulcerative Colitis, Afib (not on AC 2/2 h/o GIB, UC), hx of meningioma (s/p left frontal craniotomy in 2017and s/p radiation),  Hx of seizure disorder, DM-II presents to [facility] as a direct transfer to IR from [place] for MT. Per chart review, patient woke up around 0900am with no deficits. Around 930 am patient was having a conversation with his wife, around 945am patient started to slur his speech, having difficulty forming words and leaning to his left side. Patient’s wife called EMS. On arrival to [place], patient had NIHSS 14 and CT/CTA was noted to show Rt M1 occlusion with Core 44 and Penumbra 113. Patient was given TPA at 1058 am and transferred to [facility] for MT. Patient’s wife states the event was not a seizure.  NIHSS 14 at [place] report  Preop NIHSS 11 per [facility] report   MRS 0    Date of Service 10/22- patient was transferred for tPA and recovery. He is feeling well now.  We discussed his history of AFib and GI bleeding. He has had AFib for many years (>10 yrs).  Due to ulcerative colitis, treated with mesalamine, he is only taking aspirin and no oral anticoagulation.  Now in the aftermath of a tPA aborted stroke, we will discuss the risks and benefits of oral anticoagulation and LA appendage occlusion/exclusion procedures.  He states that he bleeds overtly ~1x/yr.  He has not been on any 'stronger medication' for his UC besides mesalamine.  He is anxious about the possibilities and outcomes after a significant GI bleed.  He is not sure if this is death, hospitalization, or transfusion, but is overall anxious about 'what could go wrong'.  He states hes not had prior transfusion for GI bleeding.  His stroke risk is estimated at 7+% per year for [id number] of 6.  He has never been tried on apixaban, rivaroxaban or warfarin for anticoagulation.  No angina, no palpitations, no orthopnea, and a 10 pt ROS is otherwise negative.    PAST MEDICAL & SURGICAL HISTORY:  Essential hypertension    Other hyperlipidemia    Meningioma  left frontal, s/p resection and radiation    Seizure  2/2 meningioma    DM (diabetes mellitus)    Pulmonary hypertension    Atrial fibrillation    GI bleed    CAD (coronary artery disease)    H/O right inguinal hernia repair    H/O cataract removal with insertion of prosthetic lens  B/L      MEDICATIONS  (STANDING):  amLODIPine   Tablet 5 milliGRAM(s) Oral daily  AQUAPHOR (petrolatum Ointment) 1 Application(s) Topical two times a day  aspirin  chewable 81 milliGRAM(s) Oral daily  atorvastatin 40 milliGRAM(s) Oral at bedtime  chlorhexidine 4% Liquid 1 Application(s) Topical <User Schedule>  enoxaparin Injectable 40 milliGRAM(s) SubCutaneous <User Schedule>  mesalamine DR (24-Hour) Tablet 4.8 Gram(s) Oral daily  metoprolol tartrate 25 milliGRAM(s) Oral two times a day  polyethylene glycol 3350 17 Gram(s) Oral daily  senna 1 Tablet(s) Oral daily    Allergies    No Known Allergies    Intolerances    FAMILY HISTORY:  FH: hypertension  Father      Non-contributary for premature coronary disease or sudden cardiac death    SOCIAL HISTORY:    [ x] Non-smoker  [ ] Smoker  [ ] Alcohol    PHYSICAL EXAM:  T(C): 36.6 (10-22-21 @ 07:00), Max: 36.8 (10-21-21 @ 15:00)  HR: 92 (10-22-21 @ 11:00) (56 - 96)  BP: 124/66 (10-22-21 @ 11:00) (83/52 - 140/61)  RR: 18 (10-22-21 @ 11:00) (12 - 24)  SpO2: 97% (10-22-21 @ 11:00) (93% - 100%)  Wt(kg): --    Appearance: Normal appearing adult male in no acute distress, cooperative	  HEENT:   Normal oral mucosa, PERRL, EOMI	  Lymphatic: No lymphadenopathy , no edema  Cardiovascular: Normal S1 S2, No JVD, No murmurs , Peripheral pulses palpable 2+ bilaterally  Respiratory: Lungs clear to auscultation, normal effort 	  Gastrointestinal:  Soft, Non-tender, + BS	  Skin: No rashes, No ecchymoses, No cyanosis, warm to touch  Musculoskeletal: Normal range of motion, normal strength  Psychiatry:  Mood & affect appropriate      TELEMETRY: AF, 60-100bpm.  BRIEF heartrate variability where [id number] rate is 30bpm.  The overall HR is 60-70 during this time  Echo: normal EF    	  LABS:	 	                          13.1   11.31 )-----------( 221      ( 21 Oct 2021 21:36 )             39.3     10-22    137  |  103  |  25<H>  ----------------------------<  105<H>  4.2   |  19<L>  |  1.10    Ca    8.9      22 Oct 2021 02:52  Phos  1.8     10-21  Mg     2.0     10-21      ASSESSMENT/PLAN: 	76y Male with new ischemic/embolic stroke, aborted with tPA.  Persistent AFib, [id number] is at least 6.    He does not have an absolute contraindication to anticoagulation, and I recommend starting Apixaban 5mg BID prior to discharge.  Ulcerative Colitis should be managed aggressively to limit bleeding.    If he has severe breakthrough bleeding and he and his doctor decide that he should not stay on long-term anticoagulation, then he would be a candidate for a Watchman implant.  He would have to be able to tolerate anticoagulation for at least 6 weeks post-implant, so this testing period is important before a Watchman is even considered.    If he promptly starts bleeding on anticoagulation and is not able to tolerate 6 weeks of therapy, he should be referred to CT Surgery (Dr Cannon) for LA appendage epicardial clipping, which does NOT require oral anticoagulation post-procedure.    AF is a chronic issue and he does not necessarily need telemetry after he leaves NSICU.  There is no significant sustained bradycardia on the monitor.  Will follow.    Josiah Monroe M.D.  Cardiac Electrophysiology    office [phone]  pager [phone]

## 2021-10-22 NOTE — PROGRESS NOTE ADULT - SUBJECTIVE AND OBJECTIVE BOX
Neurology Consultation  Hema Benites, PGY-2     subjective/ interval hx: Patient seen and examined at bedside with neuro stroke team. Overnight required haldol for agitation, IVF for hypotension to SBP 90s. Had symptomatic bradycardia for which EP following. Patient now s/p tPA and s/p MT with RM1/2 occlusion, TICI2 recannulization. Accepted for stroke service transfer when bed available.     SUBJECTIVE: has improvement in strength especially affected LE. Able to walk with PT.      VITALS & EXAMINATION:  Vital Signs Last 24 Hrs  T(C): 36.6 (22 Oct 2021 07:00), Max: 36.8 (21 Oct 2021 15:00)  T(F): 97.8 (22 Oct 2021 07:00), Max: 98.2 (21 Oct 2021 15:00)  HR: 84 (22 Oct 2021 12:00) (56 - 96)  BP: 124/66 (22 Oct 2021 11:00) (83/52 - 140/61)  BP(mean): 83 (22 Oct 2021 11:00) (60 - 110)  RR: 16 (22 Oct 2021 12:00) (12 - 24)  SpO2: 97% (22 Oct 2021 12:00) (93% - 100%)    General: NAD  Respiratory: breathing comfortably   LE: no jarad LE swelling.   GI: soft to palpation.     Neuro exam  AOx3(person, place, situation, time)   CN: PERRL, EOMI, very suble dysarthria, no overt facial asymmetry  Sensation: intact individually   Extinction impaired   Motor: MAEx4, LLE mild drift 4+ compared to RLE 5/5. Otherwise no difficulties w/ UE 5/5 and no drift.     LABORATORY:  CBC                       13.1   11.31 )-----------( 221      ( 21 Oct 2021 21:36 )             39.3     Chem 10-22    137  |  103  |  25<H>  ----------------------------<  105<H>  4.2   |  19<L>  |  1.10    Ca    8.9      22 Oct 2021 02:52  Phos  1.8     10-21  Mg     2.0     10-21    LFTs   Coagulopathy   Lipid Panel 10-21 Chol 149 LDL -- HDL 57 Trig 69, 10-21 Chol 122 LDL -- HDL 48 Trig 43  A1c   Cardiac enzymes     U/A   CSF  Immunological  Other    STUDIES & IMAGING: (EEG, CT, MR, U/S, TTE/ADDY):  < from: MR Head No Cont (10.21.21 @ 12:08) >    EXAM:  MR ANGIO BRAIN                          EXAM:  MR BRAIN                          PROCEDURE DATE:  10/21/2021      INTERPRETATION:  CLINICAL INDICATION:  Post right M2 thrombectomy    Magnetic resonance imaging of the brain was carried out with transaxial SPGR, FLAIR, fast spin echo T2 weighted images, axial susceptibility weighted series, diffusion weighted series and sagittal T1 weighted series on a 1.5 Evy magnet.    Comparison is made with the prior CT/CTA  and conventional angiogram of10/20/2021, MRI 3/30/2019    Ventricular and sulcal prominence is consistent with age-appropriate involutional change. There is restricted diffusion in the right temporal and parietal cortex in the posterior portion of the right middle cerebral artery distribution. There is local sulcal effacement. No significant hemorrhagic conversion is identified.    There is a left frontal parietal craniotomy and there is encephalomalacia and gliosis in the left parietal region which is unchanged since 3/30/2019.    3-D axial noncontrast MRA were performed on the cervical and intracranial vessels, respectively. Intravascular flow quantification was performed using gated 2D phase contrast MR, imaged perpendicular to the vessel axis.  Images were post processed NOVA software and a NOVA flow study report is available.    There is good intracranial flow using noninvasive flow MR angiography. Flow is as follows in milliliters per minute.    DEMETRA 181, RMCA 117, RACA 27, RACA2 35.    LICA 142, LMCA 70, LACA 39, LACA2 20.    RVA 28, LVA 70, BA 51, RPCA 31, LPCA 34.    IMPRESSION: Right temporal parietal acute infarct middle cerebral distribution. No significant hemorrhage. Left parietal encephalomalacia and gliosis consistent with old postoperative changes. Noninvasive flow MR angiography demonstrating flow in the right middle cerebral artery.    --- End of Report ---  ROSA ELENA BRADY MD; Attending Radiologist  This document has been electronically signed. Oct 21 2021 12:21PM    < end of copied text >

## 2021-10-22 NOTE — SBIRT NOTE ADULT - NSSBIRTALCPOSREINDET_GEN_A_CORE
Positive reinforcements provided given patient currently within healthy guidelines. Education materials reviewed and offered to the patient. Patient declined.

## 2021-10-22 NOTE — CONSULT NOTE ADULT - SUBJECTIVE AND OBJECTIVE BOX
Patient is a 76y old  Male who presents with a chief complaint of     Admission HPI:  Patient is a 75yo Rt handed M with pmh of HTN on ASA 81mg, Ulcerative Colitis, Afib (not on AC 2/2 h/o GIB, UC), hx of meningioma (s/p left frontal craniotomy in 2017and s/p radiation),  Hx of seizure disorder, DM-II presents to [facility] as a direct transfer to IR from [place] for MT. Per chart review, patient woke up around 0900am with no deficits. Around 930 am patient was having a conversation with his wife, around 945am patient started to slur his speech, having difficulty forming words and leaning to his left side. Patient’s wife called EMS. On arrival to [place], patient had NIHSS 14 and CT/CTA was noted to show Rt M1 occlusion with Core 44 and Penumbra 113. Patient was given TPA at 1058 am and transferred to [facility] for MT. Patient’s wife states the event was not a seizure.  NIHSS 14 at [place] report  Preop NIHSS 11 per Ang[facility] IR [facility] report   MRS 0    Interval History:  Most recent imaging:  CT Head No Cont (10.21.21 @ 08:20) >  New cytotoxic edema within the right parietal and temporal lobes and right insula.  No CT evidence for hemorrhagic transformation.    MR Head No Cont (10.21.21 @ 12:08) >   Right temporal parietal acute infarct middle cerebral distribution. No significant hemorrhage. Left parietal encephalomalacia and gliosis consistent with old postoperative changes. Noninvasive flow MR angiography demonstrating flow in the right middle cerebral artery.    Patient hospital course complicated by low blood pressure and bradycardia.    REVIEW OF SYSTEMS: No chest pain, shortness of breath, nausea, vomiting or diarhea; other ROS neg     PAST MEDICAL & SURGICAL HISTORY  Essential hypertension  Other hyperlipidemia  Meningioma  Seizure  DM (diabetes mellitus)  Pulmonary hypertension  Atrial fibrillation  GI bleed  CAD (coronary artery disease)  H/O right inguinal hernia repair  H/O cataract removal with insertion of prosthetic lens    FUNCTIONAL HISTORY:   Lives w spouse in home w 6 TRISHA and 14 insider.  PTA Independent    CURRENT FUNCTIONAL STATUS:  Min A transfers and gait    FAMILY HISTORY   FH: hypertension    MEDICATIONS   acetaminophen   Tablet .. 650 milliGRAM(s) Oral every 6 hours PRN  amLODIPine   Tablet 5 milliGRAM(s) Oral daily  AQUAPHOR (petrolatum Ointment) 1 Application(s) Topical two times a day  aspirin  chewable 81 milliGRAM(s) Oral daily  atorvastatin 40 milliGRAM(s) Oral at bedtime  chlorhexidine 4% Liquid 1 Application(s) Topical <User Schedule>  enoxaparin Injectable 40 milliGRAM(s) SubCutaneous <User Schedule>  mesalamine DR (24-Hour) Tablet 4.8 Gram(s) Oral daily  metoprolol tartrate 25 milliGRAM(s) Oral two times a day  polyethylene glycol 3350 17 Gram(s) Oral daily  senna 1 Tablet(s) Oral daily    ALLERGIES  No Known Allergies    VITALS  T(C): 36.6 (10-22-21 @ 07:00), Max: 36.8 (10-21-21 @ 15:00)  HR: 84 (10-22-21 @ 12:00) (56 - 96)  BP: 139/94 (10-22-21 @ 12:00) (83/52 - 140/61)  RR: 16 (10-22-21 @ 12:00) (12 - 24)  SpO2: 97% (10-22-21 @ 12:00) (93% - 100%)  Wt(kg): --    PHYSICAL EXAM  Constitutional - NAD, Comfortable  HEENT - NCAT, EOMI  Neck - Supple, No limited ROM  Chest - CTA bilaterally, No wheeze, No rhonchi, No crackles  Cardiovascular - RRR, S1S2, No murmurs  Abdomen - BS+, Soft, NTND  Extremities - No C/C/E, No calf tenderness   Neurologic Exam -                    Cognitive - Awake, Alert, AAO to self, place, date, year, situation     Communication - Fluent, No dysarthria, no aphasia     Cranial Nerves - CN 2-12 intact     Motor - No focal deficits      Sensory - Intact to LT     Reflexes - DTR Intact, No primitive reflexive  Psychiatric - Mood stable, Affect WNL    RECENT LABS/IMAGING  CBC Full  -  ( 21 Oct 2021 21:36 )  WBC Count : 11.31 K/uL  RBC Count : 4.33 M/uL  Hemoglobin : 13.1 g/dL  Hematocrit : 39.3 %  Platelet Count - Automated : 221 K/uL  Mean Cell Volume : 90.8 fl  Mean Cell Hemoglobin : 30.3 pg  Mean Cell Hemoglobin Concentration : 33.3 gm/dL  Auto Neutrophil # : x  Auto Lymphocyte # : x  Auto Monocyte # : x  Auto Eosinophil # : x  Auto Basophil # : x  Auto Neutrophil % : x  Auto Lymphocyte % : x  Auto Monocyte % : x  Auto Eosinophil % : x  Auto Basophil % : x    10-22    137  |  103  |  25<H>  ----------------------------<  105<H>  4.2   |  19<L>  |  1.10    Ca    8.9      22 Oct 2021 02:52  Phos  1.8     10-21  Mg     2.0     10-21      Impression:  75 yo with functional deficits secondary to diagnosis of R MCA CVA, s/p mechanical thrombectomy    Plan:  PT- ROM, Bed Mob, Transfers, Amb w AD and bracing as needed  OT- ADLs, bracing  SLP- Dysphagia eval and treat  Prec- Falls, Cardiac  DVT Prophylaxis- Lovenox  Skin- Turn q2 h  Dispo-  Patient is a 76y old  Male who presents with a chief complaint of     Admission HPI:  Patient is a 77yo Rt handed M with pmh of HTN on ASA 81mg, Ulcerative Colitis, Afib (not on AC 2/2 h/o GIB, UC), hx of meningioma (s/p left frontal craniotomy in 2017and s/p radiation),  Hx of seizure disorder, DM-II presents to [facility] as a direct transfer to IR from [place] for MT. Per chart review, patient woke up around 0900am with no deficits. Around 930 am patient was having a conversation with his wife, around 945am patient started to slur his speech, having difficulty forming words and leaning to his left side. Patient’s wife called EMS. On arrival to [place], patient had NIHSS 14 and CT/CTA was noted to show Rt M1 occlusion with Core 44 and Penumbra 113. Patient was given TPA at 1058 am and transferred to [facility] for MT. Patient’s wife states the event was not a seizure.  NIHSS 14 at [place] report  Preop NIHSS 11 per [facility] IR [facility] report   MRS 0    Interval History:  Most recent imaging:  CT Head No Cont (10.21.21 @ 08:20) >  New cytotoxic edema within the right parietal and temporal lobes and right insula.  No CT evidence for hemorrhagic transformation.    MR Head No Cont (10.21.21 @ 12:08) >   Right temporal parietal acute infarct middle cerebral distribution. No significant hemorrhage. Left parietal encephalomalacia and gliosis consistent with old postoperative changes. Noninvasive flow MR angiography demonstrating flow in the right middle cerebral artery.    Patient hospital course complicated by low blood pressure and bradycardia.    REVIEW OF SYSTEMS: feels weaker than usual (has baseline RLE weakness from meningioma in 2017), + poor balance, No chest pain, shortness of breath, nausea, vomiting or diarhea; other ROS neg     PAST MEDICAL & SURGICAL HISTORY  Essential hypertension  Other hyperlipidemia  Meningioma  Seizure  DM (diabetes mellitus)  Pulmonary hypertension  Atrial fibrillation  GI bleed  CAD (coronary artery disease)  H/O right inguinal hernia repair  H/O cataract removal with insertion of prosthetic lens    FUNCTIONAL HISTORY:   Lives w spouse in home w 6 TRISHA and 14 insider.  PTA Independent    CURRENT FUNCTIONAL STATUS:  Min A transfers and gait    FAMILY HISTORY   FH: hypertension    MEDICATIONS   acetaminophen   Tablet .. 650 milliGRAM(s) Oral every 6 hours PRN  amLODIPine   Tablet 5 milliGRAM(s) Oral daily  AQUAPHOR (petrolatum Ointment) 1 Application(s) Topical two times a day  aspirin  chewable 81 milliGRAM(s) Oral daily  atorvastatin 40 milliGRAM(s) Oral at bedtime  chlorhexidine 4% Liquid 1 Application(s) Topical <User Schedule>  enoxaparin Injectable 40 milliGRAM(s) SubCutaneous <User Schedule>  mesalamine DR (24-Hour) Tablet 4.8 Gram(s) Oral daily  metoprolol tartrate 25 milliGRAM(s) Oral two times a day  polyethylene glycol 3350 17 Gram(s) Oral daily  senna 1 Tablet(s) Oral daily    ALLERGIES  No Known Allergies    VITALS  T(C): 36.6 (10-22-21 @ 07:00), Max: 36.8 (10-21-21 @ 15:00)  HR: 84 (10-22-21 @ 12:00) (56 - 96)  BP: 139/94 (10-22-21 @ 12:00) (83/52 - 140/61)  RR: 16 (10-22-21 @ 12:00) (12 - 24)  SpO2: 97% (10-22-21 @ 12:00) (93% - 100%)  Wt(kg): --    PHYSICAL EXAM  Constitutional - NAD, Comfortable  HEENT - NCAT, EOMI  Neck - Supple, No limited ROM  Chest - CTA bilaterally, No wheeze, No rhonchi, No crackles  Cardiovascular - RRR, S1S2, No murmurs  Abdomen - BS+, Soft, NTND  Extremities - No C/C/E, No calf tenderness   Neurologic Exam -                   Alert, Follows verbal instruction    RLE 4/5; LLE and bl UEs 4+/5    Sensation intact    AAO x 3    No clonus  Psychiatric - Mood stable, Affect WNL    RECENT LABS/IMAGING  CBC Full  -  ( 21 Oct 2021 21:36 )  WBC Count : 11.31 K/uL  RBC Count : 4.33 M/uL  Hemoglobin : 13.1 g/dL  Hematocrit : 39.3 %  Platelet Count - Automated : 221 K/uL  Mean Cell Volume : 90.8 fl  Mean Cell Hemoglobin : 30.3 pg  Mean Cell Hemoglobin Concentration : 33.3 gm/dL  Auto Neutrophil # : x  Auto Lymphocyte # : x  Auto Monocyte # : x  Auto Eosinophil # : x  Auto Basophil # : x  Auto Neutrophil % : x  Auto Lymphocyte % : x  Auto Monocyte % : x  Auto Eosinophil % : x  Auto Basophil % : x    10-22    137  |  103  |  25<H>  ----------------------------<  105<H>  4.2   |  19<L>  |  1.10    Ca    8.9      22 Oct 2021 02:52  Phos  1.8     10-21  Mg     2.0     10-21      Impression:  77 yo with functional deficits secondary to diagnosis of R MCA CVA, s/p mechanical thrombectomy, debility    Plan:  PT- ROM, Bed Mob, Transfers, Amb w AD and bracing as needed  OT- ADLs, bracing  SLP- Dysphagia eval and treat  Prec- Falls, Cardiac  DVT Prophylaxis- Lovenox  Skin- Turn q2 h  Dispo- Acute Rehab- can tolerate 3h/d of therapies and requires daily physician visits

## 2021-10-23 RX ADMIN — CHLORHEXIDINE GLUCONATE 1 APPLICATION(S): 213 SOLUTION TOPICAL at 22:21

## 2021-10-23 RX ADMIN — Medication 52 GRAM(S): at 01:40

## 2021-10-23 RX ADMIN — Medication 4.8 GRAM(S): at 15:21

## 2021-10-23 RX ADMIN — Medication 1 TABLET(S): at 01:32

## 2021-10-23 RX ADMIN — Medication 1 APPLICATION(S): at 18:42

## 2021-10-23 RX ADMIN — Medication 81 MILLIGRAM(S): at 13:06

## 2021-10-23 RX ADMIN — Medication 1 APPLICATION(S): at 05:07

## 2021-10-23 RX ADMIN — ATORVASTATIN CALCIUM 40 MILLIGRAM(S): 80 TABLET, FILM COATED ORAL at 22:19

## 2021-10-23 RX ADMIN — ENOXAPARIN SODIUM 40 MILLIGRAM(S): 100 INJECTION SUBCUTANEOUS at 18:42

## 2021-10-23 RX ADMIN — Medication 25 MILLIGRAM(S): at 18:42

## 2021-10-23 RX ADMIN — POTASSIUM PHOSPHATE, MONOBASIC POTASSIUM PHOSPHATE, DIBASIC 83.33 MILLIMOLE(S): 236; 224 INJECTION, SOLUTION INTRAVENOUS at 01:32

## 2021-10-23 NOTE — PROGRESS NOTE ADULT - SUBJECTIVE AND OBJECTIVE BOX
C A R D I O L O G Y  **********************************     DATE OF SERVICE: 10-23-21    Patient denies chest pain or shortness of breath.   Review of symptoms otherwise negative.    acetaminophen   Tablet .. 650 milliGRAM(s) Oral every 6 hours PRN  AQUAPHOR (petrolatum Ointment) 1 Application(s) Topical two times a day  aspirin  chewable 81 milliGRAM(s) Oral daily  atorvastatin 40 milliGRAM(s) Oral at bedtime  chlorhexidine 4% Liquid 1 Application(s) Topical <User Schedule>  enoxaparin Injectable 40 milliGRAM(s) SubCutaneous <User Schedule>  mesalamine DR (24-Hour) Tablet 4.8 Gram(s) Oral daily  metoprolol tartrate 25 milliGRAM(s) Oral two times a day  polyethylene glycol 3350 17 Gram(s) Oral daily  senna 1 Tablet(s) Oral daily                            13.1   11.31 )-----------( 221      ( 21 Oct 2021 21:36 )             39.3       Hemoglobin: 13.1 g/dL (10-21 @ 21:36)  Hemoglobin: 16.2 g/dL (10-20 @ 17:49)  Hemoglobin: 14.8 g/dL (10-20 @ 10:40)      10-22    138  |  105  |  22  ----------------------------<  124<H>  3.9   |  20<L>  |  1.00    Ca    8.6      22 Oct 2021 22:19  Phos  2.2     10-22  Mg     1.9     10-22      Creatinine Trend: 1.00<--, 1.10<--, 1.16<--, 0.85<--, 1.17<--    COAGS:           T(C): 36.4 (10-23-21 @ 15:00), Max: 36.6 (10-22-21 @ 19:00)  HR: 89 (10-23-21 @ 17:00) (64 - 99)  BP: 137/59 (10-23-21 @ 15:00) (105/68 - 144/92)  RR: 17 (10-23-21 @ 17:00) (15 - 33)  SpO2: 98% (10-23-21 @ 17:00) (95% - 100%)  Wt(kg): --    I&O's Summary    22 Oct 2021 07:01  -  23 Oct 2021 07:00  --------------------------------------------------------  IN: 1249.8 mL / OUT: 1050 mL / NET: 199.8 mL    23 Oct 2021 07:01  -  23 Oct 2021 18:51  --------------------------------------------------------  IN: 720 mL / OUT: 0 mL / NET: 720 mL        Gen: Appears well in NAD  HEENT:  (-)icterus (-)pallor  CV: N S1 S2 1/6 GREG (+)2 Pulses B/l  Resp:  Clear to auscultation B/L, normal effort  GI: (+) BS Soft, NT, ND  Lymph:  (-)Edema, (-)obvious lymphadenopathy  Skin: Warm to touch, Normal turgor  Psych: Appropriate mood and affect      TELEMETRY: mainly AF 60-70, briefly to 38 and to 110s	    < from: Transthoracic Echocardiogram (10.20.21 @ 16:23) >  Conclusions:  1. Mitral annular calcification and calcified mitral  leaflets with normal diastolic opening.  2. Calcified trileaflet aortic valve with normal opening.  3. Normal left ventricular systolic function.  4. Normal right ventricular size and function.  5. Estimated pulmonary artery systolic pressure equals 31  mm Hg, assuming right atrial pressure equals 6 - 10 mm Hg,  consistent with normal pulmonary pressures.  6. Agitated saline injection demonstrates no evidence of a  patent foramen ovale.  *** No previous Echo exam.    < end of copied text >      ASSESSMENT/PLAN: Patient is a 75 y/o male with PMH of HTN, Ulcerative Colitis, chronic Afib (not on AC 2/2 h/o GIB, UC), hx of meningioma (s/p left frontal craniotomy in 2017 and s/p radiation), Hx of seizure disorder, and DM2 who presents to [facility] as a direct transfer to IR from [place] for acute CVA s/p TPA and thrombectomy. Cardiology consulted for management of afib and anticoagulation.    - Would ideally have patient on full AC especially given stroke with elevated chadsvasc  - Follow up GI eval to see if safe to start AC with Eliquis prior to discharge when okay with neurology if UC can be controlled to prevent flare ups/bleeding  - EP consult appreciated regarding watchman  - Continue rate control with metoprolol  - TTE noted with normal LV function, no PFO or sig valvular abnormalities  - Stroke management per neuro  - No further inpatient cardiac w/u expected  - [facility]    Ervin Jara MD, [facility]  BEEPER [phone]

## 2021-10-23 NOTE — PROGRESS NOTE ADULT - ASSESSMENT
75 y/o Rt handed M with pmh of HTN on ASA 81mg, Ulcerative Colitis, Afib (not on AC 2/2 h/o GIB, UC), hx of meningioma (s/p left frontal craniotomy in 2017and s/p radiation),  Hx of seizure disorder not on AED, DM-II presents to [facility] as a direct transfer to  from [place] for MT. Patient was a candidate for TPA and was given at 10:58am. Patient is s/p MT of Rt M1/2 occlusion noted on angiogram with TICI2C.  S/p MT with improvement in neuro exam especially hemiparesis and word finding difficulty.     Neurologic exam 10/23 -- unchanged.      Impression: Resolving Left sided hemiparesis with word finding difficulty 2/2 R MCA ischemic stroke in the setting of R M2 occlusion s/p MT with TICI 2C reperfusion 2/2 Cardio-embolism (A-Fib)     Recommendations:   [] Can move to Stroke Floor when bed available.   [x] MRI: Right temporal parietal acute infarct middle cerebral distribution. No significant hemorrhage. Left parietal encephalomalacia and gliosis consistent with old postoperative changes. Noninvasive flow MR angiography demonstrating flow in the right middle cerebral artery.  [x] MR NOVA demonstrating flow in the R MCA  [x] ASA 81mg PO daily for now. Will require discussion amongst patient's cardiologist and gastroenterologist regarding whether it is safe for patient (benefits > risk) to be on AC. Currently, patient is resistant to initiating anticoagulation.    If patient cannot be on AC, would be potential candidate for Watchman Device (though would require short term AC after implantation) vs alternate procedure and would require patient to have discussion with his Cardiologist/GI  [x] EP recommendations appreciated   [x] Increase Atorvastatin to 80mg PO daily. LDL 78 with goal <70  [] A1c pending.   [x] TTE w/ bubble study: EF 67%. No PFO. No Mitral stenosis.   [x] VA duplex b/l LE negative for DVT   [x] telemetry, permissive htn 130-180, glucose <180, fall precautions   [x] PT/OT: Acute Rehab    Plan discussed with Stroke Attending, Dr. Dozier.

## 2021-10-23 NOTE — PROGRESS NOTE ADULT - ATTENDING COMMENTS
Patient seen and examined with resident, PA, Fellow and RN. Agree with the hx exam Assessment and Plan as above.

## 2021-10-23 NOTE — PROGRESS NOTE ADULT - SUBJECTIVE AND OBJECTIVE BOX
pt seen and examined, no complaints, ROS - .     Tele NSR 80 this am     acetaminophen   Tablet .. 650 milliGRAM(s) Oral every 6 hours PRN  AQUAPHOR (petrolatum Ointment) 1 Application(s) Topical two times a day  aspirin  chewable 81 milliGRAM(s) Oral daily  atorvastatin 40 milliGRAM(s) Oral at bedtime  chlorhexidine 4% Liquid 1 Application(s) Topical <User Schedule>  enoxaparin Injectable 40 milliGRAM(s) SubCutaneous <User Schedule>  mesalamine DR (24-Hour) Tablet 4.8 Gram(s) Oral daily  metoprolol tartrate 25 milliGRAM(s) Oral two times a day  polyethylene glycol 3350 17 Gram(s) Oral daily  senna 1 Tablet(s) Oral daily                            13.1   11.31 )-----------( 221      ( 21 Oct 2021 21:36 )             39.3       Hemoglobin: 13.1 g/dL (10-21 @ 21:36)  Hemoglobin: 16.2 g/dL (10-20 @ 17:49)  Hemoglobin: 14.8 g/dL (10-20 @ 10:40)      10-22    138  |  105  |  22  ----------------------------<  124<H>  3.9   |  20<L>  |  1.00    Ca    8.6      22 Oct 2021 22:19  Phos  2.2     10-22  Mg     1.9     10-22      Creatinine Trend: 1.00<--, 1.10<--, 1.16<--, 0.85<--, 1.17<--    COAGS:           T(C): 36.4 (10-23-21 @ 07:00), Max: 36.7 (10-22-21 @ 15:00)  HR: 99 (10-23-21 @ 09:00) (64 - 99)  BP: 117/70 (10-23-21 @ 09:00) (105/68 - 148/79)  RR: 19 (10-23-21 @ 09:00) (15 - 24)  SpO2: 97% (10-23-21 @ 09:00) (95% - 100%)  Wt(kg): --    I&O's Summary    22 Oct 2021 07:01  -  23 Oct 2021 07:00  --------------------------------------------------------  IN: 1249.8 mL / OUT: 1050 mL / NET: 199.8 mL      Appearance: Normal appearing adult male in no acute distress, cooperative	  HEENT:   Normal oral mucosa, PERRL, EOMI	  Lymphatic: No lymphadenopathy , no edema  Cardiovascular: Normal S1 S2, No JVD, No murmurs , Peripheral pulses palpable 2+ bilaterally  Respiratory: Lungs clear to auscultation, normal effort 	  Gastrointestinal:  Soft, Non-tender, + BS	  Skin: No rashes, No ecchymoses, No cyanosis, warm to touch  Musculoskeletal: Normal range of motion, normal strength  Psychiatry:  Mood & affect appropriate                  ASSESSMENT/PLAN: 	76y Male with new ischemic/embolic stroke, aborted with tPA.  Persistent AFib, [id number] is at least 6.    He does not have an absolute contraindication to anticoagulation, and I recommend starting Apixaban 5mg BID prior to discharge.  Ulcerative Colitis should be managed aggressively to limit bleeding.    If he has severe breakthrough bleeding and he and his doctor decide that he should not stay on long-term anticoagulation, then he would be a candidate for a Watchman implant.  He would have to be able to tolerate anticoagulation for at least 6 weeks post-implant, so this testing period is important before a Watchman is even considered.    If he promptly starts bleeding on anticoagulation and is not able to tolerate 6 weeks of therapy, he should be referred to CT Surgery (Dr Cannon) for LA appendage epicardial clipping, which does NOT require oral anticoagulation post-procedure.    AF is a chronic issue and he does not necessarily need telemetry after he leaves NSICU.  There is no significant sustained bradycardia on the monitor.

## 2021-10-23 NOTE — CHART NOTE - NSCHARTNOTESELECT_GEN_ALL_CORE
Interventional Neuro Radiology/Event Note
Neuro/Transfer Note
Interventional Neuro Radiology/Event Note
VTE Risk/Event Note

## 2021-10-23 NOTE — CHART NOTE - NSCHARTNOTEFT_GEN_A_CORE
CAPRINI SCORE [CLOT] Score on Admission for     AGE RELATED RISK FACTORS                                                       MOBILITY RELATED FACTORS  [ ] Age 41-60 years                                            (1 Point)                  [ ] Bed rest                                                        (1 Point)  [ ] Age: 61-74 years                                           (2 Points)                 [ ] Plaster cast                                                   (2 Points)  [ x] Age= 75 years                                              (3 Points)                 [ ] Bed bound for more than 72 hours                 (2 Points)    DISEASE RELATED RISK FACTORS                                               GENDER SPECIFIC FACTORS  [ ] Edema in the lower extremities                       (1 Point)                  [ ] Pregnancy                                                     (1 Point)  [ ] Varicose veins                                               (1 Point)                  [ ] Post-partum < 6 weeks                                   (1 Point)             [ x] BMI > 25 Kg/m2                                            (1 Point)                  [ ] Hormonal therapy  or oral contraception          (1 Point)                 [ ] Sepsis (in the previous month)                        (1 Point)                  [ ] History of pregnancy complications                 (1 point)  [ ] Pneumonia or serious lung disease                                               [ ] Unexplained or recurrent                     (1 Point)           (in the previous month)                               (1 Point)  [ ] Abnormal pulmonary function test                     (1 Point)                 SURGERY RELATED RISK FACTORS (include planned surgeries)  [ ] Acute myocardial infarction                              (1 Point)                 [ ]  Section                                             (1 Point)  [ ] Congestive heart failure (in the previous month)  (1 Point)               [ ] Minor surgery                                                  (1 Point)   [ ] Inflammatory bowel disease                             (1 Point)                 [ ] Arthroscopic surgery                                        (2 Points)  [ ] Central venous access                                      (2 Points)                [ ] General surgery lasting more than 45 minutes   (2 Points)       [x ] Stroke (in the previous month)                          (5 Points)               [ ] Elective arthroplasty                                         (5 Points)            [ ] Current or past malignancy                                (2 Points)                                                                                                     HEMATOLOGY RELATED FACTORS                                                 TRAUMA RELATED RISK FACTORS  [ ] Prior episodes of VTE                                     (3 Points)                [ ] Fracture of the hip, pelvis, or leg                       (5 Points)  [ ] Positive family history for VTE                         (3 Points)                 [ ] Acute spinal cord injury (in the previous month)  (5 Points)  [ ] Prothrombin 99687 A                                     (3 Points)                 [ ] Paralysis  (less than 1 month)                             (5 Points)  [ ] Factor V Leiden                                             (3 Points)                  [ ] Multiple Trauma within 1 month                        (5 Points)  [ ] Lupus anticoagulants                                     (3 Points)                                                           [ ] Anticardiolipin antibodies                               (3 Points)                                                       [ ] High homocysteine in the blood                      (3 Points)                                             [ ] Other congenital or acquired thrombophilia      (3 Points)                                                [ ] Heparin induced thrombocytopenia                  (3 Points)                                          Total Score [      9    ]    Risk:  Very low 0   Low 1 to 2   Moderate 3 to 4   High =5       VTE Prophylasix Recommednations:  [x ] mechanical pneumatic compression devices                                      [ ] contraindicated: _____________________  [ ] chemo prophylasix                                                                                   [ ] contraindicated _____________________    **** HIGH LIKELIHOOD DVT PRESENT ON ADMISSION  [x ] (please order LE dopplers within 24 hours of admission)
Interventional Neuro- Radiology   Procedure Note    Procedure: Selective Cerebral Angiography   Pre- Procedure Diagnosis: right M2 occlusion   Post- Procedure Diagnosis: TICI 2c recanalization     : Dr. Bebeto MD  Fellow: Dr. Pepper   Physician Assistant: Geena Ascencio PA-C    RN: Savanna Castro: Prabhakar Geiger     Anesthesia: Dr. Westfall  (general anesthesia)    I/Os:  Fluids: 650cc   Herrera: 650cc   Contrast: 37cc   Estimated Blood Loss: <10cc      Preliminary Report:  Under general anesthesia, using a 6Fr sheath to the right groin examination of right internal carotid artery via selective cerebral angiography demonstrates right M2 occlusion, successful TICI 2c recanalization . ( Official note to follow).    Patient tolerated procedure well, vital signs stable, hemodynamically stable. Results discussed with patient and their family. Groin sheath d/c'ed, manual compression held to hemostasis, no active bleeding, no hematoma, Kelt applied, quick clot and safeguard balloon dressing applied at 1330_h.  Patient transferred to NSCU for further care/ monitoring.    Geena Ascencio PA-C  [phone]
Interventional Neuro Radiology  Pre-Procedure Note    This is a 77yo male, LKN at 9am today 10/20, 930am found down by wife, taken to  hospital with dysarthria and left sided weakness, NIHSS at  was 14, CTA showed right M1 occlusion, tpa given and transferred to [facility] IR for thrombectomy.       PAST MEDICAL & SURGICAL HISTORY:  Essential hypertension  Other hyperlipidemia  Meningioma left frontal, s/p resection and radiation  Seizure 2/2 meningioma  DM (diabetes mellitus)  Pulmonary hypertension  Atrial fibrillation  GI bleed  CAD (coronary artery disease)  H/O right inguinal hernia repair  H/O cataract removal with insertion of prosthetic lens B/L    Social History:   Denies tobacco use    FAMILY HISTORY:  FH: hypertension  Father    Allergies:   No Known Allergies    Current Meds: unknown       Assessment/Plan:   This is a 77yo male  presents with right M1 occlusion. Patient presents to neuro-IR for selective cerebral angiography. Procedure/ risks/ benefits/ goals/ alternatives were explained. Risks include but are not limited to stroke/ vessel injury/ hemorrhage/ groin hematoma. No H&P done prior done dur to emergent nature of procedure     Geena Ascencio PA-C  [phone]
Patient cleared for floor from a critical care standpoint.  Neurology accepted and to resume medical care, patient on bed board for Stroke Unit.  S/O to [id number].

## 2021-10-23 NOTE — PROGRESS NOTE ADULT - SUBJECTIVE AND OBJECTIVE BOX
Vascular Neurology Progress Note    S: Patient seen and examined at bedside. No acute complaints. Reports his left side does not feel any different than usual. Denies any new unilateral weakness. Discussion had regarding anti-coagulation -- patient states he was instructed by his cardiologist/GI to not take AC and was previously suggested to have Watchman Device placed but was concerned about complications at that time. Emphasized importance of re-considering Watchman Device placement in preventing recurrent strokes given he cannot be on AC.      VITALS & EXAMINATION:  ICU Vital Signs Last 24 Hrs  T(C): 36.4 (23 Oct 2021 07:00), Max: 36.7 (22 Oct 2021 15:00)  T(F): 97.6 (23 Oct 2021 07:00), Max: 98 (22 Oct 2021 15:00)  HR: 99 (23 Oct 2021 09:00) (64 - 99)  BP: 117/70 (23 Oct 2021 09:00) (105/68 - 148/79)  BP(mean): 85 (23 Oct 2021 09:00) (70 - 112)  ABP: --  ABP(mean): --  RR: 19 (23 Oct 2021 09:00) (15 - 24)  SpO2: 97% (23 Oct 2021 09:00) (95% - 100%)      General: NAD  Respiratory: breathing comfortably   LE: no jarad LE swelling.   GI: soft to palpation.     Neuro exam  AOx3(person, place, situation, time)   CN: PERRL, EOMI, very suble dysarthria, no overt facial asymmetry  Sensation: intact individually   Extinction impaired   Motor: MAEx4, LLE mild drift 4+ compared to RLE 5/5. Otherwise no difficulties w/ UE 5/5 and no drift.     LABORATORY:                        13.1   11.31 )-----------( 221      ( 21 Oct 2021 21:36 )             39.3     10-22    138  |  105  |  22  ----------------------------<  124<H>  3.9   |  20<L>  |  1.00    Ca    8.6      22 Oct 2021 22:19  Phos  2.2     10-22  Mg     1.9     10-22          U/A   CSF  Immunological  Other    STUDIES & IMAGING: (EEG, CT, MR, U/S, TTE/ADDY):  < from: MR Head No Cont (10.21.21 @ 12:08) >    EXAM:  MR ANGIO BRAIN                          EXAM:  MR BRAIN                          PROCEDURE DATE:  10/21/2021      INTERPRETATION:  CLINICAL INDICATION:  Post right M2 thrombectomy    Magnetic resonance imaging of the brain was carried out with transaxial SPGR, FLAIR, fast spin echo T2 weighted images, axial susceptibility weighted series, diffusion weighted series and sagittal T1 weighted series on a 1.5 Evy magnet.  Comparison is made with the prior CT/CTA  and conventional angiogram of10/20/2021, MRI 3/30/2019  Ventricular and sulcal prominence is consistent with age-appropriate involutional change. There is restricted diffusion in the right temporal and parietal cortex in the posterior portion of the right middle cerebral artery distribution. There is local sulcal effacement. No significant hemorrhagic conversion is identified.  There is a left frontal parietal craniotomy and there is encephalomalacia and gliosis in the left parietal region which is unchanged since 3/30/2019.  3-D axial noncontrast MRA were performed on the cervical and intracranial vessels, respectively. Intravascular flow quantification was performed using gated 2D phase contrast MR, imaged perpendicular to the vessel axis.  Images were post processed NOVA software and a NOVA flow study report is available.  There is good intracranial flow using noninvasive flow MR angiography. Flow is as follows in milliliters per minute.  DEMETRA 181, RMCA 117, RACA 27, RACA2 35.  LICA 142, LMCA 70, LACA 39, LACA2 20.  RVA 28, LVA 70, BA 51, RPCA 31, LPCA 34.  IMPRESSION: Right temporal parietal acute infarct middle cerebral distribution. No significant hemorrhage. Left parietal encephalomalacia and gliosis consistent with old postoperative changes. Noninvasive flow MR angiography demonstrating flow in the right middle cerebral artery.    --- End of Report ---  ROSA ELENA BRADY MD; Attending Radiologist  This document has been electronically signed. Oct 21 2021 12:21PM    < end of copied text >      < from: Transthoracic Echocardiogram (10.20.21 @ 16:23) >  Conclusions:  1. Mitral annular calcification and calcified mitral  leaflets with normal diastolic opening.  2. Calcified trileaflet aortic valve with normal opening.  3. Normal left ventricular systolic function.  4. Normal right ventricular size and function.  5. Estimated pulmonary artery systolic pressure equals 31  mm Hg, assuming right atrial pressure equals 6 - 10 mm Hg,  consistent with normal pulmonary pressures.  6. Agitated saline injection demonstrates no evidence of a  patent foramen ovale.    < end of copied text >   Vascular Neurology Progress Note    S: Patient seen and examined at bedside. No acute complaints. Reports his left side does not feel any different than usual. Denies any new unilateral weakness. Discussion had regarding anti-coagulation -- patient states he was instructed by his cardiologist/GI to not take AC and was previously suggested to have Watchman Device placed but was concerned about complications at that time. Emphasized importance of re-considering Watchman Device placement in preventing recurrent strokes.     VITALS & EXAMINATION:  ICU Vital Signs Last 24 Hrs  T(C): 36.4 (23 Oct 2021 07:00), Max: 36.7 (22 Oct 2021 15:00)  T(F): 97.6 (23 Oct 2021 07:00), Max: 98 (22 Oct 2021 15:00)  HR: 99 (23 Oct 2021 09:00) (64 - 99)  BP: 117/70 (23 Oct 2021 09:00) (105/68 - 148/79)  BP(mean): 85 (23 Oct 2021 09:00) (70 - 112)  ABP: --  ABP(mean): --  RR: 19 (23 Oct 2021 09:00) (15 - 24)  SpO2: 97% (23 Oct 2021 09:00) (95% - 100%)      General: NAD  Respiratory: breathing comfortably   LE: no jarad LE swelling.   GI: soft to palpation.     Neuro exam  AOx3(person, place, situation, time)   CN: PERRL, EOMI, very suble dysarthria, no overt facial asymmetry  Sensation: intact individually   Extinction impaired   Motor: MAEx4, LLE mild drift 4+ compared to RLE 5/5. Otherwise no difficulties w/ UE 5/5 and no drift.     LABORATORY:                        13.1   11.31 )-----------( 221      ( 21 Oct 2021 21:36 )             39.3     10-22    138  |  105  |  22  ----------------------------<  124<H>  3.9   |  20<L>  |  1.00    Ca    8.6      22 Oct 2021 22:19  Phos  2.2     10-22  Mg     1.9     10-22          U/A   CSF  Immunological  Other    STUDIES & IMAGING: (EEG, CT, MR, U/S, TTE/ADDY):  < from: MR Head No Cont (10.21.21 @ 12:08) >    EXAM:  MR ANGIO BRAIN                          EXAM:  MR BRAIN                          PROCEDURE DATE:  10/21/2021      INTERPRETATION:  CLINICAL INDICATION:  Post right M2 thrombectomy    Magnetic resonance imaging of the brain was carried out with transaxial SPGR, FLAIR, fast spin echo T2 weighted images, axial susceptibility weighted series, diffusion weighted series and sagittal T1 weighted series on a 1.5 Evy magnet.  Comparison is made with the prior CT/CTA  and conventional angiogram of10/20/2021, MRI 3/30/2019  Ventricular and sulcal prominence is consistent with age-appropriate involutional change. There is restricted diffusion in the right temporal and parietal cortex in the posterior portion of the right middle cerebral artery distribution. There is local sulcal effacement. No significant hemorrhagic conversion is identified.  There is a left frontal parietal craniotomy and there is encephalomalacia and gliosis in the left parietal region which is unchanged since 3/30/2019.  3-D axial noncontrast MRA were performed on the cervical and intracranial vessels, respectively. Intravascular flow quantification was performed using gated 2D phase contrast MR, imaged perpendicular to the vessel axis.  Images were post processed NOVA software and a NOVA flow study report is available.  There is good intracranial flow using noninvasive flow MR angiography. Flow is as follows in milliliters per minute.  DEMETRA 181, RMCA 117, RACA 27, RACA2 35.  LICA 142, LMCA 70, LACA 39, LACA2 20.  RVA 28, LVA 70, BA 51, RPCA 31, LPCA 34.  IMPRESSION: Right temporal parietal acute infarct middle cerebral distribution. No significant hemorrhage. Left parietal encephalomalacia and gliosis consistent with old postoperative changes. Noninvasive flow MR angiography demonstrating flow in the right middle cerebral artery.    --- End of Report ---  ROSA ELENA BRADY MD; Attending Radiologist  This document has been electronically signed. Oct 21 2021 12:21PM    < end of copied text >      < from: Transthoracic Echocardiogram (10.20.21 @ 16:23) >  Conclusions:  1. Mitral annular calcification and calcified mitral  leaflets with normal diastolic opening.  2. Calcified trileaflet aortic valve with normal opening.  3. Normal left ventricular systolic function.  4. Normal right ventricular size and function.  5. Estimated pulmonary artery systolic pressure equals 31  mm Hg, assuming right atrial pressure equals 6 - 10 mm Hg,  consistent with normal pulmonary pressures.  6. Agitated saline injection demonstrates no evidence of a  patent foramen ovale.    < end of copied text >

## 2021-10-24 LAB
ANION GAP SERPL CALC-SCNC: 11 MMOL/L — SIGNIFICANT CHANGE UP (ref 5–17)
BUN SERPL-MCNC: 21 MG/DL — SIGNIFICANT CHANGE UP (ref 7–23)
CALCIUM SERPL-MCNC: 9.4 MG/DL — SIGNIFICANT CHANGE UP (ref 8.4–10.5)
CHLORIDE SERPL-SCNC: 103 MMOL/L — SIGNIFICANT CHANGE UP (ref 96–108)
CO2 SERPL-SCNC: 23 MMOL/L — SIGNIFICANT CHANGE UP (ref 22–31)
CREAT SERPL-MCNC: 1.24 MG/DL — SIGNIFICANT CHANGE UP (ref 0.5–1.3)
GLUCOSE SERPL-MCNC: 126 MG/DL — HIGH (ref 70–99)
HCT VFR BLD CALC: 43.4 % — SIGNIFICANT CHANGE UP (ref 39–50)
HGB BLD-MCNC: 14.2 G/DL — SIGNIFICANT CHANGE UP (ref 13–17)
MAGNESIUM SERPL-MCNC: 1.9 MG/DL — SIGNIFICANT CHANGE UP (ref 1.6–2.6)
MCHC RBC-ENTMCNC: 30.1 PG — SIGNIFICANT CHANGE UP (ref 27–34)
MCHC RBC-ENTMCNC: 32.7 GM/DL — SIGNIFICANT CHANGE UP (ref 32–36)
MCV RBC AUTO: 92.1 FL — SIGNIFICANT CHANGE UP (ref 80–100)
NRBC # BLD: 0 /100 WBCS — SIGNIFICANT CHANGE UP (ref 0–0)
PHOSPHATE SERPL-MCNC: 2.6 MG/DL — SIGNIFICANT CHANGE UP (ref 2.5–4.5)
PLATELET # BLD AUTO: 218 K/UL — SIGNIFICANT CHANGE UP (ref 150–400)
POTASSIUM SERPL-MCNC: 4.1 MMOL/L — SIGNIFICANT CHANGE UP (ref 3.5–5.3)
POTASSIUM SERPL-SCNC: 4.1 MMOL/L — SIGNIFICANT CHANGE UP (ref 3.5–5.3)
RBC # BLD: 4.71 M/UL — SIGNIFICANT CHANGE UP (ref 4.2–5.8)
RBC # FLD: 13.8 % — SIGNIFICANT CHANGE UP (ref 10.3–14.5)
SODIUM SERPL-SCNC: 137 MMOL/L — SIGNIFICANT CHANGE UP (ref 135–145)
WBC # BLD: 8.6 K/UL — SIGNIFICANT CHANGE UP (ref 3.8–10.5)
WBC # FLD AUTO: 8.6 K/UL — SIGNIFICANT CHANGE UP (ref 3.8–10.5)

## 2021-10-24 RX ORDER — LANOLIN ALCOHOL/MO/W.PET/CERES
3 CREAM (GRAM) TOPICAL ONCE
Refills: 0 | Status: COMPLETED | OUTPATIENT
Start: 2021-10-24 | End: 2021-10-24

## 2021-10-24 RX ORDER — ATORVASTATIN CALCIUM 80 MG/1
80 TABLET, FILM COATED ORAL AT BEDTIME
Refills: 0 | Status: DISCONTINUED | OUTPATIENT
Start: 2021-10-24 | End: 2021-10-25

## 2021-10-24 RX ADMIN — Medication 1 APPLICATION(S): at 05:29

## 2021-10-24 RX ADMIN — ENOXAPARIN SODIUM 40 MILLIGRAM(S): 100 INJECTION SUBCUTANEOUS at 17:50

## 2021-10-24 RX ADMIN — CHLORHEXIDINE GLUCONATE 1 APPLICATION(S): 213 SOLUTION TOPICAL at 21:35

## 2021-10-24 RX ADMIN — Medication 25 MILLIGRAM(S): at 17:51

## 2021-10-24 RX ADMIN — Medication 1 APPLICATION(S): at 17:51

## 2021-10-24 RX ADMIN — SENNA PLUS 1 TABLET(S): 8.6 TABLET ORAL at 13:22

## 2021-10-24 RX ADMIN — ATORVASTATIN CALCIUM 80 MILLIGRAM(S): 80 TABLET, FILM COATED ORAL at 21:34

## 2021-10-24 RX ADMIN — Medication 4.8 GRAM(S): at 13:16

## 2021-10-24 RX ADMIN — Medication 25 MILLIGRAM(S): at 05:18

## 2021-10-24 RX ADMIN — Medication 81 MILLIGRAM(S): at 13:15

## 2021-10-24 RX ADMIN — POLYETHYLENE GLYCOL 3350 17 GRAM(S): 17 POWDER, FOR SOLUTION ORAL at 13:15

## 2021-10-24 NOTE — PROGRESS NOTE ADULT - SUBJECTIVE AND OBJECTIVE BOX
pt seen and examined, no complaints, ROS - .     Tele NSR 70 's            acetaminophen   Tablet .. 650 milliGRAM(s) Oral every 6 hours PRN  AQUAPHOR (petrolatum Ointment) 1 Application(s) Topical two times a day  aspirin  chewable 81 milliGRAM(s) Oral daily  atorvastatin 40 milliGRAM(s) Oral at bedtime  chlorhexidine 4% Liquid 1 Application(s) Topical <User Schedule>  enoxaparin Injectable 40 milliGRAM(s) SubCutaneous <User Schedule>  mesalamine DR (24-Hour) Tablet 4.8 Gram(s) Oral daily  metoprolol tartrate 25 milliGRAM(s) Oral two times a day  polyethylene glycol 3350 17 Gram(s) Oral daily  senna 1 Tablet(s) Oral daily          Hemoglobin: 13.1 g/dL (10-21 @ 21:36)  Hemoglobin: 16.2 g/dL (10-20 @ 17:49)  Hemoglobin: 14.8 g/dL (10-20 @ 10:40)      10-22    138  |  105  |  22  ----------------------------<  124<H>  3.9   |  20<L>  |  1.00    Ca    8.6      22 Oct 2021 22:19  Phos  2.2     10-22  Mg     1.9     10-22      Creatinine Trend: 1.00<--, 1.10<--, 1.16<--, 0.85<--, 1.17<--    COAGS:           T(C): 36.6 (10-24-21 @ 07:00), Max: 36.6 (10-24-21 @ 07:00)  HR: 79 (10-24-21 @ 07:00) (70 - 97)  BP: 137/91 (10-24-21 @ 07:00) (137/59 - 153/90)  RR: 16 (10-24-21 @ 07:00) (15 - 33)  SpO2: 100% (10-24-21 @ 07:00) (95% - 100%)  Wt(kg): --    I&O's Summary    23 Oct 2021 07:01  -  24 Oct 2021 07:00  --------------------------------------------------------  IN: 1140 mL / OUT: 400 mL / NET: 740 mL      Appearance: Normal appearing adult male in no acute distress, cooperative	  HEENT:   Normal oral mucosa, PERRL, EOMI	  Lymphatic: No lymphadenopathy , no edema  Cardiovascular: Normal S1 S2, No JVD, No murmurs , Peripheral pulses palpable 2+ bilaterally  Respiratory: Lungs clear to auscultation, normal effort 	  Gastrointestinal:  Soft, Non-tender, + BS	  Skin: No rashes, No ecchymoses, No cyanosis, warm to touch  Musculoskeletal: Normal range of motion, normal strength  Psychiatry:  Mood & affect appropriate                  ASSESSMENT/PLAN: 	76y Male with new ischemic/embolic stroke, aborted with tPA.  Persistent AFib, [id number] is at least 6.    He does not have an absolute contraindication to anticoagulation, and I recommend starting Apixaban 5mg BID prior to discharge.  Ulcerative Colitis should be managed aggressively to limit bleeding.    If he has severe breakthrough bleeding and he and his doctor decide that he should not stay on long-term anticoagulation, then he would be a candidate for a Watchman implant.  He would have to be able to tolerate anticoagulation for at least 6 weeks post-implant, so this testing period is important before a Watchman is even considered.    If he promptly starts bleeding on anticoagulation and is not able to tolerate 6 weeks of therapy, he should be referred to CT Surgery (Dr Cannon) for LA appendage epicardial clipping, which does NOT require oral anticoagulation post-procedure.    AF is a chronic issue and he does not necessarily need telemetry after he leaves NSICU.  There is no significant sustained bradycardia on the monitor.          Date of service 10/24   pt seen and examined, no complaints, ROS - .     Tele NSR 70 's            acetaminophen   Tablet .. 650 milliGRAM(s) Oral every 6 hours PRN  AQUAPHOR (petrolatum Ointment) 1 Application(s) Topical two times a day  aspirin  chewable 81 milliGRAM(s) Oral daily  atorvastatin 40 milliGRAM(s) Oral at bedtime  chlorhexidine 4% Liquid 1 Application(s) Topical <User Schedule>  enoxaparin Injectable 40 milliGRAM(s) SubCutaneous <User Schedule>  mesalamine DR (24-Hour) Tablet 4.8 Gram(s) Oral daily  metoprolol tartrate 25 milliGRAM(s) Oral two times a day  polyethylene glycol 3350 17 Gram(s) Oral daily  senna 1 Tablet(s) Oral daily          Hemoglobin: 13.1 g/dL (10-21 @ 21:36)  Hemoglobin: 16.2 g/dL (10-20 @ 17:49)  Hemoglobin: 14.8 g/dL (10-20 @ 10:40)      10-22    138  |  105  |  22  ----------------------------<  124<H>  3.9   |  20<L>  |  1.00    Ca    8.6      22 Oct 2021 22:19  Phos  2.2     10-22  Mg     1.9     10-22      Creatinine Trend: 1.00<--, 1.10<--, 1.16<--, 0.85<--, 1.17<--    COAGS:           T(C): 36.6 (10-24-21 @ 07:00), Max: 36.6 (10-24-21 @ 07:00)  HR: 79 (10-24-21 @ 07:00) (70 - 97)  BP: 137/91 (10-24-21 @ 07:00) (137/59 - 153/90)  RR: 16 (10-24-21 @ 07:00) (15 - 33)  SpO2: 100% (10-24-21 @ 07:00) (95% - 100%)  Wt(kg): --    I&O's Summary    23 Oct 2021 07:01  -  24 Oct 2021 07:00  --------------------------------------------------------  IN: 1140 mL / OUT: 400 mL / NET: 740 mL      Appearance: Normal appearing adult male in no acute distress, cooperative	  HEENT:   Normal oral mucosa, PERRL, EOMI	  Lymphatic: No lymphadenopathy , no edema  Cardiovascular: Normal S1 S2, No JVD, No murmurs , Peripheral pulses palpable 2+ bilaterally  Respiratory: Lungs clear to auscultation, normal effort 	  Gastrointestinal:  Soft, Non-tender, + BS	  Skin: No rashes, No ecchymoses, No cyanosis, warm to touch  Musculoskeletal: Normal range of motion, normal strength  Psychiatry:  Mood & affect appropriate                  ASSESSMENT/PLAN: 	76y Male with new ischemic/embolic stroke, aborted with tPA.  Persistent AFib, [id number] is at least 6.    He does not have an absolute contraindication to anticoagulation, and I recommend starting Apixaban 5mg BID prior to discharge.  Ulcerative Colitis should be managed aggressively to limit bleeding.    If he has severe breakthrough bleeding and he and his doctor decide that he should not stay on long-term anticoagulation, then he would be a candidate for a Watchman implant.  He would have to be able to tolerate anticoagulation for at least 6 weeks post-implant, so this testing period is important before a Watchman is even considered.    If he promptly starts bleeding on anticoagulation and is not able to tolerate 6 weeks of therapy, he should be referred to CT Surgery (Dr Cannon) for LA appendage epicardial clipping, which does NOT require oral anticoagulation post-procedure.    AF is a chronic issue and he does not necessarily need telemetry after he leaves NSICU.  There is no significant sustained bradycardia on the monitor.

## 2021-10-24 NOTE — PROGRESS NOTE ADULT - ASSESSMENT
77 y/o Rt handed M with pmh of HTN on ASA 81mg, Ulcerative Colitis, Afib (not on AC 2/2 h/o GIB, UC), hx of meningioma (s/p left frontal craniotomy in 2017and s/p radiation),  Hx of seizure disorder not on AED, DM-II presents to [facility] as a direct transfer to  from [place] for MT. Patient was a candidate for TPA and was given at 10:58am. Patient is s/p MT of Rt M1/2 occlusion noted on angiogram with TICI2C.  S/p MT with improvement in neuro exam especially hemiparesis and word finding difficulty.     Impression: Resolving Left sided hemiparesis with word finding difficulty 2/2 R MCA ischemic stroke in the setting of R M2 occlusion s/p MT with TICI 2C reperfusion 2/2 Cardio-embolism (A-Fib)     NEURO: Continue close monitoring for neurologic deterioration, permissive HTN, LDL 78 increase Atorvastatin to 80mg PO qHs, MRI Brain w/o, MRA Head w/o and Neck w/contrast performed, findings as above. Physical therapy/OT recommending acute rehab    ANTITHROMBOTIC THERAPY: Continue ASA 81mg PO daily, Speak with patients Cardiologist tomorrow Dr Rainey [phone] to decide for watchman device or Atrial clip given that patient cannot take AC w UC/GI bleed history    PULMONARY: CXR clear, protecting airway, saturating well     CARDIOVASCULAR: TTE grossly normal, cardiac monitoring                              SBP goal: 120-180    GASTROINTESTINAL:  dysphagia screen  passed     Diet: Regular    RENAL: BUN/Cr stable, good urine output      Na Goal: Greater than 135     Herrera:    HEMATOLOGY: H/H stable, Platelets normal      DVT ppx: Heparin s.c [] LMWH [x]     ID: afebrile, no leukocytosis     OTHER: B/L LE Doppler negative for DVT 10/21. Spoke w family daughter and wife at bedside. Addressed questions and concerns    DISPOSITION: Acute Rehab      CORE MEASURES:        Admission NIHSS: 11     TPA: YES      LDL/HDL: 78/57     Depression Screen:      Statin Therapy: Y     Dysphagia Screen: PASS        Smoking [] YES [] NO      Afib [] YES [] NO     Stroke Education [] YES [] NO    Obtain screening lower extremity venous ultrasound in patients who meet 1 or more of the following criteria as patient is high risk for DVT/PE on admission:   [] History of DVT/PE  []Hypercoagulable states (Factor V Leiden, Cancer, OCP, etc. )  []Prolonged immobility (hemiplegia/hemiparesis/post operative or any other extended immobilization)  [] Transferred from outside facility (Rehab or Long term care)  [] Age </= to 50       75 y/o Rt handed M with pmh of HTN on ASA 81mg, Ulcerative Colitis, Afib (not on AC 2/2 h/o GIB, UC), hx of meningioma (s/p left frontal craniotomy in 2017and s/p radiation),  Hx of seizure disorder not on AED, DM-II presents to [facility] as a direct transfer to  from [place] for MT. Patient was a candidate for TPA and was given at 10:58am. Patient is s/p MT of Rt M1/2 occlusion noted on angiogram with TICI2C.  S/p MT with improvement in neuro exam especially hemiparesis and word finding difficulty.     Impression: Resolving Left sided hemiparesis with word finding difficulty 2/2 R MCA ischemic stroke in the setting of R M2 occlusion s/p MT with TICI 2C reperfusion 2/2 Cardio-embolism (A-Fib)     NEURO: Continue close monitoring for neurologic deterioration, permissive HTN, LDL 78 increase Atorvastatin to 80mg PO qHs, MRI Brain w/o, MRA Head w/o and Neck w/contrast performed, findings as above. Physical therapy/OT recommending acute rehab    ANTITHROMBOTIC THERAPY: Continue ASA 81mg PO daily, Speak with patients Cardiologist tomorrow Dr Rainey [phone] to decide for watchman device or Atrial clip given that patient cannot take AC w UC/GI bleed history    PULMONARY: CXR clear, protecting airway, saturating well     CARDIOVASCULAR: TTE grossly normal, cardiac monitoring                              SBP goal: 120-180    GASTROINTESTINAL:  dysphagia screen  passed     Diet: Regular    RENAL: BUN/Cr stable, good urine output      Na Goal: Greater than 135     Herrera:    HEMATOLOGY: H/H stable, Platelets normal      DVT ppx: Heparin s.c [] LMWH [x]     ID: afebrile, no leukocytosis     OTHER: B/L LE Doppler negative for DVT 10/21. Spoke w family daughter and wife at bedside. Addressed questions and concerns    DISPOSITION: Acute Rehab      CORE MEASURES:        Admission NIHSS: 11     TPA: YES      LDL/HDL: 78/57     Depression Screen:      Statin Therapy: Y     Dysphagia Screen: PASS        Smoking NO      Afib YES         Stroke Education [] YES [] NO    Obtain screening lower extremity venous ultrasound in patients who meet 1 or more of the following criteria as patient is high risk for DVT/PE on admission:   [] History of DVT/PE  []Hypercoagulable states (Factor V Leiden, Cancer, OCP, etc. )  []Prolonged immobility (hemiplegia/hemiparesis/post operative or any other extended immobilization)  [] Transferred from outside facility (Rehab or Long term care)  [] Age </= to 50

## 2021-10-24 NOTE — PROVIDER CONTACT NOTE (OTHER) - SITUATION
change in mental status, patient saying he is at home. Patient aware of name, , month, year, and situation. Prior patient was aware he is in hospital, patient trying to get oob.

## 2021-10-24 NOTE — PROVIDER CONTACT NOTE (OTHER) - BACKGROUND
76YOM p/w dysarthria, facial droop and left side weakness, brought to [facility] Dx: R M1 occlusion, tpa given, transferred for thrombectomy.

## 2021-10-24 NOTE — PROGRESS NOTE ADULT - SUBJECTIVE AND OBJECTIVE BOX
THE PATIENT WAS SEEN AND EXAMINED BY ME WITH THE HOUSESTAFF AND STROKE TEAM DURING MORNING ROUNDS.   HPI:  Patient is a 77yo Rt handed M with pmh of HTN on ASA 81mg, Ulcerative Colitis, Afib (not on AC 2/2 h/o GIB, UC), hx of meningioma (s/p left frontal craniotomy in 2017and s/p radiation),  Hx of seizure disorder, DM-II presents to [facility] as a direct transfer to IR from [place] for MT. Per chart review, patient woke up around 0900am with no deficits. Around 930 am patient was having a conversation with his wife, around 945am patient started to slur his speech, having difficulty forming words and leaning to his left side. Patient’s wife called EMS. On arrival to [facility], patient had NIHSS 14 and CT/CTA was noted to show Rt M1 occlusion with Core 44 and Penumbra 113. Patient was given TPA at 1058 am and transferred to [facility] for MT. Patient’s wife states the event was not a seizure.  NIHSS 14 at [place] report  Preop NIHSS 11 per [facility] report   MRS 0     (20 Oct 2021 13:15)    S: Patient seen and examined at bedside. No acute complaints.      Vital Signs Last 24 Hrs  T(C): 36.7 (24 Oct 2021 15:00), Max: 36.7 (24 Oct 2021 15:00)  T(F): 98 (24 Oct 2021 15:00), Max: 98 (24 Oct 2021 15:00)  HR: 81 (24 Oct 2021 17:51) (70 - 97)  BP: 144/82 (24 Oct 2021 17:51) (136/89 - 155/96)  BP(mean): 101 (24 Oct 2021 17:51) (99 - 116)  RR: 17 (24 Oct 2021 17:51) (15 - 23)  SpO2: 95% (24 Oct 2021 17:51) (92% - 100%)      General: NAD  Respiratory: breathing comfortably   LE: no jarad LE swelling.   GI: soft to palpation.     Neuro exam  AOx3(person, place, situation, time)   CN: PERRL, EOMI, very suble dysarthria, no overt facial asymmetry  Sensation: intact individually   Extinction impaired   Motor: MAEx4, LLE mild drift 4+ compared to RLE 5/5. Otherwise no difficulties w/ UE 5/5 and no drift.     LABORATORY:    10-22    138  |  105  |  22  ----------------------------<  124<H>  3.9   |  20<L>  |  1.00    Ca    8.6      22 Oct 2021 22:19  Phos  2.2     10-22  Mg     1.9     10-22      IMAGING: Reviewed by me.     MR BRAIN   10/21/2021    IMPRESSION: Right temporal parietal acute infarct middle cerebral distribution. No significant hemorrhage. Left parietal encephalomalacia and gliosis consistent with old postoperative changes. Noninvasive flow MR angiography demonstrating flow in the right middle cerebral artery.    Transthoracic Echocardiogram (10.20.21)  Conclusions:  1. Mitral annular calcification and calcified mitral  leaflets with normal diastolic opening.  2. Calcified trileaflet aortic valve with normal opening.  3. Normal left ventricular systolic function.  4. Normal right ventricular size and function.  5. Estimated pulmonary artery systolic pressure equals 31  mm Hg, assuming right atrial pressure equals 6 - 10 mm Hg,  consistent with normal pulmonary pressures.  6. Agitated saline injection demonstrates no evidence of a  patent foramen ovale.

## 2021-10-24 NOTE — PROGRESS NOTE ADULT - SUBJECTIVE AND OBJECTIVE BOX
C A R D I O L O G Y  **********************************     DATE OF SERVICE: 10-24-21    Patient denies chest pain or shortness of breath.   Review of symptoms otherwise negative.    acetaminophen   Tablet .. 650 milliGRAM(s) Oral every 6 hours PRN  AQUAPHOR (petrolatum Ointment) 1 Application(s) Topical two times a day  aspirin  chewable 81 milliGRAM(s) Oral daily  atorvastatin 40 milliGRAM(s) Oral at bedtime  chlorhexidine 4% Liquid 1 Application(s) Topical <User Schedule>  enoxaparin Injectable 40 milliGRAM(s) SubCutaneous <User Schedule>  mesalamine DR (24-Hour) Tablet 4.8 Gram(s) Oral daily  metoprolol tartrate 25 milliGRAM(s) Oral two times a day  polyethylene glycol 3350 17 Gram(s) Oral daily  senna 1 Tablet(s) Oral daily          Hemoglobin: 13.1 g/dL (10-21 @ 21:36)  Hemoglobin: 16.2 g/dL (10-20 @ 17:49)  Hemoglobin: 14.8 g/dL (10-20 @ 10:40)      10-22    138  |  105  |  22  ----------------------------<  124<H>  3.9   |  20<L>  |  1.00    Ca    8.6      22 Oct 2021 22:19  Phos  2.2     10-22  Mg     1.9     10-22      Creatinine Trend: 1.00<--, 1.10<--, 1.16<--, 0.85<--, 1.17<--    COAGS:           T(C): 36.6 (10-24-21 @ 11:00), Max: 36.6 (10-24-21 @ 07:00)  HR: 80 (10-24-21 @ 15:00) (70 - 97)  BP: 136/89 (10-24-21 @ 15:00) (136/89 - 155/96)  RR: 18 (10-24-21 @ 15:00) (15 - 23)  SpO2: 97% (10-24-21 @ 15:00) (92% - 100%)  Wt(kg): --    I&O's Summary    23 Oct 2021 07:01  -  24 Oct 2021 07:00  --------------------------------------------------------  IN: 1140 mL / OUT: 400 mL / NET: 740 mL        Gen: Appears well in NAD  HEENT:  (-)icterus (-)pallor  CV: N S1 S2 1/6 GREG (+)2 Pulses B/l  Resp:  Clear to auscultation B/L, normal effort  GI: (+) BS Soft, NT, ND  Lymph:  (-)Edema, (-)obvious lymphadenopathy  Skin: Warm to touch, Normal turgor  Psych: Appropriate mood and affect      TELEMETRY: mainly AF 60-70, briefly to 38 and to 110s	    < from: Transthoracic Echocardiogram (10.20.21 @ 16:23) >  Conclusions:  1. Mitral annular calcification and calcified mitral  leaflets with normal diastolic opening.  2. Calcified trileaflet aortic valve with normal opening.  3. Normal left ventricular systolic function.  4. Normal right ventricular size and function.  5. Estimated pulmonary artery systolic pressure equals 31  mm Hg, assuming right atrial pressure equals 6 - 10 mm Hg,  consistent with normal pulmonary pressures.  6. Agitated saline injection demonstrates no evidence of a  patent foramen ovale.  *** No previous Echo exam.    < end of copied text >      ASSESSMENT/PLAN: Patient is a 77 y/o male with PMH of HTN, Ulcerative Colitis, chronic Afib (not on AC 2/2 h/o GIB, UC), hx of meningioma (s/p left frontal craniotomy in 2017 and s/p radiation), Hx of seizure disorder, and DM2 who presents to [facility] as a direct transfer to IR from [place] for acute CVA s/p TPA and thrombectomy. Cardiology consulted for management of afib and anticoagulation.    - Would ideally have patient on full AC especially given stroke with elevated chadsvasc  - Follow up GI eval to see if safe to start AC with Eliquis prior to discharge when okay with neurology if UC can be controlled to prevent flare ups/bleeding  - EP consult appreciated regarding watchman  - Continue rate control with metoprolol  - TTE noted with normal LV function, no PFO or sig valvular abnormalities  - Stroke management per neuro  - No further inpatient cardiac w/u expected  - Supportive care per [facility]    Ervin Jara MD, [facility]  BEEPER [phone]

## 2021-10-25 ENCOUNTER — INPATIENT (INPATIENT)
Facility: HOSPITAL | Age: 76
LOS: 14 days | Discharge: HOME HEALTH | DRG: 949 | End: 2021-11-09
Attending: PSYCHIATRY & NEUROLOGY | Admitting: PSYCHIATRY & NEUROLOGY
Payer: COMMERCIAL

## 2021-10-25 ENCOUNTER — TRANSCRIPTION ENCOUNTER (OUTPATIENT)
Age: 76
End: 2021-10-25

## 2021-10-25 VITALS
DIASTOLIC BLOOD PRESSURE: 74 MMHG | RESPIRATION RATE: 19 BRPM | SYSTOLIC BLOOD PRESSURE: 132 MMHG | TEMPERATURE: 98 F | OXYGEN SATURATION: 97 % | HEART RATE: 92 BPM

## 2021-10-25 VITALS
WEIGHT: 172.84 LBS | SYSTOLIC BLOOD PRESSURE: 167 MMHG | DIASTOLIC BLOOD PRESSURE: 92 MMHG | HEART RATE: 74 BPM | RESPIRATION RATE: 15 BRPM | OXYGEN SATURATION: 100 % | HEIGHT: 71 IN | TEMPERATURE: 98 F

## 2021-10-25 DIAGNOSIS — L85.3 XEROSIS CUTIS: ICD-10-CM

## 2021-10-25 DIAGNOSIS — I69.322 DYSARTHRIA FOLLOWING CEREBRAL INFARCTION: ICD-10-CM

## 2021-10-25 DIAGNOSIS — I69.311 MEMORY DEFICIT FOLLOWING CEREBRAL INFARCTION: ICD-10-CM

## 2021-10-25 DIAGNOSIS — I25.10 ATHEROSCLEROTIC HEART DISEASE OF NATIVE CORONARY ARTERY WITHOUT ANGINA PECTORIS: ICD-10-CM

## 2021-10-25 DIAGNOSIS — Z86.011 PERSONAL HISTORY OF BENIGN NEOPLASM OF THE BRAIN: ICD-10-CM

## 2021-10-25 DIAGNOSIS — I69.312 VISUOSPATIAL DEFICIT AND SPATIAL NEGLECT FOLLOWING CEREBRAL INFARCTION: ICD-10-CM

## 2021-10-25 DIAGNOSIS — I48.20 CHRONIC ATRIAL FIBRILLATION, UNSPECIFIED: ICD-10-CM

## 2021-10-25 DIAGNOSIS — R20.8 OTHER DISTURBANCES OF SKIN SENSATION: ICD-10-CM

## 2021-10-25 DIAGNOSIS — R41.0 DISORIENTATION, UNSPECIFIED: ICD-10-CM

## 2021-10-25 DIAGNOSIS — I69.310 ATTENTION AND CONCENTRATION DEFICIT FOLLOWING CEREBRAL INFARCTION: ICD-10-CM

## 2021-10-25 DIAGNOSIS — Z51.89 ENCOUNTER FOR OTHER SPECIFIED AFTERCARE: ICD-10-CM

## 2021-10-25 DIAGNOSIS — I69.354 HEMIPLEGIA AND HEMIPARESIS FOLLOWING CEREBRAL INFARCTION AFFECTING LEFT NON-DOMINANT SIDE: ICD-10-CM

## 2021-10-25 DIAGNOSIS — Z98.890 OTHER SPECIFIED POSTPROCEDURAL STATES: Chronic | ICD-10-CM

## 2021-10-25 DIAGNOSIS — I10 ESSENTIAL (PRIMARY) HYPERTENSION: ICD-10-CM

## 2021-10-25 DIAGNOSIS — K51.90 ULCERATIVE COLITIS, UNSPECIFIED, WITHOUT COMPLICATIONS: ICD-10-CM

## 2021-10-25 DIAGNOSIS — I63.9 CEREBRAL INFARCTION, UNSPECIFIED: ICD-10-CM

## 2021-10-25 DIAGNOSIS — R26.9 UNSPECIFIED ABNORMALITIES OF GAIT AND MOBILITY: ICD-10-CM

## 2021-10-25 DIAGNOSIS — L30.8 OTHER SPECIFIED DERMATITIS: ICD-10-CM

## 2021-10-25 DIAGNOSIS — E11.9 TYPE 2 DIABETES MELLITUS WITHOUT COMPLICATIONS: ICD-10-CM

## 2021-10-25 DIAGNOSIS — Z98.49 CATARACT EXTRACTION STATUS, UNSPECIFIED EYE: Chronic | ICD-10-CM

## 2021-10-25 DIAGNOSIS — Z87.891 PERSONAL HISTORY OF NICOTINE DEPENDENCE: ICD-10-CM

## 2021-10-25 DIAGNOSIS — I69.314 FRONTAL LOBE AND EXECUTIVE FUNCTION DEFICIT FOLLOWING CEREBRAL INFARCTION: ICD-10-CM

## 2021-10-25 DIAGNOSIS — I63.311 CEREBRAL INFARCTION DUE TO THROMBOSIS OF RIGHT MIDDLE CEREBRAL ARTERY: ICD-10-CM

## 2021-10-25 DIAGNOSIS — Z79.01 LONG TERM (CURRENT) USE OF ANTICOAGULANTS: ICD-10-CM

## 2021-10-25 DIAGNOSIS — Z79.84 LONG TERM (CURRENT) USE OF ORAL HYPOGLYCEMIC DRUGS: ICD-10-CM

## 2021-10-25 DIAGNOSIS — K21.9 GASTRO-ESOPHAGEAL REFLUX DISEASE WITHOUT ESOPHAGITIS: ICD-10-CM

## 2021-10-25 DIAGNOSIS — I87.8 OTHER SPECIFIED DISORDERS OF VEINS: ICD-10-CM

## 2021-10-25 LAB
GLUCOSE BLDC GLUCOMTR-MCNC: 151 MG/DL — HIGH (ref 70–99)
SARS-COV-2 RNA SPEC QL NAA+PROBE: SIGNIFICANT CHANGE UP
SARS-COV-2 RNA SPEC QL NAA+PROBE: SIGNIFICANT CHANGE UP

## 2021-10-25 PROCEDURE — 97167 OT EVAL HIGH COMPLEX 60 MIN: CPT

## 2021-10-25 PROCEDURE — 97162 PT EVAL MOD COMPLEX 30 MIN: CPT

## 2021-10-25 PROCEDURE — 83930 ASSAY OF BLOOD OSMOLALITY: CPT

## 2021-10-25 PROCEDURE — 36415 COLL VENOUS BLD VENIPUNCTURE: CPT

## 2021-10-25 PROCEDURE — U0003: CPT

## 2021-10-25 PROCEDURE — 97116 GAIT TRAINING THERAPY: CPT

## 2021-10-25 PROCEDURE — 97130 THER IVNTJ EA ADDL 15 MIN: CPT

## 2021-10-25 PROCEDURE — 84300 ASSAY OF URINE SODIUM: CPT

## 2021-10-25 PROCEDURE — 86901 BLOOD TYPING SEROLOGIC RH(D): CPT

## 2021-10-25 PROCEDURE — 85027 COMPLETE CBC AUTOMATED: CPT

## 2021-10-25 PROCEDURE — C1769: CPT

## 2021-10-25 PROCEDURE — 84436 ASSAY OF TOTAL THYROXINE: CPT

## 2021-10-25 PROCEDURE — U0005: CPT

## 2021-10-25 PROCEDURE — 84480 ASSAY TRIIODOTHYRONINE (T3): CPT

## 2021-10-25 PROCEDURE — 93306 TTE W/DOPPLER COMPLETE: CPT

## 2021-10-25 PROCEDURE — C9399: CPT

## 2021-10-25 PROCEDURE — 80061 LIPID PANEL: CPT

## 2021-10-25 PROCEDURE — C1894: CPT

## 2021-10-25 PROCEDURE — 61645 PERQ ART M-THROMBECT &/NFS: CPT

## 2021-10-25 PROCEDURE — 99223 1ST HOSP IP/OBS HIGH 75: CPT

## 2021-10-25 PROCEDURE — 84443 ASSAY THYROID STIM HORMONE: CPT

## 2021-10-25 PROCEDURE — 97129 THER IVNTJ 1ST 15 MIN: CPT

## 2021-10-25 PROCEDURE — 82962 GLUCOSE BLOOD TEST: CPT

## 2021-10-25 PROCEDURE — 70551 MRI BRAIN STEM W/O DYE: CPT

## 2021-10-25 PROCEDURE — 83735 ASSAY OF MAGNESIUM: CPT

## 2021-10-25 PROCEDURE — 93005 ELECTROCARDIOGRAM TRACING: CPT

## 2021-10-25 PROCEDURE — 92610 EVALUATE SWALLOWING FUNCTION: CPT

## 2021-10-25 PROCEDURE — C1760: CPT

## 2021-10-25 PROCEDURE — 83935 ASSAY OF URINE OSMOLALITY: CPT

## 2021-10-25 PROCEDURE — 93970 EXTREMITY STUDY: CPT

## 2021-10-25 PROCEDURE — 84100 ASSAY OF PHOSPHORUS: CPT

## 2021-10-25 PROCEDURE — 80048 BASIC METABOLIC PNL TOTAL CA: CPT

## 2021-10-25 PROCEDURE — 70450 CT HEAD/BRAIN W/O DYE: CPT

## 2021-10-25 PROCEDURE — 97110 THERAPEUTIC EXERCISES: CPT

## 2021-10-25 PROCEDURE — 70544 MR ANGIOGRAPHY HEAD W/O DYE: CPT

## 2021-10-25 PROCEDURE — 86850 RBC ANTIBODY SCREEN: CPT

## 2021-10-25 PROCEDURE — C1887: CPT

## 2021-10-25 PROCEDURE — 86900 BLOOD TYPING SEROLOGIC ABO: CPT

## 2021-10-25 PROCEDURE — 99233 SBSQ HOSP IP/OBS HIGH 50: CPT

## 2021-10-25 PROCEDURE — 83036 HEMOGLOBIN GLYCOSYLATED A1C: CPT

## 2021-10-25 RX ORDER — SENNA PLUS 8.6 MG/1
2 TABLET ORAL AT BEDTIME
Refills: 0 | Status: DISCONTINUED | OUTPATIENT
Start: 2021-10-25 | End: 2021-11-09

## 2021-10-25 RX ORDER — ATORVASTATIN CALCIUM 80 MG/1
80 TABLET, FILM COATED ORAL AT BEDTIME
Refills: 0 | Status: DISCONTINUED | OUTPATIENT
Start: 2021-10-25 | End: 2021-11-09

## 2021-10-25 RX ORDER — POLYETHYLENE GLYCOL 3350 17 G/17G
17 POWDER, FOR SOLUTION ORAL DAILY
Refills: 0 | Status: DISCONTINUED | OUTPATIENT
Start: 2021-10-25 | End: 2021-11-09

## 2021-10-25 RX ORDER — MESALAMINE 400 MG
4800 TABLET, DELAYED RELEASE (ENTERIC COATED) ORAL DAILY
Refills: 0 | Status: DISCONTINUED | OUTPATIENT
Start: 2021-10-26 | End: 2021-10-26

## 2021-10-25 RX ORDER — PETROLATUM,WHITE
1 JELLY (GRAM) TOPICAL
Refills: 0 | Status: DISCONTINUED | OUTPATIENT
Start: 2021-10-25 | End: 2021-11-09

## 2021-10-25 RX ORDER — APIXABAN 2.5 MG/1
5 TABLET, FILM COATED ORAL EVERY 12 HOURS
Refills: 0 | Status: DISCONTINUED | OUTPATIENT
Start: 2021-10-25 | End: 2021-10-25

## 2021-10-25 RX ORDER — POLYETHYLENE GLYCOL 3350 17 G/17G
17 POWDER, FOR SOLUTION ORAL
Qty: 0 | Refills: 0 | DISCHARGE
Start: 2021-10-25

## 2021-10-25 RX ORDER — APIXABAN 2.5 MG/1
5 TABLET, FILM COATED ORAL EVERY 12 HOURS
Refills: 0 | Status: DISCONTINUED | OUTPATIENT
Start: 2021-10-25 | End: 2021-11-09

## 2021-10-25 RX ORDER — FENOFIBRIC ACID 105 MG/1
1 TABLET ORAL
Qty: 0 | Refills: 0 | DISCHARGE

## 2021-10-25 RX ORDER — APIXABAN 2.5 MG/1
1 TABLET, FILM COATED ORAL
Qty: 0 | Refills: 0 | DISCHARGE
Start: 2021-10-25

## 2021-10-25 RX ORDER — METOPROLOL TARTRATE 50 MG
1 TABLET ORAL
Qty: 0 | Refills: 0 | DISCHARGE
Start: 2021-10-25

## 2021-10-25 RX ORDER — FENOFIBRATE,MICRONIZED 130 MG
145 CAPSULE ORAL DAILY
Refills: 0 | Status: DISCONTINUED | OUTPATIENT
Start: 2021-10-25 | End: 2021-11-09

## 2021-10-25 RX ORDER — SENNA PLUS 8.6 MG/1
1 TABLET ORAL
Qty: 0 | Refills: 0 | DISCHARGE
Start: 2021-10-25

## 2021-10-25 RX ORDER — ATORVASTATIN CALCIUM 80 MG/1
1 TABLET, FILM COATED ORAL
Qty: 0 | Refills: 0 | DISCHARGE
Start: 2021-10-25

## 2021-10-25 RX ORDER — ASPIRIN/CALCIUM CARB/MAGNESIUM 324 MG
1 TABLET ORAL
Qty: 0 | Refills: 0 | DISCHARGE

## 2021-10-25 RX ORDER — PETROLATUM,WHITE
1 JELLY (GRAM) TOPICAL
Qty: 0 | Refills: 0 | DISCHARGE
Start: 2021-10-25

## 2021-10-25 RX ORDER — METOPROLOL TARTRATE 50 MG
1 TABLET ORAL
Qty: 0 | Refills: 0 | DISCHARGE

## 2021-10-25 RX ORDER — METOPROLOL TARTRATE 50 MG
25 TABLET ORAL
Refills: 0 | Status: DISCONTINUED | OUTPATIENT
Start: 2021-10-25 | End: 2021-11-09

## 2021-10-25 RX ORDER — ACETAMINOPHEN 500 MG
650 TABLET ORAL EVERY 6 HOURS
Refills: 0 | Status: DISCONTINUED | OUTPATIENT
Start: 2021-10-25 | End: 2021-11-09

## 2021-10-25 RX ADMIN — Medication 1 APPLICATION(S): at 05:24

## 2021-10-25 RX ADMIN — Medication 1 APPLICATION(S): at 20:22

## 2021-10-25 RX ADMIN — APIXABAN 5 MILLIGRAM(S): 2.5 TABLET, FILM COATED ORAL at 20:22

## 2021-10-25 RX ADMIN — Medication 81 MILLIGRAM(S): at 12:47

## 2021-10-25 RX ADMIN — SENNA PLUS 2 TABLET(S): 8.6 TABLET ORAL at 21:54

## 2021-10-25 RX ADMIN — ATORVASTATIN CALCIUM 80 MILLIGRAM(S): 80 TABLET, FILM COATED ORAL at 21:54

## 2021-10-25 RX ADMIN — Medication 4.8 GRAM(S): at 13:17

## 2021-10-25 RX ADMIN — Medication 25 MILLIGRAM(S): at 20:22

## 2021-10-25 RX ADMIN — Medication 25 MILLIGRAM(S): at 05:23

## 2021-10-25 NOTE — H&P ADULT - NSHPPHYSICALEXAM_GEN_ALL_CORE
General: NAD, Resting Comfortable,                                  HEENT: NC/AT, EOM I, PERRLA, Normal Conjunctivae  Cardio: RRR, Normal S1-S2, No M/G/R                              Pulm: No Respiratory Distress,  Lungs CTAB                        Abdomen: ND/NT, Soft, BS+                                                MSK: No joint swelling, Full ROM                                         Ext: No C/C/E, Pulses 2+ throughout, No calf tenderness    Skin:  all skin intact                                                                 Wounds: none  Decubitus Ulcers: None Present     Neurological Examination    Cognitive: AOx3, alert, follows commands   Attention: Intact   Judgment: Good evidence of judgement                               Memory: Recall 3 objects immediate and 3 min later      Mood/Affect: wnl                                                                           Communication:  Fluent,  mild dysarthria and word finding difficulties   Swallow: intact  Coordination: FTN/HTS intact                                                  Sensory: Intact to light touch                                                                                            Tone: normal Throughout   Balance: impaired    Motor    LEFT    UE: 4/5  RIGHT UE: 4+/5  LEFT    LE:  4/5  RIGHT LE:  4+/5    Reflex:  2 + thoroughout, Verma/Babinski negative General: NAD, Resting Comfortable,                                  HEENT: NC/AT, EOM I, PERRLA, Normal Conjunctivae  Cardio: RRR, Normal S1-S2, No M/G/R                              Pulm: No Respiratory Distress,  Lungs CTAB                        Abdomen: ND/NT, Soft, BS+                                                MSK: No joint swelling, Full ROM                                         Ext: +RLE with nonblanchable erythema, no swelling or weeping, no discharge. with abd pad and kerlix dressing C/D/I.   Skin:  all skin intact                                                                 Wounds: none  Decubitus Ulcers: None Present     Neurological Examination    Cognitive: AOx3, alert, follows commands   Attention: Intact   Judgment: Good evidence of judgement                               Memory: Recall 3 objects immediate and 3 min later      Mood/Affect: wnl                                                                           Communication:  Fluent, mild dysarthria and word finding difficulties   Swallow: intact  Coordination: FTN/HTS intact                                                  Sensory: diminished over Right distal shin/proximal ankle at site of previous pressure ulcer. otherwise, Intact to light touch                                                                                            Tone: normal Throughout   Balance: impaired    Motor    LEFT    UE: 4+/5  RIGHT UE: 4/5  LEFT    LE:  4+/5  RIGHT LE:  4/5    Reflex:  2 + throughout, Verma/Babinski negative

## 2021-10-25 NOTE — PROGRESS NOTE ADULT - SUBJECTIVE AND OBJECTIVE BOX
Date of service 10/25   pt seen and examined, no complaints, ROS - .   wants to go to rehab.  we discussed at length his medical history including meningioma resection, and his significant anxiety about provoking GI bleeding in the setting of UC.  will be seen by GI today.  Tele NSR 70 's        acetaminophen   Tablet .. 650 milliGRAM(s) Oral every 6 hours PRN  AQUAPHOR (petrolatum Ointment) 1 Application(s) Topical two times a day  aspirin  chewable 81 milliGRAM(s) Oral daily  atorvastatin 80 milliGRAM(s) Oral at bedtime  chlorhexidine 4% Liquid 1 Application(s) Topical <User Schedule>  enoxaparin Injectable 40 milliGRAM(s) SubCutaneous <User Schedule>  mesalamine DR (24-Hour) Tablet 4.8 Gram(s) Oral daily  metoprolol tartrate 25 milliGRAM(s) Oral two times a day  polyethylene glycol 3350 17 Gram(s) Oral daily  senna 1 Tablet(s) Oral daily                            14.2   8.60  )-----------( 218      ( 24 Oct 2021 20:37 )             43.4       10-24    137  |  103  |  21  ----------------------------<  126<H>  4.1   |  23  |  1.24    Ca    9.4      24 Oct 2021 20:37  Phos  2.6     10-24  Mg     1.9     10-24      T(C): 36.8 (10-25-21 @ 11:00), Max: 36.8 (10-24-21 @ 19:00)  HR: 80 (10-25-21 @ 11:00) (66 - 93)  BP: 132/92 (10-25-21 @ 11:00) (99/72 - 160/105)  RR: 14 (10-25-21 @ 07:00) (13 - 27)  SpO2: 93% (10-25-21 @ 11:00) (93% - 100%)  Wt(kg): --    I&O's Summary    24 Oct 2021 07:01  -  25 Oct 2021 07:00  --------------------------------------------------------  IN: 820 mL / OUT: 550 mL / NET: 270 mL    25 Oct 2021 07:01  -  25 Oct 2021 12:08  --------------------------------------------------------  IN: 240 mL / OUT: 0 mL / NET: 240 mL    Appearance: Normal appearing adult male in no acute distress, cooperative	  HEENT:   Normal oral mucosa, PERRL, EOMI	  Lymphatic: No lymphadenopathy , no edema  Cardiovascular: Normal S1 S2, No JVD, No murmurs , Peripheral pulses palpable 2+ bilaterally  Respiratory: Lungs clear to auscultation, normal effort 	  Gastrointestinal:  Soft, Non-tender, + BS	  Skin: No rashes, No ecchymoses, No cyanosis, warm to touch  Musculoskeletal: Normal range of motion, normal strength  Psychiatry:  Mood & affect appropriate                  ASSESSMENT/PLAN: 	76y Male with new ischemic/embolic stroke, aborted with tPA.  Persistent AFib, [id number] is at least 6.    He does not have an absolute contraindication to anticoagulation, and I recommend starting Apixaban 5mg BID prior to discharge.  Despite his anxiety, I've tried to explain in many different ways, that Aspirin to prevent stroke reflects a strategy that was last appropriate many years ago when he was younger and healthier, and is no longer adequate for his current state.  We can do many things to minimize his bleeding risk.  While no one has a zero risk of bleeding, the vast majority of bleeding episodes are non-fatal, non-disabling, and don't even require hospitalization.  The risk of a repeat stroke could result in disability or death.    Ulcerative Colitis should be managed aggressively to limit bleeding.    If he has severe breakthrough bleeding and he and his doctor decide that he should not stay on long-term anticoagulation, then he would be a candidate for a Watchman implant.  He would have to be able to tolerate anticoagulation for at least 6 weeks post-implant, so this testing period is important before a Watchman is even considered.    If he promptly starts bleeding on anticoagulation and is not able to tolerate 6 weeks of therapy, he should be referred to CT Surgery (Dr Cannon) for LA appendage epicardial clipping, which does NOT require oral anticoagulation post-procedure.      AF is a chronic issue and he does not necessarily need telemetry after he leaves NSICU.  There is no significant sustained bradycardia on the monitor.    At this time, he has not committed to taking anticoagulation on discharge, and we will continue to discuss on a daily basis.  My recommendation remains to begin therapeutic apixaban ASAP.    Josiah Monroe M.D.  Cardiac Electrophysiology  [phone]

## 2021-10-25 NOTE — PROGRESS NOTE ADULT - PROVIDER SPECIALTY LIST ADULT
Cardiology
Electrophysiology
Neurology
Cardiology
NSICU
Neurology
NSICU
NSICU

## 2021-10-25 NOTE — CONSULT NOTE ADULT - SUBJECTIVE AND OBJECTIVE BOX
Benefits of a/c outweight risks at this time Chief Complaint:  Patient is a 76y old  Male who presents with a chief complaint of dyarthria    Date of service: 10-25-21 @ 20:28    HPI:    The patient is a 76 year old man with afib, UC, who presented with dysarthria. He is s/p thrombectomy and TPA with improved.    He states that he had a colonocopy with Dr. Sierra in May which showed suboptimal disease control, but he remains on mesalamine only.     Denies abd pain, diarrhea, GI Bleeding.    Allergies:  No Known Allergies      Home Medications:    Hospital Medications:  acetaminophen   Tablet .. 650 milliGRAM(s) Oral every 6 hours PRN  apixaban 5 milliGRAM(s) Oral every 12 hours  AQUAPHOR (petrolatum Ointment) 1 Application(s) Topical two times a day  atorvastatin 80 milliGRAM(s) Oral at bedtime  chlorhexidine 4% Liquid 1 Application(s) Topical <User Schedule>  mesalamine DR (24-Hour) Tablet 4.8 Gram(s) Oral daily  metoprolol tartrate 25 milliGRAM(s) Oral two times a day  polyethylene glycol 3350 17 Gram(s) Oral daily  senna 1 Tablet(s) Oral daily      PMHX/PSHX:  Essential hypertension    Other hyperlipidemia    Meningioma    Seizure    DM (diabetes mellitus)    Pulmonary hypertension    Atrial fibrillation    GI bleed    CAD (coronary artery disease)    H/O right inguinal hernia repair    H/O cataract removal with insertion of prosthetic lens        Family history:  No pertinent family history in first degree relatives    FH: hypertension        Social History:   Denies ethanol use.  Denies illicit drug use.    ROS:     General:  No wt loss, fevers, chills, night sweats, fatigue,   Eyes:  Good vision, no reported pain  ENT:  No sore throat, pain, runny nose, dysphagia  CV:  No pain, palpitations, hypo/hypertension  Resp:  No dyspnea, cough, tachypnea, wheezing  GI:  See HPI  :  No pain, bleeding, incontinence, nocturia  Muscle:  No pain, weakness  Neuro:  No weakness, tingling, memory problems  Psych:  No fatigue, insomnia, mood problems, depression  Endocrine:  No polyuria, polydipsia, cold/heat intolerance  Heme:  No petechiae, ecchymosis, easy bruisability  Integumentary:  No rash, edema      PHYSICAL EXAM:     GENERAL:  Appears stated age, well-groomed, well-nourished, no distress  HEENT:  NC/AT,  conjunctivae anicteric, clear and pink,   NECK: supple, trachea midline  CHEST:  Full & symmetric excursion, no increased effort, breath sounds clear  HEART:  Regular rhythm, no JVD  ABDOMEN:  Soft, non-tender, non-distended, normoactive bowel sounds,  no masses , no hepatosplenomegaly  EXTREMITIES:  no cyanosis,clubbing or edema  SKIN:  No rash, erythema, or, ecchymoses, no jaundice  NEURO:  Alert, non-focal, no asterixis  PSYCH: Appropriate affect, oriented to place and time  RECTAL: Deferred      Vital Signs:  Vital Signs Last 24 Hrs  T(C): 36.4 (25 Oct 2021 18:42), Max: 36.9 (25 Oct 2021 15:00)  T(F): 97.6 (25 Oct 2021 18:42), Max: 98.4 (25 Oct 2021 15:00)  HR: 74 (25 Oct 2021 18:42) (66 - 93)  BP: 167/92 (25 Oct 2021 18:42) (99/72 - 167/92)  BP(mean): 88 (25 Oct 2021 15:00) (78 - 122)  RR: 15 (25 Oct 2021 18:42) (13 - 27)  SpO2: 100% (25 Oct 2021 18:42) (93% - 100%)  Daily     Daily     LABS: Labs personally reviewed by me:                        14.2   8.60  )-----------( 218      ( 24 Oct 2021 20:37 )             43.4     10-24    137  |  103  |  21  ----------------------------<  126<H>  4.1   |  23  |  1.24    Ca    9.4      24 Oct 2021 20:37  Phos  2.6     10-24  Mg     1.9     10-24                Imaging personally reviewed by me:

## 2021-10-25 NOTE — DISCHARGE NOTE PROVIDER - PROVIDER TOKENS
PROVIDER:[TOKEN:[[id number]],FOLLOWUP:[2 weeks]] PROVIDER:[TOKEN:[[id number]],FOLLOWUP:[2 weeks]],PROVIDER:[TOKEN:[[id number]],FOLLOWUP:[1 month]],PROVIDER:[TOKEN:[[id number]],FOLLOWUP:[1 month]]

## 2021-10-25 NOTE — DISCHARGE NOTE PROVIDER - NSDCMRMEDTOKEN_GEN_ALL_CORE_FT
amLODIPine 10 mg oral tablet: 1 tab(s) orally once a day  Aspir 81 oral delayed release tablet: 1 tab(s) orally once a day  fenofibric acid 135 mg oral delayed release capsule: 1 cap(s) orally once a day  hydroCHLOROthiazide 12.5 mg oral tablet: 1 tab(s) orally once a day  mesalamine 1.2 g oral delayed release tablet: 4 tab(s) orally once a day  metoprolol succinate 200 mg oral tablet, extended release: 1 tab(s) orally once a day   apixaban 5 mg oral tablet: 1 tab(s) orally every 12 hours  atorvastatin 80 mg oral tablet: 1 tab(s) orally once a day (at bedtime)  mesalamine 1.2 g oral delayed release tablet: 4 tab(s) orally once a day  metoprolol tartrate 25 mg oral tablet: 1 tab(s) orally 2 times a day  petrolatum topical ointment: 1 application topically 2 times a day  polyethylene glycol 3350 oral powder for reconstitution: 17 gram(s) orally once a day  senna oral tablet: 1 tab(s) orally once a day

## 2021-10-25 NOTE — PROGRESS NOTE ADULT - SUBJECTIVE AND OBJECTIVE BOX
THE PATIENT WAS SEEN AND EXAMINED BY ME WITH THE HOUSESTAFF AND STROKE TEAM DURING MORNING ROUNDS.   HPI:  Patient is a 75yo Rt handed M with pmh of HTN on ASA 81mg, Ulcerative Colitis, Afib (not on AC 2/2 h/o GIB, UC), hx of meningioma (s/p left frontal craniotomy in 2017and s/p radiation),  Hx of seizure disorder, DM-II presented to [facility] as a direct transfer to IR from [place] for MT. Per chart review, patient woke up around 0900am with no deficits. Around 930 am that day patient was having a conversation with his wife, around 945am that day  patient started to slur his speech, having difficulty forming words and leaning to his left side. Patient’s wife called EMS. On arrival to [place], patient had NIHSS 14 and CT/CTA was noted to show Rt M1 occlusion with Core 44 and Penumbra 113. Patient was given TPA at 1058 am day of admission and transferred to [facility] for MT. Patient’s wife states the event was not a seizure. NIHSS 14 at [place] report Preop NIHSS 11 per [facility] IR Suite report  MRS 0    SUBJECTIVE: No events overnight.  No new neurologic complaints.  ROS reported negative unless otherwise noted.    acetaminophen   Tablet .. 650 milliGRAM(s) Oral every 6 hours PRN  AQUAPHOR (petrolatum Ointment) 1 Application(s) Topical two times a day  aspirin  chewable 81 milliGRAM(s) Oral daily  atorvastatin 80 milliGRAM(s) Oral at bedtime  chlorhexidine 4% Liquid 1 Application(s) Topical <User Schedule>  enoxaparin Injectable 40 milliGRAM(s) SubCutaneous <User Schedule>  mesalamine DR (24-Hour) Tablet 4.8 Gram(s) Oral daily  metoprolol tartrate 25 milliGRAM(s) Oral two times a day  polyethylene glycol 3350 17 Gram(s) Oral daily  senna 1 Tablet(s) Oral daily      PHYSICAL EXAM:   Vital Signs Last 24 Hrs  T(C): 36.7 (25 Oct 2021 07:00), Max: 36.8 (24 Oct 2021 19:00)  T(F): 98 (25 Oct 2021 07:00), Max: 98.2 (24 Oct 2021 19:00)  HR: 66 (25 Oct 2021 07:00) (66 - 97)  BP: 99/72 (25 Oct 2021 07:00) (99/72 - 160/105)  BP(mean): 82 (25 Oct 2021 07:00) (78 - 122)  RR: 14 (25 Oct 2021 07:00) (13 - 27)  SpO2: 100% (25 Oct 2021 07:00) (92% - 100%)    General: No acute distress  HEENT: EOM intact, visual fields full  Abdomen: Soft, nontender, nondistended   Extremities: No edema    NEUROLOGICAL EXAM:  Mental status:   Cranial Nerves: No facial asymmetry, no nystagmus, no dysarthria,  tongue midline  Motor exam: Normal tone, no drift,  Sensation: Intact to light touch   Coordination/ Gait: No dysmetria,    LABS:                        14.2   8.60  )-----------( 218      ( 24 Oct 2021 20:37 )             43.4    10-24    137  |  103  |  21  ----------------------------<  126<H>  4.1   |  23  |  1.24    Ca    9.4      24 Oct 2021 20:37  Phos  2.6     10-24  Mg     1.9     10-24          IMAGING: Reviewed by me.   MR BRAIN   10/21/2021    IMPRESSION: Right temporal parietal acute infarct middle cerebral distribution. No significant hemorrhage. Left parietal encephalomalacia and gliosis consistent with old postoperative changes. Noninvasive flow MR angiography demonstrating flow in the right middle cerebral artery.    CT Angio Head Neck w/ IV Cont (10.20.21 @ 10:58)   No acute intracranial hemorrhage or acute territorial infarct. If acute ischemia is a strong clinical concern, MRI exam is recommended for further evaluation if there is no contraindication.    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  Abnormal CT perfusion with ischemic penumbra in the right middle cerebral artery territory with mismatch volume of 69 mm and mismatch ratio of 2.6 as described above.    Transthoracic Echocardiogram (10.20.21)  Conclusions:  1. Mitral annular calcification and calcified mitral  leaflets with normal diastolic opening.  2. Calcified trileaflet aortic valve with normal opening.  3. Normal left ventricular systolic function.  4. Normal right ventricular size and function.  5. Estimated pulmonary artery systolic pressure equals 31  mm Hg, assuming right atrial pressure equals 6 - 10 mm Hg,  consistent with normal pulmonary pressures.  6. Agitated saline injection demonstrates no evidence of a  patent foramen ovale.     THE PATIENT WAS SEEN AND EXAMINED BY ME WITH THE HOUSESTAFF AND STROKE TEAM DURING MORNING ROUNDS.   HPI:  Patient is a 75yo Rt handed M with pmh of HTN on ASA 81mg, Ulcerative Colitis, Afib (not on AC 2/2 h/o GIB, UC), hx of meningioma (s/p left frontal craniotomy in 2017and s/p radiation),  Hx of seizure disorder, DM-II presented to [facility] as a direct transfer to IR from [place] for MT. Per chart review, patient woke up around 0900am with no deficits. Around 930 am that day patient was having a conversation with his wife, around 945am that day  patient started to slur his speech, having difficulty forming words and leaning to his left side. Patient’s wife called EMS. On arrival to [place], patient had NIHSS 14 and CT/CTA was noted to show Rt M1 occlusion with Core 44 and Penumbra 113. Patient was given TPA at 1058 am day of admission and transferred to [facility] for MT. Patient’s wife states the event was not a seizure. NIHSS 14 at [place] report Preop NIHSS 11 per [facility] IR Suite report  MRS 0    SUBJECTIVE: Reported episode of confusion overnight. No new neurologic complaints.  ROS reported negative unless otherwise noted.    acetaminophen   Tablet .. 650 milliGRAM(s) Oral every 6 hours PRN  AQUAPHOR (petrolatum Ointment) 1 Application(s) Topical two times a day  aspirin  chewable 81 milliGRAM(s) Oral daily  atorvastatin 80 milliGRAM(s) Oral at bedtime  chlorhexidine 4% Liquid 1 Application(s) Topical <User Schedule>  enoxaparin Injectable 40 milliGRAM(s) SubCutaneous <User Schedule>  mesalamine DR (24-Hour) Tablet 4.8 Gram(s) Oral daily  metoprolol tartrate 25 milliGRAM(s) Oral two times a day  polyethylene glycol 3350 17 Gram(s) Oral daily  senna 1 Tablet(s) Oral daily      PHYSICAL EXAM:   Vital Signs Last 24 Hrs  T(C): 36.7 (25 Oct 2021 07:00), Max: 36.8 (24 Oct 2021 19:00)  T(F): 98 (25 Oct 2021 07:00), Max: 98.2 (24 Oct 2021 19:00)  HR: 66 (25 Oct 2021 07:00) (66 - 97)  BP: 99/72 (25 Oct 2021 07:00) (99/72 - 160/105)  BP(mean): 82 (25 Oct 2021 07:00) (78 - 122)  RR: 14 (25 Oct 2021 07:00) (13 - 27)  SpO2: 100% (25 Oct 2021 07:00) (92% - 100%)    General: No acute distress  HEENT: EOM intact, visual fields full  Abdomen: Soft, nontender, nondistended   Extremities: No edema    NEUROLOGICAL EXAM:  Mental status: awake, alert, oriented to person, place, time, follows commands   Cranial Nerves: No facial asymmetry, no nystagmus, no dysarthria,  tongue midline  Motor exam: Normal tone, no drift, moves extremities well against gravity   Sensation: Intact to light touch   Coordination/ Gait: No dysmetria,, decreased FFM on left     LABS:                        14.2   8.60  )-----------( 218      ( 24 Oct 2021 20:37 )             43.4    10-24    137  |  103  |  21  ----------------------------<  126<H>  4.1   |  23  |  1.24    Ca    9.4      24 Oct 2021 20:37  Phos  2.6     10-24  Mg     1.9     10-24          IMAGING: Reviewed by me.   MR BRAIN   10/21/2021    IMPRESSION: Right temporal parietal acute infarct middle cerebral distribution. No significant hemorrhage. Left parietal encephalomalacia and gliosis consistent with old postoperative changes. Noninvasive flow MR angiography demonstrating flow in the right middle cerebral artery.    CT Angio Head Neck w/ IV Cont (10.20.21 @ 10:58)   No acute intracranial hemorrhage or acute territorial infarct. If acute ischemia is a strong clinical concern, MRI exam is recommended for further evaluation if there is no contraindication.    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  Abnormal CT perfusion with ischemic penumbra in the right middle cerebral artery territory with mismatch volume of 69 mm and mismatch ratio of 2.6 as described above.    Transthoracic Echocardiogram (10.20.21)  Conclusions:  1. Mitral annular calcification and calcified mitral  leaflets with normal diastolic opening.  2. Calcified trileaflet aortic valve with normal opening.  3. Normal left ventricular systolic function.  4. Normal right ventricular size and function.  5. Estimated pulmonary artery systolic pressure equals 31  mm Hg, assuming right atrial pressure equals 6 - 10 mm Hg,  consistent with normal pulmonary pressures.  6. Agitated saline injection demonstrates no evidence of a  patent foramen ovale.

## 2021-10-25 NOTE — H&P ADULT - ATTENDING COMMENTS
Patient was interviewed and examined, medical records and brain imaging reviewed  Admit to BIU  All medications renewed  Admit labs   Medicine evaluation

## 2021-10-25 NOTE — H&P ADULT - NSHPSOCIALHISTORY_GEN_ALL_CORE
SOCIAL HISTORY  Smoking - Denied  EtOH - Denied   Drugs - Denied    FUNCTIONAL HISTORY  Lives in a private home with wife and has 6 TRISHA and 14 inside.  Prior Level of Function: Independent in ADLs and ambulation    CURRENT FUNCTIONAL STATUS  - Bed Mobility: contact guard  - Transfers: min assist with rolling walker   - Gait: min assist   - ADLs: mod-max assist

## 2021-10-25 NOTE — DISCHARGE NOTE PROVIDER - HOSPITAL COURSE
Patient is a 75yo Rt handed M with pmh of HTN on ASA 81mg, Ulcerative Colitis, Afib (not on AC 2/2 h/o GIB, UC), hx of meningioma (s/p left frontal craniotomy in 2017and s/p radiation),  Hx of seizure disorder, DM-II presents to [facility] as a direct transfer to IR from [place] for MT. Per chart review, patient woke up around 0900am with no deficits. Around 930 am patient was having a conversation with his wife, around 945am patient started to slur his speech, having difficulty forming words and leaning to his left side. Patient’s wife called EMS. On arrival to [place], patient had NIHSS 14 and CT/CTA was noted to show Rt M1 occlusion with Core 44 and Penumbra 113. Patient was given TPA at 1058 am and transferred to [facility] for MT. Patient’s wife states the event was not a seizure.  NIHSS 14 at [place] report  Preop NIHSS 11 per [facility] IR Suite report   MRS 0  Patient was a candidate for TPA and was given at 10:58am. Patient is s/p MT of Rt M1/2 occlusion noted on angiogram with TICI2C.  S/p MT with improvement in neuro exam especially hemiparesis and word finding difficulty.     MR BRAIN   10/21/2021    IMPRESSION: Right temporal parietal acute infarct middle cerebral distribution. No significant hemorrhage. Left parietal encephalomalacia and gliosis consistent with old postoperative changes. Noninvasive flow MR angiography demonstrating flow in the right middle cerebral artery.    Transthoracic Echocardiogram (10.20.21)  Conclusions:  1. Mitral annular calcification and calcified mitral  leaflets with normal diastolic opening.  2. Calcified trileaflet aortic valve with normal opening.  3. Normal left ventricular systolic function.  4. Normal right ventricular size and function.  5. Estimated pulmonary artery systolic pressure equals 31  mm Hg, assuming right atrial pressure equals 6 - 10 mm Hg,  consistent with normal pulmonary pressures.  6. Agitated saline injection demonstrates no evidence of a  patent foramen ovale.    Impression: Resolving Left sided hemiparesis with word finding difficulty 2/2 R MCA ischemic stroke in the setting of R M2 occlusion s/p MT with TICI 2C reperfusion 2/2 Cardio-embolism (A-Fib)     ANTITHROMBOTIC THERAPY: Continue ASA 81mg PO daily, Speak with patients Cardiologist tomorrow Dr Rainey [phone] to decide for watchman device or Atrial clip given that patient cannot take AC w UC/GI bleed history  B/L LE Doppler negative for DVT 10/21    Evaluated by PT/OT and was recommended AR. Patient stable for discharge. Patient is a 75yo Rt handed M with pmh of HTN on ASA 81mg, Ulcerative Colitis, Afib (not on AC 2/2 h/o GIB, UC), hx of meningioma (s/p left frontal craniotomy in 2017and s/p radiation),  Hx of seizure disorder, DM-II presents to [facility] as a direct transfer to IR from [place] for MT. Per chart review, patient woke up around 0900am with no deficits. Around 930 am patient was having a conversation with his wife, around 945am patient started to slur his speech, having difficulty forming words and leaning to his left side. Patient’s wife called EMS. On arrival to [place], patient had NIHSS 14 and CT/CTA was noted to show Rt M1 occlusion with Core 44 and Penumbra 113. Patient was given TPA at 1058 am and transferred to [facility] for MT. Patient’s wife states the event was not a seizure.  NIHSS 14 at [place] report  Preop NIHSS 11 per [facility] IR Suite report   MRS 0  Patient was a candidate for TPA and was given at 10:58am. Patient is s/p MT of Rt M1/2 occlusion noted on angiogram with TICI2C.  S/p MT with improvement in neuro exam especially hemiparesis and word finding difficulty.     MR BRAIN   10/21/2021    IMPRESSION: Right temporal parietal acute infarct middle cerebral distribution. No significant hemorrhage. Left parietal encephalomalacia and gliosis consistent with old postoperative changes. Noninvasive flow MR angiography demonstrating flow in the right middle cerebral artery.    Transthoracic Echocardiogram (10.20.21)  Conclusions:  1. Mitral annular calcification and calcified mitral  leaflets with normal diastolic opening.  2. Calcified trileaflet aortic valve with normal opening.  3. Normal left ventricular systolic function.  4. Normal right ventricular size and function.  5. Estimated pulmonary artery systolic pressure equals 31  mm Hg, assuming right atrial pressure equals 6 - 10 mm Hg,  consistent with normal pulmonary pressures.  6. Agitated saline injection demonstrates no evidence of a  patent foramen ovale.    Impression: Resolving Left sided hemiparesis with word finding difficulty 2/2 R MCA ischemic stroke in the setting of R M2 occlusion s/p MT with TICI 2C reperfusion 2/2 Cardio-embolism (A-Fib)     ANTITHROMBOTIC THERAPY: Initially started on ASA, switched to Eliquis 5 mg BID on 10/25/21 after cleared by GI, will need GI follow up as outpatient to optimize Colitis management.  B/L LE Doppler negative for DVT 10/21    Evaluated by PT/OT and was recommended AR. Patient stable for discharge.

## 2021-10-25 NOTE — DISCHARGE NOTE PROVIDER - NSDCCPCAREPLAN_GEN_ALL_CORE_FT
PRINCIPAL DISCHARGE DIAGNOSIS  Diagnosis: Stroke  Assessment and Plan of Treatment: Please follow up with neurologist after being discharged from  Continue taking medications as prescribed. Monitor your blood pressure. Reduce fat, cholesterol and salt in your diet. Increase intake of fruits and vegetables. Limit alcohol to minimum and do not smoke. You may be at risk for falling, make changes to your home to help you walk easier. Keep up to date on vaccinations.  If you experience any symptoms of facial drooping, slurred speech, arm or leg weakness, severe headache, vision changes or any worsening symptoms, notify provider immediatley and return to ER.

## 2021-10-25 NOTE — H&P ADULT - ASSESSMENT
ASSESSMENT/PLAN  76 year old male with PMH of previous meningioma with crani, HTN, Afib, seizures but not on medication, UC, DM type 2 who presented to [facility] with a Right M2 occlusion and is s/p tpa and thrombectomy. He now has Gait Instability, ADL impairments and Functional impairments and is admitted to [facility] inpatient rehab.     #Right M1 occlusion s/p thrombectomy and tPA  #Left hemiparesis, dysarthria  - Gait Instability, ADL impairments and Functional impairments  - Start Comprehensive Rehab Program of PT/OT/SLP  -Continue Atorvastatin 80mg    #HTN/Afib  - Home med: amlodipine, metoprolol, HCTZ  - Was on ASA 81mg but switched today to apixaban 5mg BID   - Had previously failed AC due to GI bleed but due to new stroke, neurology and cardiology want apixaban used.  -GI clearance given at [facility]  - If patient does not tolerate AC, could possibly be a candidate for watchman procedure as per cardiology  -Continue Apixaban 5mg BID  -Continue metoprolol 25mg BID    #Dm type 2  - A1c is 5.9  - Home med: Jardiance  -Will monitor serum glucose     #Ulcerative Colitis  - Home med: mesalamine 1.2 gram - 4 tabs daily  -Monitor BM    #Pain control  - Tylenol PRN    #GI/Bowel Mgmt   - Continue Senna  - Miralax PRN  -Protonix 40mg daily     #Bladder management  - Continue to monitor PVR q 8 hours (SC if > 400)    #DVT  - Apixaban 5mg BID  - SCDs, TEDs     #Skin:  -Mild dry skin to BLE   -Continue aquaphor to BLE    FEN   - Diet - Regular + Thins  [CCHO, DASH/TLC]    - Dysphagia  SLP - evaluation and treatment    Precautions / PROPHYLAXIS:   - Falls, Seizure, Aspiration  - ortho: Weight bearing status: WBAT   - Pressure injury/Skin: Turn Q2hrs while in bed, OOB to Chair, PT/OT        MEDICAL PROGNOSIS: GOOD            REHAB POTENTIAL: GOOD             ESTIMATED DISPOSITION: HOME WITH HOME CARE            ELOS: 10-14 Days   EXPECTED THERAPY:     P.T. 1hr/day       O.T. 1hr/day      S.L.P. 1hr/day     P&O Unnecessary     EXP FREQUENCY: 5 days per 7 day period     PRESCREEN COMPARISION:   I have reviewed the prescreen information and I have found no relevant changes between the preadmission screening and my post admission evaluation     RATIONALE FOR INPATIENT ADMISSION - Patient demonstrates the following: (check all that apply)  [X] Medically appropriate for rehabilitation admission  [X] Has attainable rehab goals with an appropriate initial discharge plan  [X] Has rehabilitation potential (expected to make a significant improvement within a reasonable period of time)   [X] Requires close medical management by a rehab physician, rehab nursing care, Hospitalist and comprehensive interdisciplinary team (including PT, OT, & or SLP, Prosthetics and Orthotics)   ASSESSMENT/PLAN  76 year old male with PMH of previous meningioma with crani, HTN, Afib, seizures but not on medication, UC, DM type 2 who presented to [facility] with a Right M2 occlusion and is s/p tpa and thrombectomy. He now has Gait Instability, ADL impairments and Functional impairments and is admitted to [facility] inpatient rehab.     #Right M1 occlusion s/p thrombectomy and tPA  #Left hemiparesis, dysarthria  - Gait Instability, ADL impairments and Functional impairments  - Start Comprehensive Rehab Program of PT/OT/SLP  - Continue Atorvastatin 80mg    #HTN/Afib  - Home med: amlodipine, metoprolol, HCTZ  - Was on ASA 81mg but switched today to apixaban 5mg BID   - Had previously failed AC due to GI bleed but due to new stroke, neurology and cardiology want apixaban used.  - GI clearance given at [facility]  - If patient does not tolerate AC, could possibly be a candidate for watchman procedure as per cardiology  - Continue Apixaban 5mg BID  - Continue metoprolol 25mg BID    #Dm type 2  - A1c is 5.9  - Home med: Jardiance  - Will monitor serum glucose     #Ulcerative Colitis  - Home med: mesalamine 1.2 gram - 4 tabs daily  - Monitor BM    #Pain control  - Tylenol PRN    #GI/Bowel Mgmt   - Continue Senna  - Miralax PRN  -Protonix 40mg daily     #Bladder management  - Continue to monitor PVR q 8 hours (SC if > 400)    #DVT  - Apixaban 5mg BID  - SCDs, TEDs     #hx RLE pressure ulcer   - per pt, in march 2020, diuretic was dc'd, then developed worsening LE edema, tight shoes which caused pressure injury to R ankle and cellulitis tx with PO abx and followed with wound care at [facility].  -Mild dry skin to BLE   -Continue aquaphor to BLE, abd pad and kerlix dressing daily.     FEN   - Diet - Regular + Thins  [CCHO, DASH/TLC]    - Dysphagia  SLP - evaluation and treatment    Precautions / PROPHYLAXIS:   - Falls, Seizure, Aspiration  - ortho: Weight bearing status: WBAT   - Pressure injury/Skin: Turn Q2hrs while in bed, OOB to Chair, PT/OT        MEDICAL PROGNOSIS: GOOD            REHAB POTENTIAL: GOOD             ESTIMATED DISPOSITION: HOME WITH HOME CARE            ELOS: 10-14 Days   EXPECTED THERAPY:     P.T. 1hr/day       O.T. 1hr/day      S.L.P. 1hr/day     P&O Unnecessary     EXP FREQUENCY: 5 days per 7 day period     PRESCREEN COMPARISION:   I have reviewed the prescreen information and I have found no relevant changes between the preadmission screening and my post admission evaluation     RATIONALE FOR INPATIENT ADMISSION - Patient demonstrates the following: (check all that apply)  [X] Medically appropriate for rehabilitation admission  [X] Has attainable rehab goals with an appropriate initial discharge plan  [X] Has rehabilitation potential (expected to make a significant improvement within a reasonable period of time)   [X] Requires close medical management by a rehab physician, rehab nursing care, Hospitalist and comprehensive interdisciplinary team (including PT, OT, & or SLP, Prosthetics and Orthotics)

## 2021-10-25 NOTE — DISCHARGE NOTE PROVIDER - CARE PROVIDER_API CALL
Bee Garcia (NP; RN)  NP in Family Health  [address]  [place] [address]  Phone: [phone]  Fax: [phone]  Follow Up Time: 2 weeks   Bee Garcia (NP; RN)  NP in Family Health  [address]  [place] [address]  Phone: [phone]  Fax: [phone]  Follow Up Time: 2 weeks    Josiah Scott)  Internal Medicine  [address] [place] [address]  Phone: [phone]  Fax: [phone]  Follow Up Time: 1 month    Josiah Monroe)  Cardiovascular Disease; Internal Medicine  [address] [address]  [place] [address]  Phone: [phone]  Fax: [phone]  Follow Up Time: 1 month

## 2021-10-25 NOTE — H&P ADULT - NSHPREVIEWOFSYSTEMS_GEN_ALL_CORE
REVIEW OF SYSTEMS  Constitutional: No fever, No Chills, No fatigue  HEENT: No eye pain, No visual disturbances, No difficulty hearing  Pulm: No cough,  No shortness of breath  Cardio: No chest pain, No palpitations  GI:  No abdominal pain, No nausea, No vomiting, No diarrhea, No constipation  : No dysuria, No frequency, No hematuria  Neuro: No headaches, No memory loss, +loss of strength, No numbness, No tremors  Skin: No itching, No rashes, No lesions   Endo: No temperature intolerance  MSK: No joint pain, No joint swelling, No muscle pain, No Neck or back pain  Psych:  No depression, No anxiety

## 2021-10-25 NOTE — CONSULT NOTE ADULT - ASSESSMENT
A/P:  76M hx of HTN, Afib (on ASA, not AC 2/2 UC), meningioma (s/p L frontal crani and XRT 2017), NIDDM, who presented to [facility] from  for MT for dysarthria, word finding difficulty, and leaning towards L side at 0945, LWK 0900 10/20, w/ admission score NIHSS 14, imaging with RM2 occlusion s/p tPA (10/20 10:48) s/p MT TICI2C.      Wound Consult requested to assist w/ management of  BLE PVD w/ venous stasis    Continue to monitor RLE- currently no indication of cellulitis  Compression BLE w/ ace when oob  BLE Duplex w/o DVT noted  BLE elevation  Moisturize intact skin w/Aquaphor  Nutrition Consult for optimization         encourage high quality protein  Continue turning and positioning w/ offloading assistive devices as per protocol  Continue w/ Pericare as per protocol  Waffle Cushion to chair when oob to chair  Continue w/ low air loss  bed surface   Care as per medicine, remain available as requested  Upon discharge f/u as outpatient at [facility] [address] [facility] [phone]  D/w team & s/w RN  Thank you for this consult  Anette Monroe PA-C CWS [id number]  I spent 50minutes face to face w/ this pt of which more than 50% of the time was spent counseling & coordinating care of this pt.   
76 M w acute CVA    1. CVA s/p therapy    2. Afib  -given pt has high stroke risk, would favor resuming a/c. Although risk of bleeding from UC exists, he has no signs of active disease right now and he will be in monitored setting where a/c can be held if bleeding recurs    3. UC  -continue mesalamine, try to obtain outside records      Advanced care planning forms were discussed. Code status including forceful chest compressions, defibrillation and intubation were discussed. The risks benefits and alternatives to pertinent gastrointestinal procedures and interventions were discussed in detail and all questions were answered. Duration: 15 Minutes.      Josiah Scott M.D.   Gastroenterology and Hepatology  [address] [place], [place]  Office: [phone]  Cell: [phone]  
Patient seen and examined, agree with above assessment and plan as transcribed above.    - Would favor starting AC if ok from a GI prospective  - Would benefit from a watchman procedure at some point EP rodrigo Monroe called      Ervin Jara MD, [facility]  BEEPER [phone]

## 2021-10-25 NOTE — PROGRESS NOTE ADULT - ASSESSMENT
77 y/o Rt handed M with pmh of HTN on ASA 81mg, Ulcerative Colitis, Afib (not on AC 2/2 h/o GIB, UC), hx of meningioma (s/p left frontal craniotomy in 2017and s/p radiation),  Hx of seizure disorder not on AED, DM-II presents to [facility] as a direct transfer to  from [place] for MT. Patient was a candidate for TPA and was given at 10:58am. Patient is s/p MT of Rt M1/2 occlusion noted on angiogram with TICI2C.  S/p MT with improvement in neuro exam especially hemiparesis and word finding difficulty.     Impression: R MCA ischemic stroke in the setting of R M2 occlusion s/p MT with TICI 2C reperfusion likely secondary to Cardio-embolism (A-Fib)     NEURO: neurologically overall improved, Continue close monitoring for neurologic deterioration, permissive HTN, LDL 78 increase Atorvastatin to 80mg PO qHs, MRI Brain w/o, MRA Head w/o and Neck w/contrast performed, findings as above. Physical therapy/OT recommending acute rehab    ANTITHROMBOTIC THERAPY: Continue ASA 81mg PO daily, permitting cardiac/gi clearance anticipate starting Apixaban in setting of atrial fibrillation.     PULMONARY: CXR clear, protecting airway, saturating well     CARDIOVASCULAR: TTE ef 67%, dilated LA ,  EP consult appreciated, no significatn sustained bradycardia on the monitor , AC recommended.                            SBP goal: 120-180    GASTROINTESTINAL:  dysphagia screen  passed, per GI: Benefits of a/c outweight risks at this time     Diet: Regular    RENAL: BUN/Cr without acute change, maintain adequate hydration , good urine output      Na Goal: Greater than 135     Herrera:    HEMATOLOGY: H/H without acute change, no active bleeding , Platelets 218     DVT ppx: Heparin s.c [] LMWH [x]     ID: afebrile, no leukocytosis     OTHER: B/L LE Doppler negative for DVT 10/21. Spoke w family daughter and wife at bedside. Addressed questions and concerns    DISPOSITION: Acute Rehab      CORE MEASURES:        Admission NIHSS: 11     TPA: YES      LDL/HDL: 78/57     Depression Screen: 0     Statin Therapy: Y     Dysphagia Screen: PASS        Smoking NO      Afib YES         Stroke Education [] YES [] NO    Obtain screening lower extremity venous ultrasound in patients who meet 1 or more of the following criteria as patient is high risk for DVT/PE on admission:   [] History of DVT/PE  []Hypercoagulable states (Factor V Leiden, Cancer, OCP, etc. )  []Prolonged immobility (hemiplegia/hemiparesis/post operative or any other extended immobilization)  [] Transferred from outside facility (Rehab or Long term care)  [] Age </= to 50

## 2021-10-25 NOTE — PATIENT PROFILE ADULT - NSPROGENBLOODRESTRICT_GEN_A_NUR
none
Spine appears normal, range of motion is not limited, no muscle or joint tenderness. No tremors.

## 2021-10-25 NOTE — DISCHARGE NOTE PROVIDER - NSDCQMANTITHROMCONTRAOTHER_GEN_A_CORE_FT
Problem: Patient Care Overview  Goal: Plan of Care Review  Outcome: Ongoing (interventions implemented as appropriate)  Plan of care reviewed with pt at beginning of shift-continue YAKELIN, pain control.  Pt verbalized understanding. Hourly/ Q2 hourly rounds completed on this pt throughout shift.  Pt denies need for pain medication throughout shift, voiding with use of urinal and has brief, repositions self, safety maintained.  Patient has remained free from fall/injury, no skin breakdown noted.  Dressing to R foot CDI.  Side rails up x2, bed in locked and lowest position, call light kept within reach.  Needs attended to, will continue to monitor/ update as indicated       on AC

## 2021-10-25 NOTE — H&P ADULT - NSHPLABSRESULTS_GEN_ALL_CORE
LABS:                          14.2   8.60  )-----------( 218      ( 24 Oct 2021 20:37 )             43.4     10-24    137  |  103  |  21  ----------------------------<  126<H>  4.1   |  23  |  1.24    Ca    9.4      24 Oct 2021 20:37  Phos  2.6     10-24  Mg     1.9     10-24        < from: CT Brain Stroke Protocol (10.20.21 @ 10:57) >    IMPRESSION:    No acute intracranial hemorrhage or acute territorial infarct. If acute ischemia is a strong clinical concern, MRI exam is recommended for further evaluation if there is no contraindication.  Lester notified 11:08 AM 10/20/2021.    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    Abnormal CT perfusion with ischemic penumbra in the right middle cerebral artery territory with mismatch volume of 69 mm and mismatch ratio of 2.6 as described above.  .  < from: Xray Chest 1 View- PORTABLE-Urgent (10.20.21 @ 11:38) >      IMPRESSION: No radiographic evidence of active chest disease.    < end of copied text >    < from: CT Head No Cont (10.21.21 @ 08:20) >      IMPRESSION:    New cytotoxic edema within the right parietal and temporal lobes and right insula.  No CT evidence for hemorrhagic transformation.    < end of copied text >    < from: MR Head No Cont (10.21.21 @ 12:08) >      IMPRESSION: Right temporal parietal acute infarct middle cerebral distribution. No significant hemorrhage. Left parietal encephalomalacia and gliosis consistent with old postoperative changes. Noninvasive flow MR angiography demonstrating flow in the right middle cerebral artery.    < end of copied text >    < from: VA Duplex Lower Ext Vein Scan, Bilat (10.21.21 @ 15:39) >      IMPRESSION:  No evidence of deep venous thrombosis in either lower extremity.    < end of copied text >

## 2021-10-25 NOTE — PROGRESS NOTE ADULT - SUBJECTIVE AND OBJECTIVE BOX
C A R D I O L O G Y  **********************************     DATE OF SERVICE: 10-25-21    Denies chest pain, palpitations, or shortness of breath.   Review of symptoms otherwise negative.      MEDICATIONS  (STANDING):  AQUAPHOR (petrolatum Ointment) 1 Application(s) Topical two times a day  aspirin  chewable 81 milliGRAM(s) Oral daily  atorvastatin 80 milliGRAM(s) Oral at bedtime  chlorhexidine 4% Liquid 1 Application(s) Topical <User Schedule>  enoxaparin Injectable 40 milliGRAM(s) SubCutaneous <User Schedule>  mesalamine DR (24-Hour) Tablet 4.8 Gram(s) Oral daily  metoprolol tartrate 25 milliGRAM(s) Oral two times a day  polyethylene glycol 3350 17 Gram(s) Oral daily  senna 1 Tablet(s) Oral daily    MEDICATIONS  (PRN):  acetaminophen   Tablet .. 650 milliGRAM(s) Oral every 6 hours PRN Temp greater or equal to 38C (100.4F), Mild Pain (1 - 3)      LABS:                          14.2   8.60  )-----------( 218      ( 24 Oct 2021 20:37 )             43.4     Hemoglobin: 14.2 g/dL (10-24 @ 20:37)  Hemoglobin: 13.1 g/dL (10-21 @ 21:36)  Hemoglobin: 16.2 g/dL (10-20 @ 17:49)    10-24    137  |  103  |  21  ----------------------------<  126<H>  4.1   |  23  |  1.24    Ca    9.4      24 Oct 2021 20:37  Phos  2.6     10-24  Mg     1.9     10-24      Creatinine Trend: 1.24<--, 1.00<--, 1.10<--, 1.16<--, 0.85<--, 1.17<--             10-24-21 @ 07:01  -  10-25-21 @ 07:00  --------------------------------------------------------  IN: 820 mL / OUT: 550 mL / NET: 270 mL        PHYSICAL EXAM  Vital Signs Last 24 Hrs  T(C): 36.7 (25 Oct 2021 07:00), Max: 36.8 (24 Oct 2021 19:00)  T(F): 98 (25 Oct 2021 07:00), Max: 98.2 (24 Oct 2021 19:00)  HR: 66 (25 Oct 2021 07:00) (66 - 93)  BP: 99/72 (25 Oct 2021 07:00) (99/72 - 160/105)  BP(mean): 82 (25 Oct 2021 07:00) (78 - 122)  RR: 14 (25 Oct 2021 07:00) (13 - 27)  SpO2: 100% (25 Oct 2021 07:00) (95% - 100%)        Gen: Appears well in NAD  HEENT:  (-)icterus (-)pallor  CV: N S1 S2 1/6 GREG (+)2 Pulses B/l  Resp:  Clear to auscultation B/L, normal effort  GI: (+) BS Soft, NT, ND  Lymph:  (-)Edema, (-)obvious lymphadenopathy  Skin: Warm to touch, Normal turgor  Psych: Appropriate mood and affect      TELEMETRY: AF 70s    < from: Transthoracic Echocardiogram (10.20.21 @ 16:23) >  Conclusions:  1. Mitral annular calcification and calcified mitral  leaflets with normal diastolic opening.  2. Calcified trileaflet aortic valve with normal opening.  3. Normal left ventricular systolic function.  4. Normal right ventricular size and function.  5. Estimated pulmonary artery systolic pressure equals 31  mm Hg, assuming right atrial pressure equals 6 - 10 mm Hg,  consistent with normal pulmonary pressures.  6. Agitated saline injection demonstrates no evidence of a  patent foramen ovale.  *** No previous Echo exam.    < end of copied text >      ASSESSMENT/PLAN: Patient is a 75 y/o male with PMH of HTN, Ulcerative Colitis, chronic Afib (not on AC 2/2 h/o GIB, UC), hx of meningioma (s/p left frontal craniotomy in 2017 and s/p radiation), Hx of seizure disorder, and DM2 who presents to [facility] as a direct transfer to IR from [place] for acute CVA s/p TPA and thrombectomy. Cardiology consulted for management of afib and anticoagulation.    - Would ideally have patient on full AC especially given stroke with elevated chadsvasc  - Follow up GI eval to see if safe to start AC with Eliquis prior to discharge when okay with neurology if UC can be controlled to prevent flare ups/bleeding - awaiting GI eval  - EP f/u noted regarding watchman  - Continue rate control with metoprolol  - TTE noted with normal LV function, no PFO or sig valvular abnormalities  - Stroke management per neuro  - No further inpatient cardiac w/u expected  - [facility]    MONIQUE VelazquezC  Pager: [phone]

## 2021-10-25 NOTE — H&P ADULT - HISTORY OF PRESENT ILLNESS
76 year old male with PMH of HTN on ASA 81mg, Ulcerative Colitis, Afib (not on AC 2/2 h/o GIB, UC), hx of meningioma (s/p left frontal craniotomy in 2017and s/p radiation),  Hx of seizure disorder not on AED, DM-II who was admitted to [facility] after initially presentation from [facility] on 10/20. He initially had slurred speech, having difficulty forming words and leaning to his left side. inital NIHSS was 14 and CT/CTA was noted to show Rt M1 occlusion. TPA was given and patient transferred to [facility] for thrombectomy and had a Right M1-2 thrombectomy with TICI 2 reperfusion. His exam  improved but remained with left sided weakness, extinction and word finding difficulties. He was evaluated for acute inpatient rehab and was admitted to [facility] on 10/25/21    76 year old male with PMH of HTN on ASA 81mg, Ulcerative Colitis, Afib (not on AC 2/2 h/o GIB, UC), hx of meningioma (s/p left frontal craniotomy in 2017and s/p radiation),  Hx of seizure disorder not on AED, DM-II who was admitted to [facility] after initially presentation from [facility] on 10/20. He initially had slurred speech, having difficulty forming words and leaning to his left side. inital NIHSS was 14 and CT/CTA was noted to show Rt M1 occlusion. TPA was given and patient transferred to [facility] for thrombectomy and had a Right M1-2 thrombectomy with TICI 2 reperfusion. His exam improved but remained with left sided weakness, extinction and word finding difficulties. He was evaluated for acute inpatient rehab and was admitted to [facility] on 10/25/21.    On admission, seen and examined with wife at bedside. Patient reports full strength on left side, has residual RUE and RLE weakness from prior L meningioma resection. Per wife, patient has mild L sided weakness, impaired balance, mild dysarthria and some word finding difficulties. Also noted RLE non-blanchable area of erythema. Patient states that in March 2020, one of his diuretics was discontinued. He then developed worsening LE edema, and was wearing tight shoes that dug into his distal shin, causing an abrasion. He had a pressure ulcer of the RLE with associated cellulitis, was treated with PO abx, and followed with the wound care center at [facility]. He notes that the wound has healed, however, the discoloration remains. Denies pain, tenderness to palpation.

## 2021-10-26 LAB
ALBUMIN SERPL ELPH-MCNC: 3.3 G/DL — SIGNIFICANT CHANGE UP (ref 3.3–5)
ALP SERPL-CCNC: 55 U/L — SIGNIFICANT CHANGE UP (ref 40–120)
ALT FLD-CCNC: 18 U/L — SIGNIFICANT CHANGE UP (ref 10–45)
ANION GAP SERPL CALC-SCNC: 9 MMOL/L — SIGNIFICANT CHANGE UP (ref 5–17)
AST SERPL-CCNC: 28 U/L — SIGNIFICANT CHANGE UP (ref 10–40)
BASOPHILS # BLD AUTO: 0.03 K/UL — SIGNIFICANT CHANGE UP (ref 0–0.2)
BASOPHILS NFR BLD AUTO: 0.4 % — SIGNIFICANT CHANGE UP (ref 0–2)
BILIRUB SERPL-MCNC: 0.7 MG/DL — SIGNIFICANT CHANGE UP (ref 0.2–1.2)
BUN SERPL-MCNC: 25 MG/DL — HIGH (ref 7–23)
CALCIUM SERPL-MCNC: 9 MG/DL — SIGNIFICANT CHANGE UP (ref 8.4–10.5)
CHLORIDE SERPL-SCNC: 107 MMOL/L — SIGNIFICANT CHANGE UP (ref 96–108)
CO2 SERPL-SCNC: 25 MMOL/L — SIGNIFICANT CHANGE UP (ref 22–31)
CREAT SERPL-MCNC: 1.02 MG/DL — SIGNIFICANT CHANGE UP (ref 0.5–1.3)
EOSINOPHIL # BLD AUTO: 0.33 K/UL — SIGNIFICANT CHANGE UP (ref 0–0.5)
EOSINOPHIL NFR BLD AUTO: 4.1 % — SIGNIFICANT CHANGE UP (ref 0–6)
GLUCOSE SERPL-MCNC: 106 MG/DL — HIGH (ref 70–99)
HCT VFR BLD CALC: 42.9 % — SIGNIFICANT CHANGE UP (ref 39–50)
HGB BLD-MCNC: 14.1 G/DL — SIGNIFICANT CHANGE UP (ref 13–17)
IMM GRANULOCYTES NFR BLD AUTO: 0.6 % — SIGNIFICANT CHANGE UP (ref 0–1.5)
LYMPHOCYTES # BLD AUTO: 1.26 K/UL — SIGNIFICANT CHANGE UP (ref 1–3.3)
LYMPHOCYTES # BLD AUTO: 15.6 % — SIGNIFICANT CHANGE UP (ref 13–44)
MCHC RBC-ENTMCNC: 30.2 PG — SIGNIFICANT CHANGE UP (ref 27–34)
MCHC RBC-ENTMCNC: 32.9 GM/DL — SIGNIFICANT CHANGE UP (ref 32–36)
MCV RBC AUTO: 91.9 FL — SIGNIFICANT CHANGE UP (ref 80–100)
MONOCYTES # BLD AUTO: 0.61 K/UL — SIGNIFICANT CHANGE UP (ref 0–0.9)
MONOCYTES NFR BLD AUTO: 7.6 % — SIGNIFICANT CHANGE UP (ref 2–14)
NEUTROPHILS # BLD AUTO: 5.79 K/UL — SIGNIFICANT CHANGE UP (ref 1.8–7.4)
NEUTROPHILS NFR BLD AUTO: 71.7 % — SIGNIFICANT CHANGE UP (ref 43–77)
NRBC # BLD: 0 /100 WBCS — SIGNIFICANT CHANGE UP (ref 0–0)
PLATELET # BLD AUTO: 200 K/UL — SIGNIFICANT CHANGE UP (ref 150–400)
POTASSIUM SERPL-MCNC: 3.7 MMOL/L — SIGNIFICANT CHANGE UP (ref 3.5–5.3)
POTASSIUM SERPL-SCNC: 3.7 MMOL/L — SIGNIFICANT CHANGE UP (ref 3.5–5.3)
PROT SERPL-MCNC: 7.2 G/DL — SIGNIFICANT CHANGE UP (ref 6–8.3)
RBC # BLD: 4.67 M/UL — SIGNIFICANT CHANGE UP (ref 4.2–5.8)
RBC # FLD: 13.9 % — SIGNIFICANT CHANGE UP (ref 10.3–14.5)
SODIUM SERPL-SCNC: 141 MMOL/L — SIGNIFICANT CHANGE UP (ref 135–145)
WBC # BLD: 8.07 K/UL — SIGNIFICANT CHANGE UP (ref 3.8–10.5)
WBC # FLD AUTO: 8.07 K/UL — SIGNIFICANT CHANGE UP (ref 3.8–10.5)

## 2021-10-26 PROCEDURE — 99232 SBSQ HOSP IP/OBS MODERATE 35: CPT

## 2021-10-26 PROCEDURE — 99223 1ST HOSP IP/OBS HIGH 75: CPT

## 2021-10-26 PROCEDURE — 93010 ELECTROCARDIOGRAM REPORT: CPT

## 2021-10-26 RX ORDER — MESALAMINE 400 MG
4.8 TABLET, DELAYED RELEASE (ENTERIC COATED) ORAL DAILY
Refills: 0 | Status: DISCONTINUED | OUTPATIENT
Start: 2021-10-26 | End: 2021-11-09

## 2021-10-26 RX ADMIN — Medication 650 MILLIGRAM(S): at 10:55

## 2021-10-26 RX ADMIN — APIXABAN 5 MILLIGRAM(S): 2.5 TABLET, FILM COATED ORAL at 05:18

## 2021-10-26 RX ADMIN — Medication 4800 MILLIGRAM(S): at 11:28

## 2021-10-26 RX ADMIN — Medication 25 MILLIGRAM(S): at 17:18

## 2021-10-26 RX ADMIN — Medication 25 MILLIGRAM(S): at 05:18

## 2021-10-26 RX ADMIN — APIXABAN 5 MILLIGRAM(S): 2.5 TABLET, FILM COATED ORAL at 17:18

## 2021-10-26 RX ADMIN — Medication 1 APPLICATION(S): at 17:17

## 2021-10-26 RX ADMIN — Medication 650 MILLIGRAM(S): at 10:03

## 2021-10-26 RX ADMIN — Medication 145 MILLIGRAM(S): at 11:28

## 2021-10-26 RX ADMIN — Medication 1 APPLICATION(S): at 05:19

## 2021-10-26 RX ADMIN — ATORVASTATIN CALCIUM 80 MILLIGRAM(S): 80 TABLET, FILM COATED ORAL at 21:06

## 2021-10-26 NOTE — ADVANCED PRACTICE NURSE CONSULT - ASSESSMENT
Patient admitted for CVA presents with circumferential erythema/purplish discoloration of right lower extremity, there is no opening in skin.   Patient appears to be a reliable historian & reports a previous wound & cellulitis to this area in March 2020, wife @ bedside & confirms same.   Both report patient was followed by wound care @ [place] until healed, but that discoloration never faded.  There is very slight swelling noted when compared to left leg, but no warmth or tenderness; patient without other signs/symptoms of infection.  Skin is noted to be firm, drier & less elastic than unaffected leg.  Discoloration is likely scarring/hemosiderin staining/tissue damage from previous cellulitic infection.

## 2021-10-26 NOTE — DIETITIAN INITIAL EVALUATION ADULT. - PERTINENT MEDS FT
MEDICATIONS  (STANDING):  apixaban 5 milliGRAM(s) Oral every 12 hours  AQUAPHOR (petrolatum Ointment) 1 Application(s) Topical two times a day  atorvastatin 80 milliGRAM(s) Oral at bedtime  fenofibrate Tablet 145 milliGRAM(s) Oral daily  mesalamine DR Capsule 4800 milliGRAM(s) Oral daily  metoprolol tartrate 25 milliGRAM(s) Oral two times a day  polyethylene glycol 3350 17 Gram(s) Oral daily  senna 2 Tablet(s) Oral at bedtime    MEDICATIONS  (PRN):  acetaminophen     Tablet .. 650 milliGRAM(s) Oral every 6 hours PRN Temp greater or equal to 38C (100.4F), Mild Pain (1 - 3)

## 2021-10-26 NOTE — CONSULT NOTE ADULT - ASSESSMENT
76 year old male with PMH of previous meningioma with crani, HTN, Afib, seizures but not on medication, UC, DM type 2 who presented to [facility] with a Right M2 occlusion and is s/p tpa and thrombectomy. He now has Gait Instability, ADL impairments and Functional impairments and is admitted to [facility] inpatient rehab- pt/ot/dvt ppx    #Right M1 occlusion s/p thrombectomy and tPA  #Left hemiparesis, dysarthria   Atorvastatin     #HTN- amlodipine, metoprolol, HCTZ    # Afib- Was on ASA 81mg but switched to apixaban 5mg BID    previously failed AC due to GI bleed but due to new stroke, neurology and cardiology want apixaban used.  GI clearance given at [facility]    #Dm type 2- diet controlled, iss, accuchecks  - A1c is 5.9  - Home med: Jardiance    #Ulcerative Colitis  - c/w Home med mesalamine     #Pain control  - Tylenol PRN    #Gerd-  -Protonix     #DVT  - Apixaban      #hx RLE pressure ulcer   - per pt, in march 2020, diuretic was dc'd, then developed worsening LE edema, tight shoes which caused pressure injury to R ankle and cellulitis tx with PO abx and followed with wound care at [facility].  - aquaphor to BLE, abd pad and kerlix dressing daily.         will follow  d/w dr. chow 76 year old male with PMH of previous meningioma with crani, HTN, Afib, seizures but not on medication, UC, DM type 2 who presented to [facility] with a Right M2 occlusion and is s/p tpa and thrombectomy. He now has Gait Instability, ADL impairments and Functional impairments and is admitted to [facility] inpatient rehab- pt/ot/dvt ppx    #Right M1 occlusion s/p thrombectomy and tPA  #Left hemiparesis, dysarthria   Atorvastatin     #HTN- amlodipine, metoprolol, HCTZ    # chronic Afib- Was on ASA 81mg but switched to apixaban 5mg BID    previously failed AC due to GI bleed but due to new stroke, neurology and cardiology want apixaban used.  GI clearance given at [facility]    #Dm type 2- diet controlled, iss, accuchecks  - A1c is 5.9  - Home med: Jardiance    #Ulcerative Colitis  - c/w Home med mesalamine     #Pain control  - Tylenol PRN    #Gerd-  -Protonix     #DVT  - Apixaban    #hx RLE pressure ulcer   wound care per rehab team.    will follow  d/w dr. chow 76 year old male with PMH of previous meningioma with crani, HTN, Afib, seizures but not on medication, UC, DM type 2 who presented to [facility] with a Right M2 occlusion and is s/p tpa and thrombectomy. He now has Gait Instability, ADL impairments and Functional impairments and is admitted to [facility] inpatient rehab- pt/ot/dvt ppx    #Right M1 occlusion s/p thrombectomy and tPA  #Left hemiparesis, dysarthria   Atorvastatin     #HTN- amlodipine, metoprolol, HCTZ    # chronic Afib- Was on ASA 81mg but switched to apixaban 5mg BID    previously failed AC due to GI bleed but due to new stroke, neurology and cardiology want apixaban used.  GI clearance given at [facility]    #Dm type 2- diet controlled, iss, accuchecks  - A1c is 5.9  - Home med: Jardiance    #Ulcerative Colitis  - c/w Home med mesalamine     #Pain control  - Tylenol PRN    #Gerd-  -Protonix     #DVT  - Apixaban    #hx RLE pressure ulcer   wound care per rehab team.  doppler negative 10/21/21    will follow  d/w dr. chow

## 2021-10-26 NOTE — DIETITIAN INITIAL EVALUATION ADULT. - DIET TYPE
DASH/TLC (sodium and cholesterol restricted diet) consistent carbohydrate (no snacks)/DASH/TLC (sodium and cholesterol restricted diet)

## 2021-10-26 NOTE — PROGRESS NOTE ADULT - ASSESSMENT
76 year old male with PMH of previous meningioma with crani, HTN, Afib, seizures but not on medication, UC, DM type 2 who presented to [facility] with a Right M2 occlusion and is s/p tpa and thrombectomy. He now has Gait Instability, ADL impairments and Functional impairments and is admitted to [facility] inpatient rehab.     #Right M1 occlusion s/p thrombectomy and tPA  #Left hemiparesis, dysarthria  - Gait Instability, ADL impairments and Functional impairments  - Start Comprehensive Rehab Program of PT/OT/SLP  - Continue Atorvastatin 80mg    #HTN/Afib  - Home med: amlodipine, metoprolol, HCTZ  - Was on ASA 81mg but switched today to apixaban 5mg BID   - Had previously failed AC due to GI bleed but due to new stroke, neurology and cardiology want apixaban used.  - GI clearance given at [facility]  - If patient does not tolerate AC, could possibly be a candidate for watchman procedure as per cardiology  - Continue Apixaban 5mg BID  - Continue metoprolol 25mg BID    #Dm type 2  - A1c is 5.9  - Home med: Jardiance  - Will monitor serum glucose     #Ulcerative Colitis  - Home med: mesalamine 1.2 gram - 4 tabs daily  -Restart mesalamine   - Monitor BM    #Pain control  - Tylenol PRN    #GI/Bowel Mgmt   - Continue Senna  - Miralax PRN  -Protonix 40mg daily     #Bladder management  - Continue to monitor PVR q 8 hours (SC if > 400)    #DVT  - Apixaban 5mg BID  - SCDs, TEDs     #hx RLE pressure ulcer   - per pt, in march 2020, diuretic was dc'd, then developed worsening LE edema, tight shoes which caused pressure injury to R ankle and cellulitis tx with PO abx and followed with wound care at [facility].  -Mild dry skin to BLE   -Continue aquaphor to BLE, abd pad and kerlix dressing daily.   - Wound care consult pending 10/26  -Continue tylenol for discomfort    FEN   - Diet - Regular + Thins  [CCHO, DASH/TLC]    - Dysphagia  SLP - evaluation and treatment    Precautions / PROPHYLAXIS:   - Falls, Seizure, Aspiration  - ortho: Weight bearing status: WBAT   - Pressure injury/Skin: Turn Q2hrs while in bed, OOB to Chair, PT/OT

## 2021-10-26 NOTE — DIETITIAN INITIAL EVALUATION ADULT. - PHYSCIAL ASSESSMENT
Nutrition-focused physical exam deferred due to pt sitting out of room throughout interview, after, and during lunch.

## 2021-10-26 NOTE — DIETITIAN INITIAL EVALUATION ADULT. - OTHER INFO
76 year old male with PMH of previous meningioma with crani, HTN, Afib, seizures but not on medication, UC, DM type 2 who presented to [facility] with a Right M2 occlusion and is s/p tpa and thrombectomy. He now has Gait Instability, ADL impairments and Functional impairments and is admitted to [facility] inpatient rehab.     Pt is tolerating diet and reports eating most of the meals. No chewing/swallowing difficulties as per patient. Pt denies GI issues. Last BM: 10/25 as per nursing flowsheets. Food preference obtained to optimize PO intake. No edema as per nursing flowsheets. Skin intact as per nursing flowsheets. 76 year old male with PMH of previous meningioma with crani, HTN, Afib, seizures but not on medication, UC, DM type 2 who presented to [facility] with a Right M2 occlusion and is s/p tpa and thrombectomy. He now has Gait Instability, ADL impairments and Functional impairments and is admitted to [facility] inpatient rehab.     Pt is tolerating diet and reports eating most of the meals. Pt is a on a regular diet, has hx of DM however HbA1c of 5.9 is within pre-DM range. Pt not on finger sticks or insulin coverage/oral DM meds. Recommend continuing with regular diet due to weight loss during hospitalization. No chewing/swallowing difficulties as per patient. Pt denies GI issues. Last BM: 10/25 as per nursing flowsheets. Food preference obtained to optimize PO intake. No edema as per nursing flowsheets. Skin intact as per nursing flowsheets. 76 year old male with PMH of previous meningioma with crani, HTN, Afib, seizures but not on medication, UC, DM type 2 who presented to [facility] with a Right M2 occlusion and is s/p tpa and thrombectomy. He now has Gait Instability, ADL impairments and Functional impairments and is admitted to [facility] inpatient rehab.     Pt is tolerating diet and reports eating most of the meals. Pt is a on a regular diet, has hx of DM however HbA1c of 5.9 is within pre-DM range. Pt not on finger sticks or insulin coverage/oral DM meds currently. Recommend DASH/TLC diet due to hx of HTN. No chewing/swallowing difficulties as per patient. Pt denies GI issues. Last BM: 10/25 as per nursing flowsheets. Food preference obtained to optimize PO intake. No edema as per nursing flowsheets. Skin intact as per nursing flowsheets. 76 year old male with PMH of previous meningioma with crani, HTN, Afib, seizures but not on medication, UC, DM type 2 who presented to [facility] with a Right M2 occlusion and is s/p tpa and thrombectomy. He now has Gait Instability, ADL impairments and Functional impairments and is admitted to [facility] inpatient rehab.     Pt is tolerating diet and reports eating most of the meals currently. Pt is a on a regular diet, has hx of DM however HbA1c of 5.9 is within pre-DM range. Pt not on finger sticks or insulin coverage/oral DM meds currently. Recommend DASH/TLC diet due to hx of HTN. No chewing/swallowing difficulties as per patient. Pt denies GI issues. Last BM: 10/25 as per nursing flowsheets. Food preference obtained to optimize PO intake. No edema as per nursing flowsheets. Skin intact as per nursing flowsheets. 76 year old male with PMH of previous meningioma with crani, HTN, Afib, seizures but not on medication, UC, DM type 2 who presented to [facility] with a Right M2 occlusion and is s/p tpa and thrombectomy. He now has Gait Instability, ADL impairments and Functional impairments and is admitted to [facility] inpatient rehab.     Pt is tolerating diet and reports eating most of the meals currently. Pt is a on a regular diet, has hx of DM however HbA1c of 5.9 is within pre-DM range. Recommend consistent carb, DASH/TLC diet due to hx of HTN. No chewing/swallowing difficulties as per patient. Pt denies GI issues. Last BM: 10/25 as per nursing flowsheets. Food preference obtained to optimize PO intake. No edema as per nursing flowsheets. Skin intact as per nursing flowsheets.

## 2021-10-26 NOTE — DIETITIAN INITIAL EVALUATION ADULT. - ORAL INTAKE PTA/DIET HISTORY
Pt reports good appetite, eating 3x a day prior to hospital admission. Patient and pt's wife like to go to a local deli to get food during the week (salads, tuna, eggplant, chicken cutlets). Pt takes multivitamin, Vitamin D, and Vitamin B6. NKFA. Pt reports good appetite at home, eating 3x a day prior to hospital admission. Patient and pt's wife like to go to a local deli to get food during the week (salads, tuna, eggplant, chicken cutlets). Pt takes multivitamin, Vitamin D, and Vitamin B6. NKFA.

## 2021-10-26 NOTE — DIETITIAN INITIAL EVALUATION ADULT. - PERTINENT LABORATORY DATA
Labs 10/26  Na- 141 (WNL)  K- 3.7 (WNL)  BUN- 25 (H)  Creat- 1.02 (WNL)  Glucose- 106 (H)   A1c- 5.9 (H)

## 2021-10-26 NOTE — ADVANCED PRACTICE NURSE CONSULT - RECOMMEDATIONS
Suggest continue Aquaphor ointment applied up to twice daily to skin of bilateral lower legs to maintain moisture/elasticity.  May elevate leg when possible.  Ensure proper fitting footwear to prevent accidental skin openings.

## 2021-10-26 NOTE — CONSULT NOTE ADULT - SUBJECTIVE AND OBJECTIVE BOX
76 year old male with PMH of HTN on ASA 81mg, Ulcerative Colitis, Afib (not on AC 2/2 h/o GIB, UC), hx of meningioma (s/p left frontal craniotomy in 2017and s/p radiation),  Hx of seizure disorder not on AED, DM-II who was admitted to [facility] after initially presentation from [facility] on 10/20. He initially had slurred speech, having difficulty forming words and leaning to his left side. inital NIHSS was 14 and CT/CTA was noted to show Rt M1 occlusion. TPA was given and patient transferred to [facility] for thrombectomy and had a Right M1-2 thrombectomy with TICI 2 reperfusion. His exam improved but remained with left sided weakness, extinction and word finding difficulties. He was evaluated for acute inpatient rehab and was admitted to [facility] on 10/25/21.    seen and examined at bedside for medical consultation,       Patient reports full strength on left side, has residual RUE and RLE weakness from prior L meningioma resection. Per wife, patient has mild L sided weakness, impaired balance, mild dysarthria and some word finding difficulties. Also noted RLE non-blanchable area of erythema. Patient states that in March 2020, one of his diuretics was discontinued. He then developed worsening LE edema, and was wearing tight shoes that dug into his distal shin, causing an abrasion. He had a pressure ulcer of the RLE with associated cellulitis, was treated with PO abx, and followed with the wound care center at [facility]. He notes that the wound has healed, however, the discoloration remains. Denies pain, tenderness to palpation.     {[id number]} Review of Systems: per hpi, all other negative      Allergies and Intolerances:        Allergies:  	No Known Allergies:     Home Medications:   * Patient Currently Takes Medications as of 25-Oct-2021 13:34 documented in Structured Notes  · 	polyethylene glycol 3350 oral powder for reconstitution: 17 gram(s) orally once a day  · 	senna oral tablet: 1 tab(s) orally once a day  · 	petrolatum topical ointment: 1 application topically 2 times a day  · 	metoprolol tartrate 25 mg oral tablet: 1 tab(s) orally 2 times a day  · 	atorvastatin 80 mg oral tablet: 1 tab(s) orally once a day (at bedtime)  · 	apixaban 5 mg oral tablet: 1 tab(s) orally every 12 hours  · 	mesalamine 1.2 g oral delayed release tablet: 4 tab(s) orally once a day    Patient History:   {[id number]} Past Medical, Past Surgical, and Family History:  PAST MEDICAL HISTORY:  Atrial fibrillation     CAD (coronary artery disease)     DM (diabetes mellitus)     Essential hypertension     GI bleed     Meningioma left frontal, s/p resection and radiation    Other hyperlipidemia     Pulmonary hypertension     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     PAST SURGICAL HISTORY:  H/O cataract removal with insertion of prosthetic lens B/L    H/O right inguinal hernia repair.     FAMILY HISTORY:  FH: hypertension, Father.    {[id number]} Social History:  Smoking - Denied  EtOH - Denied   Drugs - Denied      {[id number]} Heart Failure:  Does this patient have a history of or has been diagnosed with heart failure? no.    Physical Exam:   Vital Signs Last 24 Hrs  T(C): 36.3 (26 Oct 2021 07:50), Max: 36.9 (25 Oct 2021 15:00)  T(F): 97.3 (26 Oct 2021 07:50), Max: 98.4 (25 Oct 2021 15:00)  HR: 81 (26 Oct 2021 07:50) (71 - 92)  BP: 162/90 (26 Oct 2021 07:50) (132/74 - 167/92)  BP(mean): 88 (25 Oct 2021 15:00) (88 - 102)  RR: 16 (26 Oct 2021 07:50) (15 - 19)  SpO2: 98% (26 Oct 2021 07:50) (93% - 100%)    GENERAL- NAD  EAR/NOSE/MOUTH/THROAT - no pharyngeal exudates, no oral lesion's  MMM  EYES- AFSHAN, conjunctiva and Sclera clear  NECK- supple  RESPIRATORY-  clear to auscultation bilaterally, non laboured breathing  CARDIOVASCULAR - SIS2, RRR  GI - soft NT BS present  EXTREMITIES- no pedal edema  NEUROLOGY-mild dysarthria, mild L sided weakness  SKIN- +RLE with nonblanchable erythema, no swelling or weeping, no discharge. with abd pad and kerlix dressing C/D/I.   PSYCHIATRY- AAO X 3  MUSCULOSKELETAL- ROM normal      {[id number]} Labs and Results:                14.1                 141  | 25   | 25           8.07  >-----------< 200     ------------------------< 106                   42.9                 3.7  | 107  | 1.02                                         Ca 9.0   Mg x     Ph x            CAPILLARY BLOOD GLUCOSE          Labs reviewed:     CXR personally reviewed: < from: Xray Chest 1 View- PORTABLE-Urgent (10.20.21 @ 11:38) >  IMPRESSION: No radiographic evidence of active chest disease.    < end of copied text >      ECG reviewed and interpreted: < from: 12 Lead ECG (10.22.21 @ 10:03) >    Ventricular Rate 72 BPM    Atrial Rate 63 BPM    QRS Duration 98 ms    Q-T Interval 414 ms    QTC Calculation(Bazett) 453 ms    R Axis 9 degrees    T Axis 22 degrees    Diagnosis Line ATRIAL FIBRILLATION  ABNORMAL ECG  WHEN COMPARED WITH ECG OF 20-OCT-2021 18:14,  NO SIGNIFICANT CHANGE WAS FOUND    < end of copied text >    < from: Transthoracic Echocardiogram (10.20.21 @ 16:23) >  EF (Visual Estimate): 65-70 %    < end of copied text >  < from: Transthoracic Echocardiogram (10.20.21 @ 16:23) >  Conclusions:  1. Mitral annular calcification and calcified mitral  leaflets with normal diastolic opening.  2. Calcified trileaflet aortic valve with normal opening.  3. Normal left ventricular systolic function.  4. Normal right ventricular size and function.  5. Estimated pulmonary artery systolic pressure equals 31  mm Hg, assuming right atrial pressure equals 6 - 10 mm Hg,  consistent with normal pulmonary pressures.  6. Agitated saline injection demonstrates no evidence of a  patent foramen ovale.    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 No significant hemorrhage. Left parietal encephalomalacia and gliosis consistent with old postoperative changes. Noninvasive flow MR angiography demonstrating flow in the right middle cerebral artery.    < end of copied text >      MEDICATIONS  (STANDING):  apixaban 5 milliGRAM(s) Oral every 12 hours  AQUAPHOR (petrolatum Ointment) 1 Application(s) Topical two times a day  atorvastatin 80 milliGRAM(s) Oral at bedtime  fenofibrate Tablet 145 milliGRAM(s) Oral daily  mesalamine DR Capsule 4800 milliGRAM(s) Oral daily  metoprolol tartrate 25 milliGRAM(s) Oral two times a day  polyethylene glycol 3350 17 Gram(s) Oral daily  senna 2 Tablet(s) Oral at bedtime    MEDICATIONS  (PRN):  acetaminophen     Tablet .. 650 milliGRAM(s) Oral every 6 hours PRN Temp greater or equal to 38C (100.4F), Mild Pain (1 - 3)     76 year old male with PMH of HTN on ASA 81mg, Ulcerative Colitis, Afib (not on AC 2/2 h/o GIB, UC), hx of meningioma (s/p left frontal craniotomy in 2017and s/p radiation),  Hx of seizure disorder not on AED, DM-2 who was admitted to [facility] after initially presentation from [facility] on 10/20. He initially had slurred speech, having difficulty forming words and leaning to his left side. inital NIHSS was 14 and CT/CTA was noted to show Rt M1 occlusion. TPA was given and patient transferred to [facility] for thrombectomy and had a Right M1-2 thrombectomy with TICI 2 reperfusion. His exam improved but remained with left sided weakness, extinction and word finding difficulties. He was evaluated for acute inpatient rehab and was admitted to [facility] on 10/25/21.    seen and examined at bedside for medical consultation, c/o right lower extremity discomfort, mild- moderate, associated with swelling and redness.  no n/v, no sob, no headaches, no dysuria.      {[id number]} Review of Systems: per hpi, all other negative      Allergies and Intolerances:        Allergies:  	No Known Allergies:     Home Medications:   * Patient Currently Takes Medications as of 25-Oct-2021 13:34 documented in Structured Notes  · 	polyethylene glycol 3350 oral powder for reconstitution: 17 gram(s) orally once a day  · 	senna oral tablet: 1 tab(s) orally once a day  · 	petrolatum topical ointment: 1 application topically 2 times a day  · 	metoprolol tartrate 25 mg oral tablet: 1 tab(s) orally 2 times a day  · 	atorvastatin 80 mg oral tablet: 1 tab(s) orally once a day (at bedtime)  · 	apixaban 5 mg oral tablet: 1 tab(s) orally every 12 hours  · 	mesalamine 1.2 g oral delayed release tablet: 4 tab(s) orally once a day    Patient History:   {[id number]} Past Medical, Past Surgical, and Family History:  PAST MEDICAL HISTORY:  Atrial fibrillation     CAD (coronary artery disease)     DM (diabetes mellitus)     Essential hypertension     GI bleed     Meningioma left frontal, s/p resection and radiation    Other hyperlipidemia     Pulmonary hypertension     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     PAST SURGICAL HISTORY:  H/O cataract removal with insertion of prosthetic lens B/L    H/O right inguinal hernia repair.     FAMILY HISTORY:  FH: hypertension, Father.    {[id number]} Social History:  Smoking - Denied  EtOH - Denied   Drugs - Denied      {[id number]} Heart Failure:  Does this patient have a history of or has been diagnosed with heart failure? no.    Physical Exam:   Vital Signs Last 24 Hrs  T(C): 36.3 (26 Oct 2021 07:50), Max: 36.9 (25 Oct 2021 15:00)  T(F): 97.3 (26 Oct 2021 07:50), Max: 98.4 (25 Oct 2021 15:00)  HR: 81 (26 Oct 2021 07:50) (71 - 92)  BP: 162/90 (26 Oct 2021 07:50) (132/74 - 167/92)  BP(mean): 88 (25 Oct 2021 15:00) (88 - 102)  RR: 16 (26 Oct 2021 07:50) (15 - 19)  SpO2: 98% (26 Oct 2021 07:50) (93% - 100%)    GENERAL- NAD  EAR/NOSE/MOUTH/THROAT - no pharyngeal exudates, no oral lesion's  MMM  EYES- AFSHAN, conjunctiva and Sclera clear  NECK- supple  RESPIRATORY-  clear to auscultation bilaterally, non laboured breathing  CARDIOVASCULAR - SIS2, RRR  GI - soft NT BS present  EXTREMITIES- no pedal edema  NEUROLOGY-no gross motor deficits  SKIN- +RLE with erythema, swelling or weeping, intact dressing   PSYCHIATRY- AAO X 3  MUSCULOSKELETAL- ROM normal      {[id number]} Labs and Results:                14.1                 141  | 25   | 25           8.07  >-----------< 200     ------------------------< 106                   42.9                 3.7  | 107  | 1.02                                         Ca 9.0   Mg x     Ph x            CAPILLARY BLOOD GLUCOSE          Labs reviewed:     CXR personally reviewed: < from: Xray Chest 1 View- PORTABLE-Urgent (10.20.21 @ 11:38) >  IMPRESSION: No radiographic evidence of active chest disease.    < end of copied text >      ECG reviewed and interpreted: < from: 12 Lead ECG (10.22.21 @ 10:03) >    Ventricular Rate 72 BPM    Atrial Rate 63 BPM    QRS Duration 98 ms    Q-T Interval 414 ms    QTC Calculation(Bazett) 453 ms    R Axis 9 degrees    T Axis 22 degrees    Diagnosis Line ATRIAL FIBRILLATION  ABNORMAL ECG  WHEN COMPARED WITH ECG OF 20-OCT-2021 18:14,  NO SIGNIFICANT CHANGE WAS FOUND    < end of copied text >    < from: Transthoracic Echocardiogram (10.20.21 @ 16:23) >  EF (Visual Estimate): 65-70 %    < end of copied text >  < from: Transthoracic Echocardiogram (10.20.21 @ 16:23) >  Conclusions:  1. Mitral annular calcification and calcified mitral  leaflets with normal diastolic opening.  2. Calcified trileaflet aortic valve with normal opening.  3. Normal left ventricular systolic function.  4. Normal right ventricular size and function.  5. Estimated pulmonary artery systolic pressure equals 31  mm Hg, assuming right atrial pressure equals 6 - 10 mm Hg,  consistent with normal pulmonary pressures.  6. Agitated saline injection demonstrates no evidence of a  patent foramen ovale.    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 No significant hemorrhage. Left parietal encephalomalacia and gliosis consistent with old postoperative changes. Noninvasive flow MR angiography demonstrating flow in the right middle cerebral artery.    < end of copied text >      MEDICATIONS  (STANDING):  apixaban 5 milliGRAM(s) Oral every 12 hours  AQUAPHOR (petrolatum Ointment) 1 Application(s) Topical two times a day  atorvastatin 80 milliGRAM(s) Oral at bedtime  fenofibrate Tablet 145 milliGRAM(s) Oral daily  mesalamine DR Capsule 4800 milliGRAM(s) Oral daily  metoprolol tartrate 25 milliGRAM(s) Oral two times a day  polyethylene glycol 3350 17 Gram(s) Oral daily  senna 2 Tablet(s) Oral at bedtime    MEDICATIONS  (PRN):  acetaminophen     Tablet .. 650 milliGRAM(s) Oral every 6 hours PRN Temp greater or equal to 38C (100.4F), Mild Pain (1 - 3)     76 year old male with PMH of HTN on ASA 81mg, Ulcerative Colitis, Afib (not on AC 2/2 h/o GIB, UC), hx of meningioma (s/p left frontal craniotomy in 2017and s/p radiation),  Hx of seizure disorder not on AED, DM-2 who was admitted to [facility] after initially presentation from [facility] on 10/20. He initially had slurred speech, having difficulty forming words and leaning to his left side. inital NIHSS was 14 and CT/CTA was noted to show Rt M1 occlusion. TPA was given and patient transferred to [facility] for thrombectomy and had a Right M1-2 thrombectomy with TICI 2 reperfusion. His exam improved but remained with left sided weakness, extinction and word finding difficulties. He was evaluated for acute inpatient rehab and was admitted to [facility] on 10/25/21.    seen and examined at bedside for medical consultation, c/o right lower extremity discomfort, mild- moderate, associated with swelling and redness.  no n/v, no sob, no headaches, no dysuria.      {[id number]} Review of Systems: per hpi, all other negative      Allergies and Intolerances:        Allergies:  	No Known Allergies:     Home Medications:   * Patient Currently Takes Medications as of 25-Oct-2021 13:34 documented in Structured Notes  · 	polyethylene glycol 3350 oral powder for reconstitution: 17 gram(s) orally once a day  · 	senna oral tablet: 1 tab(s) orally once a day  · 	petrolatum topical ointment: 1 application topically 2 times a day  · 	metoprolol tartrate 25 mg oral tablet: 1 tab(s) orally 2 times a day  · 	atorvastatin 80 mg oral tablet: 1 tab(s) orally once a day (at bedtime)  · 	apixaban 5 mg oral tablet: 1 tab(s) orally every 12 hours  · 	mesalamine 1.2 g oral delayed release tablet: 4 tab(s) orally once a day    Patient History:   {[id number]} Past Medical, Past Surgical, and Family History:  PAST MEDICAL HISTORY:  Atrial fibrillation     CAD (coronary artery disease)     DM (diabetes mellitus)     Essential hypertension     GI bleed     Meningioma left frontal, s/p resection and radiation    Other hyperlipidemia     Pulmonary hypertension     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     PAST SURGICAL HISTORY:  H/O cataract removal with insertion of prosthetic lens B/L    H/O right inguinal hernia repair.     FAMILY HISTORY:  FH: father  at age 75 CVA, mom no medical problems, age of death unknown to patient    {[id number]} Social History:  Smoker - 1 pk/day x 20 yrs, quit   EtOH - 1 drink daily  Drugs - Denied      {[id number]} Heart Failure:  Does this patient have a history of or has been diagnosed with heart failure? no.    Physical Exam:   Vital Signs Last 24 Hrs  T(C): 36.3 (26 Oct 2021 07:50), Max: 36.9 (25 Oct 2021 15:00)  T(F): 97.3 (26 Oct 2021 07:50), Max: 98.4 (25 Oct 2021 15:00)  HR: 81 (26 Oct 2021 07:50) (71 - 92)  BP: 162/90 (26 Oct 2021 07:50) (132/74 - 167/92)  BP(mean): 88 (25 Oct 2021 15:00) (88 - 102)  RR: 16 (26 Oct 2021 07:50) (15 - 19)  SpO2: 98% (26 Oct 2021 07:50) (93% - 100%)    GENERAL- NAD  EAR/NOSE/MOUTH/THROAT - no pharyngeal exudates, no oral lesion's  MMM  EYES- AFSHAN, conjunctiva and Sclera clear  NECK- supple  RESPIRATORY-  clear to auscultation bilaterally, non laboured breathing  CARDIOVASCULAR - SIS2, RRR  GI - soft NT BS present  EXTREMITIES- no pedal edema  NEUROLOGY-no gross motor deficits  SKIN- +RLE with erythema, swelling or weeping, intact dressing   PSYCHIATRY- AAO X 3  MUSCULOSKELETAL- ROM normal      {[id number]} Labs and Results:                14.1                 141  | 25   | 25           8.07  >-----------< 200     ------------------------< 106                   42.9                 3.7  | 107  | 1.02                                         Ca 9.0   Mg x     Ph x            CAPILLARY BLOOD GLUCOSE          Labs reviewed:     CXR personally reviewed: < from: Xray Chest 1 View- PORTABLE-Urgent (10.20.21 @ 11:38) >  IMPRESSION: No radiographic evidence of active chest disease.    < end of copied text >      ECG reviewed and interpreted: < from: 12 Lead ECG (10.22.21 @ 10:03) >    Ventricular Rate 72 BPM    Atrial Rate 63 BPM    QRS Duration 98 ms    Q-T Interval 414 ms    QTC Calculation(Bazett) 453 ms    R Axis 9 degrees    T Axis 22 degrees    Diagnosis Line ATRIAL FIBRILLATION  ABNORMAL ECG  WHEN COMPARED WITH ECG OF 20-OCT-2021 18:14,  NO SIGNIFICANT CHANGE WAS FOUND    < end of copied text >    < from: Transthoracic Echocardiogram (10.20.21 @ 16:23) >  EF (Visual Estimate): 65-70 %    < end of copied text >  < from: Transthoracic Echocardiogram (10.20.21 @ 16:23) >  Conclusions:  1. Mitral annular calcification and calcified mitral  leaflets with normal diastolic opening.  2. Calcified trileaflet aortic valve with normal opening.  3. Normal left ventricular systolic function.  4. Normal right ventricular size and function.  5. Estimated pulmonary artery systolic pressure equals 31  mm Hg, assuming right atrial pressure equals 6 - 10 mm Hg,  consistent with normal pulmonary pressures.  6. Agitated saline injection demonstrates no evidence of a  patent foramen ovale.    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 No significant hemorrhage. Left parietal encephalomalacia and gliosis consistent with old postoperative changes. Noninvasive flow MR angiography demonstrating flow in the right middle cerebral artery.    < end of copied text >      MEDICATIONS  (STANDING):  apixaban 5 milliGRAM(s) Oral every 12 hours  AQUAPHOR (petrolatum Ointment) 1 Application(s) Topical two times a day  atorvastatin 80 milliGRAM(s) Oral at bedtime  fenofibrate Tablet 145 milliGRAM(s) Oral daily  mesalamine DR Capsule 4800 milliGRAM(s) Oral daily  metoprolol tartrate 25 milliGRAM(s) Oral two times a day  polyethylene glycol 3350 17 Gram(s) Oral daily  senna 2 Tablet(s) Oral at bedtime    MEDICATIONS  (PRN):  acetaminophen     Tablet .. 650 milliGRAM(s) Oral every 6 hours PRN Temp greater or equal to 38C (100.4F), Mild Pain (1 - 3)

## 2021-10-27 DIAGNOSIS — K51.90 ULCERATIVE COLITIS, UNSPECIFIED, WITHOUT COMPLICATIONS: ICD-10-CM

## 2021-10-27 PROCEDURE — 99232 SBSQ HOSP IP/OBS MODERATE 35: CPT

## 2021-10-27 RX ADMIN — ATORVASTATIN CALCIUM 80 MILLIGRAM(S): 80 TABLET, FILM COATED ORAL at 22:48

## 2021-10-27 RX ADMIN — Medication 145 MILLIGRAM(S): at 11:57

## 2021-10-27 RX ADMIN — Medication 1 APPLICATION(S): at 06:07

## 2021-10-27 RX ADMIN — APIXABAN 5 MILLIGRAM(S): 2.5 TABLET, FILM COATED ORAL at 06:06

## 2021-10-27 RX ADMIN — Medication 25 MILLIGRAM(S): at 17:21

## 2021-10-27 RX ADMIN — Medication 25 MILLIGRAM(S): at 06:06

## 2021-10-27 RX ADMIN — Medication 1 APPLICATION(S): at 17:22

## 2021-10-27 RX ADMIN — Medication 4.8 GRAM(S): at 11:57

## 2021-10-27 RX ADMIN — SENNA PLUS 2 TABLET(S): 8.6 TABLET ORAL at 22:48

## 2021-10-27 RX ADMIN — APIXABAN 5 MILLIGRAM(S): 2.5 TABLET, FILM COATED ORAL at 17:21

## 2021-10-27 NOTE — CONSULT NOTE ADULT - ASSESSMENT
76 year old male with PMH of HTN on ASA 81mg, Ulcerative Colitis, Afib (not on AC 2/2 h/o GIB, UC), hx of meningioma (s/p left frontal craniotomy in 2017and s/p radiation),  Hx of seizure disorder not on AED, DM-II who was admitted to [facility] after initially presentation from [facility] on 10/20. Patient initiated on elqiuis s/p stroke. H/H remains stable no BRBPR reported.

## 2021-10-27 NOTE — CONSULT NOTE ADULT - SUBJECTIVE AND OBJECTIVE BOX
INTERVAL HPI/OVERNIGHT EVENTS:  HPI:  76 year old male with PMH of HTN on ASA 81mg, Ulcerative Colitis, Afib (not on AC 2/2 h/o GIB, UC), hx of meningioma (s/p left frontal craniotomy in 2017and s/p radiation),  Hx of seizure disorder not on AED, DM-II who was admitted to [facility] after initially presentation from [facility] on 10/20. He initially had slurred speech, having difficulty forming words and leaning to his left side. inital NIHSS was 14 and CT/CTA was noted to show Rt M1 occlusion. TPA was given and patient transferred to [facility] for thrombectomy and had a Right M1-2 thrombectomy with TICI 2 reperfusion. His exam improved but remained with left sided weakness, extinction and word finding difficulties. He was evaluated for acute inpatient rehab and was admitted to [facility] on 10/25/21.    On admission, seen and examined with wife at bedside. Patient reports full strength on left side, has residual RUE and RLE weakness from prior L meningioma resection. Per wife, patient has mild L sided weakness, impaired balance, mild dysarthria and some word finding difficulties. Also noted RLE non-blanchable area of erythema. Patient states that in 2020, one of his diuretics was discontinued. He then developed worsening LE edema, and was wearing tight shoes that dug into his distal shin, causing an abrasion. He had a pressure ulcer of the RLE with associated cellulitis, was treated with PO abx, and followed with the wound care center at [facility]. He notes that the wound has healed, however, the discoloration remains. Denies pain, tenderness to palpation.    (25 Oct 2021 13:34)    GI consulted to see patient due to history of ulcerative colitis and recent initiation of anticoagulation. Patient seen and examined. No reports of BRBPR per patient. He denies abdominal pain. UC has been stable.     MEDICATIONS  (STANDING):  apixaban 5 milliGRAM(s) Oral every 12 hours  AQUAPHOR (petrolatum Ointment) 1 Application(s) Topical two times a day  atorvastatin 80 milliGRAM(s) Oral at bedtime  fenofibrate Tablet 145 milliGRAM(s) Oral daily  mesalamine DR (24-Hour) Tablet 4.8 Gram(s) Oral daily  metoprolol tartrate 25 milliGRAM(s) Oral two times a day  polyethylene glycol 3350 17 Gram(s) Oral daily  senna 2 Tablet(s) Oral at bedtime    MEDICATIONS  (PRN):  acetaminophen     Tablet .. 650 milliGRAM(s) Oral every 6 hours PRN Temp greater or equal to 38C (100.4F), Mild Pain (1 - 3)      Allergies    No Known Allergies    Intolerances        PAST MEDICAL & SURGICAL HISTORY:  Essential hypertension    Other hyperlipidemia    Meningioma  left frontal, s/p resection and radiation    Seizure  2/2 meningioma    DM (diabetes mellitus)    Pulmonary hypertension    Atrial fibrillation    GI bleed    CAD (coronary artery disease)    H/O right inguinal hernia repair    H/O cataract removal with insertion of prosthetic lens  B/L        REVIEW OF SYSTEMS: negative unless indicated in HPI    non smoker  no etoh abuse   	    PHYSICAL EXAM:   Vital Signs:  Vital Signs Last 24 Hrs  T(C): 36.4 (27 Oct 2021 07:45), Max: 37 (26 Oct 2021 19:59)  T(F): 97.6 (27 Oct 2021 07:45), Max: 98.6 (26 Oct 2021 19:59)  HR: 65 (27 Oct 2021 07:45) (65 - 91)  BP: 136/74 (27 Oct 2021 07:45) (129/74 - 151/81)  BP(mean): --  RR: 16 (27 Oct 2021 07:45) (14 - 16)  SpO2: 100% (27 Oct 2021 07:45) (99% - 100%)  Daily     Daily Weight in k.5 (26 Oct 2021 22:39)I&O's Summary    26 Oct 2021 07:01  -  27 Oct 2021 07:00  --------------------------------------------------------  IN: 480 mL / OUT: 0 mL / NET: 480 mL        GENERAL:  Appears stated age  HEENT:  NC/AT,  conjunctivae clear and pink  CHEST:  Full & symmetric excursion, no increased effort, breath sounds clear  HEART:  Regular rhythm, S1, S2, no murmur  ABDOMEN:  Soft, non-tender, non-distended, normoactive bowel sounds  EXTEREMITIES:  no edema  SKIN:  No rash/warm/dry  NEURO:  Alert, oriented    LABS:                        14.1   8.07  )-----------( 200      ( 26 Oct 2021 05:15 )             42.9     10-    141  |  107  |  25<H>  ----------------------------<  106<H>  3.7   |  25  |  1.02    Ca    9.0      26 Oct 2021 05:15    TPro  7.2  /  Alb  3.3  /  TBili  0.7  /  DBili  x   /  AST  28  /  ALT  18  /  AlkPhos  55  10-26        amylase   lipase  RADIOLOGY & ADDITIONAL TESTS:

## 2021-10-27 NOTE — CONSULT NOTE ADULT - PROBLEM SELECTOR RECOMMENDATION 9
Continue mesalmine  Monitor bowel movements  Monitor H/H  Continue anticoagulation as benefit outweighs risk

## 2021-10-27 NOTE — PROGRESS NOTE ADULT - ASSESSMENT
76 year old male with PMH of previous meningioma with crani, HTN, Afib, seizures but not on medication, UC, DM type 2 who presented to [facility] with a Right M2 occlusion and is s/p tpa and thrombectomy. He now has Gait Instability, ADL impairments and Functional impairments and is admitted to [facility] inpatient rehab.     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  - GI clearance given at [facility]  - If patient does not tolerate AC, could possibly be a candidate for watchman procedure as per cardiology  - Continue Apixaban 5mg BID  - Continue metoprolol 25mg BID    #Dm type 2  - A1c is 5.9  - Home med: Jardiance  - Will monitor serum glucose     #Ulcerative Colitis  - Home med: mesalamine 1.2 gram - 4 tabs daily  -Restart mesalamine   - Monitor BM    #Pain control  - Tylenol PRN    #GI/Bowel Mgmt   - Continue Senna  - Miralax PRN  -Protonix 40mg daily     #Bladder management  - Continue to monitor PVR q 8 hours (SC if > 400)    #DVT  - Apixaban 5mg BID  - SCDs, TEDs     #hx RLE pressure ulcer   - per pt, in march 2020, diuretic was dc'd, then developed worsening LE edema, tight shoes which caused pressure injury to R ankle and cellulitis tx with PO abx and followed with wound care at [facility].  -Mild dry skin to BLE   -Continue aquaphor to BLE, abd pad and kerlix dressing daily.   - Wound care consult pending 10/26  -Continue tylenol for discomfort    FEN   - Diet - Regular + Thins  [CCHO, DASH/TLC]    - Dysphagia  SLP - evaluation and treatment    Precautions / PROPHYLAXIS:   - Falls, Seizure, Aspiration  - ortho: Weight bearing status: WBAT   - Pressure injury/Skin: Turn Q2hrs while in bed, OOB to Chair, PT/OT           76 year old male with PMH of previous meningioma with crani, HTN, Afib, seizures but not on medication, UC, DM type 2 who presented to [facility] with a Right M2 occlusion and is s/p tpa and thrombectomy. He now has Gait Instability, ADL impairments and Functional impairments and is admitted to [facility] inpatient rehab.     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  - GI clearance given at [facility]  - If patient does not tolerate AC, could possibly be a candidate for watchman procedure as per cardiology  - Continue Apixaban 5mg BID  - Continue metoprolol 25mg BID    #Dm type 2  - A1c is 5.9  - Home med: Jardiance  - Will monitor serum glucose     #Ulcerative Colitis  - Home med: mesalamine 1.2 gram - 4 tabs daily  -Restart mesalamine   - Monitor BM    #Pain control  - Tylenol PRN    #GI/Bowel Mgmt   - Continue Senna  - Miralax PRN  -Protonix 40mg daily     #Bladder management  - Continue to monitor PVR q 8 hours (SC if > 400)    #DVT  - Apixaban 5mg BID  - SCDs, TEDs     #hx RLE pressure ulcer   - per pt, in march 2020, diuretic was dc'd, then developed worsening LE edema, tight shoes which caused pressure injury to R ankle and cellulitis tx with PO abx and followed with wound care at [facility].  -Mild dry skin to BLE   -Continue aquaphor to BLE, abd pad and kerlix dressing daily.   - Wound care consult pending 10/26  -Continue tylenol for discomfort    FEN   - Diet - Regular + Thins  [CCHO, DASH/TLC]    - Dysphagia  SLP - evaluation and treatment    Precautions / PROPHYLAXIS:   - Falls, Seizure, Aspiration  - ortho: Weight bearing status: WBAT   - Pressure injury/Skin: Turn Q2hrs while in bed, OOB to Chair, PT/OT      TEAM MEETING 10/27/21  SW:  Lives with wife and daughter involved. has 6STE but has multilevel home with many stairs  OT: min for toilet/grooming, mod for UBD, Max for LBD, total for toileting  PT: Min for transfers, 100 feet with rolling walker and 4 steps with 2 hand rails and min assist   SLP: mod I for comprehension, min for memory, regular diet  barriers: steps at home, easily distracted, dec attention, dec word finding, poor insight   goal: mod I for all ADLs and ambulation  EDOD:  11/9 Home

## 2021-10-27 NOTE — CONSULT NOTE ADULT - ATTENDING COMMENTS
Patient seen and examined with Ludy Romero NP.  Agree with assessment and plan as above.    Elderly male with multipled medical problems including ulcerative colitis initially admitted with CVA.  He has been started on anticoagulation (Eliquis) and is now on rehab service.  No abdominal pain.  No melena or bright red blood per rectum.    VSS.  Abdomen soft, nontender to palpation    Continue mesalamine for now  Monitor Hgb/Hct and bowel movements  Continue Eliquis

## 2021-10-28 LAB
ALBUMIN SERPL ELPH-MCNC: 3.1 G/DL — LOW (ref 3.3–5)
ALP SERPL-CCNC: 64 U/L — SIGNIFICANT CHANGE UP (ref 40–120)
ALT FLD-CCNC: 23 U/L — SIGNIFICANT CHANGE UP (ref 10–45)
ANION GAP SERPL CALC-SCNC: 11 MMOL/L — SIGNIFICANT CHANGE UP (ref 5–17)
AST SERPL-CCNC: 29 U/L — SIGNIFICANT CHANGE UP (ref 10–40)
BILIRUB SERPL-MCNC: 0.7 MG/DL — SIGNIFICANT CHANGE UP (ref 0.2–1.2)
BUN SERPL-MCNC: 24 MG/DL — HIGH (ref 7–23)
CALCIUM SERPL-MCNC: 9 MG/DL — SIGNIFICANT CHANGE UP (ref 8.4–10.5)
CHLORIDE SERPL-SCNC: 108 MMOL/L — SIGNIFICANT CHANGE UP (ref 96–108)
CO2 SERPL-SCNC: 21 MMOL/L — LOW (ref 22–31)
CREAT SERPL-MCNC: 1 MG/DL — SIGNIFICANT CHANGE UP (ref 0.5–1.3)
GLUCOSE SERPL-MCNC: 116 MG/DL — HIGH (ref 70–99)
HCT VFR BLD CALC: 40.9 % — SIGNIFICANT CHANGE UP (ref 39–50)
HGB BLD-MCNC: 13.7 G/DL — SIGNIFICANT CHANGE UP (ref 13–17)
MCHC RBC-ENTMCNC: 30.5 PG — SIGNIFICANT CHANGE UP (ref 27–34)
MCHC RBC-ENTMCNC: 33.5 GM/DL — SIGNIFICANT CHANGE UP (ref 32–36)
MCV RBC AUTO: 91.1 FL — SIGNIFICANT CHANGE UP (ref 80–100)
NRBC # BLD: 0 /100 WBCS — SIGNIFICANT CHANGE UP (ref 0–0)
PLATELET # BLD AUTO: 209 K/UL — SIGNIFICANT CHANGE UP (ref 150–400)
POTASSIUM SERPL-MCNC: 3.7 MMOL/L — SIGNIFICANT CHANGE UP (ref 3.5–5.3)
POTASSIUM SERPL-SCNC: 3.7 MMOL/L — SIGNIFICANT CHANGE UP (ref 3.5–5.3)
PROT SERPL-MCNC: 6.7 G/DL — SIGNIFICANT CHANGE UP (ref 6–8.3)
RBC # BLD: 4.49 M/UL — SIGNIFICANT CHANGE UP (ref 4.2–5.8)
RBC # FLD: 13.7 % — SIGNIFICANT CHANGE UP (ref 10.3–14.5)
SODIUM SERPL-SCNC: 140 MMOL/L — SIGNIFICANT CHANGE UP (ref 135–145)
WBC # BLD: 7.54 K/UL — SIGNIFICANT CHANGE UP (ref 3.8–10.5)
WBC # FLD AUTO: 7.54 K/UL — SIGNIFICANT CHANGE UP (ref 3.8–10.5)

## 2021-10-28 PROCEDURE — 99232 SBSQ HOSP IP/OBS MODERATE 35: CPT

## 2021-10-28 PROCEDURE — 99233 SBSQ HOSP IP/OBS HIGH 50: CPT

## 2021-10-28 RX ADMIN — Medication 1 APPLICATION(S): at 06:13

## 2021-10-28 RX ADMIN — Medication 25 MILLIGRAM(S): at 17:23

## 2021-10-28 RX ADMIN — Medication 145 MILLIGRAM(S): at 11:31

## 2021-10-28 RX ADMIN — APIXABAN 5 MILLIGRAM(S): 2.5 TABLET, FILM COATED ORAL at 06:12

## 2021-10-28 RX ADMIN — SENNA PLUS 2 TABLET(S): 8.6 TABLET ORAL at 20:29

## 2021-10-28 RX ADMIN — Medication 4.8 GRAM(S): at 11:31

## 2021-10-28 RX ADMIN — ATORVASTATIN CALCIUM 80 MILLIGRAM(S): 80 TABLET, FILM COATED ORAL at 20:29

## 2021-10-28 RX ADMIN — APIXABAN 5 MILLIGRAM(S): 2.5 TABLET, FILM COATED ORAL at 17:23

## 2021-10-28 RX ADMIN — Medication 25 MILLIGRAM(S): at 06:12

## 2021-10-28 RX ADMIN — Medication 1 APPLICATION(S): at 20:29

## 2021-10-28 NOTE — PROGRESS NOTE ADULT - ASSESSMENT
76 year old male with PMH of previous meningioma with crani, HTN, Afib, seizures but not on medication, UC, DM type 2 who presented to [facility] with a Right M2 occlusion and is s/p tpa and thrombectomy. He now has Gait Instability, ADL impairments and Functional impairments and is admitted to [facility] inpatient rehab.     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  - GI clearance given at [facility]  - If patient does not tolerate AC, could possibly be a candidate for watchman procedure as per cardiology  - Continue Apixaban 5mg BID  - Continue metoprolol 25mg BID    #Dm type 2  - A1c is 5.9  - Home med: Jardiance  - Will monitor serum glucose     #Ulcerative Colitis  - Home med: mesalamine 1.2 gram - 4 tabs daily  -Restart mesalamine   - Monitor BM    #Pain control  - Tylenol PRN    #GI/Bowel Mgmt   - Continue Senna  - Miralax PRN  -Protonix 40mg daily     #Bladder management  - monitor UO    #DVT  - Apixaban 5mg BID  - SCDs, TEDs     #hx RLE pressure ulcer   - per pt, in march 2020, diuretic was dc'd, then developed worsening LE edema, tight shoes which caused pressure injury to R ankle and cellulitis tx with PO abx and followed with wound care at [facility].  -Mild dry skin to BLE   -Continue aquaphor to BLE, abd pad and kerlix dressing daily.   - Wound care consult appreciated - Continue aquaphor  -Continue tylenol for discomfort    FEN   - Diet - Regular + Thins  [CCHO, DASH/TLC]    - Dysphagia  SLP - evaluation and treatment    Precautions / PROPHYLAXIS:   - Falls, Seizure, Aspiration  - ortho: Weight bearing status: WBAT   - Pressure injury/Skin: Turn Q2hrs while in bed, OOB to Chair, PT/OT      TEAM MEETING 10/27/21  SW:  Lives with wife and daughter involved. has 6STE but has multilevel home with many stairs  OT: min for toilet/grooming, mod for UBD, Max for LBD, total for toileting  PT: Min for transfers, 100 feet with rolling walker and 4 steps with 2 hand rails and min assist   SLP: mod I for comprehension, min for memory, regular diet  barriers: steps at home, easily distracted, dec attention, dec word finding, poor insight   goal: mod I for all ADLs and ambulation  EDOD:  11/9 Home

## 2021-10-28 NOTE — PROGRESS NOTE ADULT - ASSESSMENT
76 year old male with PMH of previous meningioma with crani, HTN, Afib, seizures but not on medication, UC, DM type 2 who presented to [facility] with a Right M2 occlusion and is s/p tpa and thrombectomy. He now has Gait Instability, ADL impairments and Functional impairments and is admitted to [facility] inpatient rehab- pt/ot/dvt ppx    #Right M1 occlusion s/p thrombectomy and tPA  #Left hemiparesis, dysarthria   Atorvastatin     #HTN- metoprolol,    # chronic Afib- Was on ASA 81mg but switched to apixaban    previously failed AC due to GI bleed but due to new stroke, neurology and cardiology advice apixaban used.  GI clearance given at [facility]    #Dm type 2- diet controlled  - A1c is 5.9  - Home med: Jardiance    #Ulcerative Colitis  - c/w Home med mesalamine     #Pain control  - Tylenol PRN    #Gerd-  -Protonix     #DVT ppx  - Apixaban    #hx RLE pressure ulcer   wound care per rehab team.  doppler negative 10/21/21    will follow  d/w dr. chow

## 2021-10-29 PROCEDURE — 99232 SBSQ HOSP IP/OBS MODERATE 35: CPT

## 2021-10-29 RX ADMIN — Medication 25 MILLIGRAM(S): at 17:14

## 2021-10-29 RX ADMIN — Medication 4.8 GRAM(S): at 11:29

## 2021-10-29 RX ADMIN — Medication 1 APPLICATION(S): at 08:42

## 2021-10-29 RX ADMIN — APIXABAN 5 MILLIGRAM(S): 2.5 TABLET, FILM COATED ORAL at 17:14

## 2021-10-29 RX ADMIN — Medication 145 MILLIGRAM(S): at 11:29

## 2021-10-29 RX ADMIN — Medication 25 MILLIGRAM(S): at 05:44

## 2021-10-29 RX ADMIN — ATORVASTATIN CALCIUM 80 MILLIGRAM(S): 80 TABLET, FILM COATED ORAL at 20:38

## 2021-10-29 RX ADMIN — APIXABAN 5 MILLIGRAM(S): 2.5 TABLET, FILM COATED ORAL at 05:44

## 2021-10-29 RX ADMIN — Medication 1 APPLICATION(S): at 17:15

## 2021-10-29 RX ADMIN — SENNA PLUS 2 TABLET(S): 8.6 TABLET ORAL at 20:38

## 2021-10-29 NOTE — PROGRESS NOTE ADULT - ASSESSMENT
76 year old male with PMH of previous meningioma with crani, HTN, Afib, seizures but not on medication, UC, DM type 2 who presented to [facility] with a Right M2 occlusion and is s/p tpa and thrombectomy. He now has Gait Instability, ADL impairments and Functional impairments and is admitted to [facility] inpatient rehab- pt/ot/dvt ppx    #Right M1 occlusion s/p thrombectomy and tPA  #Left hemiparesis, dysarthria   Atorvastatin     #HTN- metoprolol    # chronic Afib-  apixaban    previously failed AC due to GI bleed but due to new stroke, neurology and cardiology advice apixaban used.  GI clearance given at [facility]    #Dm type 2- diet controlled  - A1c is 5.9  - Home med: Jardiance    #Ulcerative Colitis  - c/w Home med mesalamine     #Pain control  - Tylenol PRN    #Gerd-  -Protonix     #DVT ppx  - Apixaban    #hx RLE pressure ulcer   wound care per rehab team.  doppler negative 10/21/21    will follow  d/w dr. chow

## 2021-10-29 NOTE — PROGRESS NOTE ADULT - ASSESSMENT
76 year old male with PMH of previous meningioma with crani, HTN, Afib, seizures but not on medication, UC, DM type 2 who presented to [facility] with a Right M2 occlusion and is s/p tpa and thrombectomy. He now has Gait Instability, ADL impairments and Functional impairments and is admitted to [facility] inpatient rehab- pt/ot/dvt ppx    #Right M1 occlusion s/p thrombectomy and tPA  #Left hemiparesis, dysarthria   Atorvastatin and Apixaban for stroke ppx    #HTN- metoprolol    # chronic Afib-  apixaban    previously failed AC due to GI bleed but due to new stroke,per neurology and cardiology advice - apixaban used.  GI clearance given at [facility]  - Metoprolol for rate control     #Dm type 2- diet controlled  - A1c is 5.9  - Home med: Jardiance    #Ulcerative Colitis  - c/w Home med mesalamine     #Pain control  - Tylenol PRN    # GERD  -Protonix     #DVT ppx  - Apixaban    #hx RLE pressure ulcer   wound care input apprisited .  doppler negative 10/21/21

## 2021-10-30 PROCEDURE — 99232 SBSQ HOSP IP/OBS MODERATE 35: CPT

## 2021-10-30 RX ADMIN — Medication 1 APPLICATION(S): at 05:17

## 2021-10-30 RX ADMIN — Medication 145 MILLIGRAM(S): at 12:47

## 2021-10-30 RX ADMIN — Medication 4.8 GRAM(S): at 12:47

## 2021-10-30 RX ADMIN — Medication 25 MILLIGRAM(S): at 18:29

## 2021-10-30 RX ADMIN — APIXABAN 5 MILLIGRAM(S): 2.5 TABLET, FILM COATED ORAL at 05:17

## 2021-10-30 RX ADMIN — Medication 25 MILLIGRAM(S): at 05:17

## 2021-10-30 RX ADMIN — APIXABAN 5 MILLIGRAM(S): 2.5 TABLET, FILM COATED ORAL at 18:29

## 2021-10-30 RX ADMIN — Medication 1 APPLICATION(S): at 18:30

## 2021-10-30 RX ADMIN — ATORVASTATIN CALCIUM 80 MILLIGRAM(S): 80 TABLET, FILM COATED ORAL at 22:55

## 2021-10-31 PROCEDURE — 99232 SBSQ HOSP IP/OBS MODERATE 35: CPT

## 2021-10-31 RX ADMIN — Medication 25 MILLIGRAM(S): at 18:26

## 2021-10-31 RX ADMIN — Medication 650 MILLIGRAM(S): at 20:49

## 2021-10-31 RX ADMIN — APIXABAN 5 MILLIGRAM(S): 2.5 TABLET, FILM COATED ORAL at 18:26

## 2021-10-31 RX ADMIN — Medication 4.8 GRAM(S): at 12:11

## 2021-10-31 RX ADMIN — ATORVASTATIN CALCIUM 80 MILLIGRAM(S): 80 TABLET, FILM COATED ORAL at 21:07

## 2021-10-31 RX ADMIN — Medication 1 APPLICATION(S): at 21:09

## 2021-10-31 RX ADMIN — APIXABAN 5 MILLIGRAM(S): 2.5 TABLET, FILM COATED ORAL at 06:07

## 2021-10-31 RX ADMIN — Medication 650 MILLIGRAM(S): at 21:49

## 2021-10-31 RX ADMIN — Medication 145 MILLIGRAM(S): at 12:11

## 2021-10-31 RX ADMIN — Medication 1 APPLICATION(S): at 06:07

## 2021-10-31 RX ADMIN — Medication 25 MILLIGRAM(S): at 06:07

## 2021-11-01 LAB
ALBUMIN SERPL ELPH-MCNC: 3.2 G/DL — LOW (ref 3.3–5)
ALP SERPL-CCNC: 63 U/L — SIGNIFICANT CHANGE UP (ref 40–120)
ALT FLD-CCNC: 18 U/L — SIGNIFICANT CHANGE UP (ref 10–45)
ANION GAP SERPL CALC-SCNC: 10 MMOL/L — SIGNIFICANT CHANGE UP (ref 5–17)
AST SERPL-CCNC: 26 U/L — SIGNIFICANT CHANGE UP (ref 10–40)
BILIRUB SERPL-MCNC: 0.6 MG/DL — SIGNIFICANT CHANGE UP (ref 0.2–1.2)
BUN SERPL-MCNC: 22 MG/DL — SIGNIFICANT CHANGE UP (ref 7–23)
CALCIUM SERPL-MCNC: 9.2 MG/DL — SIGNIFICANT CHANGE UP (ref 8.4–10.5)
CHLORIDE SERPL-SCNC: 106 MMOL/L — SIGNIFICANT CHANGE UP (ref 96–108)
CO2 SERPL-SCNC: 22 MMOL/L — SIGNIFICANT CHANGE UP (ref 22–31)
CREAT SERPL-MCNC: 1.07 MG/DL — SIGNIFICANT CHANGE UP (ref 0.5–1.3)
GLUCOSE SERPL-MCNC: 117 MG/DL — HIGH (ref 70–99)
HCT VFR BLD CALC: 41.2 % — SIGNIFICANT CHANGE UP (ref 39–50)
HGB BLD-MCNC: 13.9 G/DL — SIGNIFICANT CHANGE UP (ref 13–17)
MCHC RBC-ENTMCNC: 31.3 PG — SIGNIFICANT CHANGE UP (ref 27–34)
MCHC RBC-ENTMCNC: 33.7 GM/DL — SIGNIFICANT CHANGE UP (ref 32–36)
MCV RBC AUTO: 92.8 FL — SIGNIFICANT CHANGE UP (ref 80–100)
NRBC # BLD: 0 /100 WBCS — SIGNIFICANT CHANGE UP (ref 0–0)
PLATELET # BLD AUTO: 237 K/UL — SIGNIFICANT CHANGE UP (ref 150–400)
POTASSIUM SERPL-MCNC: 3.8 MMOL/L — SIGNIFICANT CHANGE UP (ref 3.5–5.3)
POTASSIUM SERPL-SCNC: 3.8 MMOL/L — SIGNIFICANT CHANGE UP (ref 3.5–5.3)
PROT SERPL-MCNC: 6.7 G/DL — SIGNIFICANT CHANGE UP (ref 6–8.3)
RBC # BLD: 4.44 M/UL — SIGNIFICANT CHANGE UP (ref 4.2–5.8)
RBC # FLD: 13.6 % — SIGNIFICANT CHANGE UP (ref 10.3–14.5)
SARS-COV-2 RNA SPEC QL NAA+PROBE: SIGNIFICANT CHANGE UP
SODIUM SERPL-SCNC: 138 MMOL/L — SIGNIFICANT CHANGE UP (ref 135–145)
WBC # BLD: 6.43 K/UL — SIGNIFICANT CHANGE UP (ref 3.8–10.5)
WBC # FLD AUTO: 6.43 K/UL — SIGNIFICANT CHANGE UP (ref 3.8–10.5)

## 2021-11-01 PROCEDURE — 99232 SBSQ HOSP IP/OBS MODERATE 35: CPT

## 2021-11-01 RX ADMIN — APIXABAN 5 MILLIGRAM(S): 2.5 TABLET, FILM COATED ORAL at 17:44

## 2021-11-01 RX ADMIN — Medication 4.8 GRAM(S): at 12:43

## 2021-11-01 RX ADMIN — Medication 650 MILLIGRAM(S): at 06:06

## 2021-11-01 RX ADMIN — Medication 650 MILLIGRAM(S): at 05:06

## 2021-11-01 RX ADMIN — Medication 650 MILLIGRAM(S): at 20:59

## 2021-11-01 RX ADMIN — APIXABAN 5 MILLIGRAM(S): 2.5 TABLET, FILM COATED ORAL at 05:06

## 2021-11-01 RX ADMIN — Medication 25 MILLIGRAM(S): at 05:06

## 2021-11-01 RX ADMIN — Medication 145 MILLIGRAM(S): at 12:43

## 2021-11-01 RX ADMIN — Medication 25 MILLIGRAM(S): at 17:44

## 2021-11-01 RX ADMIN — ATORVASTATIN CALCIUM 80 MILLIGRAM(S): 80 TABLET, FILM COATED ORAL at 21:07

## 2021-11-01 RX ADMIN — Medication 650 MILLIGRAM(S): at 19:59

## 2021-11-01 RX ADMIN — Medication 1 APPLICATION(S): at 05:07

## 2021-11-01 RX ADMIN — Medication 1 APPLICATION(S): at 17:44

## 2021-11-01 NOTE — PROGRESS NOTE ADULT - ASSESSMENT
76 year old male with PMH of previous meningioma with crani, HTN, Afib, seizures but not on medication, UC, DM type 2 who presented to [facility] with a Right M2 occlusion and is s/p tpa and thrombectomy. He now has Gait Instability, ADL impairments and Functional impairments and is admitted to [facility] inpatient rehab.     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  - GI clearance given at [facility]  - If patient does not tolerate AC, could possibly be a candidate for watchman procedure as per cardiology  - Continue Apixaban 5mg BID  - Continue metoprolol 25mg BID    #Dm type 2  - A1c is 5.9  - Home med: Jardiance  - Will monitor serum glucose     #Ulcerative Colitis  - Home med: mesalamine 1.2 gram - 4 tabs daily  -Restart mesalamine   - Monitor BM    #Pain control  - Tylenol PRN    #GI/Bowel Mgmt   - Continue Senna  - Miralax PRN  -Protonix 40mg daily     #Bladder management  - monitor UO    #DVT  - Apixaban 5mg BID  - SCDs, TEDs     #hx RLE pressure ulcer   - per pt, in march 2020, diuretic was dc'd, then developed worsening LE edema, tight shoes which caused pressure injury to R ankle and cellulitis tx with PO abx and followed with wound care at [facility].  -Mild dry skin to BLE   -Continue aquaphor to BLE, abd pad and kerlix dressing daily.   - Wound care consult appreciated - Continue aquaphor  -Continue tylenol for discomfort    FEN   - Diet - Regular + Thins  [CCHO, DASH/TLC]    - Dysphagia  SLP - evaluation and treatment    Precautions / PROPHYLAXIS:   - Falls, Seizure, Aspiration  - ortho: Weight bearing status: WBAT   - Pressure injury/Skin: Turn Q2hrs while in bed, OOB to Chair, PT/OT      TEAM MEETING 10/27/21  SW:  Lives with wife and daughter involved. has 6STE but has multilevel home with many stairs  OT: min for toilet/grooming, mod for UBD, Max for LBD, total for toileting  PT: Min for transfers, 100 feet with rolling walker and 4 steps with 2 hand rails and min assist   SLP: mod I for comprehension, min for memory, regular diet  barriers: steps at home, easily distracted, dec attention, dec word finding, poor insight   goal: mod I for all ADLs and ambulation  EDOD:  11/9 Home

## 2021-11-02 PROCEDURE — 99232 SBSQ HOSP IP/OBS MODERATE 35: CPT

## 2021-11-02 RX ORDER — ZINC OXIDE 200 MG/G
1 OINTMENT TOPICAL
Refills: 0 | Status: DISCONTINUED | OUTPATIENT
Start: 2021-11-02 | End: 2021-11-09

## 2021-11-02 RX ADMIN — Medication 1 APPLICATION(S): at 05:10

## 2021-11-02 RX ADMIN — ATORVASTATIN CALCIUM 80 MILLIGRAM(S): 80 TABLET, FILM COATED ORAL at 20:35

## 2021-11-02 RX ADMIN — Medication 145 MILLIGRAM(S): at 12:34

## 2021-11-02 RX ADMIN — Medication 1 APPLICATION(S): at 17:48

## 2021-11-02 RX ADMIN — SENNA PLUS 2 TABLET(S): 8.6 TABLET ORAL at 20:35

## 2021-11-02 RX ADMIN — ZINC OXIDE 1 APPLICATION(S): 200 OINTMENT TOPICAL at 17:48

## 2021-11-02 RX ADMIN — Medication 25 MILLIGRAM(S): at 05:10

## 2021-11-02 RX ADMIN — Medication 25 MILLIGRAM(S): at 17:47

## 2021-11-02 RX ADMIN — APIXABAN 5 MILLIGRAM(S): 2.5 TABLET, FILM COATED ORAL at 05:10

## 2021-11-02 RX ADMIN — Medication 4.8 GRAM(S): at 12:34

## 2021-11-02 RX ADMIN — APIXABAN 5 MILLIGRAM(S): 2.5 TABLET, FILM COATED ORAL at 17:48

## 2021-11-02 NOTE — CHART NOTE - NSCHARTNOTEFT_GEN_A_CORE
75 y/o male admitted to [facility]U s/p stroke. GI following patient due to h/o ulcerative colitis and initiation of anticoagulation. H/H remains stable. No reports of GI bleeding noted. Will sign off. Please reconsult as needed.
NUTRITION FOLLOW UP    SOURCE: Patient [X)    Medical Record (X)    DIET: Regular. Consistent Carb and DASH/TLC   Pt is tolerating diet well and reports excellent appetite. Pt reported difficulty cutting up some foods (meat) but still consumes % of meals. Pt reports no chewing or swallowing difficulties. Pt denies GI distress.     PATIENT REPORT:  [X] other: no GI distress    PO INTAKE: %  [X]      Enteral Nutrition : Not Applicable    CURRENT WEIGHT:  172 lbs - (11/01)  172 lbs - (10/26)    PERTINENT MEDS:   Pertinent Medications: MEDICATIONS  (STANDING):  apixaban 5 milliGRAM(s) Oral every 12 hours  AQUAPHOR (petrolatum Ointment) 1 Application(s) Topical two times a day  atorvastatin 80 milliGRAM(s) Oral at bedtime  fenofibrate Tablet 145 milliGRAM(s) Oral daily  mesalamine DR (24-Hour) Tablet 4.8 Gram(s) Oral daily  metoprolol tartrate 25 milliGRAM(s) Oral two times a day  polyethylene glycol 3350 17 Gram(s) Oral daily  senna 2 Tablet(s) Oral at bedtime    MEDICATIONS  (PRN):  acetaminophen     Tablet .. 650 milliGRAM(s) Oral every 6 hours PRN Temp greater or equal to 38C (100.4F), Mild Pain (1 - 3)      PERTINENT LABS:  11-01 Na138 mmol/L Glu 117 mg/dL<H> K+ 3.8 mmol/L Cr  1.07 mg/dL BUN 22 mg/dL 11-01 Alb 3.2 g/dL<L> 10-21 Chol 149 mg/dL LDL --    HDL 57 mg/dL Trig 69 mg/dL    SKIN: Intact     EDEMA: None noted.     LAST BM: on 11/01    ESTIMATED NEEDS:   [X] no change since previous assessment    PREVIOUS NUTRITION DIAGNOSIS:    Unintended weight loss.     NUTRITION DIAGNOSIS is :  (X)  Ongoing      (    ) Resolved,  RD will follow as per nutrition department protocol.    NEW NUTRITION DIAGNOSIS: N/A    NUTRITION RECOMMENDATIONS:   Continue current Nutrition Care Plan.     MONITORING AND EVALUATION:   1. Tolerance to diet prescription   2. PO intake  3. Weights  4. Labs  5. Follow Up per protocol     Laisha Cleveland  Dietetic Intern
75 y/o male admitted to BIU. Attempted to see patient as new consult for h/o GI bleed and anticoagulation. Patient was with other provider at the time. Will see later or early tomorrow.

## 2021-11-02 NOTE — PROGRESS NOTE ADULT - ASSESSMENT
76 year old male with PMH of previous meningioma with crani, HTN, Afib, seizures but not on medication, UC, DM type 2 who presented to [facility] with a Right M2 occlusion and is s/p tpa and thrombectomy. He now has Gait Instability, ADL impairments and Functional impairments and is admitted to [facility] inpatient rehab.     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  - GI clearance given at [facility]  - If patient does not tolerate AC, could possibly be a candidate for watchman procedure as per cardiology  - Continue Apixaban 5mg BID  - Continue metoprolol 25mg BID    #Dm type 2  - A1c is 5.9  - Home med: Jardiance  - Will monitor serum glucose     #Ulcerative Colitis  - Home med: mesalamine 1.2 gram - 4 tabs daily  -Restart mesalamine   - Monitor BM  - GI consult appreciated     #Pain control  - Tylenol PRN    #GI/Bowel Mgmt   - Continue Senna  - Miralax PRN  -Protonix 40mg daily     #Bladder management  - monitor UO    #DVT  - Apixaban 5mg BID  - SCDs, TEDs     #hx RLE pressure ulcer   - per pt, in march 2020, diuretic was dc'd, then developed worsening LE edema, tight shoes which caused pressure injury to R ankle and cellulitis tx with PO abx and followed with wound care at [facility].  -Mild dry skin to BLE   -Continue aquaphor to BLE, abd pad and kerlix dressing daily.   - Wound care consult appreciated - Continue aquaphor  -Continue tylenol for discomfort    FEN   - Diet - Regular + Thins  [CCHO, DASH/TLC]    - Dysphagia  SLP - evaluation and treatment    Precautions / PROPHYLAXIS:   - Falls, Seizure, Aspiration  - ortho: Weight bearing status: WBAT   - Pressure injury/Skin: Turn Q2hrs while in bed, OOB to Chair, PT/OT      TEAM MEETING 10/27/21  SW:  Lives with wife and daughter involved. has 6STE but has multilevel home with many stairs  OT: min for toilet/grooming, mod for UBD, Max for LBD, total for toileting  PT: Min for transfers, 100 feet with rolling walker and 4 steps with 2 hand rails and min assist   SLP: mod I for comprehension, min for memory, regular diet  barriers: steps at home, easily distracted, dec attention, dec word finding, poor insight   goal: mod I for all ADLs and ambulation  EDOD:  11/9 Home

## 2021-11-03 PROCEDURE — 99232 SBSQ HOSP IP/OBS MODERATE 35: CPT

## 2021-11-03 RX ADMIN — Medication 145 MILLIGRAM(S): at 12:01

## 2021-11-03 RX ADMIN — Medication 25 MILLIGRAM(S): at 17:38

## 2021-11-03 RX ADMIN — Medication 1 APPLICATION(S): at 05:17

## 2021-11-03 RX ADMIN — SENNA PLUS 2 TABLET(S): 8.6 TABLET ORAL at 22:38

## 2021-11-03 RX ADMIN — Medication 4.8 GRAM(S): at 12:01

## 2021-11-03 RX ADMIN — APIXABAN 5 MILLIGRAM(S): 2.5 TABLET, FILM COATED ORAL at 17:38

## 2021-11-03 RX ADMIN — ZINC OXIDE 1 APPLICATION(S): 200 OINTMENT TOPICAL at 05:18

## 2021-11-03 RX ADMIN — Medication 25 MILLIGRAM(S): at 05:17

## 2021-11-03 RX ADMIN — ZINC OXIDE 1 APPLICATION(S): 200 OINTMENT TOPICAL at 17:39

## 2021-11-03 RX ADMIN — Medication 1 APPLICATION(S): at 17:38

## 2021-11-03 RX ADMIN — ATORVASTATIN CALCIUM 80 MILLIGRAM(S): 80 TABLET, FILM COATED ORAL at 22:38

## 2021-11-03 RX ADMIN — APIXABAN 5 MILLIGRAM(S): 2.5 TABLET, FILM COATED ORAL at 05:16

## 2021-11-03 NOTE — PROGRESS NOTE ADULT - ASSESSMENT
76 year old male with PMH of previous meningioma with crani, HTN, Afib, seizures but not on medication, UC, DM type 2 who presented to [facility] with a Right M2 occlusion and is s/p tpa and thrombectomy. He now has Gait Instability, ADL impairments and Functional impairments and is admitted to [facility] inpatient rehab.     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  - GI clearance given at [facility]  - If patient does not tolerate AC, could possibly be a candidate for watchman procedure as per cardiology  - Continue Apixaban 5mg BID  - Continue metoprolol 25mg BID    #Dm type 2  - A1c is 5.9  - Home med: Jardiance  - Will monitor serum glucose     #Ulcerative Colitis  - Home med: mesalamine 1.2 gram - 4 tabs daily  -Restart mesalamine   - Monitor BM  - GI consult appreciated     #Pain control  - Tylenol PRN    #GI/Bowel Mgmt   - Continue Senna  - Miralax PRN  -Protonix 40mg daily     #Bladder management  - monitor UO    #DVT  - Apixaban 5mg BID  - SCDs, TEDs     #hx RLE pressure ulcer   - per pt, in march 2020, diuretic was dc'd, then developed worsening LE edema, tight shoes which caused pressure injury to R ankle and cellulitis tx with PO abx and followed with wound care at [facility].  -Mild dry skin to BLE   -Continue aquaphor to BLE, abd pad and kerlix dressing daily.   - Wound care consult appreciated - Continue aquaphor  -Continue tylenol for discomfort    #Skin  - Desitin ordered for buttocks due to incontinence associated dermatitis.   -Improving     FEN   - Diet - Regular + Thins  [CCHO, DASH/TLC]    - Dysphagia  SLP - evaluation and treatment    Precautions / PROPHYLAXIS:   - Falls, Seizure, Aspiration  - ortho: Weight bearing status: WBAT   - Pressure injury/Skin: Turn Q2hrs while in bed, OOB to Chair, PT/OT      TEAM MEETING 10/27/21  SW:  Lives with wife and daughter involved. has 6STE but has multilevel home with many stairs  OT: min for toilet/grooming, mod for UBD, Max for LBD, total for toileting  PT: Min for transfers, 100 feet with rolling walker and 4 steps with 2 hand rails and min assist   SLP: mod I for comprehension, min for memory, regular diet  barriers: steps at home, easily distracted, dec attention, dec word finding, poor insight   goal: mod I for all ADLs and ambulation  EDOD:  11/9 Home      76 year old male with PMH of previous meningioma with crani, HTN, Afib, seizures but not on medication, UC, DM type 2 who presented to [facility] with a Right M2 occlusion and is s/p tpa and thrombectomy. He now has Gait Instability, ADL impairments and Functional impairments and is admitted to [facility] inpatient rehab.     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  - GI clearance given at [facility]  - If patient does not tolerate AC, could possibly be a candidate for watchman procedure as per cardiology  - Continue Apixaban 5mg BID  - Continue metoprolol 25mg BID    #Dm type 2  - A1c is 5.9  - Home med: Jardiance  - Will monitor serum glucose     #Ulcerative Colitis  - Home med: mesalamine 1.2 gram - 4 tabs daily  -Restart mesalamine   - Monitor BM  - GI consult appreciated     #Pain control  - Tylenol PRN    #GI/Bowel Mgmt   - Continue Senna  - Miralax PRN  -Protonix 40mg daily     #Bladder management  - monitor UO    #DVT  - Apixaban 5mg BID  - SCDs, TEDs     #hx RLE pressure ulcer   - per pt, in march 2020, diuretic was dc'd, then developed worsening LE edema, tight shoes which caused pressure injury to R ankle and cellulitis tx with PO abx and followed with wound care at [facility].  -Mild dry skin to BLE   -Continue aquaphor to BLE, abd pad and kerlix dressing daily.   - Wound care consult appreciated - Continue aquaphor  -Continue tylenol for discomfort    #Skin  - Desitin ordered for buttocks due to incontinence associated dermatitis.   -Improving     FEN   - Diet - Regular + Thins  [CCHO, DASH/TLC]    - Dysphagia  SLP - evaluation and treatment    Precautions / PROPHYLAXIS:   - Falls, Seizure, Aspiration  - ortho: Weight bearing status: WBAT   - Pressure injury/Skin: Turn Q2hrs while in bed, OOB to Chair, PT/OT      TEAM MEETING 11/3/21  SW:  Lives with wife and daughter involved. has 6STE but has multilevel home with many stairs  OT: set up for eating/grooming, min for bathing and UBD, max for LBD and toileting   PT: SV for transfers, 100 feet with rolling walker and 4 steps with 2 hand rails and min assist   SLP: mod I for comprehension, min for memory, regular diet  barriers: easily distracted, dec attention, dec word finding, poor insight, dec prob solving, dec safety awareness, higher cog deficits    goal: Goals adjusted for Sv for ambulation and mod I for all ADLs   EDOD:  11/9 Home

## 2021-11-04 LAB
ALBUMIN SERPL ELPH-MCNC: 3.4 G/DL — SIGNIFICANT CHANGE UP (ref 3.3–5)
ALP SERPL-CCNC: 57 U/L — SIGNIFICANT CHANGE UP (ref 40–120)
ALT FLD-CCNC: 18 U/L — SIGNIFICANT CHANGE UP (ref 10–45)
ANION GAP SERPL CALC-SCNC: 9 MMOL/L — SIGNIFICANT CHANGE UP (ref 5–17)
AST SERPL-CCNC: 23 U/L — SIGNIFICANT CHANGE UP (ref 10–40)
BILIRUB SERPL-MCNC: 0.6 MG/DL — SIGNIFICANT CHANGE UP (ref 0.2–1.2)
BUN SERPL-MCNC: 24 MG/DL — HIGH (ref 7–23)
CALCIUM SERPL-MCNC: 9.2 MG/DL — SIGNIFICANT CHANGE UP (ref 8.4–10.5)
CHLORIDE SERPL-SCNC: 107 MMOL/L — SIGNIFICANT CHANGE UP (ref 96–108)
CO2 SERPL-SCNC: 26 MMOL/L — SIGNIFICANT CHANGE UP (ref 22–31)
CREAT SERPL-MCNC: 1.21 MG/DL — SIGNIFICANT CHANGE UP (ref 0.5–1.3)
GLUCOSE SERPL-MCNC: 116 MG/DL — HIGH (ref 70–99)
HCT VFR BLD CALC: 42.1 % — SIGNIFICANT CHANGE UP (ref 39–50)
HGB BLD-MCNC: 13.9 G/DL — SIGNIFICANT CHANGE UP (ref 13–17)
MCHC RBC-ENTMCNC: 30.1 PG — SIGNIFICANT CHANGE UP (ref 27–34)
MCHC RBC-ENTMCNC: 33 GM/DL — SIGNIFICANT CHANGE UP (ref 32–36)
MCV RBC AUTO: 91.1 FL — SIGNIFICANT CHANGE UP (ref 80–100)
NRBC # BLD: 0 /100 WBCS — SIGNIFICANT CHANGE UP (ref 0–0)
PLATELET # BLD AUTO: 238 K/UL — SIGNIFICANT CHANGE UP (ref 150–400)
POTASSIUM SERPL-MCNC: 4.2 MMOL/L — SIGNIFICANT CHANGE UP (ref 3.5–5.3)
POTASSIUM SERPL-SCNC: 4.2 MMOL/L — SIGNIFICANT CHANGE UP (ref 3.5–5.3)
PROT SERPL-MCNC: 6.9 G/DL — SIGNIFICANT CHANGE UP (ref 6–8.3)
RBC # BLD: 4.62 M/UL — SIGNIFICANT CHANGE UP (ref 4.2–5.8)
RBC # FLD: 13.5 % — SIGNIFICANT CHANGE UP (ref 10.3–14.5)
SODIUM SERPL-SCNC: 142 MMOL/L — SIGNIFICANT CHANGE UP (ref 135–145)
WBC # BLD: 6.72 K/UL — SIGNIFICANT CHANGE UP (ref 3.8–10.5)
WBC # FLD AUTO: 6.72 K/UL — SIGNIFICANT CHANGE UP (ref 3.8–10.5)

## 2021-11-04 PROCEDURE — 99232 SBSQ HOSP IP/OBS MODERATE 35: CPT

## 2021-11-04 RX ADMIN — Medication 1 APPLICATION(S): at 17:17

## 2021-11-04 RX ADMIN — Medication 4.8 GRAM(S): at 11:10

## 2021-11-04 RX ADMIN — POLYETHYLENE GLYCOL 3350 17 GRAM(S): 17 POWDER, FOR SOLUTION ORAL at 11:10

## 2021-11-04 RX ADMIN — Medication 1 APPLICATION(S): at 05:49

## 2021-11-04 RX ADMIN — Medication 25 MILLIGRAM(S): at 05:50

## 2021-11-04 RX ADMIN — Medication 145 MILLIGRAM(S): at 11:10

## 2021-11-04 RX ADMIN — SENNA PLUS 2 TABLET(S): 8.6 TABLET ORAL at 21:11

## 2021-11-04 RX ADMIN — ZINC OXIDE 1 APPLICATION(S): 200 OINTMENT TOPICAL at 21:12

## 2021-11-04 RX ADMIN — ATORVASTATIN CALCIUM 80 MILLIGRAM(S): 80 TABLET, FILM COATED ORAL at 21:11

## 2021-11-04 RX ADMIN — ZINC OXIDE 1 APPLICATION(S): 200 OINTMENT TOPICAL at 05:56

## 2021-11-04 RX ADMIN — APIXABAN 5 MILLIGRAM(S): 2.5 TABLET, FILM COATED ORAL at 05:50

## 2021-11-04 RX ADMIN — APIXABAN 5 MILLIGRAM(S): 2.5 TABLET, FILM COATED ORAL at 17:18

## 2021-11-04 RX ADMIN — Medication 25 MILLIGRAM(S): at 17:18

## 2021-11-04 NOTE — PROGRESS NOTE ADULT - ASSESSMENT
76 year old male with PMH of previous meningioma with crani, HTN, Afib, seizures but not on medication, UC, DM type 2 who presented to [facility] with a Right M2 occlusion and is s/p tpa and thrombectomy. He now has Gait Instability, ADL impairments and Functional impairments and is admitted to [facility] inpatient rehab.     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  - GI clearance given at [facility]  - If patient does not tolerate AC, could possibly be a candidate for watchman procedure as per cardiology  - Continue Apixaban 5mg BID  - Continue metoprolol 25mg BID    #Dm type 2  - A1c is 5.9  - Home med: Jardiance  - Will monitor serum glucose     #Ulcerative Colitis  - Home med: mesalamine 1.2 gram - 4 tabs daily  -Restart mesalamine   - Monitor BM  - GI consult appreciated     #Pain control  - Tylenol PRN    #GI/Bowel Mgmt   - Continue Senna  - Miralax PRN  -Protonix 40mg daily     #Bladder management  - monitor UO    #DVT  - Apixaban 5mg BID  - SCDs, TEDs     #hx RLE pressure ulcer   - per pt, in march 2020, diuretic was dc'd, then developed worsening LE edema, tight shoes which caused pressure injury to R ankle and cellulitis tx with PO abx and followed with wound care at [facility].  -Mild dry skin to BLE   -Continue aquaphor to BLE, abd pad and kerlix dressing daily.   - Wound care consult appreciated - Continue aquaphor  -Continue tylenol for discomfort    #Skin  - Desitin ordered for buttocks due to incontinence associated dermatitis.   -Improving     FEN   - Diet - Regular + Thins  [CCHO, DASH/TLC]    - Dysphagia  SLP - evaluation and treatment    Precautions / PROPHYLAXIS:   - Falls, Seizure, Aspiration  - ortho: Weight bearing status: WBAT   - Pressure injury/Skin: Turn Q2hrs while in bed, OOB to Chair, PT/OT      TEAM MEETING 11/3/21  SW:  Lives with wife and daughter involved. has 6STE but has multilevel home with many stairs  OT: set up for eating/grooming, min for bathing and UBD, max for LBD and toileting   PT: SV for transfers, 100 feet with rolling walker and 4 steps with 2 hand rails and min assist   SLP: mod I for comprehension, min for memory, regular diet  barriers: easily distracted, dec attention, dec word finding, poor insight, dec prob solving, dec safety awareness, higher cog deficits    goal: Goals adjusted for Sv for ambulation and mod I for all ADLs   EDOD:  11/9 Home

## 2021-11-05 ENCOUNTER — TRANSCRIPTION ENCOUNTER (OUTPATIENT)
Age: 76
End: 2021-11-05

## 2021-11-05 LAB
APPEARANCE UR: CLEAR — SIGNIFICANT CHANGE UP
BILIRUB UR-MCNC: NEGATIVE — SIGNIFICANT CHANGE UP
COLOR SPEC: YELLOW — SIGNIFICANT CHANGE UP
DIFF PNL FLD: ABNORMAL
EPI CELLS # UR: SIGNIFICANT CHANGE UP
GLUCOSE UR QL: NEGATIVE — SIGNIFICANT CHANGE UP
HYALINE CASTS # UR AUTO: NEGATIVE — SIGNIFICANT CHANGE UP
KETONES UR-MCNC: ABNORMAL
LEUKOCYTE ESTERASE UR-ACNC: NEGATIVE — SIGNIFICANT CHANGE UP
NITRITE UR-MCNC: NEGATIVE — SIGNIFICANT CHANGE UP
PH UR: 5 — SIGNIFICANT CHANGE UP (ref 5–8)
PROT UR-MCNC: 30 MG/DL
RBC CASTS # UR COMP ASSIST: ABNORMAL /HPF (ref 0–4)
SP GR SPEC: 1.02 — SIGNIFICANT CHANGE UP (ref 1.01–1.02)
UROBILINOGEN FLD QL: NEGATIVE — SIGNIFICANT CHANGE UP
WBC UR QL: NEGATIVE /HPF — SIGNIFICANT CHANGE UP (ref 0–5)

## 2021-11-05 PROCEDURE — 99232 SBSQ HOSP IP/OBS MODERATE 35: CPT

## 2021-11-05 PROCEDURE — 70450 CT HEAD/BRAIN W/O DYE: CPT | Mod: 26

## 2021-11-05 RX ADMIN — Medication 25 MILLIGRAM(S): at 17:15

## 2021-11-05 RX ADMIN — ZINC OXIDE 1 APPLICATION(S): 200 OINTMENT TOPICAL at 06:21

## 2021-11-05 RX ADMIN — APIXABAN 5 MILLIGRAM(S): 2.5 TABLET, FILM COATED ORAL at 17:15

## 2021-11-05 RX ADMIN — Medication 1 APPLICATION(S): at 06:20

## 2021-11-05 RX ADMIN — ATORVASTATIN CALCIUM 80 MILLIGRAM(S): 80 TABLET, FILM COATED ORAL at 22:08

## 2021-11-05 RX ADMIN — Medication 145 MILLIGRAM(S): at 11:29

## 2021-11-05 RX ADMIN — SENNA PLUS 2 TABLET(S): 8.6 TABLET ORAL at 22:08

## 2021-11-05 RX ADMIN — Medication 1 APPLICATION(S): at 17:15

## 2021-11-05 RX ADMIN — Medication 4.8 GRAM(S): at 11:29

## 2021-11-05 RX ADMIN — APIXABAN 5 MILLIGRAM(S): 2.5 TABLET, FILM COATED ORAL at 06:21

## 2021-11-05 RX ADMIN — ZINC OXIDE 1 APPLICATION(S): 200 OINTMENT TOPICAL at 20:38

## 2021-11-05 RX ADMIN — Medication 25 MILLIGRAM(S): at 06:22

## 2021-11-05 NOTE — PROGRESS NOTE ADULT - ASSESSMENT
76 year old male with PMH of previous meningioma with crani, HTN, Afib, seizures but not on medication, UC, DM type 2 who presented to [facility] with a Right M2 occlusion and is s/p tpa and thrombectomy. He now has Gait Instability, ADL impairments and Functional impairments and is admitted to [facility] inpatient rehab.     #Right M1 occlusion s/p thrombectomy and tPA  #Left hemiparesis, dysarthria  - Gait Instability, ADL impairments and Functional impairments  - Continue Comprehensive Rehab Program of PT/OT/SLP  - Continue Atorvastatin 80mg    #HTN/Afib  - Home med: amlodipine, metoprolol, HCTZ  - Was on ASA 81mg but switched to apixaban 5mg BID   - Had previously failed AC due to GI bleed but due to new stroke, neurology and cardiology want apixaban used.  - GI clearance given at [facility]  - If patient does not tolerate AC, could possibly be a candidate for watchman procedure as per cardiology  - Continue Apixaban 5mg BID  - Continue metoprolol 25mg BID    #Dm type 2  - A1c is 5.9  - Home med: Jardiance  - Will monitor serum glucose     #Ulcerative Colitis  - Home med: mesalamine 1.2 gram - 4 tabs daily  -Restart mesalamine   - Monitor BM  - GI consult appreciated     #Pain control  - Tylenol PRN    #GI/Bowel Mgmt   - Continue Senna  - Miralax PRN  -Protonix 40mg daily     #Bladder management  - monitor UO    #DVT  - Apixaban 5mg BID  - SCDs, TEDs     #hx RLE pressure ulcer - redness improving   - per pt, in march 2020, diuretic was dc'd, then developed worsening LE edema, tight shoes which caused pressure injury to R ankle and cellulitis tx with PO abx and followed with wound care at [facility].  -Mild dry skin to BLE   -Continue aquaphor to BLE, abd pad and kerlix dressing daily.   - Wound care consult appreciated - Continue aquaphor  -Continue tylenol for discomfort    #Skin  - Desitin ordered for buttocks due to incontinence associated dermatitis.   -Improving     FEN   - Diet - Regular + Thins  [CCHO, DASH/TLC]    - Dysphagia  SLP - evaluation and treatment    Precautions / PROPHYLAXIS:   - Falls, Seizure, Aspiration  - ortho: Weight bearing status: WBAT   - Pressure injury/Skin: Turn Q2hrs while in bed, OOB to Chair, PT/OT      TEAM MEETING 11/3/21  SW:  Lives with wife and daughter involved. has 6STE but has multilevel home with many stairs  OT: set up for eating/grooming, min for bathing and UBD, max for LBD and toileting   PT: SV for transfers, 100 feet with rolling walker and 4 steps with 2 hand rails and min assist   SLP: mod I for comprehension, min for memory, regular diet  barriers: easily distracted, dec attention, dec word finding, poor insight, dec prob solving, dec safety awareness, higher cog deficits    goal: Goals adjusted for Sv for ambulation and mod I for all ADLs   EDOD:  11/9 Home      76 year old male with PMH of previous meningioma with crani, HTN, Afib, seizures but not on medication, UC, DM type 2 who presented to [facility] with a Right M2 occlusion and is s/p tpa and thrombectomy. He now has Gait Instability, ADL impairments and Functional impairments and is admitted to [facility] inpatient rehab.     #Right M1 occlusion s/p thrombectomy and tPA  #Left hemiparesis, dysarthria  - Gait Instability, ADL impairments and Functional impairments  - Continue Comprehensive Rehab Program of PT/OT/SLP  - Continue Atorvastatin 80mg  -Repeat CTH 11/5 due to confusion - shows stability of stroke   -UA pending to rule out infectious source of confusion    #HTN/Afib  - Home med: amlodipine, metoprolol, HCTZ  - Was on ASA 81mg but switched to apixaban 5mg BID   - Had previously failed AC due to GI bleed but due to new stroke, neurology and cardiology want apixaban used.  - GI clearance given at [facility]  - If patient does not tolerate AC, could possibly be a candidate for watchman procedure as per cardiology  - Continue Apixaban 5mg BID  - Continue metoprolol 25mg BID    #Dm type 2  - A1c is 5.9  - Home med: Jardiance  - Will monitor serum glucose     #Ulcerative Colitis  - Home med: mesalamine 1.2 gram - 4 tabs daily  -Restart mesalamine   - Monitor BM  - GI consult appreciated     #Pain control  - Tylenol PRN    #GI/Bowel Mgmt   - Continue Senna  - Miralax PRN  -Protonix 40mg daily     #Bladder management  - monitor UO    #DVT  - Apixaban 5mg BID  - SCDs, TEDs     #hx RLE pressure ulcer - redness improving   - per pt, in march 2020, diuretic was dc'd, then developed worsening LE edema, tight shoes which caused pressure injury to R ankle and cellulitis tx with PO abx and followed with wound care at [facility].  -Mild dry skin to BLE   -Continue aquaphor to BLE, abd pad and kerlix dressing daily.   - Wound care consult appreciated - Continue aquaphor  -Continue tylenol for discomfort    #Skin  - Desitin ordered for buttocks due to incontinence associated dermatitis.   -Improving     FEN   - Diet - Regular + Thins  [CCHO, DASH/TLC]    - Dysphagia  SLP - evaluation and treatment    Precautions / PROPHYLAXIS:   - Falls, Seizure, Aspiration  - ortho: Weight bearing status: WBAT   - Pressure injury/Skin: Turn Q2hrs while in bed, OOB to Chair, PT/OT      TEAM MEETING 11/3/21  SW:  Lives with wife and daughter involved. has 6STE but has multilevel home with many stairs  OT: set up for eating/grooming, min for bathing and UBD, max for LBD and toileting   PT: SV for transfers, 100 feet with rolling walker and 4 steps with 2 hand rails and min assist   SLP: mod I for comprehension, min for memory, regular diet  barriers: easily distracted, dec attention, dec word finding, poor insight, dec prob solving, dec safety awareness, higher cog deficits    goal: Goals adjusted for Sv for ambulation and mod I for all ADLs   EDOD:  11/9 Home

## 2021-11-05 NOTE — DISCHARGE NOTE NURSING/CASE MANAGEMENT/SOCIAL WORK - PATIENT PORTAL LINK FT
You can access the FollowMyHealth Patient Portal offered by [facility] by registering at the following website: [email or web address]. By joining [employer]’s FollowMyHealth portal, you will also be able to view your health information using other applications (apps) compatible with our system.

## 2021-11-05 NOTE — DISCHARGE NOTE NURSING/CASE MANAGEMENT/SOCIAL WORK - NSDCDMETYPESERV_GEN_ALL_CORE_FT
Patient has a 20% copay for the RW and commode $10.20 and the shower chair is $45. A message was left by vendor on [phone] and [phone]

## 2021-11-05 NOTE — DISCHARGE NOTE NURSING/CASE MANAGEMENT/SOCIAL WORK - NSSCCONTNUM_GEN_ALL_CORE
[phone] Note: Please call them directly to schedule appt if you do not hear from them within 24 hours of discharge.

## 2021-11-06 PROCEDURE — 99232 SBSQ HOSP IP/OBS MODERATE 35: CPT

## 2021-11-06 RX ADMIN — ATORVASTATIN CALCIUM 80 MILLIGRAM(S): 80 TABLET, FILM COATED ORAL at 22:37

## 2021-11-06 RX ADMIN — Medication 25 MILLIGRAM(S): at 17:54

## 2021-11-06 RX ADMIN — APIXABAN 5 MILLIGRAM(S): 2.5 TABLET, FILM COATED ORAL at 05:25

## 2021-11-06 RX ADMIN — Medication 145 MILLIGRAM(S): at 12:18

## 2021-11-06 RX ADMIN — Medication 1 APPLICATION(S): at 05:23

## 2021-11-06 RX ADMIN — Medication 4.8 GRAM(S): at 12:18

## 2021-11-06 RX ADMIN — ZINC OXIDE 1 APPLICATION(S): 200 OINTMENT TOPICAL at 05:23

## 2021-11-06 RX ADMIN — SENNA PLUS 2 TABLET(S): 8.6 TABLET ORAL at 22:37

## 2021-11-06 RX ADMIN — Medication 25 MILLIGRAM(S): at 05:25

## 2021-11-06 RX ADMIN — Medication 1 APPLICATION(S): at 22:37

## 2021-11-06 RX ADMIN — ZINC OXIDE 1 APPLICATION(S): 200 OINTMENT TOPICAL at 22:37

## 2021-11-07 LAB — SARS-COV-2 RNA SPEC QL NAA+PROBE: SIGNIFICANT CHANGE UP

## 2021-11-07 PROCEDURE — 99232 SBSQ HOSP IP/OBS MODERATE 35: CPT

## 2021-11-07 RX ADMIN — Medication 1 APPLICATION(S): at 17:50

## 2021-11-07 RX ADMIN — Medication 25 MILLIGRAM(S): at 05:29

## 2021-11-07 RX ADMIN — ATORVASTATIN CALCIUM 80 MILLIGRAM(S): 80 TABLET, FILM COATED ORAL at 21:23

## 2021-11-07 RX ADMIN — APIXABAN 5 MILLIGRAM(S): 2.5 TABLET, FILM COATED ORAL at 17:49

## 2021-11-07 RX ADMIN — Medication 4.8 GRAM(S): at 11:47

## 2021-11-07 RX ADMIN — ZINC OXIDE 1 APPLICATION(S): 200 OINTMENT TOPICAL at 05:28

## 2021-11-07 RX ADMIN — Medication 1 APPLICATION(S): at 05:28

## 2021-11-07 RX ADMIN — Medication 145 MILLIGRAM(S): at 11:47

## 2021-11-07 RX ADMIN — Medication 25 MILLIGRAM(S): at 17:49

## 2021-11-07 RX ADMIN — ZINC OXIDE 1 APPLICATION(S): 200 OINTMENT TOPICAL at 17:49

## 2021-11-07 RX ADMIN — APIXABAN 5 MILLIGRAM(S): 2.5 TABLET, FILM COATED ORAL at 05:29

## 2021-11-07 NOTE — PROGRESS NOTE ADULT - ASSESSMENT
76 year old male with PMH of previous meningioma with crani, HTN, Afib, seizures but not on medication, UC, DM type 2 who presented to [facility] with a Right M2 occlusion and is s/p tpa and thrombectomy. 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  GI clearance given at [facility]    #Dm type 2- diet controlled  - A1c is 5.9  - Home med: Jardiance    #Ulcerative Colitis  - c/w Home med mesalamine     #Pain control  - Tylenol PRN    #Gerd-  -Protonix     #DVT ppx  - Apixaban    #hx RLE pressure ulcer   wound care per rehab team.  doppler negative 10/21/21    will follow  d/w dr. chow

## 2021-11-08 ENCOUNTER — TRANSCRIPTION ENCOUNTER (OUTPATIENT)
Age: 76
End: 2021-11-08

## 2021-11-08 LAB
ALBUMIN SERPL ELPH-MCNC: 3.5 G/DL — SIGNIFICANT CHANGE UP (ref 3.3–5)
ALP SERPL-CCNC: 62 U/L — SIGNIFICANT CHANGE UP (ref 40–120)
ALT FLD-CCNC: 18 U/L — SIGNIFICANT CHANGE UP (ref 10–45)
ANION GAP SERPL CALC-SCNC: 10 MMOL/L — SIGNIFICANT CHANGE UP (ref 5–17)
AST SERPL-CCNC: 25 U/L — SIGNIFICANT CHANGE UP (ref 10–40)
BILIRUB SERPL-MCNC: 0.5 MG/DL — SIGNIFICANT CHANGE UP (ref 0.2–1.2)
BUN SERPL-MCNC: 27 MG/DL — HIGH (ref 7–23)
CALCIUM SERPL-MCNC: 9 MG/DL — SIGNIFICANT CHANGE UP (ref 8.4–10.5)
CHLORIDE SERPL-SCNC: 106 MMOL/L — SIGNIFICANT CHANGE UP (ref 96–108)
CO2 SERPL-SCNC: 24 MMOL/L — SIGNIFICANT CHANGE UP (ref 22–31)
CREAT SERPL-MCNC: 1.2 MG/DL — SIGNIFICANT CHANGE UP (ref 0.5–1.3)
GLUCOSE SERPL-MCNC: 112 MG/DL — HIGH (ref 70–99)
HCT VFR BLD CALC: 44.9 % — SIGNIFICANT CHANGE UP (ref 39–50)
HGB BLD-MCNC: 14.6 G/DL — SIGNIFICANT CHANGE UP (ref 13–17)
MCHC RBC-ENTMCNC: 30.5 PG — SIGNIFICANT CHANGE UP (ref 27–34)
MCHC RBC-ENTMCNC: 32.5 GM/DL — SIGNIFICANT CHANGE UP (ref 32–36)
MCV RBC AUTO: 93.7 FL — SIGNIFICANT CHANGE UP (ref 80–100)
NRBC # BLD: 0 /100 WBCS — SIGNIFICANT CHANGE UP (ref 0–0)
PLATELET # BLD AUTO: 230 K/UL — SIGNIFICANT CHANGE UP (ref 150–400)
POTASSIUM SERPL-MCNC: 4 MMOL/L — SIGNIFICANT CHANGE UP (ref 3.5–5.3)
POTASSIUM SERPL-SCNC: 4 MMOL/L — SIGNIFICANT CHANGE UP (ref 3.5–5.3)
PROT SERPL-MCNC: 7.2 G/DL — SIGNIFICANT CHANGE UP (ref 6–8.3)
RBC # BLD: 4.79 M/UL — SIGNIFICANT CHANGE UP (ref 4.2–5.8)
RBC # FLD: 13.2 % — SIGNIFICANT CHANGE UP (ref 10.3–14.5)
SODIUM SERPL-SCNC: 140 MMOL/L — SIGNIFICANT CHANGE UP (ref 135–145)
WBC # BLD: 6.21 K/UL — SIGNIFICANT CHANGE UP (ref 3.8–10.5)
WBC # FLD AUTO: 6.21 K/UL — SIGNIFICANT CHANGE UP (ref 3.8–10.5)

## 2021-11-08 PROCEDURE — 99232 SBSQ HOSP IP/OBS MODERATE 35: CPT

## 2021-11-08 PROCEDURE — 96136 PSYCL/NRPSYC TST PHY/QHP 1ST: CPT

## 2021-11-08 PROCEDURE — 96116 NUBHVL XM PHYS/QHP 1ST HR: CPT

## 2021-11-08 PROCEDURE — 96132 NRPSYC TST EVAL PHYS/QHP 1ST: CPT

## 2021-11-08 PROCEDURE — 96137 PSYCL/NRPSYC TST PHY/QHP EA: CPT

## 2021-11-08 RX ORDER — ACETAMINOPHEN 500 MG
2 TABLET ORAL
Qty: 0 | Refills: 0 | DISCHARGE
Start: 2021-11-08

## 2021-11-08 RX ORDER — METOPROLOL TARTRATE 50 MG
1 TABLET ORAL
Qty: 60 | Refills: 0
Start: 2021-11-08 | End: 2021-12-07

## 2021-11-08 RX ORDER — ZINC OXIDE 200 MG/G
1 OINTMENT TOPICAL
Qty: 0 | Refills: 0 | DISCHARGE
Start: 2021-11-08

## 2021-11-08 RX ORDER — FENOFIBRATE,MICRONIZED 130 MG
1 CAPSULE ORAL
Qty: 30 | Refills: 0
Start: 2021-11-08 | End: 2021-12-07

## 2021-11-08 RX ORDER — APIXABAN 2.5 MG/1
1 TABLET, FILM COATED ORAL
Qty: 60 | Refills: 0
Start: 2021-11-08 | End: 2021-12-07

## 2021-11-08 RX ORDER — ATORVASTATIN CALCIUM 80 MG/1
1 TABLET, FILM COATED ORAL
Qty: 30 | Refills: 0
Start: 2021-11-08 | End: 2021-12-07

## 2021-11-08 RX ORDER — SENNA PLUS 8.6 MG/1
2 TABLET ORAL
Qty: 0 | Refills: 0 | DISCHARGE
Start: 2021-11-08

## 2021-11-08 RX ORDER — POLYETHYLENE GLYCOL 3350 17 G/17G
17 POWDER, FOR SOLUTION ORAL
Qty: 0 | Refills: 0 | DISCHARGE
Start: 2021-11-08

## 2021-11-08 RX ORDER — PETROLATUM,WHITE
1 JELLY (GRAM) TOPICAL
Qty: 0 | Refills: 0 | DISCHARGE
Start: 2021-11-08

## 2021-11-08 RX ADMIN — APIXABAN 5 MILLIGRAM(S): 2.5 TABLET, FILM COATED ORAL at 05:29

## 2021-11-08 RX ADMIN — ATORVASTATIN CALCIUM 80 MILLIGRAM(S): 80 TABLET, FILM COATED ORAL at 21:15

## 2021-11-08 RX ADMIN — ZINC OXIDE 1 APPLICATION(S): 200 OINTMENT TOPICAL at 05:30

## 2021-11-08 RX ADMIN — APIXABAN 5 MILLIGRAM(S): 2.5 TABLET, FILM COATED ORAL at 17:18

## 2021-11-08 RX ADMIN — Medication 4.8 GRAM(S): at 11:34

## 2021-11-08 RX ADMIN — Medication 145 MILLIGRAM(S): at 11:34

## 2021-11-08 RX ADMIN — Medication 25 MILLIGRAM(S): at 17:18

## 2021-11-08 RX ADMIN — Medication 1 APPLICATION(S): at 05:30

## 2021-11-08 RX ADMIN — Medication 25 MILLIGRAM(S): at 05:29

## 2021-11-08 RX ADMIN — Medication 1 APPLICATION(S): at 19:42

## 2021-11-08 RX ADMIN — ZINC OXIDE 1 APPLICATION(S): 200 OINTMENT TOPICAL at 19:42

## 2021-11-08 NOTE — PROGRESS NOTE ADULT - ASSESSMENT
76 year old male with PMH of previous meningioma with crani, HTN, Afib, seizures but not on medication, UC, DM type 2 who presented to [facility] with a Right M2 occlusion and is s/p tpa and thrombectomy. He now has Gait Instability, ADL impairments and Functional impairments and is admitted to [facility] inpatient rehab.     #Right M1 occlusion s/p thrombectomy and tPA  #Left hemiparesis, dysarthria  - Gait Instability, ADL impairments and Functional impairments  - Continue Comprehensive Rehab Program of PT/OT/SLP  - Continue Atorvastatin 80mg  -Repeat CTH 11/5 due to confusion - shows stability of stroke   -UA sent to rule out infectious source of confusion - negative 11/5    #HTN/Afib  - Home med: amlodipine, metoprolol, HCTZ  - Was on ASA 81mg but switched to apixaban 5mg BID   - Had previously failed AC due to GI bleed but due to new stroke, neurology and cardiology want apixaban used.  - GI clearance given at [facility]  - If patient does not tolerate AC, could possibly be a candidate for watchman procedure as per cardiology  - Continue Apixaban 5mg BID  - Continue metoprolol 25mg BID    #Dm type 2  - A1c is 5.9  - Home med: Jardiance  - Will monitor serum glucose     #Ulcerative Colitis  - Home med: mesalamine 1.2 gram - 4 tabs daily  -Restart mesalamine   - Monitor BM  - GI consult appreciated     #Pain control  - Tylenol PRN    #GI/Bowel Mgmt   - Continue Senna  - Miralax PRN  -Protonix 40mg daily     #Bladder management  - monitor UO    #DVT  - Apixaban 5mg BID  - SCDs, TEDs     #hx RLE pressure ulcer - redness improving   - per pt, in march 2020, diuretic was dc'd, then developed worsening LE edema, tight shoes which caused pressure injury to R ankle and cellulitis tx with PO abx and followed with wound care at [facility].  -Mild dry skin to BLE   -Continue aquaphor to BLE, abd pad and kerlix dressing daily.   - Wound care consult appreciated - Continue aquaphor  -Continue tylenol for discomfort    #Skin  - Desitin ordered for buttocks due to incontinence associated dermatitis.   -Improving     FEN   - Diet - Regular + Thins  [CCHO, DASH/TLC]    - Dysphagia  SLP - evaluation and treatment    Precautions / PROPHYLAXIS:   - Falls, Seizure, Aspiration  - ortho: Weight bearing status: WBAT   - Pressure injury/Skin: Turn Q2hrs while in bed, OOB to Chair, PT/OT      TEAM MEETING 11/3/21  SW:  Lives with wife and daughter involved. has 6STE but has multilevel home with many stairs  OT: set up for eating/grooming, min for bathing and UBD, max for LBD and toileting   PT: SV for transfers, 100 feet with rolling walker and 4 steps with 2 hand rails and min assist   SLP: mod I for comprehension, min for memory, regular diet  barriers: easily distracted, dec attention, dec word finding, poor insight, dec prob solving, dec safety awareness, higher cog deficits    goal: Goals adjusted for Sv for ambulation and mod I for all ADLs   EDOD:  11/9 Home

## 2021-11-08 NOTE — DISCHARGE NOTE PROVIDER - PROVIDER TOKENS
PROVIDER:[TOKEN:[[id number]],FOLLOWUP:[2 weeks]],PROVIDER:[TOKEN:[[id number]],FOLLOWUP:[2 weeks]],PROVIDER:[TOKEN:[[id number]],FOLLOWUP:[2 weeks]],PROVIDER:[TOKEN:[[id number]],FOLLOWUP:[2 weeks]]

## 2021-11-08 NOTE — DISCHARGE NOTE PROVIDER - HOSPITAL COURSE
HPI:  76 year old male with PMH of HTN on ASA 81mg, Ulcerative Colitis, Afib (not on AC 2/2 h/o GIB, UC), hx of meningioma (s/p left frontal craniotomy in 2017and s/p radiation),  Hx of seizure disorder not on AED, DM-II who was admitted to [facility] after initially presentation from [facility] on 10/20. He initially had slurred speech, having difficulty forming words and leaning to his left side. inital NIHSS was 14 and CT/CTA was noted to show Rt M1 occlusion. TPA was given and patient transferred to [facility] for thrombectomy and had a Right M1-2 thrombectomy with TICI 2 reperfusion. His exam improved but remained with left sided weakness, extinction and word finding difficulties. He was evaluated for acute inpatient rehab and was admitted to [facility] on 10/25/21.    On admission, seen and examined with wife at bedside. Patient reports full strength on left side, has residual RUE and RLE weakness from prior L meningioma resection. Per wife, patient has mild L sided weakness, impaired balance, mild dysarthria and some word finding difficulties. Also noted RLE non-blanchable area of erythema. Patient states that in March 2020, one of his diuretics was discontinued. He then developed worsening LE edema, and was wearing tight shoes that dug into his distal shin, causing an abrasion. He had a pressure ulcer of the RLE with associated cellulitis, was treated with PO abx, and followed with the wound care center at [facility]. He notes that the wound has healed, however, the discoloration remains. Denies pain, tenderness to palpation.    (25 Oct 2021 13:34)      Rehab course significant for mild increase in confusion which resolved on its own. UA was negative for UTI and CTH was stable.     All other medical co-morbidites were stable.    Pt tolerated course of inpatient pt/ot/slp rehab with significant functional improvements and met rehab goals prior to discharge.     Pt was medically cleared on 11/9/21 for discharge to Home.

## 2021-11-08 NOTE — DISCHARGE NOTE PROVIDER - NSDCMRMEDTOKEN_GEN_ALL_CORE_FT
acetaminophen 325 mg oral tablet: 2 tab(s) orally every 6 hours, As needed, Temp greater or equal to 38C (100.4F), Mild Pain (1 - 3)  apixaban 5 mg oral tablet: 1 tab(s) orally every 12 hours  atorvastatin 80 mg oral tablet: 1 tab(s) orally once a day (at bedtime)  fenofibrate 145 mg oral tablet: 1 tab(s) orally once a day  mesalamine 1.2 g oral delayed release tablet: 4 tab(s) orally once a day  metoprolol tartrate 25 mg oral tablet: 1 tab(s) orally 2 times a day  petrolatum topical ointment: 1 application topically 2 times a day  polyethylene glycol 3350 oral powder for reconstitution: 17 gram(s) orally once a day  senna oral tablet: 2 tab(s) orally once a day (at bedtime)  zinc oxide 40% topical ointment: 1 application topically 2 times a day

## 2021-11-08 NOTE — DISCHARGE NOTE PROVIDER - NSDCCPCAREPLAN_GEN_ALL_CORE_FT
PRINCIPAL DISCHARGE DIAGNOSIS  Diagnosis: CVA (cerebrovascular accident)  Assessment and Plan of Treatment: - Continue Atorvastatin 80mg  -Repeat [facility] 11/5 stable   -Continue apixaband 5mg BID  -Follow up with neurology within 2 weeks        SECONDARY DISCHARGE DIAGNOSES  Diagnosis: HTN (hypertension)  Assessment and Plan of Treatment: Stop amlodipine  Follow up with cardiology    Diagnosis: Ulcerative colitis  Assessment and Plan of Treatment: Continue Mesalamine   Follow up with GI    Diagnosis: Chronic atrial fibrillation  Assessment and Plan of Treatment: - Had previously failed AC due to GI bleed but due to new stroke, neurology and cardiology want apixaban used.  - GI clearance given at [facility]  - Continue Apixaban 5mg BID  - Continue metoprolol 25mg BID    Diagnosis: DM (diabetes mellitus), type 2  Assessment and Plan of Treatment: Resume home Jardiance  A1c is 5.9  Follow up with primary doctor

## 2021-11-08 NOTE — CONSULT NOTE ADULT - ASSESSMENT
Assessment: Pt's overall neuropsychological functioning as measured by a standardized measure with norms for individuals in his age range was significantly impaired. His attention and language functions which involved some of his best performances were mildly impaired within the Low Average range, with relatively intact simple auditory span and naming ability, and moderately impaired categorical verbal fluency and sustained visual attention. Pt's Immediate Memory skills, including encoding and immediate retrieval of unstructured verbal information and narrative memory fell within the Borderline range, indicating moderate impairment. Narrative recall was relatively intact, whereas encoding of unstructured verbal information was moderately impaired. Delayed Memory skills were significantly impaired, including intact narrative recall, mildly impaired recall of unstructured verbal information, and significantly impaired verbal recognition memory and delayed visual recall of figural, geometric information. Executive functions tended to be mildly to moderately impaired, including mildly impaired organizational skills and moderately impaired verbal initiation. Reduced processing speed was noted in time tasks involving some degree of visual processing.  Difficulties noted in Pt's visuospatial/constructional are consistent with expectations for a right sided CVA. Difficulties noted in verbal fluency, on the other hand, appear to be a sequelae of his previous meningioma resection.  Pt's affect was relatively broad in range and he only reported a few adjustment symptoms including feelings of isolation and decreased range of interests and socialization, suggesting relatively low levels of distress in view of his current medical condition, but also may indicate decreased self-awareness for his deficits and their implications. FIM scores: Social Interaction 5; Problem Solving 5; Memory 3.  Plan: Pt will benefit from continued speech therapy with emphasis on cognitive remediation on an outpatient basis. Also, Pt is encouraged to undergo a full neuropsychological evaluation in 6 months time in order to have a new baseline of his cognitive functioning once clinical gains from outpatient therapy are maximized. Pt is not currently depressed, likely in part due to decreased awareness of deficits, but were he to report depressive symptoms in the near future he should inform his doctors to access a referral to mental health services. Pt is encouraged to take his time for recovering and focusing on his rehabilitation therapist rather than returning to work right away. Also, Pt is advised not to drive as it may not be safe for him and/or his surrounding community.  Time spent with Pt,75 minutes.

## 2021-11-08 NOTE — DISCHARGE NOTE PROVIDER - CARE PROVIDER_API CALL
Josiah Monroe)  Cardiovascular Disease; Internal Medicine  [address] [address]  [place] [address]  Phone: [phone]  Fax: [phone]  Follow Up Time: 2 weeks    Josiah Scott)  Internal Medicine  [address] [place] [address]  Phone: [phone]  Fax: [phone]  Follow Up Time: 2 weeks    Bee Garcia (NP; RN)  NP in [address], [address]  [place] [address]  Phone: [phone]  Fax: [phone]  Follow Up Time: 2 weeks    Parmjit Boateng)  Neurology; Vascular Neurology  [address] [facility] [address], [address]  [place] [address]  Phone: [phone]  Fax: [phone]  Follow Up Time: 2 weeks

## 2021-11-08 NOTE — CONSULT NOTE ADULT - SUBJECTIVE AND OBJECTIVE BOX
Pt is a 76 year-old, right-handed male with PMHx of HTN on ASA 81mg, Ulcerative Colitis, Afib (not on AC 2/2 h/o GIB, UC), hx of meningioma (s/p left frontal craniotomy in 2017and s/p radiation),  Hx of seizure disorder not on AED, DM-II who was admitted to [facility] after initially presentation from [facility] on 10/20. He initially had slurred speech, having difficulty forming words and leaning to his left side. inital NIHSS was 14 and CT/CTA was noted to show Rt M1 occlusion. TPA was given and Pt was transferred to [facility] for thrombectomy and had a Right M1-2 thrombectomy with TICI 2 reperfusion. His exam improved but remained with left sided weakness, extinction and word finding difficulties. He was evaluated for acute inpatient rehab and was admitted to [facility] on 10/25/21. On admission, seen and examined by his attending neurologist Dr. Rubio, Ms. Hurtado NP, and Dr. Alfaro, resident with wife at bedside. Pt reported full strength on left side, has residual RUE and RLE weakness from prior L meningioma resection. Per wife, Pt has mild L sided weakness, impaired balance, mild dysarthria and some word finding difficulties. Also noted RLE non-blanchable area of erythema. Pt states that in March 2020, one of his diuretics was discontinued. He then developed worsening LE edema, and was wearing tight shoes that dug into his distal shin, causing an abrasion. He had a pressure ulcer of the RLE with associated cellulitis, was treated with PO abx, and followed with the wound care center at [facility]. He notes that the wound has healed, however, the discoloration remains. Denied pain, tenderness to palpation. Pt is familiar to this writer from a previous admission on May 2017, when he underwent rehabilitation following a left frontoparietal craniotomy to resect a meningioma.   OPMHx: As noted above. Current meds: Please see the complete list in Pt’s chart. Social Hx: Pt is  and has three adult children from a previous marriage. He is a retired  (he completed BA and [employer] degrees). Pt denied prior hx of mental illness. He stopped smoking in 2008. He used to drink cocktails 5 times a week as of 2017. Pt is a non practicing Christianity. He used to enjoy going to the theater and sports venues prior to the COVID pandemic, now he enjoys watching TV.  Findings: Pt was seen for an updated assessment of his/her cognitive and emotional functioning. The MMSE (3MS) was administered; Pt’s results (29/30) were in the Normal range. His scores in a geriatric mood measures suggested minimal levels of depression (GDS = 2/15). Pt was alert, fully Ox3, and cooperative. Pt was also administered the Repeatable Battery for the Assessment of Neuropsychological Status (RBANS Update) and the CLOX test (clock drawing) whose results will be reported below.  Pt's scores in the RBANS Total Scale (SS=64, 1st %ile) suggested impaired overall cognitive functioning, well below expectations for individuals his age. Immediate memory for verbal information, both unstructured and structured was moderately impaired (SS=78, 7th %ile). Delayed memory, including delayed verbal - involving unstructured, recognition, and structured/narrative information - and delayed visual recall, was impaired (SS=56, < 1st %ile). Visuospatial/Constructional skills (SS=58, < 1st %ile) were significantly impaired. Language abilities (SS=85, 16th %ile), were mildly impaired. Attention processes (SS=82, 12th %ile) were also mildly impaired. Abilities and skills comprising these indexes are reported below.    Attn/Conc: Simple auditory span was intact, in the High Average range (ss=12, 75th %ile), Pt was able to repeat up to 7 digits forward. Concentration: As per digit span backwards was relatively intact, Pt was able to repeat up to 4 digits. Sustained visual attention: As per a coding task on which he scored in the Impaired range (ss=2, < 1st %ile).  Language: Pt’s speech was of normal volume, pitch and pace. Naming was assessed with the 10item Picture Naming subtest, Pt's performance was within the average range (51st to 75th %ile). Verbal fluency was assessed with the Semantic Fluency subtest, on which Pt scored within the Borderline range (ss=4, 2nd %ile). Memory: Encoding of 3 words and immediate delay after about 1-2 minutes was intact per his MMSE performance. On the List Memory subtest his score placed within the Borderline range in a list learning task involving 10 words to be learned over four learning trials' he was able to recall 2 words in the first trial, 4 in the second, and 5 respectively in the third and fourth. On the Story Memory subtest, he was asked to remember as many details out of 12 from a short story, over two learning trial, and his performance fell within the lower end of the average range (ss=8, 25th %ile). Delayed recall of the list resulted in a score within the low average range (ss=4, 2nd %ile), being able to recall 3 out of the 10 words after a 20 minute delay. Delayed recall of the story was relatively intact, in the Average range (ss=9, 37th %ile), and he was able to recall 8/12 details from the story read to him about 20 minutes earlier. Delayed visual recall was extremely poor, in the impaired range (ss=1, < 1st  %ile); he was unable to recall any details from a semi-complex figure showed to him about 15 min earlier. Interestingly, he was able to recall the overall gestalt from a much simpler figure he was asked to draw much earlier when he was administered the MMSE.  Visuospatial: Visuomotor integration was impaired for the copy of a semi-complex figure involving 10 different geometric elements placed in specific locations (ss=1, < 1st  %ile). His reproduction was relatively organized but there was some distortion of most elements in regard to accuracy or location, and he omitted one of the elements. His copy of 2D figure (overlapping pentagons) on the MMSE was also impaired due to simplification and sloppiness. Angularity perception was also impaired, as he was only able to discriminate 4 out of 10 sets of lines set at different angles in a sample to match task (equal or below the 2nd %ile). Executive Functions:  Organizational skills - . Abstract reasoning - . Initiation/Phonemic Fluency - .Verbal problem solving – .   Emotional functioning: Affect - full range. Mood - "less confident in my reconvery" ; Pt reported experiencing variable energy. On a mood measures he additionally reported decreased range of interests and activities and feelings of isolation. Thought processes were mildly circumstantial. No abnormal thought contents were observed.  Pt denied any current AH/VH; however, he reported an instance of VH while at the hospital. Pt also denied SI/HI/I/P. Insight - fair. Judgment - fair.    Assessment:    FIM scores: Social Interaction ; Problem Solving ; Memory .  Plan: Individual supportive psychotherapy to monitor cognition, affect/mood, and behavior. Cognitive remediation during speech therapy sessions. Participation in recreation/art therapy in order to have pleasure and mastery experiences and regain/reestablish a sense of routine.    Time spent with Pt,  minutes.   Pt is a 76 year-old, right-handed male with PMHx of HTN on ASA 81mg, Ulcerative Colitis, Afib (not on AC 2/2 h/o GIB, UC), hx of meningioma (s/p left frontal craniotomy in 2017and s/p radiation),  Hx of seizure disorder not on AED, DM-II who was admitted to [facility] after initially presentation from [facility] on 10/20. He initially had slurred speech, having difficulty forming words and leaning to his left side. inital NIHSS was 14 and CT/CTA was noted to show Rt M1 occlusion. TPA was given and Pt was transferred to [facility] for thrombectomy and had a Right M1-2 thrombectomy with TICI 2 reperfusion. His exam improved but remained with left sided weakness, extinction and word finding difficulties. He was evaluated for acute inpatient rehab and was admitted to [facility] on 10/25/21. On admission, seen and examined by his attending neurologist Dr. Rubio, Ms. Hurtado NP, and Dr. Alfaro, resident with wife at bedside. Pt reported full strength on left side, has residual RUE and RLE weakness from prior L meningioma resection. Per wife, Pt has mild L sided weakness, impaired balance, mild dysarthria and some word finding difficulties. Also noted RLE non-blanchable area of erythema. Pt states that in March 2020, one of his diuretics was discontinued. He then developed worsening LE edema, and was wearing tight shoes that dug into his distal shin, causing an abrasion. He had a pressure ulcer of the RLE with associated cellulitis, was treated with PO abx, and followed with the wound care center at [facility]. He notes that the wound has healed, however, the discoloration remains. Denied pain, tenderness to palpation. Pt is familiar to this writer from a previous admission on May 2017, when he underwent rehabilitation following a left frontoparietal craniotomy to resect a meningioma. PMHx: As noted above. Current meds: Please see the complete list in Pt’s chart. Social Hx: Pt is  and has three adult children from a previous marriage. He is a retired  (he completed BA and [employer] degrees). Pt denied prior hx of mental illness. He stopped smoking in 2008. He used to drink cocktails 5 times a week as of 2017. Pt is a non practicing Roman Catholic. He used to enjoy going to the theater and sports venues prior to the COVID pandemic, now he enjoys watching TV.  Findings: Pt was seen for an updated assessment of his cognitive and emotional functioning. The MMSE (3MS) was administered; Pt’s results (29/30) were in the Normal range, without much change compared to his initial evaluation with this writer in 2017 (30/30)l however, the MMSE as a global screening measure does not provide detailed information across different areas of cognitive functioning. His scores in a geriatric mood measures suggested minimal levels of depression (GDS = 2/15). Pt was alert, fully Ox3, and cooperative. Pt was also administered the Repeatable Battery for the Assessment of Neuropsychological Status (RBANS Update, a standardized measure of cognitive functioning involving measures of memory, language, attention and visuospatial skills) and the CLOX test (clock drawing, a standardized measure of executive functions/organizational and visuospatial skills) whose results will be reported below.  Pt's scores in the RBANS Total Scale (SS=64, 1st %ile) suggested impaired overall cognitive functioning, well below expectations for individuals his age. Immediate memory for verbal information, both unstructured and structured was moderately impaired (SS=78, 7th %ile). Delayed memory, including delayed verbal - involving unstructured, recognition, and structured/narrative information - and delayed visual recall, was impaired (SS=56, < 1st %ile). Visuospatial/Constructional skills (SS=58, < 1st %ile) were significantly impaired. Language abilities (SS=85, 16th %ile), were mildly impaired. Attention processes (SS=82, 12th %ile) were also mildly impaired. Abilities and skills comprising these indexes are reported below.  Attention/Concentration: Simple auditory span was intact, in the High Average range (ss=12, 75th %ile), Pt was able to repeat up to 7 digits forward. Concentration: As per digit span backwards was relatively intact, Pt was able to repeat up to 4 digits. Sustained visual attention: As per a coding task on which he scored in the Impaired range (ss=2, < 1st %ile).  Language: Pt’s speech was of normal volume, pitch and pace. Naming was assessed with the 10item Picture Naming subtest, Pt's performance was within the average range (51st to 75th %ile). Verbal fluency was assessed with the Semantic Fluency subtest, on which Pt scored within the Borderline range (ss=4, 2nd %ile). Memory: Encoding of 3 words and immediate delay after about 1-2 minutes was intact per his MMSE performance. On the List Memory subtest his score placed within the Borderline range in a list learning task involving 10 words to be learned over four learning trials' he was able to recall 2 words in the first trial, 4 in the second, and 5 respectively in the third and fourth. On the Story Memory subtest, he was asked to remember as many details out of 12 from a short story, over two learning trial, and his performance fell within the lower end of the average range (ss=8, 25th %ile). Delayed recall of the list resulted in a score within the low average range (ss=4, 2nd %ile), being able to recall 3 out of the 10 words after a 20 minute delay. Delayed recall of the story was relatively intact, in the Average range (ss=9, 37th %ile), and he was able to recall 8/12 details from the story read to him about 20 minutes earlier. Delayed visual recall was extremely poor, in the impaired range (ss=1, < 1st  %ile); he was unable to recall any details from a semi-complex figure showed to him about 15 min earlier. Interestingly, he was able to recall the overall gestalt from a much simpler figure he was asked to draw much earlier when he was administered the MMSE.  Visuospatial: Visuomotor integration was impaired for the copy of a semi-complex figure involving 10 different geometric elements placed in specific locations (ss=1, < 1st  %ile). His reproduction was relatively organized but there was some distortion of most elements in regard to accuracy or location, and he omitted one of the elements. His copy of 2D figure (overlapping pentagons) on the MMSE was also impaired due to simplification and sloppiness. Angularity perception was also impaired, as he was only able to discriminate 4 out of 10 sets of lines set at different angles in a sample to match task (equal or below the 2nd %ile). Copy of a clock previously drawn by the examiner was moderately impaired, in the Borderline range (CLOX 2, 3rd %ile).  Executive Functions:  Organizational skills - as measured by the CLOX 1 requiring to draw a clock on command, fell on the Low Average range (CLOX 1, 21st %ile).  Initiation/Categorical Fluency - were moderately impaired, in the Borderline range (3rd %ile), as reported above under language.   Emotional functioning: Affect - full range. Mood - "less confident in my recovery" ; Pt reported experiencing variable energy. On a mood measures he additionally reported decreased range of interests and activities and feelings of isolation. Thought processes were mildly circumstantial. No abnormal thought contents were observed.  Pt denied any current AH/VH; however, he reported an instance of VH while at the hospital. Pt also denied SI/HI/I/P. Insight - fair. Judgment - fair.

## 2021-11-09 VITALS
SYSTOLIC BLOOD PRESSURE: 160 MMHG | OXYGEN SATURATION: 100 % | HEART RATE: 69 BPM | TEMPERATURE: 98 F | DIASTOLIC BLOOD PRESSURE: 90 MMHG | RESPIRATION RATE: 15 BRPM

## 2021-11-09 PROCEDURE — 99239 HOSP IP/OBS DSCHRG MGMT >30: CPT

## 2021-11-09 RX ADMIN — APIXABAN 5 MILLIGRAM(S): 2.5 TABLET, FILM COATED ORAL at 06:06

## 2021-11-09 RX ADMIN — Medication 145 MILLIGRAM(S): at 11:01

## 2021-11-09 RX ADMIN — Medication 4.8 GRAM(S): at 11:02

## 2021-11-09 RX ADMIN — Medication 1 APPLICATION(S): at 06:06

## 2021-11-09 RX ADMIN — ZINC OXIDE 1 APPLICATION(S): 200 OINTMENT TOPICAL at 06:06

## 2021-11-09 RX ADMIN — Medication 25 MILLIGRAM(S): at 06:06

## 2021-11-09 NOTE — PROGRESS NOTE ADULT - ATTENDING COMMENTS
Neuropsychology input appreciated,, severe cognitive impairment  documented  Discharge plan is in progress
No new complaints  Motivated in therapy  Stable exam  Full program
Patient is being discharged home with home care today.  Discharge instructions were discussed with patient and family, all current medications were sent to the pharmacy. Patient and family were educated on importance of medication compliance,  continued  care with PMD and follow-up care with the specialists in the community. Safety and fall risk precautions  were discussed in detail, counseled on healthy life style modifications.  All questions were answered to their satisfaction.      > 35 min spent
Patient medically and neurologically stable. Making progress towards rehab goals.   Continue rehab program. Discharge plan is in progress
Patient medically and neurologically stable. Making progress towards rehab goals.   Continue rehab program. Discharge plan is in progress.
Comfortable, tolerates therapy well  Wound acre , Medicine and GI input appreciated
Neuropsychology input requested to evaluate depth of cognitive impairment  Head CT today without acute changes  Obtain UA ro r/o UTI
No acute events overnight  Adjusting to program well  Labs reviewed  Case discussed wit Medicine
No new complaints  Stable exam  Tolerating program well
Patient medically and neurologically stable. Making progress towards rehab goals.       Multidisciplinary team meeting today:  patient's functional goals and needs, functional and clinical  progress were discussed, barriers to discharge were identified. Anticipate discharge home with home care, 24/7 supervision for safety.    EDOD 11/9/21

## 2021-11-09 NOTE — PROGRESS NOTE ADULT - SUBJECTIVE AND OBJECTIVE BOX
CHIEF COMPLAINT: Denies chest pain, fever, chills, nausea vomiting. He complains of intermittent pain on the right LE where he has redness from a healing venous ulcer.     Patient is a 76y old  Male who presents with a chief complaint of CVA (26 Oct 2021 11:26)    HPI:  76 year old male with PMH of HTN on ASA 81mg, Ulcerative Colitis, Afib (not on AC 2/2 h/o GIB, UC), hx of meningioma (s/p left frontal craniotomy in 2017and s/p radiation),  Hx of seizure disorder not on AED, DM-II who was admitted to [facility] after initially presentation from [facility] on 10/20. He initially had slurred speech, having difficulty forming words and leaning to his left side. inital NIHSS was 14 and CT/CTA was noted to show Rt M1 occlusion. TPA was given and patient transferred to [facility] for thrombectomy and had a Right M1-2 thrombectomy with TICI 2 reperfusion. His exam improved but remained with left sided weakness, extinction and word finding difficulties. He was evaluated for acute inpatient rehab and was admitted to [facility] on 10/25/21.    On admission, seen and examined with wife at bedside. Patient reports full strength on left side, has residual RUE and RLE weakness from prior L meningioma resection. Per wife, patient has mild L sided weakness, impaired balance, mild dysarthria and some word finding difficulties. Also noted RLE non-blanchable area of erythema. Patient states that in March 2020, one of his diuretics was discontinued. He then developed worsening LE edema, and was wearing tight shoes that dug into his distal shin, causing an abrasion. He had a pressure ulcer of the RLE with associated cellulitis, was treated with PO abx, and followed with the wound care center at [facility]. He notes that the wound has healed, however, the discoloration remains. Denies pain, tenderness to palpation.    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    DM (diabetes mellitus)    Pulmonary hypertension    Atrial fibrillation    GI bleed    CAD (coronary artery disease)    H/O right inguinal hernia repair    H/O cataract removal with insertion of prosthetic lens  B/L        Allergies    No Known Allergies    Intolerances          PHYSICAL EXAM    Vital Signs Last 24 Hrs  T(C): 36.3 (26 Oct 2021 07:50), Max: 36.9 (25 Oct 2021 15:00)  T(F): 97.3 (26 Oct 2021 07:50), Max: 98.4 (25 Oct 2021 15:00)  HR: 81 (26 Oct 2021 07:50) (71 - 92)  BP: 162/90 (26 Oct 2021 07:50) (132/74 - 167/92)  BP(mean): 88 (25 Oct 2021 15:00) (88 - 88)  RR: 16 (26 Oct 2021 07:50) (15 - 19)  SpO2: 98% (26 Oct 2021 07:50) (97% - 100%)    PHYSICAL EXAM  Constitutional - NAD, Comfortable  HEENT - NCAT, EOMI  Neck - Supple, No limited ROM  Chest - CTA bilaterally  Cardiovascular - RRR, S1S2  Abdomen -BS+, Soft, NTND  Extremities - No C/C/E, No calf tenderness   Neurologic Exam - no new focal deficits  Skin: RLE with erythema from foot to calf, mild edema noted    RECENT LABS:                          14.1   8.07  )-----------( 200      ( 26 Oct 2021 05:15 )             42.9     10-26    141  |  107  |  25<H>  ----------------------------<  106<H>  3.7   |  25  |  1.02    Ca    9.0      26 Oct 2021 05:15  Phos  2.6     10-24  Mg     1.9     10-24    TPro  7.2  /  Alb  3.3  /  TBili  0.7  /  DBili  x   /  AST  28  /  ALT  18  /  AlkPhos  55  10-26    LIVER FUNCTIONS - ( 26 Oct 2021 05:15 )  Alb: 3.3 g/dL / Pro: 7.2 g/dL / ALK PHOS: 55 U/L / ALT: 18 U/L / AST: 28 U/L / GGT: x                   CAPILLARY BLOOD GLUCOSE      POCT Blood Glucose.: 151 mg/dL (25 Oct 2021 22:39)      MEDICATIONS  (STANDING):  apixaban 5 milliGRAM(s) Oral every 12 hours  AQUAPHOR (petrolatum Ointment) 1 Application(s) Topical two times a day  atorvastatin 80 milliGRAM(s) Oral at bedtime  fenofibrate Tablet 145 milliGRAM(s) Oral daily  mesalamine DR (24-Hour) Tablet 4.8 Gram(s) Oral daily  metoprolol tartrate 25 milliGRAM(s) Oral two times a day  polyethylene glycol 3350 17 Gram(s) Oral daily  senna 2 Tablet(s) Oral at bedtime    MEDICATIONS  (PRN):  acetaminophen     Tablet .. 650 milliGRAM(s) Oral every 6 hours PRN Temp greater or equal to 38C (100.4F), Mild Pain (1 - 3)  
CHIEF COMPLAINT: Denies chest pain, fever, chills, nausea vomiting. He had some mild confusion last night but was easily reoriented. He is making improvements in therapy.     Patient is a 76y old  Male who presents with a chief complaint of CVA (26 Oct 2021 11:26)    HPI:  76 year old male with PMH of HTN on ASA 81mg, Ulcerative Colitis, Afib (not on AC 2/2 h/o GIB, UC), hx of meningioma (s/p left frontal craniotomy in 2017and s/p radiation),  Hx of seizure disorder not on AED, DM-II who was admitted to [facility] after initially presentation from [facility] on 10/20. He initially had slurred speech, having difficulty forming words and leaning to his left side. inital NIHSS was 14 and CT/CTA was noted to show Rt M1 occlusion. TPA was given and patient transferred to [facility] for thrombectomy and had a Right M1-2 thrombectomy with TICI 2 reperfusion. His exam improved but remained with left sided weakness, extinction and word finding difficulties. He was evaluated for acute inpatient rehab and was admitted to [facility] on 10/25/21.    On admission, seen and examined with wife at bedside. Patient reports full strength on left side, has residual RUE and RLE weakness from prior L meningioma resection. Per wife, patient has mild L sided weakness, impaired balance, mild dysarthria and some word finding difficulties. Also noted RLE non-blanchable area of erythema. Patient states that in March 2020, one of his diuretics was discontinued. He then developed worsening LE edema, and was wearing tight shoes that dug into his distal shin, causing an abrasion. He had a pressure ulcer of the RLE with associated cellulitis, was treated with PO abx, and followed with the wound care center at [facility]. He notes that the wound has healed, however, the discoloration remains. Denies pain, tenderness to palpation.    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    DM (diabetes mellitus)    Pulmonary hypertension    Atrial fibrillation    GI bleed    CAD (coronary artery disease)    H/O right inguinal hernia repair    H/O cataract removal with insertion of prosthetic lens  B/L        Allergies    No Known Allergies    Intolerances          PHYSICAL EXAM    Vital Signs Last 24 Hrs  T(C): 36.6 (05 Nov 2021 07:32), Max: 36.6 (05 Nov 2021 07:32)  T(F): 97.9 (05 Nov 2021 07:32), Max: 97.9 (05 Nov 2021 07:32)  HR: 80 (05 Nov 2021 07:32) (70 - 80)  BP: 170/74 (05 Nov 2021 07:32) (131/92 - 170/74)  BP(mean): --  RR: 15 (05 Nov 2021 07:32) (14 - 15)  SpO2: 97% (05 Nov 2021 07:32) (96% - 97%)    PHYSICAL EXAM  Constitutional - NAD, Comfortable  HEENT - NCAT, EOMI  Neck - Supple, No limited ROM  Chest - CTA bilaterally  Cardiovascular - RRR, S1S2  Abdomen -BS+, Soft, NTND  Extremities - No C/C/E, No calf tenderness   Neurologic Exam - no new focal deficits  Skin: RLE with erythema from foot to calf, mild edema noted          LABS:                          13.9   6.72  )-----------( 238      ( 04 Nov 2021 05:20 )             42.1     11-04    142  |  107  |  24<H>  ----------------------------<  116<H>  4.2   |  26  |  1.21    Ca    9.2      04 Nov 2021 05:20    TPro  6.9  /  Alb  3.4  /  TBili  0.6  /  DBili  x   /  AST  23  /  ALT  18  /  AlkPhos  57  11-04    LIVER FUNCTIONS - ( 04 Nov 2021 05:20 )  Alb: 3.4 g/dL / Pro: 6.9 g/dL / ALK PHOS: 57 U/L / ALT: 18 U/L / AST: 23 U/L / GGT: x                 MEDICATIONS  (STANDING):  apixaban 5 milliGRAM(s) Oral every 12 hours  AQUAPHOR (petrolatum Ointment) 1 Application(s) Topical two times a day  atorvastatin 80 milliGRAM(s) Oral at bedtime  fenofibrate Tablet 145 milliGRAM(s) Oral daily  mesalamine DR (24-Hour) Tablet 4.8 Gram(s) Oral daily  metoprolol tartrate 25 milliGRAM(s) Oral two times a day  polyethylene glycol 3350 17 Gram(s) Oral daily  senna 2 Tablet(s) Oral at bedtime  zinc oxide 40% Ointment 1 Application(s) Topical two times a day    MEDICATIONS  (PRN):  acetaminophen     Tablet .. 650 milliGRAM(s) Oral every 6 hours PRN Temp greater or equal to 38C (100.4F), Mild Pain (1 - 3)  
CHIEF COMPLAINT: Denies chest pain, fever, chills, nausea vomiting. He has no new complaints and is stable and ready for discharge today.    Patient is a 76y old  Male who presents with a chief complaint of CVA (26 Oct 2021 11:26)    HPI:  76 year old male with PMH of HTN on ASA 81mg, Ulcerative Colitis, Afib (not on AC 2/2 h/o GIB, UC), hx of meningioma (s/p left frontal craniotomy in 2017and s/p radiation),  Hx of seizure disorder not on AED, DM-II who was admitted to [facility] after initially presentation from [facility] on 10/20. He initially had slurred speech, having difficulty forming words and leaning to his left side. inital NIHSS was 14 and CT/CTA was noted to show Rt M1 occlusion. TPA was given and patient transferred to [facility] for thrombectomy and had a Right M1-2 thrombectomy with TICI 2 reperfusion. His exam improved but remained with left sided weakness, extinction and word finding difficulties. He was evaluated for acute inpatient rehab and was admitted to [facility] on 10/25/21.    On admission, seen and examined with wife at bedside. Patient reports full strength on left side, has residual RUE and RLE weakness from prior L meningioma resection. Per wife, patient has mild L sided weakness, impaired balance, mild dysarthria and some word finding difficulties. Also noted RLE non-blanchable area of erythema. Patient states that in March 2020, one of his diuretics was discontinued. He then developed worsening LE edema, and was wearing tight shoes that dug into his distal shin, causing an abrasion. He had a pressure ulcer of the RLE with associated cellulitis, was treated with PO abx, and [facility] He notes that the wound has healed, however, the discoloration remains. Denies pain, tenderness to palpation.    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    DM (diabetes mellitus)    Pulmonary hypertension    Atrial fibrillation    GI bleed    CAD (coronary artery disease)    H/O right inguinal hernia repair    H/O cataract removal with insertion of prosthetic lens  B/L        Allergies    No Known Allergies    Intolerances          PHYSICAL EXAM    Vital Signs Last 24 Hrs  T(C): 36.5 (09 Nov 2021 07:23), Max: 36.6 (08 Nov 2021 19:43)  T(F): 97.7 (09 Nov 2021 07:23), Max: 97.9 (08 Nov 2021 19:43)  HR: 69 (09 Nov 2021 07:23) (69 - 75)  BP: 160/90 (09 Nov 2021 07:23) (149/90 - 160/90)  BP(mean): --  RR: 15 (09 Nov 2021 07:23) (15 - 16)  SpO2: 100% (09 Nov 2021 07:23) (97% - 100%)    PHYSICAL EXAM  Constitutional - NAD, Comfortable  HEENT - NCAT, EOMI  Neck - Supple, No limited ROM  Chest - CTA bilaterally  Cardiovascular - RRR, S1S2  Abdomen -BS+, Soft, NTND  Extremities - No C/C/E, No calf tenderness   Neurologic Exam - no new focal deficits  Skin: RLE with erythema from foot to calf, mild edema noted            LABS:                          14.6   6.21  )-----------( 230      ( 08 Nov 2021 05:25 )             44.9     11-08    140  |  106  |  27<H>  ----------------------------<  112<H>  4.0   |  24  |  1.20    Ca    9.0      08 Nov 2021 05:25    TPro  7.2  /  Alb  3.5  /  TBili  0.5  /  DBili  x   /  AST  25  /  ALT  18  /  AlkPhos  62  11-08    LIVER FUNCTIONS - ( 08 Nov 2021 05:25 )  Alb: 3.5 g/dL / Pro: 7.2 g/dL / ALK PHOS: 62 U/L / ALT: 18 U/L / AST: 25 U/L / GGT: x                   MEDICATIONS  (STANDING):  apixaban 5 milliGRAM(s) Oral every 12 hours  AQUAPHOR (petrolatum Ointment) 1 Application(s) Topical two times a day  atorvastatin 80 milliGRAM(s) Oral at bedtime  fenofibrate Tablet 145 milliGRAM(s) Oral daily  mesalamine DR (24-Hour) Tablet 4.8 Gram(s) Oral daily  metoprolol tartrate 25 milliGRAM(s) Oral two times a day  polyethylene glycol 3350 17 Gram(s) Oral daily  senna 2 Tablet(s) Oral at bedtime  zinc oxide 40% Ointment 1 Application(s) Topical two times a day    MEDICATIONS  (PRN):  acetaminophen     Tablet .. 650 milliGRAM(s) Oral every 6 hours PRN Temp greater or equal to 38C (100.4F), Mild Pain (1 - 3)  
CHIEF COMPLAINT: Denies chest pain, fever, chills, nausea vomiting. He has no new complaints overnight and is making improvements in therapy.     Patient is a 76y old  Male who presents with a chief complaint of CVA (26 Oct 2021 11:26)    HPI:  76 year old male with PMH of HTN on ASA 81mg, Ulcerative Colitis, Afib (not on AC 2/2 h/o GIB, UC), hx of meningioma (s/p left frontal craniotomy in 2017and s/p radiation),  Hx of seizure disorder not on AED, DM-II who was admitted to [facility] after initially presentation from [facility] on 10/20. He initially had slurred speech, having difficulty forming words and leaning to his left side. inital NIHSS was 14 and CT/CTA was noted to show Rt M1 occlusion. TPA was given and patient transferred to [facility] for thrombectomy and had a Right M1-2 thrombectomy with TICI 2 reperfusion. His exam improved but remained with left sided weakness, extinction and word finding difficulties. He was evaluated for acute inpatient rehab and was admitted to [facility] on 10/25/21.    On admission, seen and examined with wife at bedside. Patient reports full strength on left side, has residual RUE and RLE weakness from prior L meningioma resection. Per wife, patient has mild L sided weakness, impaired balance, mild dysarthria and some word finding difficulties. Also noted RLE non-blanchable area of erythema. Patient states that in March 2020, one of his diuretics was discontinued. He then developed worsening LE edema, and was wearing tight shoes that dug into his distal shin, causing an abrasion. He had a pressure ulcer of the RLE with associated cellulitis, was treated with PO abx, and followed with the wound care center at [facility]. He notes that the wound has healed, however, the discoloration remains. Denies pain, tenderness to palpation.    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    DM (diabetes mellitus)    Pulmonary hypertension    Atrial fibrillation    GI bleed    CAD (coronary artery disease)    H/O right inguinal hernia repair    H/O cataract removal with insertion of prosthetic lens  B/L        Allergies    No Known Allergies    Intolerances          PHYSICAL EXAM    Vital Signs Last 24 Hrs  T(C): 36.4 (08 Nov 2021 07:39), Max: 36.4 (07 Nov 2021 19:43)  T(F): 97.5 (08 Nov 2021 07:39), Max: 97.5 (07 Nov 2021 19:43)  HR: 70 (08 Nov 2021 07:39) (70 - 80)  BP: 127/74 (08 Nov 2021 07:39) (119/73 - 162/79)  BP(mean): --  RR: 15 (08 Nov 2021 07:39) (15 - 15)  SpO2: 98% (08 Nov 2021 07:39) (97% - 98%)    PHYSICAL EXAM  Constitutional - NAD, Comfortable  HEENT - NCAT, EOMI  Neck - Supple, No limited ROM  Chest - CTA bilaterally  Cardiovascular - RRR, S1S2  Abdomen -BS+, Soft, NTND  Extremities - No C/C/E, No calf tenderness   Neurologic Exam - no new focal deficits  Skin: RLE with erythema from foot to calf, mild edema noted            LABS:                          14.6   6.21  )-----------( 230      ( 08 Nov 2021 05:25 )             44.9     11-08    140  |  106  |  27<H>  ----------------------------<  112<H>  4.0   |  24  |  1.20    Ca    9.0      08 Nov 2021 05:25    TPro  7.2  /  Alb  3.5  /  TBili  0.5  /  DBili  x   /  AST  25  /  ALT  18  /  AlkPhos  62  11-08    LIVER FUNCTIONS - ( 08 Nov 2021 05:25 )  Alb: 3.5 g/dL / Pro: 7.2 g/dL / ALK PHOS: 62 U/L / ALT: 18 U/L / AST: 25 U/L / GGT: x                   MEDICATIONS  (STANDING):  apixaban 5 milliGRAM(s) Oral every 12 hours  AQUAPHOR (petrolatum Ointment) 1 Application(s) Topical two times a day  atorvastatin 80 milliGRAM(s) Oral at bedtime  fenofibrate Tablet 145 milliGRAM(s) Oral daily  mesalamine DR (24-Hour) Tablet 4.8 Gram(s) Oral daily  metoprolol tartrate 25 milliGRAM(s) Oral two times a day  polyethylene glycol 3350 17 Gram(s) Oral daily  senna 2 Tablet(s) Oral at bedtime  zinc oxide 40% Ointment 1 Application(s) Topical two times a day    MEDICATIONS  (PRN):  acetaminophen     Tablet .. 650 milliGRAM(s) Oral every 6 hours PRN Temp greater or equal to 38C (100.4F), Mild Pain (1 - 3)  
CHIEF COMPLAINT: Denies chest pain, fever, chills, nausea vomiting. He states he had a good night and has no new complaints.     Patient is a 76y old  Male who presents with a chief complaint of CVA (26 Oct 2021 11:26)    HPI:  76 year old male with PMH of HTN on ASA 81mg, Ulcerative Colitis, Afib (not on AC 2/2 h/o GIB, UC), hx of meningioma (s/p left frontal craniotomy in 2017and s/p radiation),  Hx of seizure disorder not on AED, DM-II who was admitted to [facility] after initially presentation from [facility] on 10/20. He initially had slurred speech, having difficulty forming words and leaning to his left side. inital NIHSS was 14 and CT/CTA was noted to show Rt M1 occlusion. TPA was given and patient transferred to [facility] for thrombectomy and had a Right M1-2 thrombectomy with TICI 2 reperfusion. His exam improved but remained with left sided weakness, extinction and word finding difficulties. He was evaluated for acute inpatient rehab and was admitted to [facility] on 10/25/21.    On admission, seen and examined with wife at bedside. Patient reports full strength on left side, has residual RUE and RLE weakness from prior L meningioma resection. Per wife, patient has mild L sided weakness, impaired balance, mild dysarthria and some word finding difficulties. Also noted RLE non-blanchable area of erythema. Patient states that in March 2020, one of his diuretics was discontinued. He then developed worsening LE edema, and was wearing tight shoes that dug into his distal shin, causing an abrasion. He had a pressure ulcer of the RLE with associated cellulitis, was treated with PO abx, and followed with the wound care center at [facility]. He notes that the wound has healed, however, the discoloration remains. Denies pain, tenderness to palpation.    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    DM (diabetes mellitus)    Pulmonary hypertension    Atrial fibrillation    GI bleed    CAD (coronary artery disease)    H/O right inguinal hernia repair    H/O cataract removal with insertion of prosthetic lens  B/L        Allergies    No Known Allergies    Intolerances          PHYSICAL EXAM    Vital Signs Last 24 Hrs  T(C): 36.7 (27 Oct 2021 20:10), Max: 36.7 (27 Oct 2021 20:10)  T(F): 98.1 (27 Oct 2021 20:10), Max: 98.1 (27 Oct 2021 20:10)  HR: 80 (28 Oct 2021 07:27) (72 - 83)  BP: 137/77 (28 Oct 2021 07:27) (126/74 - 137/82)  BP(mean): --  RR: 15 (28 Oct 2021 07:27) (14 - 16)  SpO2: 96% (28 Oct 2021 07:27) (96% - 99%)    PHYSICAL EXAM  Constitutional - NAD, Comfortable  HEENT - NCAT, EOMI  Neck - Supple, No limited ROM  Chest - CTA bilaterally  Cardiovascular - RRR, S1S2  Abdomen -BS+, Soft, NTND  Extremities - No C/C/E, No calf tenderness   Neurologic Exam - no new focal deficits  Skin: RLE with erythema from foot to calf, mild edema noted        LABS:                          13.7   7.54  )-----------( 209      ( 28 Oct 2021 06:00 )             40.9     10-28    140  |  108  |  24<H>  ----------------------------<  116<H>  3.7   |  21<L>  |  1.00    Ca    9.0      28 Oct 2021 06:00    TPro  6.7  /  Alb  3.1<L>  /  TBili  0.7  /  DBili  x   /  AST  29  /  ALT  23  /  AlkPhos  64  10-28    LIVER FUNCTIONS - ( 28 Oct 2021 06:00 )  Alb: 3.1 g/dL / Pro: 6.7 g/dL / ALK PHOS: 64 U/L / ALT: 23 U/L / AST: 29 U/L / GGT: x                       MEDICATIONS  (STANDING):  apixaban 5 milliGRAM(s) Oral every 12 hours  AQUAPHOR (petrolatum Ointment) 1 Application(s) Topical two times a day  atorvastatin 80 milliGRAM(s) Oral at bedtime  fenofibrate Tablet 145 milliGRAM(s) Oral daily  mesalamine DR (24-Hour) Tablet 4.8 Gram(s) Oral daily  metoprolol tartrate 25 milliGRAM(s) Oral two times a day  polyethylene glycol 3350 17 Gram(s) Oral daily  senna 2 Tablet(s) Oral at bedtime    MEDICATIONS  (PRN):  acetaminophen     Tablet .. 650 milliGRAM(s) Oral every 6 hours PRN Temp greater or equal to 38C (100.4F), Mild Pain (1 - 3)  
No overnight events.  no new complaints     REVIEW OF SYSTEMS  Constitutional - No fever,  No fatigue  Neurological - No headaches, No loss of strength  Musculoskeletal - No joint pain, No joint swelling, No muscle pain    VITALS  T(C): 36.4 (21 @ 09:07), Max: 36.8 (21 @ 05:15)  HR: 75 (21 @ 09:07) (61 - 80)  BP: 136/85 (21 @ 09:07) (130/83 - 136/85)  RR: 16 (21 @ 09:07) (16 - 16)  SpO2: 100% (21 @ 09:07) (96% - 100%)  Wt(kg): --       MEDICATIONS   acetaminophen     Tablet .. 650 milliGRAM(s) every 6 hours PRN  apixaban 5 milliGRAM(s) every 12 hours  AQUAPHOR (petrolatum Ointment) 1 Application(s) two times a day  atorvastatin 80 milliGRAM(s) at bedtime  fenofibrate Tablet 145 milliGRAM(s) daily  mesalamine DR (24-Hour) Tablet 4.8 Gram(s) daily  metoprolol tartrate 25 milliGRAM(s) two times a day  polyethylene glycol 3350 17 Gram(s) daily  senna 2 Tablet(s) at bedtime  zinc oxide 40% Ointment 1 Application(s) two times a day      RECENT LABS/IMAGING            Urinalysis Basic - ( 2021 16:00 )    Color: Yellow / Appearance: Clear / S.025 / pH: x  Gluc: x / Ketone: Trace  / Bili: Negative / Urobili: Negative   Blood: x / Protein: 30 mg/dL / Nitrite: Negative   Leuk Esterase: Negative / RBC: 5-10 /HPF / WBC Negative /HPF   Sq Epi: x / Non Sq Epi: Neg.-Few / Bacteria: x      < from: CT Head No Cont (21 @ 10:51) >    IMPRESSION:  Normal temporal evolution of recent rightparietotemporal and insular infarct, with decreased cytotoxic edema and mass effect.    No CT evidence for jarad hemorrhagic transformation.    Increased gyral attenuation within the right posterolateral parietal infarct may represent spared gyri (favored) or petechial hemorrhagic transformation.      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       
Cc: CVA    HPI: Patient with no new medical issues today.  Pain controlled, no chest pain, no N/V, no Fevers/Chills. No other new ROS  Has been tolerating rehabilitation program.  Resting comfortable.    MEDICATIONS  (STANDING):  apixaban 5 milliGRAM(s) Oral every 12 hours  AQUAPHOR (petrolatum Ointment) 1 Application(s) Topical two times a day  atorvastatin 80 milliGRAM(s) Oral at bedtime  fenofibrate Tablet 145 milliGRAM(s) Oral daily  mesalamine DR (24-Hour) Tablet 4.8 Gram(s) Oral daily  metoprolol tartrate 25 milliGRAM(s) Oral two times a day  polyethylene glycol 3350 17 Gram(s) Oral daily  senna 2 Tablet(s) Oral at bedtime    MEDICATIONS  (PRN):  acetaminophen     Tablet .. 650 milliGRAM(s) Oral every 6 hours PRN Temp greater or equal to 38C (100.4F), Mild Pain (1 - 3)    Vital Signs Last 24 Hrs  T(C): 36.4 (30 Oct 2021 20:08), Max: 36.4 (30 Oct 2021 20:08)  T(F): 97.6 (30 Oct 2021 20:08), Max: 97.6 (30 Oct 2021 20:08)  HR: 78 (31 Oct 2021 06:05) (67 - 80)  BP: 126/72 (31 Oct 2021 06:05) (126/72 - 149/80)  BP(mean): --  RR: 15 (30 Oct 2021 20:08) (15 - 15)  SpO2: 98% (30 Oct 2021 20:08) (98% - 98%)    In NAD  HEENT- EOMI  Heart- Well Perfused  Lungs- No resp distress, no use of accessory resp muscles  Abd- + BS, NT  Ext- No calf pain, venous stasis changes  Neuro- Exam unchanged  Psych- Affect wnl    Imp: Patient with diagnosis of CVA admitted for comprehensive acute rehabilitation.    Plan:  - Continue therapies  - DVT prophylaxis- Apixaban  - Skin- Turn q2h, check skin daily  - Continue current medications; patient medically stable.   - Patient is stable to continue current rehabilitation program.   
76 year old male with PMH of HTN on ASA 81mg, Ulcerative Colitis, Afib (not on AC 2/2 h/o GIB, UC), hx of meningioma (s/p left frontal craniotomy in 2017and s/p radiation),  Hx of seizure disorder not on AED, DM-2 who was admitted to [facility] after initially presentation from [facility] on 10/20. He initially had slurred speech, having difficulty forming words and leaning to his left side. initial NIHSS was 14 and CT/CTA was noted to show Rt M1 occlusion. TPA was given and patient transferred to [facility] for thrombectomy and had a Right M1-2 thrombectomy with TICI 2 reperfusion. His exam improved but remained with left sided weakness, extinction and word finding difficulties. He was evaluated for acute inpatient rehab and was admitted to [facility] on 10/25/21.    seen and examined at bedside no new complaints, doing well with PT, overall appears well, no n/v, no sob, no headaches, no dysuria.      {[id number]}  Vital Signs Last 24 Hrs  T(C): 36.7 (29 Oct 2021 09:11), Max: 36.7 (29 Oct 2021 09:11)  T(F): 98 (29 Oct 2021 09:11), Max: 98 (29 Oct 2021 09:11)  HR: 72 (29 Oct 2021 09:11) (72 - 82)  BP: 125/76 (29 Oct 2021 09:11) (125/76 - 146/82)  BP(mean): --  RR: 14 (29 Oct 2021 09:11) (14 - 14)  SpO2: 100% (29 Oct 2021 09:11) (96% - 100%)    GENERAL- NAD  EAR/NOSE/MOUTH/THROAT - no pharyngeal exudates, no oral lesion's  MMM  EYES- AFSHAN, conjunctiva and Sclera clear  NECK- supple  RESPIRATORY-  clear to auscultation bilaterally, non laboured breathing  CARDIOVASCULAR - SIS2, RRR  GI - soft NT BS present  EXTREMITIES- no pedal edema  NEUROLOGY-no gross motor deficits  SKIN- RLE  venous stasis discolouration  PSYCHIATRY- AAO X 3                13.7                 140  | 21   | 24           7.54  >-----------< 209     ------------------------< 116                   40.9                 3.7  | 108  | 1.00                                         Ca 9.0   Mg x     Ph x               MEDICATIONS  (STANDING):  apixaban 5 milliGRAM(s) Oral every 12 hours  AQUAPHOR (petrolatum Ointment) 1 Application(s) Topical two times a day  atorvastatin 80 milliGRAM(s) Oral at bedtime  fenofibrate Tablet 145 milliGRAM(s) Oral daily  mesalamine DR (24-Hour) Tablet 4.8 Gram(s) Oral daily  metoprolol tartrate 25 milliGRAM(s) Oral two times a day  polyethylene glycol 3350 17 Gram(s) Oral daily  senna 2 Tablet(s) Oral at bedtime    MEDICATIONS  (PRN):  acetaminophen     Tablet .. 650 milliGRAM(s) Oral every 6 hours PRN Temp greater or equal to 38C (100.4F), Mild Pain (1 - 3)    
CHIEF COMPLAINT: Denies chest pain, fever, chills, nausea vomiting. He states he is feeling well today and has no complaints.       Patient is a 76y old  Male who presents with a chief complaint of CVA (26 Oct 2021 11:26)    HPI:  76 year old male with PMH of HTN on ASA 81mg, Ulcerative Colitis, Afib (not on AC 2/2 h/o GIB, UC), hx of meningioma (s/p left frontal craniotomy in 2017and s/p radiation),  Hx of seizure disorder not on AED, DM-II who was admitted to [facility] after initially presentation from [facility] on 10/20. He initially had slurred speech, having difficulty forming words and leaning to his left side. inital NIHSS was 14 and CT/CTA was noted to show Rt M1 occlusion. TPA was given and patient transferred to [facility] for thrombectomy and had a Right M1-2 thrombectomy with TICI 2 reperfusion. His exam improved but remained with left sided weakness, extinction and word finding difficulties. He was evaluated for acute inpatient rehab and was admitted to [facility] on 10/25/21.    On admission, seen and examined with wife at bedside. Patient reports full strength on left side, has residual RUE and RLE weakness from prior L meningioma resection. Per wife, patient has mild L sided weakness, impaired balance, mild dysarthria and some word finding difficulties. Also noted RLE non-blanchable area of erythema. Patient states that in March 2020, one of his diuretics was discontinued. He then developed worsening LE edema, and was wearing tight shoes that dug into his distal shin, causing an abrasion. He had a pressure ulcer of the RLE with associated cellulitis, was treated with PO abx, and followed with the wound care center at [facility]. He notes that the wound has healed, however, the discoloration remains. Denies pain, tenderness to palpation.    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    DM (diabetes mellitus)    Pulmonary hypertension    Atrial fibrillation    GI bleed    CAD (coronary artery disease)    H/O right inguinal hernia repair    H/O cataract removal with insertion of prosthetic lens  B/L        Allergies    No Known Allergies    Intolerances          PHYSICAL EXAM    Vital Signs Last 24 Hrs  T(C): 36.4 (03 Nov 2021 07:50), Max: 36.4 (03 Nov 2021 07:50)  T(F): 97.6 (03 Nov 2021 07:50), Max: 97.6 (03 Nov 2021 07:50)  HR: 68 (03 Nov 2021 07:50) (64 - 85)  BP: 148/85 (03 Nov 2021 07:50) (135/75 - 166/90)  BP(mean): --  RR: 16 (03 Nov 2021 07:50) (14 - 16)  SpO2: 98% (03 Nov 2021 07:50) (97% - 98%)    PHYSICAL EXAM  Constitutional - NAD, Comfortable  HEENT - NCAT, EOMI  Neck - Supple, No limited ROM  Chest - CTA bilaterally  Cardiovascular - RRR, S1S2  Abdomen -BS+, Soft, NTND  Extremities - No C/C/E, No calf tenderness   Neurologic Exam - no new focal deficits  Skin: RLE with erythema from foot to calf, mild edema noted        LABS:                          13.9   6.43  )-----------( 237      ( 01 Nov 2021 05:15 )             41.2     11-01    138  |  106  |  22  ----------------------------<  117<H>  3.8   |  22  |  1.07    Ca    9.2      01 Nov 2021 05:15    TPro  6.7  /  Alb  3.2<L>  /  TBili  0.6  /  DBili  x   /  AST  26  /  ALT  18  /  AlkPhos  63  11-01    LIVER FUNCTIONS - ( 01 Nov 2021 05:15 )  Alb: 3.2 g/dL / Pro: 6.7 g/dL / ALK PHOS: 63 U/L / ALT: 18 U/L / AST: 26 U/L / GGT: x                     MEDICATIONS  (STANDING):  apixaban 5 milliGRAM(s) Oral every 12 hours  AQUAPHOR (petrolatum Ointment) 1 Application(s) Topical two times a day  atorvastatin 80 milliGRAM(s) Oral at bedtime  fenofibrate Tablet 145 milliGRAM(s) Oral daily  mesalamine DR (24-Hour) Tablet 4.8 Gram(s) Oral daily  metoprolol tartrate 25 milliGRAM(s) Oral two times a day  polyethylene glycol 3350 17 Gram(s) Oral daily  senna 2 Tablet(s) Oral at bedtime  zinc oxide 40% Ointment 1 Application(s) Topical two times a day    MEDICATIONS  (PRN):  acetaminophen     Tablet .. 650 milliGRAM(s) Oral every 6 hours PRN Temp greater or equal to 38C (100.4F), Mild Pain (1 - 3)  
Cc: CVA    HPI: Patient with no new medical issues today.  Pain controlled, no chest pain, no N/V, no Fevers/Chills. No other new ROS  Has been tolerating rehabilitation program.    acetaminophen     Tablet .. 650 milliGRAM(s) Oral every 6 hours PRN  apixaban 5 milliGRAM(s) Oral every 12 hours  AQUAPHOR (petrolatum Ointment) 1 Application(s) Topical two times a day  atorvastatin 80 milliGRAM(s) Oral at bedtime  fenofibrate Tablet 145 milliGRAM(s) Oral daily  mesalamine DR (24-Hour) Tablet 4.8 Gram(s) Oral daily  metoprolol tartrate 25 milliGRAM(s) Oral two times a day  polyethylene glycol 3350 17 Gram(s) Oral daily  senna 2 Tablet(s) Oral at bedtime    T(C): 36.2 (10-30-21 @ 08:05), Max: 36.7 (10-29-21 @ 09:11)  HR: 72 (10-30-21 @ 08:05) (72 - 74)  BP: 123/78 (10-30-21 @ 08:05) (118/72 - 127/68)  RR: 14 (10-30-21 @ 08:05) (14 - 14)  SpO2: 99% (10-30-21 @ 08:05) (97% - 100%)    In NAD  HEENT- EOMI  Heart- Well Perfused  Lungs- No resp distress, no use of accessory resp muscles  Abd- + BS, NT  Ext- No calf pain  Neuro- Exam unchanged  Psych- Affect wnl    Imp: Patient with diagnosis of CVA admitted for comprehensive acute rehabilitation.    Plan:  - Continue therapies  - DVT prophylaxis- Apixaban  - Skin- Turn q2h, check skin daily  - Continue current medications; patient medically stable.   - Patient is stable to continue current rehabilitation program.   
No overnight events.      REVIEW OF SYSTEMS  Constitutional - No fever,  No fatigue  Neurological - No headaches, No loss of strength  Musculoskeletal - No joint pain, No joint swelling, No muscle pain    VITALS  T(C): 36.7 (21 @ 08:01), Max: 36.7 (21 @ 20:18)  HR: 74 (21 @ 08:01) (70 - 74)  BP: 150/70 (21 @ 08:01) (120/81 - 150/70)  RR: 16 (21 @ 08:01) (16 - 16)  SpO2: 98% (21 @ 08:01) (97% - 98%)  Wt(kg): --       MEDICATIONS   acetaminophen     Tablet .. 650 milliGRAM(s) every 6 hours PRN  apixaban 5 milliGRAM(s) every 12 hours  AQUAPHOR (petrolatum Ointment) 1 Application(s) two times a day  atorvastatin 80 milliGRAM(s) at bedtime  fenofibrate Tablet 145 milliGRAM(s) daily  mesalamine DR (24-Hour) Tablet 4.8 Gram(s) daily  metoprolol tartrate 25 milliGRAM(s) two times a day  polyethylene glycol 3350 17 Gram(s) daily  senna 2 Tablet(s) at bedtime  zinc oxide 40% Ointment 1 Application(s) two times a day      RECENT LABS/IMAGING            Urinalysis Basic - ( 2021 16:00 )    Color: Yellow / Appearance: Clear / S.025 / pH: x  Gluc: x / Ketone: Trace  / Bili: Negative / Urobili: Negative   Blood: x / Protein: 30 mg/dL / Nitrite: Negative   Leuk Esterase: Negative / RBC: 5-10 /HPF / WBC Negative /HPF   Sq Epi: x / Non Sq Epi: Neg.-Few / Bacteria: x            PHYSICAL EXAM  Constitutional - NAD, Comfortable, in bed  Chest - breathing comfortably   Cardiovascular - RRR, S1S2  Abdomen -BS+, Soft, NTND  Extremities - No C/C/E, No calf tenderness   Neurologic Exam - left sided weakness, dysarthria       ASSESSMENT/PLAN  76y Male PMH of previous meningioma with crani, HTN, Afib, seizures but not on medication, UC, DM type 2 with functional deficits after Right M2 occlusion and is s/p tpa and thrombectomy  CT  stable, no confusion today   afib on metoprolol, apixaban  UC on mesalamine  Continue current medical management  Pain - Tylenol PRN  Continue 3hrs a day of comprehensive rehab program.       
76 year old male with PMH of HTN on ASA 81mg, Ulcerative Colitis, Afib (not on AC 2/2 h/o GIB, UC), hx of meningioma (s/p left frontal craniotomy in 2017and s/p radiation),  Hx of seizure disorder not on AED, DM-2 who was admitted to [facility] after initially presentation from [facility] on 10/20. He initially had slurred speech, having difficulty forming words and leaning to his left side. initial NIHSS was 14 and CT/CTA was noted to show Rt M1 occlusion. TPA was given and patient transferred to [facility] for thrombectomy and had a Right M1-2 thrombectomy with TICI 2 reperfusion. His exam improved but remained with left sided weakness, extinction and word finding difficulties. He was evaluated for acute inpatient rehab and was admitted to [facility] on 10/25/21.    seen and examined at bedside no new complaints  no n/v, no sob, no headaches, no dysuria.      {[id number]}Vital Signs Last 24 Hrs  T(C): 36.4 (27 Oct 2021 07:45), Max: 37 (26 Oct 2021 19:59)  T(F): 97.6 (27 Oct 2021 07:45), Max: 98.6 (26 Oct 2021 19:59)  HR: 65 (27 Oct 2021 07:45) (65 - 91)  BP: 136/74 (27 Oct 2021 07:45) (129/74 - 151/81)  BP(mean): --  RR: 16 (27 Oct 2021 07:45) (14 - 16)  SpO2: 100% (27 Oct 2021 07:45) (99% - 100%)      GENERAL- NAD  EAR/NOSE/MOUTH/THROAT - no pharyngeal exudates, no oral lesion's  MMM  EYES- AFSHAN, conjunctiva and Sclera clear  NECK- supple  RESPIRATORY-  clear to auscultation bilaterally, non laboured breathing  CARDIOVASCULAR - SIS2, RRR  GI - soft NT BS present  EXTREMITIES- no pedal edema  NEUROLOGY-no gross motor deficits  SKIN- RLE  venous stasis discoluration  PSYCHIATRY- AAO X 3  MUSCULOSKELETAL- ROM normal                    14.1                 141  | 25   | 25           8.07  >-----------< 200     ------------------------< 106                   42.9                 3.7  | 107  | 1.02                                         Ca 9.0   Mg x     Ph x            MEDICATIONS  (STANDING):  apixaban 5 milliGRAM(s) Oral every 12 hours  AQUAPHOR (petrolatum Ointment) 1 Application(s) Topical two times a day  atorvastatin 80 milliGRAM(s) Oral at bedtime  fenofibrate Tablet 145 milliGRAM(s) Oral daily  mesalamine DR (24-Hour) Tablet 4.8 Gram(s) Oral daily  metoprolol tartrate 25 milliGRAM(s) Oral two times a day  polyethylene glycol 3350 17 Gram(s) Oral daily  senna 2 Tablet(s) Oral at bedtime    MEDICATIONS  (PRN):  acetaminophen     Tablet .. 650 milliGRAM(s) Oral every 6 hours PRN Temp greater or equal to 38C (100.4F), Mild Pain (1 - 3)    
76 year old male with PMH of HTN on ASA 81mg, Ulcerative Colitis, Afib (not on AC 2/2 h/o GIB, UC), hx of meningioma (s/p left frontal craniotomy in 2017and s/p radiation),  Hx of seizure disorder not on AED, DM-2 who was admitted to [facility] after initially presentation from [facility] on 10/20. He initially had slurred speech, having difficulty forming words and leaning to his left side. initial NIHSS was 14 and CT/CTA was noted to show Rt M1 occlusion. TPA was given and patient transferred to [facility] for thrombectomy and had a Right M1-2 thrombectomy with TICI 2 reperfusion. His exam improved but remained with left sided weakness, extinction and word finding difficulties. He was evaluated for acute inpatient rehab and was admitted to [facility] on 10/25/21.    seen and examined at bedside c/o right LE discomfort and gait imbalance.   no n/v, no sob, no headaches, no dysuria.      {[id number]}  Vital Signs Last 24 Hrs  T(C): 36.7 (27 Oct 2021 20:10), Max: 36.7 (27 Oct 2021 20:10)  T(F): 98.1 (27 Oct 2021 20:10), Max: 98.1 (27 Oct 2021 20:10)  HR: 80 (28 Oct 2021 07:27) (72 - 83)  BP: 137/77 (28 Oct 2021 07:27) (126/74 - 137/82)  BP(mean): --  RR: 15 (28 Oct 2021 07:27) (14 - 16)  SpO2: 96% (28 Oct 2021 07:27) (96% - 99%)      GENERAL- NAD  EAR/NOSE/MOUTH/THROAT - no pharyngeal exudates, no oral lesion's  MMM  EYES- AFSHAN, conjunctiva and Sclera clear  NECK- supple  RESPIRATORY-  clear to auscultation bilaterally, non laboured breathing  CARDIOVASCULAR - SIS2, RRR  GI - soft NT BS present  EXTREMITIES- no pedal edema  NEUROLOGY-no gross motor deficits  SKIN- RLE  venous stasis discolouration  PSYCHIATRY- AAO X 3                    13.7                 140  | 21   | 24           7.54  >-----------< 209     ------------------------< 116                   40.9                 3.7  | 108  | 1.00                                         Ca 9.0   Mg x     Ph x            MEDICATIONS  (STANDING):  apixaban 5 milliGRAM(s) Oral every 12 hours  AQUAPHOR (petrolatum Ointment) 1 Application(s) Topical two times a day  atorvastatin 80 milliGRAM(s) Oral at bedtime  fenofibrate Tablet 145 milliGRAM(s) Oral daily  mesalamine DR (24-Hour) Tablet 4.8 Gram(s) Oral daily  metoprolol tartrate 25 milliGRAM(s) Oral two times a day  polyethylene glycol 3350 17 Gram(s) Oral daily  senna 2 Tablet(s) Oral at bedtime    MEDICATIONS  (PRN):  acetaminophen     Tablet .. 650 milliGRAM(s) Oral every 6 hours PRN Temp greater or equal to 38C (100.4F), Mild Pain (1 - 3)    
CHIEF COMPLAINT: Denies chest pain, fever, chills, nausea vomiting. He continues to make improvements in therapy and had no acute events overnight.       Patient is a 76y old  Male who presents with a chief complaint of CVA (26 Oct 2021 11:26)    HPI:  76 year old male with PMH of HTN on ASA 81mg, Ulcerative Colitis, Afib (not on AC 2/2 h/o GIB, UC), hx of meningioma (s/p left frontal craniotomy in 2017and s/p radiation),  Hx of seizure disorder not on AED, DM-II who was admitted to [facility] after initially presentation from [facility] on 10/20. He initially had slurred speech, having difficulty forming words and leaning to his left side. inital NIHSS was 14 and CT/CTA was noted to show Rt M1 occlusion. TPA was given and patient transferred to [facility] for thrombectomy and had a Right M1-2 thrombectomy with TICI 2 reperfusion. His exam improved but remained with left sided weakness, extinction and word finding difficulties. He was evaluated for acute inpatient rehab and was admitted to [facility] on 10/25/21.    On admission, seen and examined with wife at bedside. Patient reports full strength on left side, has residual RUE and RLE weakness from prior L meningioma resection. Per wife, patient has mild L sided weakness, impaired balance, mild dysarthria and some word finding difficulties. Also noted RLE non-blanchable area of erythema. Patient states that in March 2020, one of his diuretics was discontinued. He then developed worsening LE edema, and was wearing tight shoes that dug into his distal shin, causing an abrasion. He had a pressure ulcer of the RLE with associated cellulitis, was treated with PO abx, and followed with the wound care center at [facility]. He notes that the wound has healed, however, the discoloration remains. Denies pain, tenderness to palpation.    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    DM (diabetes mellitus)    Pulmonary hypertension    Atrial fibrillation    GI bleed    CAD (coronary artery disease)    H/O right inguinal hernia repair    H/O cataract removal with insertion of prosthetic lens  B/L        Allergies    No Known Allergies    Intolerances          PHYSICAL EXAM    Vital Signs Last 24 Hrs  T(C): 36.6 (02 Nov 2021 08:16), Max: 36.6 (02 Nov 2021 08:16)  T(F): 97.8 (02 Nov 2021 08:16), Max: 97.8 (02 Nov 2021 08:16)  HR: 73 (02 Nov 2021 08:16) (73 - 89)  BP: 150/82 (02 Nov 2021 08:16) (136/88 - 159/88)  BP(mean): --  RR: 16 (02 Nov 2021 08:16) (16 - 16)  SpO2: 98% (02 Nov 2021 08:16) (97% - 98%)    PHYSICAL EXAM  Constitutional - NAD, Comfortable  HEENT - NCAT, EOMI  Neck - Supple, No limited ROM  Chest - CTA bilaterally  Cardiovascular - RRR, S1S2  Abdomen -BS+, Soft, NTND  Extremities - No C/C/E, No calf tenderness   Neurologic Exam - no new focal deficits  Skin: RLE with erythema from foot to calf, mild edema noted        LABS:                          13.9   6.43  )-----------( 237      ( 01 Nov 2021 05:15 )             41.2     11-01    138  |  106  |  22  ----------------------------<  117<H>  3.8   |  22  |  1.07    Ca    9.2      01 Nov 2021 05:15    TPro  6.7  /  Alb  3.2<L>  /  TBili  0.6  /  DBili  x   /  AST  26  /  ALT  18  /  AlkPhos  63  11-01    LIVER FUNCTIONS - ( 01 Nov 2021 05:15 )  Alb: 3.2 g/dL / Pro: 6.7 g/dL / ALK PHOS: 63 U/L / ALT: 18 U/L / AST: 26 U/L / GGT: x                     MEDICATIONS  (STANDING):  apixaban 5 milliGRAM(s) Oral every 12 hours  AQUAPHOR (petrolatum Ointment) 1 Application(s) Topical two times a day  atorvastatin 80 milliGRAM(s) Oral at bedtime  fenofibrate Tablet 145 milliGRAM(s) Oral daily  mesalamine DR (24-Hour) Tablet 4.8 Gram(s) Oral daily  metoprolol tartrate 25 milliGRAM(s) Oral two times a day  polyethylene glycol 3350 17 Gram(s) Oral daily  senna 2 Tablet(s) Oral at bedtime    MEDICATIONS  (PRN):  acetaminophen     Tablet .. 650 milliGRAM(s) Oral every 6 hours PRN Temp greater or equal to 38C (100.4F), Mild Pain (1 - 3)  
CHIEF COMPLAINT: Denies chest pain, fever, chills, nausea vomiting. He continues to make improvements in therapy and has no new complaints overnight.       Patient is a 76y old  Male who presents with a chief complaint of CVA (26 Oct 2021 11:26)    HPI:  76 year old male with PMH of HTN on ASA 81mg, Ulcerative Colitis, Afib (not on AC 2/2 h/o GIB, UC), hx of meningioma (s/p left frontal craniotomy in 2017and s/p radiation),  Hx of seizure disorder not on AED, DM-II who was admitted to [facility] after initially presentation from [facility] on 10/20. He initially had slurred speech, having difficulty forming words and leaning to his left side. inital NIHSS was 14 and CT/CTA was noted to show Rt M1 occlusion. TPA was given and patient transferred to [facility] for thrombectomy and had a Right M1-2 thrombectomy with TICI 2 reperfusion. His exam improved but remained with left sided weakness, extinction and word finding difficulties. He was evaluated for acute inpatient rehab and was admitted to [facility] on 10/25/21.    On admission, seen and examined with wife at bedside. Patient reports full strength on left side, has residual RUE and RLE weakness from prior L meningioma resection. Per wife, patient has mild L sided weakness, impaired balance, mild dysarthria and some word finding difficulties. Also noted RLE non-blanchable area of erythema. Patient states that in March 2020, one of his diuretics was discontinued. He then developed worsening LE edema, and was wearing tight shoes that dug into his distal shin, causing an abrasion. He had a pressure ulcer of the RLE with associated cellulitis, was treated with PO abx, and followed with the wound care center at [facility]. He notes that the wound has healed, however, the discoloration remains. Denies pain, tenderness to palpation.    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    DM (diabetes mellitus)    Pulmonary hypertension    Atrial fibrillation    GI bleed    CAD (coronary artery disease)    H/O right inguinal hernia repair    H/O cataract removal with insertion of prosthetic lens  B/L        Allergies    No Known Allergies    Intolerances          PHYSICAL EXAM    Vital Signs Last 24 Hrs  T(C): 36.4 (04 Nov 2021 07:57), Max: 36.4 (04 Nov 2021 07:57)  T(F): 97.5 (04 Nov 2021 07:57), Max: 97.5 (04 Nov 2021 07:57)  HR: 58 (04 Nov 2021 07:57) (58 - 76)  BP: 131/83 (04 Nov 2021 07:57) (125/68 - 157/88)  BP(mean): --  RR: 15 (04 Nov 2021 07:57) (14 - 15)  SpO2: 100% (04 Nov 2021 07:57) (97% - 100%)    PHYSICAL EXAM  Constitutional - NAD, Comfortable  HEENT - NCAT, EOMI  Neck - Supple, No limited ROM  Chest - CTA bilaterally  Cardiovascular - RRR, S1S2  Abdomen -BS+, Soft, NTND  Extremities - No C/C/E, No calf tenderness   Neurologic Exam - no new focal deficits  Skin: RLE with erythema from foot to calf, mild edema noted          LABS:                          13.9   6.72  )-----------( 238      ( 04 Nov 2021 05:20 )             42.1     11-04    142  |  107  |  24<H>  ----------------------------<  116<H>  4.2   |  26  |  1.21    Ca    9.2      04 Nov 2021 05:20    TPro  6.9  /  Alb  3.4  /  TBili  0.6  /  DBili  x   /  AST  23  /  ALT  18  /  AlkPhos  57  11-04    LIVER FUNCTIONS - ( 04 Nov 2021 05:20 )  Alb: 3.4 g/dL / Pro: 6.9 g/dL / ALK PHOS: 57 U/L / ALT: 18 U/L / AST: 23 U/L / GGT: x                 MEDICATIONS  (STANDING):  apixaban 5 milliGRAM(s) Oral every 12 hours  AQUAPHOR (petrolatum Ointment) 1 Application(s) Topical two times a day  atorvastatin 80 milliGRAM(s) Oral at bedtime  fenofibrate Tablet 145 milliGRAM(s) Oral daily  mesalamine DR (24-Hour) Tablet 4.8 Gram(s) Oral daily  metoprolol tartrate 25 milliGRAM(s) Oral two times a day  polyethylene glycol 3350 17 Gram(s) Oral daily  senna 2 Tablet(s) Oral at bedtime  zinc oxide 40% Ointment 1 Application(s) Topical two times a day    MEDICATIONS  (PRN):  acetaminophen     Tablet .. 650 milliGRAM(s) Oral every 6 hours PRN Temp greater or equal to 38C (100.4F), Mild Pain (1 - 3)  
CHIEF COMPLAINT: Denies chest pain, fever, chills, nausea vomiting. He has intermittent back pain when sitting up in the chair which is relieved by tylenol. He has no new complaints this morning.         Patient is a 76y old  Male who presents with a chief complaint of CVA (26 Oct 2021 11:26)    HPI:  76 year old male with PMH of HTN on ASA 81mg, Ulcerative Colitis, Afib (not on AC 2/2 h/o GIB, UC), hx of meningioma (s/p left frontal craniotomy in 2017and s/p radiation),  Hx of seizure disorder not on AED, DM-II who was admitted to [facility] after initially presentation from [facility] on 10/20. He initially had slurred speech, having difficulty forming words and leaning to his left side. inital NIHSS was 14 and CT/CTA was noted to show Rt M1 occlusion. TPA was given and patient transferred to [facility] for thrombectomy and had a Right M1-2 thrombectomy with TICI 2 reperfusion. His exam improved but remained with left sided weakness, extinction and word finding difficulties. He was evaluated for acute inpatient rehab and was admitted to [facility] on 10/25/21.    On admission, seen and examined with wife at bedside. Patient reports full strength on left side, has residual RUE and RLE weakness from prior L meningioma resection. Per wife, patient has mild L sided weakness, impaired balance, mild dysarthria and some word finding difficulties. Also noted RLE non-blanchable area of erythema. Patient states that in March 2020, one of his diuretics was discontinued. He then developed worsening LE edema, and was wearing tight shoes that dug into his distal shin, causing an abrasion. He had a pressure ulcer of the RLE with associated cellulitis, was treated with PO abx, and followed with the wound care center at [facility]. He notes that the wound has healed, however, the discoloration remains. Denies pain, tenderness to palpation.    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    DM (diabetes mellitus)    Pulmonary hypertension    Atrial fibrillation    GI bleed    CAD (coronary artery disease)    H/O right inguinal hernia repair    H/O cataract removal with insertion of prosthetic lens  B/L        Allergies    No Known Allergies    Intolerances          PHYSICAL EXAM    Vital Signs Last 24 Hrs  T(C): 36.2 (01 Nov 2021 08:43), Max: 36.3 (31 Oct 2021 20:46)  T(F): 97.2 (01 Nov 2021 08:43), Max: 97.4 (31 Oct 2021 20:46)  HR: 63 (01 Nov 2021 08:43) (63 - 79)  BP: 147/79 (01 Nov 2021 08:43) (107/73 - 166/79)  BP(mean): --  RR: 16 (01 Nov 2021 08:43) (15 - 16)  SpO2: 96% (01 Nov 2021 08:43) (96% - 99%)    PHYSICAL EXAM  Constitutional - NAD, Comfortable  HEENT - NCAT, EOMI  Neck - Supple, No limited ROM  Chest - CTA bilaterally  Cardiovascular - RRR, S1S2  Abdomen -BS+, Soft, NTND  Extremities - No C/C/E, No calf tenderness   Neurologic Exam - no new focal deficits  Skin: RLE with erythema from foot to calf, mild edema noted        LABS:                          13.9   6.43  )-----------( 237      ( 01 Nov 2021 05:15 )             41.2     11-01    138  |  106  |  22  ----------------------------<  117<H>  3.8   |  22  |  1.07    Ca    9.2      01 Nov 2021 05:15    TPro  6.7  /  Alb  3.2<L>  /  TBili  0.6  /  DBili  x   /  AST  26  /  ALT  18  /  AlkPhos  63  11-01    LIVER FUNCTIONS - ( 01 Nov 2021 05:15 )  Alb: 3.2 g/dL / Pro: 6.7 g/dL / ALK PHOS: 63 U/L / ALT: 18 U/L / AST: 26 U/L / GGT: x                     MEDICATIONS  (STANDING):  apixaban 5 milliGRAM(s) Oral every 12 hours  AQUAPHOR (petrolatum Ointment) 1 Application(s) Topical two times a day  atorvastatin 80 milliGRAM(s) Oral at bedtime  fenofibrate Tablet 145 milliGRAM(s) Oral daily  mesalamine DR (24-Hour) Tablet 4.8 Gram(s) Oral daily  metoprolol tartrate 25 milliGRAM(s) Oral two times a day  polyethylene glycol 3350 17 Gram(s) Oral daily  senna 2 Tablet(s) Oral at bedtime    MEDICATIONS  (PRN):  acetaminophen     Tablet .. 650 milliGRAM(s) Oral every 6 hours PRN Temp greater or equal to 38C (100.4F), Mild Pain (1 - 3)  
CHIEF COMPLAINT: Denies chest pain, fever, chills, nausea vomiting. He states his leg feels better today. Has no new complaints.     Patient is a 76y old  Male who presents with a chief complaint of CVA (26 Oct 2021 11:26)    HPI:  76 year old male with PMH of HTN on ASA 81mg, Ulcerative Colitis, Afib (not on AC 2/2 h/o GIB, UC), hx of meningioma (s/p left frontal craniotomy in 2017and s/p radiation),  Hx of seizure disorder not on AED, DM-II who was admitted to [facility] after initially presentation from [facility] on 10/20. He initially had slurred speech, having difficulty forming words and leaning to his left side. inital NIHSS was 14 and CT/CTA was noted to show Rt M1 occlusion. TPA was given and patient transferred to [facility] for thrombectomy and had a Right M1-2 thrombectomy with TICI 2 reperfusion. His exam improved but remained with left sided weakness, extinction and word finding difficulties. He was evaluated for acute inpatient rehab and was admitted to [facility] on 10/25/21.    On admission, seen and examined with wife at bedside. Patient reports full strength on left side, has residual RUE and RLE weakness from prior L meningioma resection. Per wife, patient has mild L sided weakness, impaired balance, mild dysarthria and some word finding difficulties. Also noted RLE non-blanchable area of erythema. Patient states that in March 2020, one of his diuretics was discontinued. He then developed worsening LE edema, and was wearing tight shoes that dug into his distal shin, causing an abrasion. He had a pressure ulcer of the RLE with associated cellulitis, was treated with PO abx, and followed with the wound care center at [facility] [place]. He notes that the wound has healed, however, the discoloration remains. Denies pain, tenderness to palpation.    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    DM (diabetes mellitus)    Pulmonary hypertension    Atrial fibrillation    GI bleed    CAD (coronary artery disease)    H/O right inguinal hernia repair    H/O cataract removal with insertion of prosthetic lens  B/L        Allergies    No Known Allergies    Intolerances          PHYSICAL EXAM    Vital Signs Last 24 Hrs  T(C): 36.4 (27 Oct 2021 07:45), Max: 37 (26 Oct 2021 19:59)  T(F): 97.6 (27 Oct 2021 07:45), Max: 98.6 (26 Oct 2021 19:59)  HR: 65 (27 Oct 2021 07:45) (65 - 91)  BP: 136/74 (27 Oct 2021 07:45) (129/74 - 151/81)  BP(mean): --  RR: 16 (27 Oct 2021 07:45) (14 - 16)  SpO2: 100% (27 Oct 2021 07:45) (99% - 100%)    PHYSICAL EXAM  Constitutional - NAD, Comfortable  HEENT - NCAT, EOMI  Neck - Supple, No limited ROM  Chest - CTA bilaterally  Cardiovascular - RRR, S1S2  Abdomen -BS+, Soft, NTND  Extremities - No C/C/E, No calf tenderness   Neurologic Exam - no new focal deficits  Skin: RLE with erythema from foot to calf, mild edema noted    RECENT LABS:                          14.1   8.07  )-----------( 200      ( 26 Oct 2021 05:15 )             42.9     10-26    141  |  107  |  25<H>  ----------------------------<  106<H>  3.7   |  25  |  1.02    Ca    9.0      26 Oct 2021 05:15  Phos  2.6     10-24  Mg     1.9     10-24    TPro  7.2  /  Alb  3.3  /  TBili  0.7  /  DBili  x   /  AST  28  /  ALT  18  /  AlkPhos  55  10-26    LIVER FUNCTIONS - ( 26 Oct 2021 05:15 )  Alb: 3.3 g/dL / Pro: 7.2 g/dL / ALK PHOS: 55 U/L / ALT: 18 U/L / AST: 28 U/L / GGT: x                 MEDICATIONS  (STANDING):  apixaban 5 milliGRAM(s) Oral every 12 hours  AQUAPHOR (petrolatum Ointment) 1 Application(s) Topical two times a day  atorvastatin 80 milliGRAM(s) Oral at bedtime  fenofibrate Tablet 145 milliGRAM(s) Oral daily  mesalamine DR (24-Hour) Tablet 4.8 Gram(s) Oral daily  metoprolol tartrate 25 milliGRAM(s) Oral two times a day  polyethylene glycol 3350 17 Gram(s) Oral daily  senna 2 Tablet(s) Oral at bedtime    MEDICATIONS  (PRN):  acetaminophen     Tablet .. 650 milliGRAM(s) Oral every 6 hours PRN Temp greater or equal to 38C (100.4F), Mild Pain (1 - 3)  
CHIEF COMPLAINT: better spirits, motivated in therapy, offers no new complaints      HISTORY OF PRESENT ILLNESS    76 year old male with PMH of HTN on ASA 81mg, Ulcerative Colitis, Afib (not on AC 2/2 h/o GIB, UC), hx of meningioma (s/p left frontal craniotomy in 2017and s/p radiation),  Hx of seizure disorder not on AED, DM-II who was admitted to [facility] after initially presentation from [facility] on 10/20. He initially had slurred speech, having difficulty forming words and leaning to his left side. inital NIHSS was 14 and CT/CTA was noted to show Rt M1 occlusion. TPA was given and patient transferred to [facility] for thrombectomy and had a Right M1-2 thrombectomy with TICI 2 reperfusion. His exam improved but remained with left sided weakness, extinction and word finding difficulties. He was evaluated for acute inpatient rehab and was admitted to [facility] on 10/25/21.    On admission, seen and examined with wife at bedside. Patient reports full strength on left side, has residual RUE and RLE weakness from prior L meningioma resection. Per wife, patient has mild L sided weakness, impaired balance, mild dysarthria and some word finding difficulties. Also noted RLE non-blanchable area of erythema. Patient states that in March 2020, one of his diuretics was discontinued. He then developed worsening LE edema, and was wearing tight shoes that dug into his distal shin, causing an abrasion. He had a pressure ulcer of the RLE with associated cellulitis, was treated with PO abx, and followed with the wound care center at [facility]. He notes that the wound has healed, however, the discoloration remains. Denies pain, tenderness to palpation.    (25 Oct 2021 13:34)        PAST MEDICAL & SURGICAL HISTORY:  Essential hypertension    Other hyperlipidemia    Meningioma  left frontal, s/p resection and radiation    Seizure  2/2 meningioma    DM (diabetes mellitus)    Pulmonary hypertension    Atrial fibrillation    GI bleed    CAD (coronary artery disease)    H/O right inguinal hernia repair    H/O cataract removal with insertion of prosthetic lens  B/L    VITALS  Vital Signs Last 24 Hrs  T(C): 36.7 (29 Oct 2021 09:11), Max: 36.7 (29 Oct 2021 09:11)  T(F): 98 (29 Oct 2021 09:11), Max: 98 (29 Oct 2021 09:11)  HR: 72 (29 Oct 2021 09:11) (72 - 82)  BP: 125/76 (29 Oct 2021 09:11) (125/76 - 146/82)  BP(mean): --  RR: 14 (29 Oct 2021 09:11) (14 - 14)  SpO2: 100% (29 Oct 2021 09:11) (96% - 100%)      PHYSICAL EXAM  Constitutional - NAD, Comfortable  HEENT - NCAT, EOMI  Neck - Supple, No limited ROM  Chest - CTA bilaterally  Cardiovascular - RRR, S1S2, No murmurs  Abdomen - BS+, Soft, NTND  Extremities - No calf tenderness   Neurologic Exam -  no new focal deficits                       RECENT LABS                        13.7   7.54  )-----------( 209      ( 28 Oct 2021 06:00 )             40.9     10-28    140  |  108  |  24<H>  ----------------------------<  116<H>  3.7   |  21<L>  |  1.00    Ca    9.0      28 Oct 2021 06:00    TPro  6.7  /  Alb  3.1<L>  /  TBili  0.7  /  DBili  x   /  AST  29  /  ALT  23  /  AlkPhos  64  10-28      LIVER FUNCTIONS - ( 28 Oct 2021 06:00 )  Alb: 3.1 g/dL / Pro: 6.7 g/dL / ALK PHOS: 64 U/L / ALT: 23 U/L / AST: 29 U/L / GGT: x               CURRENT MEDICATIONS  MEDICATIONS  (STANDING):  apixaban 5 milliGRAM(s) Oral every 12 hours  AQUAPHOR (petrolatum Ointment) 1 Application(s) Topical two times a day  atorvastatin 80 milliGRAM(s) Oral at bedtime  fenofibrate Tablet 145 milliGRAM(s) Oral daily  mesalamine DR (24-Hour) Tablet 4.8 Gram(s) Oral daily  metoprolol tartrate 25 milliGRAM(s) Oral two times a day  polyethylene glycol 3350 17 Gram(s) Oral daily  senna 2 Tablet(s) Oral at bedtime    MEDICATIONS  (PRN):  acetaminophen     Tablet .. 650 milliGRAM(s) Oral every 6 hours PRN Temp greater or equal to 38C (100.4F), Mild Pain (1 - 3)    
Hospitalist: Dalila Salazar DO    CHIEF COMPLAINT: Patient is a 76y old  male who presents with a chief complaint of CVA (2021 10:50)      SUBJECTIVE / OVERNIGHT EVENTS: Patient seen and examined. No acute events overnight. Pain well controlled and patient without any complaints.    MEDICATIONS  (STANDING):  apixaban 5 milliGRAM(s) Oral every 12 hours  AQUAPHOR (petrolatum Ointment) 1 Application(s) Topical two times a day  atorvastatin 80 milliGRAM(s) Oral at bedtime  fenofibrate Tablet 145 milliGRAM(s) Oral daily  mesalamine DR (24-Hour) Tablet 4.8 Gram(s) Oral daily  metoprolol tartrate 25 milliGRAM(s) Oral two times a day  polyethylene glycol 3350 17 Gram(s) Oral daily  senna 2 Tablet(s) Oral at bedtime  zinc oxide 40% Ointment 1 Application(s) Topical two times a day    MEDICATIONS  (PRN):  acetaminophen     Tablet .. 650 milliGRAM(s) Oral every 6 hours PRN Temp greater or equal to 38C (100.4F), Mild Pain (1 - 3)      VITALS:  T(F): 98.1 (21 @ 08:01), Max: 98.1 (21 @ 20:18)  HR: 74 (21 @ 08:01) (70 - 74)  BP: 150/70 (21 @ 08:01) (120/81 - 150/70)  RR: 16 (21 @ 08:01) (16 - 16)  SpO2: 98% (21 @ 08:01)      REVIEW OF SYSTEMS:  For ROV please refer back to H&P     PHYSICAL EXAM:  Constitutional - NAD, Comfortable, in bed  Chest - breathing comfortably   Cardiovascular - RRR, S1S2  Abdomen -BS+, Soft, NTND  Extremities - No C/C/E, No calf tenderness   Neurologic Exam - left sided weakness, dysarthria       Urinalysis Basic - ( 2021 16:00 )    Color: Yellow / Appearance: Clear / S.025 / pH: x  Gluc: x / Ketone: Trace  / Bili: Negative / Urobili: Negative   Blood: x / Protein: 30 mg/dL / Nitrite: Negative   Leuk Esterase: Negative / RBC: 5-10 /HPF / WBC Negative /HPF   Sq Epi: x / Non Sq Epi: Neg.-Few / Bacteria: x           CAPILLARY BLOOD GLUCOSE            MICROBIOLOGY:  Urinalysis Basic - ( 2021 16:00 )    Color: Yellow / Appearance: Clear / S.025 / pH: x  Gluc: x / Ketone: Trace  / Bili: Negative / Urobili: Negative   Blood: x / Protein: 30 mg/dL / Nitrite: Negative   Leuk Esterase: Negative / RBC: 5-10 /HPF / WBC Negative /HPF   Sq Epi: x / Non Sq Epi: Neg.-Few / Bacteria: x            RADIOLOGY & ADDITIONAL TESTS:    Imaging Personally Reviewed:    [X] Consultant(s) Notes Reviewed:  [X] Care Discussed with Consultants/Other Providers:

## 2021-11-09 NOTE — PROGRESS NOTE ADULT - ASSESSMENT
76 year old male with PMH of previous meningioma with crani, HTN, Afib, seizures but not on medication, UC, DM type 2 who presented to [facility] with a Right M2 occlusion and is s/p tpa and thrombectomy. He now has Gait Instability, ADL impairments and Functional impairments and is admitted to [facility] inpatient rehab.     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  - GI clearance given at [facility]  - If patient does not tolerate AC, could possibly be a candidate for watchman procedure as per cardiology  - Continue Apixaban 5mg BID  - Continue metoprolol 25mg BID    #Dm type 2  - A1c is 5.9  - Home med: Jardiance      #Ulcerative Colitis  - Home med: mesalamine 1.2 gram - 4 tabs daily  -Restart mesalamine   - Monitor BM  - GI consult appreciated     #Pain control  - Tylenol PRN    #GI/Bowel Mgmt   - Continue Senna  - Miralax PRN  -Protonix 40mg daily       #DVT  - Apixaban 5mg BID  - SCDs, TEDs     #hx RLE pressure ulcer - redness improving   - per pt, in march 2020, diuretic was dc'd, then developed worsening LE edema, tight shoes which caused pressure injury to R ankle and cellulitis tx with PO abx and followed with wound care at [facility].  -Mild dry skin to BLE   -Continue aquaphor to BLE, abd pad and kerlix dressing daily.   - Wound care consult appreciated - Continue aquaphor  -Continue tylenol for discomfort    #Skin  - Desitin ordered for buttocks due to incontinence associated dermatitis.   -Improving     FEN   - Diet - Regular + Thins  [CCHO, DASH/TLC]      TEAM MEETING 11/3/21  SW:  Lives with wife and daughter involved. has 6STE but has multilevel home with many stairs  OT: set up for eating/grooming, min for bathing and UBD, max for LBD and toileting   PT: SV for transfers, 100 feet with rolling walker and 4 steps with 2 hand rails and min assist   SLP: mod I for comprehension, min for memory, regular diet  barriers: easily distracted, dec attention, dec word finding, poor insight, dec prob solving, dec safety awareness, higher cog deficits    goal: Goals adjusted for Sv for ambulation and mod I for all ADLs   EDOD:  11/9 Home     Patient is stable and ready for discharge.

## 2021-11-09 NOTE — PROGRESS NOTE ADULT - PROVIDER SPECIALTY LIST ADULT
Hospitalist
Rehab Medicine
Rehab Medicine
Hospitalist
Neurology
Hospitalist
Hospitalist
Neurology
Rehab Medicine
Rehab Medicine
Neurology

## 2021-11-16 PROBLEM — I48.91 AFIB: Status: ACTIVE | Noted: 2021-11-16

## 2021-11-16 RX ORDER — DICLOFENAC SODIUM 10 MG/G
1 GEL TOPICAL
Refills: 0 | Status: COMPLETED | COMMUNITY
End: 2021-11-16

## 2021-11-16 RX ORDER — ATORVASTATIN CALCIUM 80 MG/1
80 TABLET, FILM COATED ORAL
Qty: 30 | Refills: 0 | Status: ACTIVE | COMMUNITY
Start: 2021-11-16

## 2021-11-16 RX ORDER — DICLOFENAC SODIUM 10 MG/G
1 GEL TOPICAL
Qty: 3 | Refills: 4 | Status: COMPLETED | COMMUNITY
Start: 2018-07-25 | End: 2021-11-16

## 2021-11-16 RX ORDER — FENOFIBRIC ACID 135 MG/1
135 CAPSULE, DELAYED RELEASE ORAL
Refills: 0 | Status: COMPLETED | COMMUNITY
End: 2021-11-16

## 2021-11-16 RX ORDER — FUROSEMIDE 40 MG/1
40 TABLET ORAL DAILY
Refills: 0 | Status: COMPLETED | COMMUNITY
End: 2021-11-16

## 2021-11-16 RX ORDER — MESALAMINE 1.2 G/1
1.2 TABLET, DELAYED RELEASE ORAL DAILY
Refills: 0 | Status: ACTIVE | COMMUNITY
Start: 2021-11-16

## 2021-11-17 ENCOUNTER — APPOINTMENT (OUTPATIENT)
Dept: CARDIOTHORACIC SURGERY | Facility: CLINIC | Age: 76
End: 2021-11-17
Payer: MEDICARE

## 2021-11-17 VITALS
DIASTOLIC BLOOD PRESSURE: 80 MMHG | SYSTOLIC BLOOD PRESSURE: 144 MMHG | TEMPERATURE: 98.1 F | HEIGHT: 71 IN | BODY MASS INDEX: 25.2 KG/M2 | OXYGEN SATURATION: 98 % | WEIGHT: 180 LBS | RESPIRATION RATE: 16 BRPM | HEART RATE: 70 BPM

## 2021-11-17 DIAGNOSIS — E78.5 HYPERLIPIDEMIA, UNSPECIFIED: ICD-10-CM

## 2021-11-17 DIAGNOSIS — I48.91 UNSPECIFIED ATRIAL FIBRILLATION: ICD-10-CM

## 2021-11-17 DIAGNOSIS — I10 ESSENTIAL (PRIMARY) HYPERTENSION: ICD-10-CM

## 2021-11-17 PROCEDURE — 99204 OFFICE O/P NEW MOD 45 MIN: CPT

## 2021-11-17 RX ORDER — METOPROLOL TARTRATE 25 MG/1
25 TABLET, FILM COATED ORAL TWICE DAILY
Qty: 60 | Refills: 0 | Status: COMPLETED | COMMUNITY
Start: 2021-11-16 | End: 2021-11-17

## 2021-11-17 RX ORDER — AMLODIPINE BESYLATE 10 MG/1
10 TABLET ORAL DAILY
Refills: 0 | Status: COMPLETED | COMMUNITY
Start: 2019-03-13 | End: 2021-11-17

## 2021-11-17 RX ORDER — MESALAMINE 1.2 G/1
1.2 TABLET, DELAYED RELEASE ORAL
Refills: 0 | Status: COMPLETED | COMMUNITY
End: 2021-11-17

## 2021-11-17 RX ORDER — HYDROCHLOROTHIAZIDE 25 MG/1
25 TABLET ORAL
Refills: 0 | Status: COMPLETED | COMMUNITY
End: 2021-11-17

## 2021-11-17 NOTE — PHYSICAL EXAM
[General Appearance - Alert] : alert [General Appearance - In No Acute Distress] : in no acute distress [Neck Appearance] : the appearance of the neck was normal [Jugular Venous Distention Increased] : there was no jugular-venous distention [] : no respiratory distress [Respiration, Rhythm And Depth] : normal respiratory rhythm and effort [Exaggerated Use Of Accessory Muscles For Inspiration] : no accessory muscle use [Auscultation Breath Sounds / Voice Sounds] : lungs were clear to auscultation bilaterally [Apical Impulse] : the apical impulse was normal [Heart Sounds] : normal S1 and S2 [Murmurs] : no murmurs [Bowel Sounds] : normal bowel sounds [Abdomen Soft] : soft [Abdomen Tenderness] : non-tender [Involuntary Movements] : no involuntary movements were seen [FreeTextEntry1] : Slow to respond at times, baseline since CVA [Oriented To Time, Place, And Person] : oriented to person, place, and time [Impaired Insight] : insight and judgment were intact

## 2021-11-17 NOTE — HISTORY OF PRESENT ILLNESS
[FreeTextEntry1] : Mr. Haddad is a 76 year old male followed by cardiologist Dr. Ceasar Rainey and referred by EP Dr. Monroe for evaluation of MARYAM ligation.  To review, PMH includes HTN, Ulcerative Colitis, lymphedema, Afib (not on AC 2/2 h/o GIB and his UC), hx of meningioma (s/p left frontal craniotomy in 2017, s/p radiation), hx of seizure disorder and T2DMII. He was recenly admitted to [facility] from [place] where he presented with slurred speech, having difficulty forming words and leaning to his left side. CT/CTA was noted to show Rt M1 occlusion. TPA was given and patient transferred to [facility] for thrombectomy and had a Right M1-2 thrombectomy with TICI 2 reperfusion. His exam improved but remained with left sided weakness, extinction and word finding difficulties. He was evaluated for acute inpatient rehab and was admitted to [facility] on 10/25/21.\par \par While hospitalized he was evaluated by GI, cardiology and Dr. Monroe of EP who all recommended AC without any absolute contraindication to AC. Upon review of Dr. Monroe inpatient note, the risks of bleeding from AC vs risk of stroke without AC was discussed.  Watchman was also discussed by Dr. Monroe of EP however pt would need to tolerate 60 days on AC with Warfarin. Chadsvasc score = 6. TTE noted with normal LV function, no PFO or sig valvular abnormalities. And he was DC home form the hospital on Eliquis 5mg BID.\par \par Upon review of notes, he has been in afib since around 2014 when he saw Dr. Benites for evaluation for watchman. Prior to thst he was evaluated for a Lariat at [facility], but was found not to be a candidate due to the anatomy of his left atrial appendage. His watchman was approved in early 2015 however pt never underwent procedure. \par \par He presents today and reports since his DC home on Eliquis he has had no issues of bleeding and has been tolerating it well.  He reports he is getting VN and PT at home with some progress. Walking with walker a little better and despite his physical limitations he reports the most notable residual from his stroke is his loss of memory/word finding. He reports despite his long history of afib, he has never taken AC due to bleeding related to his UC and fear of bleeding more.  He reports he has never been hospitalized for his bleeding or received a blood transfusion in the past for his bleeding.  Also reports he has never underwent an ablation as this was never discussed with him in the past. Of note he was recently taken off metoprolol by his PCP Dr. Geeta Acosta. He denies any symptoms of his afib other than feeling his heart beat fast from time to time.  Also reports hx of "poor circulation to my feet" and was told he has lymphedema. Denies chest pain, back pain, SOB, dizziness, weight gain, fever or chills. \par \par Denies liver, kidney or lung disease. Prior CVA recently.  No MI\par \par PCP: Dr Geeta Acosta\par Cards: Ceasar Rainey\par

## 2021-11-17 NOTE — CONSULT LETTER
[Dear  ___] : Dear  [unfilled], [Courtesy Letter:] : I had the pleasure of seeing your patient, [unfilled], in my office today. [Please see my note below.] : Please see my note below. [Sincerely,] : Sincerely, [FreeTextEntry3] : Nadir Cannon MD\par Department of Cardiovascular &Thoracic Surgery\par \par Cardiovascular &Thoracic Surgery\par [facility].

## 2021-11-17 NOTE — DATA REVIEWED
[FreeTextEntry1] : TTE 10/20/2021: Mitral annular calcification and calcified mitral\par leaflets with normal diastolic opening.\par 2. Calcified trileaflet aortic valve with normal opening.\par 3. Normal left ventricular systolic function.\par 4. Normal right ventricular size and function.\par 5. Estimated pulmonary artery systolic pressure equals 31\par mm Hg, assuming right atrial pressure equals 6 - 10 mm Hg,\par consistent with normal pulmonary pressures.\par 6. Agitated saline injection demonstrates no evidence of a\par patent foramen ovale.\par

## 2021-11-17 NOTE — END OF VISIT
[FreeTextEntry3] : Written by Angel Gregory NP, acting as a scribe for Dr. Cannon\par “The documentation recorded by the scribe accurately reflects the service I personally performed and the decisions made by me.” Signature Nadir Cannon MD.

## 2021-11-17 NOTE — ASSESSMENT
[FreeTextEntry1] : I reviewed the cardiac imaging, medical records and reports with patient and discussed the case.  I discussed the risks, benefits and alternatives to watchman procedure vs left atrial appendage ligation. Given his long hx of afib and EP's not recommending ablation, ablation would seem to have low success rate of about 50%. Risks associated with MARYAM ligation include but not limited to  bleeding , stroke, Myocardial Infarction, kidney problems, Blood transfusion, permanent  pacemaker implantation,  infections and death. I also discussed the various approaches in detail and discussed that MARYAM ligation would reduce but not totally eliminate pts risk of future stroke as strokes can occur elsewhere. Given pts hesitancy to continue AC, I feel that the patient will benefit and is a candidate for Robotic MARYAM ligation. All questions and concerns were addressed and patient agrees to proceed with surgery.\par \par \par Plan:\par 1) Robotic MARYAM ligation\par 2) Chest CTA prior to surgery\par 3) PST prior to surgery\par 4) Stop Eliquis 5 days prior to surgery\par

## 2021-12-20 ENCOUNTER — APPOINTMENT (OUTPATIENT)
Dept: CARDIOTHORACIC SURGERY | Facility: HOSPITAL | Age: 76
End: 2021-12-20

## 2021-12-23 ENCOUNTER — EMERGENCY (EMERGENCY)
Facility: HOSPITAL | Age: 76
LOS: 1 days | Discharge: HOME | End: 2021-12-23
Attending: EMERGENCY MEDICINE | Admitting: EMERGENCY MEDICINE
Payer: COMMERCIAL

## 2021-12-23 VITALS
TEMPERATURE: 98 F | OXYGEN SATURATION: 97 % | DIASTOLIC BLOOD PRESSURE: 105 MMHG | HEART RATE: 78 BPM | RESPIRATION RATE: 18 BRPM | HEIGHT: 71 IN | WEIGHT: 179.9 LBS | SYSTOLIC BLOOD PRESSURE: 190 MMHG

## 2021-12-23 VITALS
HEART RATE: 56 BPM | SYSTOLIC BLOOD PRESSURE: 130 MMHG | TEMPERATURE: 98 F | DIASTOLIC BLOOD PRESSURE: 79 MMHG | OXYGEN SATURATION: 98 % | RESPIRATION RATE: 17 BRPM

## 2021-12-23 DIAGNOSIS — Z98.49 CATARACT EXTRACTION STATUS, UNSPECIFIED EYE: Chronic | ICD-10-CM

## 2021-12-23 DIAGNOSIS — Z98.890 OTHER SPECIFIED POSTPROCEDURAL STATES: Chronic | ICD-10-CM

## 2021-12-23 LAB
ALBUMIN SERPL ELPH-MCNC: 3.9 G/DL — SIGNIFICANT CHANGE UP (ref 3.3–5)
ALP SERPL-CCNC: 169 U/L — HIGH (ref 40–120)
ALT FLD-CCNC: 30 U/L — SIGNIFICANT CHANGE UP (ref 10–45)
ANION GAP SERPL CALC-SCNC: 8 MMOL/L — SIGNIFICANT CHANGE UP (ref 5–17)
APPEARANCE UR: CLEAR — SIGNIFICANT CHANGE UP
AST SERPL-CCNC: 38 U/L — SIGNIFICANT CHANGE UP (ref 10–40)
BACTERIA # UR AUTO: NEGATIVE /HPF — SIGNIFICANT CHANGE UP
BASOPHILS # BLD AUTO: 0.04 K/UL — SIGNIFICANT CHANGE UP (ref 0–0.2)
BASOPHILS NFR BLD AUTO: 0.5 % — SIGNIFICANT CHANGE UP (ref 0–2)
BILIRUB SERPL-MCNC: 0.5 MG/DL — SIGNIFICANT CHANGE UP (ref 0.2–1.2)
BILIRUB UR-MCNC: NEGATIVE — SIGNIFICANT CHANGE UP
BUN SERPL-MCNC: 23 MG/DL — SIGNIFICANT CHANGE UP (ref 7–23)
CALCIUM SERPL-MCNC: 9.5 MG/DL — SIGNIFICANT CHANGE UP (ref 8.4–10.5)
CHLORIDE SERPL-SCNC: 106 MMOL/L — SIGNIFICANT CHANGE UP (ref 96–108)
CO2 SERPL-SCNC: 29 MMOL/L — SIGNIFICANT CHANGE UP (ref 22–31)
COLOR SPEC: YELLOW — SIGNIFICANT CHANGE UP
CREAT SERPL-MCNC: 1.15 MG/DL — SIGNIFICANT CHANGE UP (ref 0.5–1.3)
DIFF PNL FLD: ABNORMAL
EOSINOPHIL # BLD AUTO: 0.45 K/UL — SIGNIFICANT CHANGE UP (ref 0–0.5)
EOSINOPHIL NFR BLD AUTO: 5.8 % — SIGNIFICANT CHANGE UP (ref 0–6)
EPI CELLS # UR: SIGNIFICANT CHANGE UP
GLUCOSE SERPL-MCNC: 120 MG/DL — HIGH (ref 70–99)
GLUCOSE UR QL: NEGATIVE — SIGNIFICANT CHANGE UP
HCT VFR BLD CALC: 46.8 % — SIGNIFICANT CHANGE UP (ref 39–50)
HGB BLD-MCNC: 15.1 G/DL — SIGNIFICANT CHANGE UP (ref 13–17)
IMM GRANULOCYTES NFR BLD AUTO: 0.4 % — SIGNIFICANT CHANGE UP (ref 0–1.5)
KETONES UR-MCNC: NEGATIVE — SIGNIFICANT CHANGE UP
LEUKOCYTE ESTERASE UR-ACNC: NEGATIVE — SIGNIFICANT CHANGE UP
LYMPHOCYTES # BLD AUTO: 1.69 K/UL — SIGNIFICANT CHANGE UP (ref 1–3.3)
LYMPHOCYTES # BLD AUTO: 21.8 % — SIGNIFICANT CHANGE UP (ref 13–44)
MCHC RBC-ENTMCNC: 29.6 PG — SIGNIFICANT CHANGE UP (ref 27–34)
MCHC RBC-ENTMCNC: 32.3 GM/DL — SIGNIFICANT CHANGE UP (ref 32–36)
MCV RBC AUTO: 91.8 FL — SIGNIFICANT CHANGE UP (ref 80–100)
MONOCYTES # BLD AUTO: 0.54 K/UL — SIGNIFICANT CHANGE UP (ref 0–0.9)
MONOCYTES NFR BLD AUTO: 6.9 % — SIGNIFICANT CHANGE UP (ref 2–14)
NEUTROPHILS # BLD AUTO: 5.02 K/UL — SIGNIFICANT CHANGE UP (ref 1.8–7.4)
NEUTROPHILS NFR BLD AUTO: 64.6 % — SIGNIFICANT CHANGE UP (ref 43–77)
NITRITE UR-MCNC: NEGATIVE — SIGNIFICANT CHANGE UP
NRBC # BLD: 0 /100 WBCS — SIGNIFICANT CHANGE UP (ref 0–0)
PH UR: 5 — SIGNIFICANT CHANGE UP (ref 5–8)
PLATELET # BLD AUTO: 219 K/UL — SIGNIFICANT CHANGE UP (ref 150–400)
POTASSIUM SERPL-MCNC: 3.3 MMOL/L — LOW (ref 3.5–5.3)
POTASSIUM SERPL-SCNC: 3.3 MMOL/L — LOW (ref 3.5–5.3)
PROT SERPL-MCNC: 7.8 G/DL — SIGNIFICANT CHANGE UP (ref 6–8.3)
PROT UR-MCNC: 30 MG/DL
RBC # BLD: 5.1 M/UL — SIGNIFICANT CHANGE UP (ref 4.2–5.8)
RBC # FLD: 12.9 % — SIGNIFICANT CHANGE UP (ref 10.3–14.5)
RBC CASTS # UR COMP ASSIST: ABNORMAL /HPF (ref 0–4)
SODIUM SERPL-SCNC: 143 MMOL/L — SIGNIFICANT CHANGE UP (ref 135–145)
SP GR SPEC: 1.02 — SIGNIFICANT CHANGE UP (ref 1.01–1.02)
UROBILINOGEN FLD QL: NEGATIVE — SIGNIFICANT CHANGE UP
WBC # BLD: 7.77 K/UL — SIGNIFICANT CHANGE UP (ref 3.8–10.5)
WBC # FLD AUTO: 7.77 K/UL — SIGNIFICANT CHANGE UP (ref 3.8–10.5)
WBC UR QL: NEGATIVE /HPF — SIGNIFICANT CHANGE UP (ref 0–5)

## 2021-12-23 PROCEDURE — 93005 ELECTROCARDIOGRAM TRACING: CPT

## 2021-12-23 PROCEDURE — 85025 COMPLETE CBC W/AUTO DIFF WBC: CPT

## 2021-12-23 PROCEDURE — 99283 EMERGENCY DEPT VISIT LOW MDM: CPT

## 2021-12-23 PROCEDURE — 36415 COLL VENOUS BLD VENIPUNCTURE: CPT

## 2021-12-23 PROCEDURE — 80053 COMPREHEN METABOLIC PANEL: CPT

## 2021-12-23 PROCEDURE — 81001 URINALYSIS AUTO W/SCOPE: CPT

## 2021-12-23 PROCEDURE — 87086 URINE CULTURE/COLONY COUNT: CPT

## 2021-12-23 PROCEDURE — 93010 ELECTROCARDIOGRAM REPORT: CPT

## 2021-12-23 PROCEDURE — 99284 EMERGENCY DEPT VISIT MOD MDM: CPT

## 2021-12-23 RX ORDER — SODIUM CHLORIDE 9 MG/ML
1000 INJECTION INTRAMUSCULAR; INTRAVENOUS; SUBCUTANEOUS ONCE
Refills: 0 | Status: COMPLETED | OUTPATIENT
Start: 2021-12-23 | End: 2021-12-23

## 2021-12-23 RX ADMIN — SODIUM CHLORIDE 2000 MILLILITER(S): 9 INJECTION INTRAMUSCULAR; INTRAVENOUS; SUBCUTANEOUS at 01:04

## 2021-12-23 NOTE — ED PROVIDER NOTE - CLINICAL SUMMARY MEDICAL DECISION MAKING FREE TEXT BOX
pt with episode of weakness pt with episode of weakness likely 2/2 from his vodka martini, pt now awake alert, ambulated in ED, stable for discharge.

## 2021-12-23 NOTE — ED PROVIDER NOTE - OBJECTIVE STATEMENT
pt states that tonight he was climbing the stairs but felt weak and couldn't climb the whole flight and sat down, as per wife she went to ask pt what was wrong and he stared at her but didn't say anything.  since pt had a stroke 1 month ago wife was concerned it was another stroke pt states that he was not concerned and did not want to come to the ER.  Pt privately reports that he had a large vodka martini tonight but his wife does not know. He thinks he was too weak because of drinking. currently has no complaints.

## 2021-12-23 NOTE — ED ADULT NURSE NOTE - OBJECTIVE STATEMENT
I just feel weak and was unable to climb the stairs. My legs are weak. I had a Stroke in November" pt denies loc both lower ext  reddened pt states  they are always like that

## 2021-12-23 NOTE — ED ADULT TRIAGE NOTE - CHIEF COMPLAINT QUOTE
I just feel weak and was unable to climb the stairs. My legs are weak. I had a Stroke in November" JAKE Positive

## 2021-12-23 NOTE — ED PROVIDER NOTE - NSFOLLOWUPINSTRUCTIONS_ED_ALL_ED_FT
-- Follow up with your primary care physician in 48 hours.    -- Return to the ER for worsening or persistent symptoms, and/or ANY NEW OR CONCERNING SYMPTOMS.    -- If you have difficulty following up, please call: [phone] to obtain a [facility] doctor or specialist who takes your insurance in your area.

## 2021-12-23 NOTE — ED PROVIDER NOTE - PHYSICAL EXAMINATION
Gen:  alert, awake, no acute distress  HEENT:  atraumatic head, airway clear, pupils equal and round  CV:  rrr, nl S1, S2, no m/r/g  Pulm:  lungs CTA b/l  Abd: s/nt/nd, +BS  Ext:  moving all extremities  Neuro:  grossly intact, no focal deficits  Skin:  RLE with redness and flaking of skin (chronic as per pt)  Psych: AOx3, normal affect, no apparent risk to self or others

## 2021-12-23 NOTE — ED PROVIDER NOTE - PATIENT PORTAL LINK FT
You can access the FollowMyHealth Patient Portal offered by [facility] by registering at the following website: [email or web address]. By joining [employer]’s FollowMyHealth portal, you will also be able to view your health information using other applications (apps) compatible with our system.

## 2021-12-24 LAB
CULTURE RESULTS: SIGNIFICANT CHANGE UP
SPECIMEN SOURCE: SIGNIFICANT CHANGE UP

## 2021-12-27 NOTE — CHART NOTE - NSCHARTNOTEFT_GEN_A_CORE
SW called patient to discuss and assist with follow up care.  Patient presented to ED on 12/23/21 for weakness.  SW spoke with patient who reports he is still feeling weak.  Patient reports he has not yet followed up with his PMD Dr Santiago.  Patient reports that his daughter will be scheduling follow up appt.  Patient denied SW assistance.  Patient reports that his daughter is able to manage his appts.  Patient encouraged to call SW if further assistance is needed.  SW contact number provided to patient.

## 2021-12-29 PROCEDURE — U0005: CPT

## 2021-12-29 PROCEDURE — 82962 GLUCOSE BLOOD TEST: CPT

## 2021-12-29 PROCEDURE — 80053 COMPREHEN METABOLIC PANEL: CPT

## 2021-12-29 PROCEDURE — 97163 PT EVAL HIGH COMPLEX 45 MIN: CPT

## 2021-12-29 PROCEDURE — 36415 COLL VENOUS BLD VENIPUNCTURE: CPT

## 2021-12-29 PROCEDURE — 97116 GAIT TRAINING THERAPY: CPT

## 2021-12-29 PROCEDURE — 81001 URINALYSIS AUTO W/SCOPE: CPT

## 2021-12-29 PROCEDURE — U0003: CPT

## 2021-12-29 PROCEDURE — 97110 THERAPEUTIC EXERCISES: CPT

## 2021-12-29 PROCEDURE — 93005 ELECTROCARDIOGRAM TRACING: CPT

## 2021-12-29 PROCEDURE — 92507 TX SP LANG VOICE COMM INDIV: CPT

## 2021-12-29 PROCEDURE — 97535 SELF CARE MNGMENT TRAINING: CPT

## 2021-12-29 PROCEDURE — 97530 THERAPEUTIC ACTIVITIES: CPT

## 2021-12-29 PROCEDURE — 70450 CT HEAD/BRAIN W/O DYE: CPT

## 2021-12-29 PROCEDURE — 85025 COMPLETE CBC W/AUTO DIFF WBC: CPT

## 2021-12-29 PROCEDURE — 92523 SPEECH SOUND LANG COMPREHEN: CPT

## 2021-12-29 PROCEDURE — 97167 OT EVAL HIGH COMPLEX 60 MIN: CPT

## 2021-12-29 PROCEDURE — 92610 EVALUATE SWALLOWING FUNCTION: CPT

## 2021-12-29 PROCEDURE — 87635 SARS-COV-2 COVID-19 AMP PRB: CPT

## 2021-12-29 PROCEDURE — 85027 COMPLETE CBC AUTOMATED: CPT

## 2022-02-11 ENCOUNTER — OUTPATIENT (OUTPATIENT)
Dept: OUTPATIENT SERVICES | Facility: HOSPITAL | Age: 77
LOS: 1 days | End: 2022-02-11
Payer: COMMERCIAL

## 2022-02-11 ENCOUNTER — NON-APPOINTMENT (OUTPATIENT)
Age: 77
End: 2022-02-11

## 2022-02-11 VITALS
HEIGHT: 67 IN | SYSTOLIC BLOOD PRESSURE: 107 MMHG | DIASTOLIC BLOOD PRESSURE: 75 MMHG | TEMPERATURE: 98 F | HEART RATE: 90 BPM | WEIGHT: 154.98 LBS | OXYGEN SATURATION: 100 % | RESPIRATION RATE: 16 BRPM

## 2022-02-11 DIAGNOSIS — I48.20 CHRONIC ATRIAL FIBRILLATION, UNSPECIFIED: ICD-10-CM

## 2022-02-11 DIAGNOSIS — Z98.890 OTHER SPECIFIED POSTPROCEDURAL STATES: Chronic | ICD-10-CM

## 2022-02-11 DIAGNOSIS — Z98.49 CATARACT EXTRACTION STATUS, UNSPECIFIED EYE: Chronic | ICD-10-CM

## 2022-02-11 DIAGNOSIS — Z11.52 ENCOUNTER FOR SCREENING FOR COVID-19: ICD-10-CM

## 2022-02-11 DIAGNOSIS — Z01.818 ENCOUNTER FOR OTHER PREPROCEDURAL EXAMINATION: ICD-10-CM

## 2022-02-11 DIAGNOSIS — I35.0 NONRHEUMATIC AORTIC (VALVE) STENOSIS: ICD-10-CM

## 2022-02-11 DIAGNOSIS — I48.11 LONGSTANDING PERSISTENT ATRIAL FIBRILLATION: ICD-10-CM

## 2022-02-11 LAB — SARS-COV-2 RNA SPEC QL NAA+PROBE: SIGNIFICANT CHANGE UP

## 2022-02-11 PROCEDURE — U0005: CPT

## 2022-02-11 PROCEDURE — 85027 COMPLETE CBC AUTOMATED: CPT

## 2022-02-11 PROCEDURE — 87641 MR-STAPH DNA AMP PROBE: CPT

## 2022-02-11 PROCEDURE — 86900 BLOOD TYPING SEROLOGIC ABO: CPT

## 2022-02-11 PROCEDURE — C9803: CPT

## 2022-02-11 PROCEDURE — G0463: CPT

## 2022-02-11 PROCEDURE — 86901 BLOOD TYPING SEROLOGIC RH(D): CPT

## 2022-02-11 PROCEDURE — 93880 EXTRACRANIAL BILAT STUDY: CPT

## 2022-02-11 PROCEDURE — 86850 RBC ANTIBODY SCREEN: CPT

## 2022-02-11 PROCEDURE — 83036 HEMOGLOBIN GLYCOSYLATED A1C: CPT

## 2022-02-11 PROCEDURE — U0003: CPT

## 2022-02-11 PROCEDURE — 86923 COMPATIBILITY TEST ELECTRIC: CPT

## 2022-02-11 PROCEDURE — 93880 EXTRACRANIAL BILAT STUDY: CPT | Mod: 26

## 2022-02-11 PROCEDURE — 80048 BASIC METABOLIC PNL TOTAL CA: CPT

## 2022-02-11 PROCEDURE — 71046 X-RAY EXAM CHEST 2 VIEWS: CPT | Mod: 26

## 2022-02-11 PROCEDURE — 71046 X-RAY EXAM CHEST 2 VIEWS: CPT

## 2022-02-11 PROCEDURE — 36415 COLL VENOUS BLD VENIPUNCTURE: CPT

## 2022-02-11 PROCEDURE — 87640 STAPH A DNA AMP PROBE: CPT

## 2022-02-11 RX ORDER — MESALAMINE 400 MG
4 TABLET, DELAYED RELEASE (ENTERIC COATED) ORAL
Qty: 0 | Refills: 0 | DISCHARGE

## 2022-02-11 NOTE — H&P PST ADULT - NSICDXPASTSURGICALHX_GEN_ALL_CORE_FT
PAST SURGICAL HISTORY:  H/O cataract removal with insertion of prosthetic lens B/L    H/O right inguinal hernia repair     S/P colonoscopic polypectomy 2018

## 2022-02-11 NOTE — H&P PST ADULT - FALL HARM RISK - HARM RISK INTERVENTIONS

## 2022-02-11 NOTE — H&P PST ADULT - NSICDXPASTMEDICALHX_GEN_ALL_CORE_FT
PAST MEDICAL HISTORY:  Alteration in skin integrity in adult both palms peeling of skin    Atrial fibrillation dx 5 years    Back pain with left-sided sciatica     CAD (coronary artery disease)     Cerebrovascular accident (CVA) 10/2021 difficulty responding to question dx with blood clot   cerebral angio    DM (diabetes mellitus) no medication  diet management    Essential hypertension     GI bleed     Meningioma left frontal, s/p resection and radiation 2018    Other hyperlipidemia     Pulmonary hypertension     PVD (peripheral vascular disease) right lower ankle/foot had chronic open wound requiring wound care. No note opening at pst. 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

## 2022-02-11 NOTE — H&P PST ADULT - HISTORY OF PRESENT ILLNESS
76 yr old male with hx of atrial fibrillation, hypertension, PVD, Diabetes  Mellitus. Had CVA work up "clot" had cerebral angio.   MD recommend surgery for atrial fib.  Scheduled for laparoscopic robotic left atrial appendage ligation.     **A fib Eliquis last dose 2/10 am  pt/family instructed to stop.    **COVID  denies foreign travel  denies s/s   denies  known exposure vaccine Pfizer 2/1/2021, 3/12/2021 and booster September 2021   swab 2/11 [facility]

## 2022-02-13 ENCOUNTER — TRANSCRIPTION ENCOUNTER (OUTPATIENT)
Age: 77
End: 2022-02-13

## 2022-02-14 ENCOUNTER — INPATIENT (INPATIENT)
Facility: HOSPITAL | Age: 77
LOS: 2 days | Discharge: HOME | DRG: 274 | End: 2022-02-17
Attending: THORACIC SURGERY (CARDIOTHORACIC VASCULAR SURGERY) | Admitting: THORACIC SURGERY (CARDIOTHORACIC VASCULAR SURGERY)
Payer: COMMERCIAL

## 2022-02-14 ENCOUNTER — APPOINTMENT (OUTPATIENT)
Dept: CARDIOTHORACIC SURGERY | Facility: HOSPITAL | Age: 77
End: 2022-02-14

## 2022-02-14 VITALS
RESPIRATION RATE: 16 BRPM | SYSTOLIC BLOOD PRESSURE: 118 MMHG | WEIGHT: 160.94 LBS | DIASTOLIC BLOOD PRESSURE: 71 MMHG | TEMPERATURE: 97 F | HEART RATE: 90 BPM | HEIGHT: 67 IN | OXYGEN SATURATION: 100 %

## 2022-02-14 DIAGNOSIS — Z01.818 ENCOUNTER FOR OTHER PREPROCEDURAL EXAMINATION: ICD-10-CM

## 2022-02-14 DIAGNOSIS — I35.0 NONRHEUMATIC AORTIC (VALVE) STENOSIS: ICD-10-CM

## 2022-02-14 DIAGNOSIS — Z98.890 OTHER SPECIFIED POSTPROCEDURAL STATES: Chronic | ICD-10-CM

## 2022-02-14 DIAGNOSIS — Z98.49 CATARACT EXTRACTION STATUS, UNSPECIFIED EYE: Chronic | ICD-10-CM

## 2022-02-14 LAB
ALBUMIN SERPL ELPH-MCNC: 3.7 G/DL — SIGNIFICANT CHANGE UP (ref 3.3–5)
ALP SERPL-CCNC: 116 U/L — SIGNIFICANT CHANGE UP (ref 40–120)
ALT FLD-CCNC: 11 U/L — SIGNIFICANT CHANGE UP (ref 10–45)
ANION GAP SERPL CALC-SCNC: 15 MMOL/L — SIGNIFICANT CHANGE UP (ref 5–17)
APTT BLD: 29.8 SEC — SIGNIFICANT CHANGE UP (ref 27.5–35.5)
AST SERPL-CCNC: 21 U/L — SIGNIFICANT CHANGE UP (ref 10–40)
BASE EXCESS BLDV CALC-SCNC: 3.5 MMOL/L — HIGH (ref -2–2)
BASOPHILS # BLD AUTO: 0.01 K/UL — SIGNIFICANT CHANGE UP (ref 0–0.2)
BASOPHILS NFR BLD AUTO: 0.2 % — SIGNIFICANT CHANGE UP (ref 0–2)
BILIRUB SERPL-MCNC: 0.6 MG/DL — SIGNIFICANT CHANGE UP (ref 0.2–1.2)
BUN SERPL-MCNC: 38 MG/DL — HIGH (ref 7–23)
CA-I SERPL-SCNC: 1.28 MMOL/L — SIGNIFICANT CHANGE UP (ref 1.15–1.33)
CALCIUM SERPL-MCNC: 8.7 MG/DL — SIGNIFICANT CHANGE UP (ref 8.4–10.5)
CHLORIDE BLDV-SCNC: 102 MMOL/L — SIGNIFICANT CHANGE UP (ref 96–108)
CHLORIDE SERPL-SCNC: 103 MMOL/L — SIGNIFICANT CHANGE UP (ref 96–108)
CO2 BLDV-SCNC: 32 MMOL/L — HIGH (ref 22–26)
CO2 SERPL-SCNC: 22 MMOL/L — SIGNIFICANT CHANGE UP (ref 22–31)
CREAT SERPL-MCNC: 0.97 MG/DL — SIGNIFICANT CHANGE UP (ref 0.5–1.3)
EOSINOPHIL # BLD AUTO: 0.12 K/UL — SIGNIFICANT CHANGE UP (ref 0–0.5)
EOSINOPHIL NFR BLD AUTO: 2 % — SIGNIFICANT CHANGE UP (ref 0–6)
FIBRINOGEN PPP-MCNC: 551 MG/DL — HIGH (ref 290–520)
GAS PNL BLDA: SIGNIFICANT CHANGE UP
GAS PNL BLDA: SIGNIFICANT CHANGE UP
GAS PNL BLDV: 135 MMOL/L — LOW (ref 136–145)
GAS PNL BLDV: SIGNIFICANT CHANGE UP
GAS PNL BLDV: SIGNIFICANT CHANGE UP
GLUCOSE BLDC GLUCOMTR-MCNC: 111 MG/DL — HIGH (ref 70–99)
GLUCOSE BLDC GLUCOMTR-MCNC: 122 MG/DL — HIGH (ref 70–99)
GLUCOSE BLDC GLUCOMTR-MCNC: 134 MG/DL — HIGH (ref 70–99)
GLUCOSE BLDC GLUCOMTR-MCNC: 144 MG/DL — HIGH (ref 70–99)
GLUCOSE BLDC GLUCOMTR-MCNC: 146 MG/DL — HIGH (ref 70–99)
GLUCOSE BLDV-MCNC: 147 MG/DL — HIGH (ref 70–99)
GLUCOSE SERPL-MCNC: 160 MG/DL — HIGH (ref 70–99)
HCO3 BLDV-SCNC: 30 MMOL/L — HIGH (ref 22–29)
HCT VFR BLD CALC: 37.8 % — LOW (ref 39–50)
HCT VFR BLDA CALC: 45 % — SIGNIFICANT CHANGE UP (ref 39–51)
HGB BLD CALC-MCNC: 14.9 G/DL — SIGNIFICANT CHANGE UP (ref 12.6–17.4)
HGB BLD-MCNC: 12.7 G/DL — LOW (ref 13–17)
IMM GRANULOCYTES NFR BLD AUTO: 0.7 % — SIGNIFICANT CHANGE UP (ref 0–1.5)
INR BLD: 1.06 RATIO — SIGNIFICANT CHANGE UP (ref 0.88–1.16)
LACTATE BLDV-MCNC: 1.2 MMOL/L — SIGNIFICANT CHANGE UP (ref 0.7–2)
LYMPHOCYTES # BLD AUTO: 0.49 K/UL — LOW (ref 1–3.3)
LYMPHOCYTES # BLD AUTO: 8 % — LOW (ref 13–44)
MAGNESIUM SERPL-MCNC: 2.2 MG/DL — SIGNIFICANT CHANGE UP (ref 1.6–2.6)
MANUAL SMEAR VERIFICATION: SIGNIFICANT CHANGE UP
MCHC RBC-ENTMCNC: 29.1 PG — SIGNIFICANT CHANGE UP (ref 27–34)
MCHC RBC-ENTMCNC: 33.6 GM/DL — SIGNIFICANT CHANGE UP (ref 32–36)
MCV RBC AUTO: 86.5 FL — SIGNIFICANT CHANGE UP (ref 80–100)
MONOCYTES # BLD AUTO: 0.44 K/UL — SIGNIFICANT CHANGE UP (ref 0–0.9)
MONOCYTES NFR BLD AUTO: 7.2 % — SIGNIFICANT CHANGE UP (ref 2–14)
NEUTROPHILS # BLD AUTO: 4.99 K/UL — SIGNIFICANT CHANGE UP (ref 1.8–7.4)
NEUTROPHILS NFR BLD AUTO: 81.9 % — HIGH (ref 43–77)
NRBC # BLD: 0 /100 WBCS — SIGNIFICANT CHANGE UP (ref 0–0)
PCO2 BLDV: 52 MMHG — SIGNIFICANT CHANGE UP (ref 42–55)
PH BLDV: 7.37 — SIGNIFICANT CHANGE UP (ref 7.32–7.43)
PHOSPHATE SERPL-MCNC: 3.9 MG/DL — SIGNIFICANT CHANGE UP (ref 2.5–4.5)
PLAT MORPH BLD: NORMAL — SIGNIFICANT CHANGE UP
PLATELET # BLD AUTO: 174 K/UL — SIGNIFICANT CHANGE UP (ref 150–400)
PO2 BLDV: 22 MMHG — LOW (ref 25–45)
POTASSIUM BLDV-SCNC: 3.4 MMOL/L — LOW (ref 3.5–5.1)
POTASSIUM SERPL-MCNC: 3.2 MMOL/L — LOW (ref 3.5–5.3)
POTASSIUM SERPL-SCNC: 3.2 MMOL/L — LOW (ref 3.5–5.3)
PROT SERPL-MCNC: 6.3 G/DL — SIGNIFICANT CHANGE UP (ref 6–8.3)
PROTHROM AB SERPL-ACNC: 12.7 SEC — SIGNIFICANT CHANGE UP (ref 10.6–13.6)
RBC # BLD: 4.37 M/UL — SIGNIFICANT CHANGE UP (ref 4.2–5.8)
RBC # FLD: 13.2 % — SIGNIFICANT CHANGE UP (ref 10.3–14.5)
RBC BLD AUTO: SIGNIFICANT CHANGE UP
SAO2 % BLDV: 29.5 % — LOW (ref 67–88)
SODIUM SERPL-SCNC: 140 MMOL/L — SIGNIFICANT CHANGE UP (ref 135–145)
WBC # BLD: 6.09 K/UL — SIGNIFICANT CHANGE UP (ref 3.8–10.5)
WBC # FLD AUTO: 6.09 K/UL — SIGNIFICANT CHANGE UP (ref 3.8–10.5)

## 2022-02-14 PROCEDURE — 33269 EXCL LAA THRSCP ANY METHOD: CPT

## 2022-02-14 PROCEDURE — 99233 SBSQ HOSP IP/OBS HIGH 50: CPT

## 2022-02-14 PROCEDURE — S2900 ROBOTIC SURGICAL SYSTEM: CPT | Mod: NC

## 2022-02-14 PROCEDURE — 71045 X-RAY EXAM CHEST 1 VIEW: CPT | Mod: 26

## 2022-02-14 DEVICE — IMPLANTABLE DEVICE: Type: IMPLANTABLE DEVICE | Status: FUNCTIONAL

## 2022-02-14 DEVICE — KIT CVC 2LUM MAC 9FR CHG: Type: IMPLANTABLE DEVICE | Status: FUNCTIONAL

## 2022-02-14 DEVICE — KIT A-LINE 1LUM 20G X 12CM SAFE KIT: Type: IMPLANTABLE DEVICE | Status: FUNCTIONAL

## 2022-02-14 DEVICE — LIGATING CLIPS WECK HORIZON MEDIUM (BLUE) 24: Type: IMPLANTABLE DEVICE | Status: FUNCTIONAL

## 2022-02-14 DEVICE — CHEST DRAIN THORACIC ARGYLE PVC 24FR STRAIGHT: Type: IMPLANTABLE DEVICE | Status: FUNCTIONAL

## 2022-02-14 DEVICE — LIGATING CLIPS WECK HORIZON SMALL-WIDE (RED) 24: Type: IMPLANTABLE DEVICE | Status: FUNCTIONAL

## 2022-02-14 RX ORDER — SODIUM CHLORIDE 9 MG/ML
1000 INJECTION INTRAMUSCULAR; INTRAVENOUS; SUBCUTANEOUS
Refills: 0 | Status: ACTIVE | OUTPATIENT
Start: 2022-02-14 | End: 2023-01-13

## 2022-02-14 RX ORDER — GABAPENTIN 400 MG/1
300 CAPSULE ORAL ONCE
Refills: 0 | Status: COMPLETED | OUTPATIENT
Start: 2022-02-14 | End: 2022-02-14

## 2022-02-14 RX ORDER — ONDANSETRON 8 MG/1
4 TABLET, FILM COATED ORAL ONCE
Refills: 0 | Status: ACTIVE | OUTPATIENT
Start: 2022-02-14 | End: 2023-01-13

## 2022-02-14 RX ORDER — ACETAMINOPHEN 500 MG
1000 TABLET ORAL ONCE
Refills: 0 | Status: COMPLETED | OUTPATIENT
Start: 2022-02-14 | End: 2022-02-14

## 2022-02-14 RX ORDER — INSULIN HUMAN 100 [IU]/ML
3 INJECTION, SOLUTION SUBCUTANEOUS
Qty: 100 | Refills: 0 | Status: DISCONTINUED | OUTPATIENT
Start: 2022-02-14 | End: 2022-02-15

## 2022-02-14 RX ORDER — SODIUM CHLORIDE 9 MG/ML
3 INJECTION INTRAMUSCULAR; INTRAVENOUS; SUBCUTANEOUS EVERY 8 HOURS
Refills: 0 | Status: DISCONTINUED | OUTPATIENT
Start: 2022-02-14 | End: 2022-02-14

## 2022-02-14 RX ORDER — LIDOCAINE HCL 20 MG/ML
0.2 VIAL (ML) INJECTION ONCE
Refills: 0 | Status: DISCONTINUED | OUTPATIENT
Start: 2022-02-14 | End: 2022-02-14

## 2022-02-14 RX ORDER — OXYCODONE HYDROCHLORIDE 5 MG/1
5 TABLET ORAL EVERY 6 HOURS
Refills: 0 | Status: ACTIVE | OUTPATIENT
Start: 2022-02-14 | End: 2022-02-21

## 2022-02-14 RX ORDER — HYDROMORPHONE HYDROCHLORIDE 2 MG/ML
0.5 INJECTION INTRAMUSCULAR; INTRAVENOUS; SUBCUTANEOUS
Refills: 0 | Status: DISCONTINUED | OUTPATIENT
Start: 2022-02-14 | End: 2022-02-15

## 2022-02-14 RX ORDER — POTASSIUM CHLORIDE 20 MEQ
10 PACKET (EA) ORAL
Refills: 0 | Status: COMPLETED | OUTPATIENT
Start: 2022-02-14 | End: 2022-02-14

## 2022-02-14 RX ORDER — DEXTROSE 50 % IN WATER 50 %
25 SYRINGE (ML) INTRAVENOUS
Refills: 0 | Status: ACTIVE | OUTPATIENT
Start: 2022-02-14

## 2022-02-14 RX ORDER — POTASSIUM CHLORIDE 20 MEQ
10 PACKET (EA) ORAL
Refills: 0 | Status: DISCONTINUED | OUTPATIENT
Start: 2022-02-14 | End: 2022-02-15

## 2022-02-14 RX ORDER — POLYETHYLENE GLYCOL 3350 17 G/17G
17 POWDER, FOR SOLUTION ORAL DAILY
Refills: 0 | Status: ACTIVE | OUTPATIENT
Start: 2022-02-14 | End: 2023-01-13

## 2022-02-14 RX ORDER — CEFUROXIME AXETIL 250 MG
1500 TABLET ORAL EVERY 8 HOURS
Refills: 0 | Status: COMPLETED | OUTPATIENT
Start: 2022-02-14 | End: 2022-02-16

## 2022-02-14 RX ORDER — FAMOTIDINE 10 MG/ML
20 INJECTION INTRAVENOUS EVERY 12 HOURS
Refills: 0 | Status: DISCONTINUED | OUTPATIENT
Start: 2022-02-14 | End: 2022-02-15

## 2022-02-14 RX ORDER — ACETAMINOPHEN 500 MG
650 TABLET ORAL EVERY 6 HOURS
Refills: 0 | Status: ACTIVE | OUTPATIENT
Start: 2022-02-14 | End: 2023-01-13

## 2022-02-14 RX ORDER — CEFUROXIME AXETIL 250 MG
1500 TABLET ORAL ONCE
Refills: 0 | Status: DISCONTINUED | OUTPATIENT
Start: 2022-02-14 | End: 2022-02-14

## 2022-02-14 RX ORDER — MESALAMINE 400 MG
4 TABLET, DELAYED RELEASE (ENTERIC COATED) ORAL
Qty: 0 | Refills: 0 | DISCHARGE

## 2022-02-14 RX ORDER — DEXTROSE 50 % IN WATER 50 %
50 SYRINGE (ML) INTRAVENOUS
Refills: 0 | Status: ACTIVE | OUTPATIENT
Start: 2022-02-14

## 2022-02-14 RX ADMIN — Medication 100 MILLIEQUIVALENT(S): at 18:30

## 2022-02-14 RX ADMIN — Medication 1000 MILLIGRAM(S): at 11:16

## 2022-02-14 RX ADMIN — Medication 100 MILLIGRAM(S): at 17:41

## 2022-02-14 RX ADMIN — Medication 100 MILLIEQUIVALENT(S): at 19:20

## 2022-02-14 RX ADMIN — GABAPENTIN 300 MILLIGRAM(S): 400 CAPSULE ORAL at 11:15

## 2022-02-14 RX ADMIN — Medication 100 MILLIEQUIVALENT(S): at 18:00

## 2022-02-14 RX ADMIN — HYDROMORPHONE HYDROCHLORIDE 0.5 MILLIGRAM(S): 2 INJECTION INTRAMUSCULAR; INTRAVENOUS; SUBCUTANEOUS at 21:04

## 2022-02-14 RX ADMIN — HYDROMORPHONE HYDROCHLORIDE 0.5 MILLIGRAM(S): 2 INJECTION INTRAMUSCULAR; INTRAVENOUS; SUBCUTANEOUS at 20:49

## 2022-02-14 RX ADMIN — FAMOTIDINE 20 MILLIGRAM(S): 10 INJECTION INTRAVENOUS at 17:36

## 2022-02-14 NOTE — BRIEF OPERATIVE NOTE - COMMENTS
I first assisted for the entirety of the case, including port placement, robot docking, placement of Atrial clip and closing.

## 2022-02-14 NOTE — PROGRESS NOTE ADULT - SUBJECTIVE AND OBJECTIVE BOX
NANY HOUGH  MRN-[id number]  Patient is a 76y old  Male who presents with a chief complaint of atrial fib (11 Feb 2022 11:50)    HPI:  76 yr old male with hx of atrial fibrillation, hypertension, PVD, Diabetes  Mellitus. Had CVA work up "clot" had cerebral angio.   MD recommend surgery for atrial fib.  Scheduled for laparoscopic robotic left atrial appendage ligation.     **A fib Eliquis last dose 2/10 am  pt/family instructed to stop.    **COVID  denies foreign travel  denies s/s   denies  known exposure vaccine Pfizer 2/1/2021, 3/12/2021 and booster September 2021   swab 2/11 [facility]  (11 Feb 2022 11:50)      Surgery/Hospital course:  2/14 Robotic MARYAM clip     Today/Overnight:  Extubated this evening to supp O2 NC. Patient is weaned off of Precedex.     Vital Signs Last 24 Hrs  T(C): 37.2 (14 Feb 2022 20:00), Max: 37.2 (14 Feb 2022 20:00)  T(F): 99 (14 Feb 2022 20:00), Max: 99 (14 Feb 2022 20:00)  HR: 80 (14 Feb 2022 21:00) (60 - 90)  BP: 118/71 (14 Feb 2022 12:22) (118/71 - 118/71)  BP(mean): --  RR: 23 (14 Feb 2022 21:00) (13 - 27)  SpO2: 98% (14 Feb 2022 21:00) (89% - 100%)    Physical exam:  GENERAL: Intubated  HEAD:  Atraumatic, Normocephalic  EYES: EOMI, PERRLA, conjunctiva and sclera clear  NECK: Supple, no lymphadenopathy, no JVD  Pulmonary: CTAB; No wheezes, rales, or rhonchi  CVS: Regular rate and rhythm. Normal S1/S2. No murmurs, rubs, or gallops, adventitious lung sounds.   ABDOMEN: Soft, non-tender, non-distended; normal bowel sounds on nursing assessment, no organomegaly  EXTREMITIES:  intact, 2+ peripheral pulses b/l, No clubbing, cyanosis, or edema  NEUROLOGY: Sedated  SKIN: No rashes or lesions, warm, capillary refill < 3 seconds, no cyanosis     ============================I/O===========================  I&O's Detail    14 Feb 2022 07:01  -  14 Feb 2022 22:18  --------------------------------------------------------  IN:    IV PiggyBack: 150 mL    sodium chloride 0.9%: 70 mL  Total IN: 220 mL    OUT:    Chest Tube (mL): 10 mL    Indwelling Catheter - Urethral (mL): 290 mL    Insulin: 0 mL  Total OUT: 300 mL    Total NET: -80 mL        ============================ LABS =========================                        12.7   6.09  )-----------( 174      ( 14 Feb 2022 16:34 )             37.8     02-14    140  |  103  |  38<H>  ----------------------------<  160<H>  3.2<L>   |  22  |  0.97    Ca    8.7      14 Feb 2022 16:12  Phos  3.9     02-14  Mg     2.2     02-14    TPro  6.3  /  Alb  3.7  /  TBili  0.6  /  DBili  x   /  AST  21  /  ALT  11  /  AlkPhos  116  02-14    LIVER FUNCTIONS - ( 14 Feb 2022 16:12 )  Alb: 3.7 g/dL / Pro: 6.3 g/dL / ALK PHOS: 116 U/L / ALT: 11 U/L / AST: 21 U/L / GGT: x           PT/INR - ( 14 Feb 2022 16:12 )   PT: 12.7 sec;   INR: 1.06 ratio         PTT - ( 14 Feb 2022 16:12 )  PTT:29.8 sec  ABG - ( 14 Feb 2022 18:52 )  pH, Arterial: 7.44  pH, Blood: x     /  pCO2: 37    /  pO2: 132   / HCO3: 25    / Base Excess: 1.1   /  SaO2: 99.4      ======================Micro/Rad/Cardio=================  CXR: Reviewed    INTERPRETATION:  Heart size and the mediastinum cannot be accurately evaluated on this   projection. Calcified thoracic aorta.  There is a new left atrial appendage clip.  The right lung is clear.  There is new left perihilar opacity. There is new left midlung linear   atelectasis.  No pleural effusion or pneumothorax is seen.  No acute bony abnormality is noted.    IMPRESSION:  New left atrial appendage clip.  No pneumothorax.  New left midlung linear atelectasis.  New left perihilar opacity, possibly postsurgical change or subsegmental   atelectasis.    Echo: Reviewed    Conclusions:  1. Mitral annular calcification and calcified mitral  leaflets with normal diastolic opening.  2. Calcified trileaflet aortic valve with normal opening.  3. Normal left ventricular systolic function.  4. Normal right ventricular size and function.  5. Estimated pulmonary artery systolic pressure equals 31  mm Hg, assuming right atrial pressure equals 6 - 10 mm Hg,  consistent with normal pulmonary pressures.  6. Agitated saline injection demonstrates no evidence of a  patent foramen ovale.  *** No previous Echo exam.  10/20/21    ======================================================  PAST MEDICAL & SURGICAL HISTORY:  Essential hypertension    Other hyperlipidemia    Meningioma  left frontal, s/p resection and radiation 2018    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 No note opening at pst. skin purple/mottled    H/O right inguinal hernia repair    H/O cataract removal with insertion of prosthetic lens  B/L    S/P colonoscopic polypectomy  2018      ========================ASSESSMENT ================  Atrial fibrillation s/p C4B/L radial 2/14  Post-op acute respiratory insuffciency  Acute Blood Loss Anemia   Hx of DM    Plan:  ====================== NEUROLOGY=====================  Tylenol, Gabapentin, PRN Dilaudid, and PRN Oxycodone for analgesia     acetaminophen     Tablet .. 650 milliGRAM(s) Oral every 6 hours PRN Temp greater or equal to 38.5C (101.3F), Mild Pain (1 - 3)  HYDROmorphone  Injectable 0.5 milliGRAM(s) IV Push every 10 minutes PRN Severe Pain (7 - 10)  oxyCODONE    IR 5 milliGRAM(s) Oral every 6 hours PRN Moderate Pain (4 - 6)    ==================== RESPIRATORY======================  Supplemental O2 via 3LPM NC  Encourage incentive spirometry, continue pulse ox monitoring, follow ABGs     ====================CARDIOVASCULAR==================  Atrial fibrillation s/p C4B/L radial 2/14    ===================HEMATOLOGIC/ONC ===================  Acute Blood Loss Anemia. Continue to monitor H&H/Plts     ===================== RENAL =========================  Continue to monitor I/Os, BUN/Creatinine, and urine output w/ tirado  Replete lytes PRN. Keep K> 4 and Mg >2     ==================== GASTROINTESTINAL===================  NPO, will advance patient diet as tolerated   Pepcid for stress ulcer prophylaxis.   Continue bowel regimen with Miralax  Continue Zofran for nausea     famotidine Injectable 20 milliGRAM(s) IV Push every 12 hours  polyethylene glycol 3350 17 Gram(s) Oral daily  potassium chloride  10 mEq/50 mL IVPB 10 milliEquivalent(s) IV Intermittent every 1 hour  sodium chloride 0.9%. 1000 milliLiter(s) (10 mL/Hr) IV Continuous <Continuous>  ondansetron Injectable 4 milliGRAM(s) IV Push once PRN Nausea and/or Vomiting  =======================    ENDOCRINE  =====================  Hx of DM2 , continue glycemic control with IV insulin infusion    dextrose 50% Injectable 50 milliLiter(s) IV Push every 15 minutes  dextrose 50% Injectable 25 milliLiter(s) IV Push every 15 minutes  insulin regular Infusion 3 Unit(s)/Hr (3 mL/Hr) IV Continuous <Continuous>    ========================INFECTIOUS DISEASE================  Temp of 99F, WBC within normal limits  Continue trending WBC and monitoring fever curve   Perioperative coverage with Cefuroxime     cefuroxime  IVPB 1500 milliGRAM(s) IV Intermittent every 8 hours      Patient requires continuous monitoring with bedside rhythm monitoring, pulse oximetry monitoring, and continuous central venous and arterial pressure monitoring; and intermittent blood gas analysis.  Care plan discussed with ICU care team.    Patient remained critical, at risk for life threatening decompensation.   I have spent 35 minutes providing acute care with multiple re-evaluations throughout the evening.     By signing my name below, I, Elizabeth Ferraro, attest that this documentation has been prepared under the direction and in the presence of Joe Fonseca NP  Electronically signed: Dianelys Woodson, 02-14-22 @ 22:18    Joe WEISS NP personally performed the services described in this documentation. All medical record entries made by the scribe were at my direction and in my presence. I have reviewed the chart and agree that the record reflects my personal performance and is accurate and complete  Electronically signed: Joe Fonseca NP02-14-22 @ 22:18       NANY HOUGH  MRN-[id number]  Patient is a 76y old  Male who presents with a chief complaint of atrial fib (11 Feb 2022 11:50)    HPI:  76 yr old male with hx of atrial fibrillation, hypertension, PVD, Diabetes  Mellitus. Had CVA work up "clot" had cerebral angio.   MD recommend surgery for atrial fib.  Scheduled for laparoscopic robotic left atrial appendage ligation.     **A fib Eliquis last dose 2/10 am  pt/family instructed to stop.    **COVID  denies foreign travel  denies s/s   denies  known exposure vaccine Pfizer 2/1/2021, 3/12/2021 and booster September 2021   swab 2/11 [facility]  (11 Feb 2022 11:50)      Surgery/Hospital course:  2/14 Robotic MARYAM clip     Today/Overnight:  Extubated this evening to supp O2 NC. Patient is weaned off of Precedex.     Vital Signs Last 24 Hrs  T(C): 37.2 (14 Feb 2022 20:00), Max: 37.2 (14 Feb 2022 20:00)  T(F): 99 (14 Feb 2022 20:00), Max: 99 (14 Feb 2022 20:00)  HR: 80 (14 Feb 2022 21:00) (60 - 90)  BP: 118/71 (14 Feb 2022 12:22) (118/71 - 118/71)  BP(mean): --  RR: 23 (14 Feb 2022 21:00) (13 - 27)  SpO2: 98% (14 Feb 2022 21:00) (89% - 100%)    Physical exam:  GENERAL: Intubated  HEAD:  Atraumatic, Normocephalic  EYES: EOMI, PERRLA, conjunctiva and sclera clear  NECK: Supple, no lymphadenopathy, no JVD  Pulmonary: CTAB; No wheezes, rales, or rhonchi  CVS: Regular rate and rhythm. Normal S1/S2. No murmurs, rubs, or gallops, adventitious lung sounds.   ABDOMEN: Soft, non-tender, non-distended; normal bowel sounds on nursing assessment, no organomegaly  EXTREMITIES:  intact, 2+ peripheral pulses b/l, No clubbing, cyanosis, or edema  NEUROLOGY: Sedated  SKIN: No rashes or lesions, warm, capillary refill < 3 seconds, no cyanosis     ============================I/O===========================  I&O's Detail    14 Feb 2022 07:01  -  14 Feb 2022 22:18  --------------------------------------------------------  IN:    IV PiggyBack: 150 mL    sodium chloride 0.9%: 70 mL  Total IN: 220 mL    OUT:    Chest Tube (mL): 10 mL    Indwelling Catheter - Urethral (mL): 290 mL    Insulin: 0 mL  Total OUT: 300 mL    Total NET: -80 mL        ============================ LABS =========================                        12.7   6.09  )-----------( 174      ( 14 Feb 2022 16:34 )             37.8     02-14    140  |  103  |  38<H>  ----------------------------<  160<H>  3.2<L>   |  22  |  0.97    Ca    8.7      14 Feb 2022 16:12  Phos  3.9     02-14  Mg     2.2     02-14    TPro  6.3  /  Alb  3.7  /  TBili  0.6  /  DBili  x   /  AST  21  /  ALT  11  /  AlkPhos  116  02-14    LIVER FUNCTIONS - ( 14 Feb 2022 16:12 )  Alb: 3.7 g/dL / Pro: 6.3 g/dL / ALK PHOS: 116 U/L / ALT: 11 U/L / AST: 21 U/L / GGT: x           PT/INR - ( 14 Feb 2022 16:12 )   PT: 12.7 sec;   INR: 1.06 ratio         PTT - ( 14 Feb 2022 16:12 )  PTT:29.8 sec  ABG - ( 14 Feb 2022 18:52 )  pH, Arterial: 7.44  pH, Blood: x     /  pCO2: 37    /  pO2: 132   / HCO3: 25    / Base Excess: 1.1   /  SaO2: 99.4      ======================Micro/Rad/Cardio=================  CXR: Reviewed    INTERPRETATION:  Heart size and the mediastinum cannot be accurately evaluated on this   projection. Calcified thoracic aorta.  There is a new left atrial appendage clip.  The right lung is clear.  There is new left perihilar opacity. There is new left midlung linear   atelectasis.  No pleural effusion or pneumothorax is seen.  No acute bony abnormality is noted.    IMPRESSION:  New left atrial appendage clip.  No pneumothorax.  New left midlung linear atelectasis.  New left perihilar opacity, possibly postsurgical change or subsegmental   atelectasis.    Echo: Reviewed    Conclusions:  1. Mitral annular calcification and calcified mitral  leaflets with normal diastolic opening.  2. Calcified trileaflet aortic valve with normal opening.  3. Normal left ventricular systolic function.  4. Normal right ventricular size and function.  5. Estimated pulmonary artery systolic pressure equals 31  mm Hg, assuming right atrial pressure equals 6 - 10 mm Hg,  consistent with normal pulmonary pressures.  6. Agitated saline injection demonstrates no evidence of a  patent foramen ovale.  *** No previous Echo exam.  10/20/21    ======================================================  PAST MEDICAL & SURGICAL HISTORY:  Essential hypertension    Other hyperlipidemia    Meningioma  left frontal, s/p resection and radiation 2018    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 No note opening at pst. skin purple/mottled    H/O right inguinal hernia repair    H/O cataract removal with insertion of prosthetic lens  B/L    S/P colonoscopic polypectomy  2018      ========================ASSESSMENT ================  Atrial fibrillation s/p C4B/L radial 2/14  Post-op acute respiratory insuffciency  Acute Blood Loss Anemia   Hx of DM    Plan:  ====================== NEUROLOGY=====================  Tylenol, Gabapentin, PRN Dilaudid, and PRN Oxycodone for analgesia     acetaminophen     Tablet .. 650 milliGRAM(s) Oral every 6 hours PRN Temp greater or equal to 38.5C (101.3F), Mild Pain (1 - 3)  HYDROmorphone  Injectable 0.5 milliGRAM(s) IV Push every 10 minutes PRN Severe Pain (7 - 10)  oxyCODONE    IR 5 milliGRAM(s) Oral every 6 hours PRN Moderate Pain (4 - 6)    ==================== RESPIRATORY======================  Supplemental O2 via 3LPM NC  Encourage incentive spirometry, continue pulse ox monitoring, follow ABGs     ====================CARDIOVASCULAR==================  Atrial fibrillation s/p C4B/L radial 2/14    ===================HEMATOLOGIC/ONC ===================  Acute Blood Loss Anemia. Continue to monitor H&H/Plts     ===================== RENAL =========================  Continue to monitor I/Os, BUN/Creatinine, and urine output w/ tirado  Replete lytes PRN. Keep K> 4 and Mg >2     ==================== GASTROINTESTINAL===================  NPO, will advance patient diet as tolerated   Pepcid for stress ulcer prophylaxis.   Continue bowel regimen with Miralax  Continue Zofran for nausea     famotidine Injectable 20 milliGRAM(s) IV Push every 12 hours  polyethylene glycol 3350 17 Gram(s) Oral daily  potassium chloride  10 mEq/50 mL IVPB 10 milliEquivalent(s) IV Intermittent every 1 hour  sodium chloride 0.9%. 1000 milliLiter(s) (10 mL/Hr) IV Continuous <Continuous>  ondansetron Injectable 4 milliGRAM(s) IV Push once PRN Nausea and/or Vomiting  =======================    ENDOCRINE  =====================  Hx of DM2 , continue glycemic control with IV insulin infusion    dextrose 50% Injectable 50 milliLiter(s) IV Push every 15 minutes  dextrose 50% Injectable 25 milliLiter(s) IV Push every 15 minutes  insulin regular Infusion 3 Unit(s)/Hr (3 mL/Hr) IV Continuous <Continuous>    ========================INFECTIOUS DISEASE================  Temp of 99F, WBC within normal limits  Continue trending WBC and monitoring fever curve   Perioperative coverage with Cefuroxime     cefuroxime  IVPB 1500 milliGRAM(s) IV Intermittent every 8 hours      Patient requires continuous monitoring with bedside rhythm monitoring, pulse oximetry monitoring, and continuous central venous and arterial pressure monitoring; and intermittent blood gas analysis.  Care plan discussed with ICU care team.    Patient remained critical, at risk for life threatening decompensation.   I have spent 30 minutes providing acute care with multiple re-evaluations throughout the evening.     By signing my name below, I, Elizabeth Ferraro, attest that this documentation has been prepared under the direction and in the presence of Joe Fonseca NP  Electronically signed: Dianelys Woodson, 02-14-22 @ 22:18    Joe WEISS NP personally performed the services described in this documentation. All medical record entries made by the scribe were at my direction and in my presence. I have reviewed the chart and agree that the record reflects my personal performance and is accurate and complete  Electronically signed: Joe Fonseca NP02-14-22 @ 22:18

## 2022-02-14 NOTE — BRIEF OPERATIVE NOTE - NSICDXBRIEFPROCEDURE_GEN_ALL_CORE_FT
PROCEDURES:  Excision of left atrial appendage, percutaneous approach 14-Feb-2022 16:58:21 Left Atrial Appendage Clip via Robotic Assist Sammie Alcaraz

## 2022-02-14 NOTE — PRE-ANESTHESIA EVALUATION ADULT - NSANTHPMHFT_GEN_ALL_CORE
76M PMH atrial fibrillation on eliquis, PVD, T2DM, 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 s/p resection, HTN, GI bleed, former smoker.  EF: 65-70%

## 2022-02-15 DIAGNOSIS — I48.21 PERMANENT ATRIAL FIBRILLATION: ICD-10-CM

## 2022-02-15 DIAGNOSIS — I48.20 CHRONIC ATRIAL FIBRILLATION, UNSPECIFIED: ICD-10-CM

## 2022-02-15 LAB
ALBUMIN SERPL ELPH-MCNC: 3.7 G/DL — SIGNIFICANT CHANGE UP (ref 3.3–5)
ALP SERPL-CCNC: 115 U/L — SIGNIFICANT CHANGE UP (ref 40–120)
ALT FLD-CCNC: 13 U/L — SIGNIFICANT CHANGE UP (ref 10–45)
ANION GAP SERPL CALC-SCNC: 14 MMOL/L — SIGNIFICANT CHANGE UP (ref 5–17)
AST SERPL-CCNC: 23 U/L — SIGNIFICANT CHANGE UP (ref 10–40)
BASOPHILS # BLD AUTO: 0.02 K/UL — SIGNIFICANT CHANGE UP (ref 0–0.2)
BASOPHILS NFR BLD AUTO: 0.2 % — SIGNIFICANT CHANGE UP (ref 0–2)
BILIRUB SERPL-MCNC: 0.5 MG/DL — SIGNIFICANT CHANGE UP (ref 0.2–1.2)
BUN SERPL-MCNC: 33 MG/DL — HIGH (ref 7–23)
CALCIUM SERPL-MCNC: 9.1 MG/DL — SIGNIFICANT CHANGE UP (ref 8.4–10.5)
CHLORIDE SERPL-SCNC: 105 MMOL/L — SIGNIFICANT CHANGE UP (ref 96–108)
CO2 SERPL-SCNC: 21 MMOL/L — LOW (ref 22–31)
CREAT SERPL-MCNC: 1 MG/DL — SIGNIFICANT CHANGE UP (ref 0.5–1.3)
EOSINOPHIL # BLD AUTO: 0.03 K/UL — SIGNIFICANT CHANGE UP (ref 0–0.5)
EOSINOPHIL NFR BLD AUTO: 0.3 % — SIGNIFICANT CHANGE UP (ref 0–6)
GAS PNL BLDA: SIGNIFICANT CHANGE UP
GLUCOSE BLDC GLUCOMTR-MCNC: 132 MG/DL — HIGH (ref 70–99)
GLUCOSE BLDC GLUCOMTR-MCNC: 133 MG/DL — HIGH (ref 70–99)
GLUCOSE BLDC GLUCOMTR-MCNC: 150 MG/DL — HIGH (ref 70–99)
GLUCOSE BLDC GLUCOMTR-MCNC: 152 MG/DL — HIGH (ref 70–99)
GLUCOSE BLDC GLUCOMTR-MCNC: 177 MG/DL — HIGH (ref 70–99)
GLUCOSE BLDC GLUCOMTR-MCNC: 178 MG/DL — HIGH (ref 70–99)
GLUCOSE BLDC GLUCOMTR-MCNC: 189 MG/DL — HIGH (ref 70–99)
GLUCOSE SERPL-MCNC: 123 MG/DL — HIGH (ref 70–99)
HCT VFR BLD CALC: 39.1 % — SIGNIFICANT CHANGE UP (ref 39–50)
HGB BLD-MCNC: 12.8 G/DL — LOW (ref 13–17)
IMM GRANULOCYTES NFR BLD AUTO: 0.2 % — SIGNIFICANT CHANGE UP (ref 0–1.5)
LYMPHOCYTES # BLD AUTO: 0.48 K/UL — LOW (ref 1–3.3)
LYMPHOCYTES # BLD AUTO: 5.6 % — LOW (ref 13–44)
MAGNESIUM SERPL-MCNC: 2 MG/DL — SIGNIFICANT CHANGE UP (ref 1.6–2.6)
MCHC RBC-ENTMCNC: 29 PG — SIGNIFICANT CHANGE UP (ref 27–34)
MCHC RBC-ENTMCNC: 32.7 GM/DL — SIGNIFICANT CHANGE UP (ref 32–36)
MCV RBC AUTO: 88.7 FL — SIGNIFICANT CHANGE UP (ref 80–100)
MONOCYTES # BLD AUTO: 0.64 K/UL — SIGNIFICANT CHANGE UP (ref 0–0.9)
MONOCYTES NFR BLD AUTO: 7.4 % — SIGNIFICANT CHANGE UP (ref 2–14)
NEUTROPHILS # BLD AUTO: 7.41 K/UL — HIGH (ref 1.8–7.4)
NEUTROPHILS NFR BLD AUTO: 86.3 % — HIGH (ref 43–77)
NRBC # BLD: 0 /100 WBCS — SIGNIFICANT CHANGE UP (ref 0–0)
PHOSPHATE SERPL-MCNC: 3.2 MG/DL — SIGNIFICANT CHANGE UP (ref 2.5–4.5)
PLATELET # BLD AUTO: 167 K/UL — SIGNIFICANT CHANGE UP (ref 150–400)
POTASSIUM BLDA-SCNC: 4 MMOL/L — SIGNIFICANT CHANGE UP (ref 3.5–5.1)
POTASSIUM SERPL-MCNC: 3.7 MMOL/L — SIGNIFICANT CHANGE UP (ref 3.5–5.3)
POTASSIUM SERPL-SCNC: 3.7 MMOL/L — SIGNIFICANT CHANGE UP (ref 3.5–5.3)
PROT SERPL-MCNC: 6.7 G/DL — SIGNIFICANT CHANGE UP (ref 6–8.3)
RBC # BLD: 4.41 M/UL — SIGNIFICANT CHANGE UP (ref 4.2–5.8)
RBC # FLD: 13.5 % — SIGNIFICANT CHANGE UP (ref 10.3–14.5)
SODIUM SERPL-SCNC: 140 MMOL/L — SIGNIFICANT CHANGE UP (ref 135–145)
WBC # BLD: 8.6 K/UL — SIGNIFICANT CHANGE UP (ref 3.8–10.5)
WBC # FLD AUTO: 8.6 K/UL — SIGNIFICANT CHANGE UP (ref 3.8–10.5)

## 2022-02-15 PROCEDURE — 71045 X-RAY EXAM CHEST 1 VIEW: CPT | Mod: 26,77

## 2022-02-15 PROCEDURE — 99232 SBSQ HOSP IP/OBS MODERATE 35: CPT

## 2022-02-15 PROCEDURE — 99291 CRITICAL CARE FIRST HOUR: CPT

## 2022-02-15 PROCEDURE — 71045 X-RAY EXAM CHEST 1 VIEW: CPT | Mod: 26

## 2022-02-15 RX ORDER — METOPROLOL TARTRATE 50 MG
5 TABLET ORAL ONCE
Refills: 0 | Status: DISCONTINUED | OUTPATIENT
Start: 2022-02-15 | End: 2022-02-15

## 2022-02-15 RX ORDER — HALOPERIDOL DECANOATE 100 MG/ML
2.5 INJECTION INTRAMUSCULAR ONCE
Refills: 0 | Status: COMPLETED | OUTPATIENT
Start: 2022-02-15 | End: 2022-02-15

## 2022-02-15 RX ORDER — MESALAMINE 400 MG
1200 TABLET, DELAYED RELEASE (ENTERIC COATED) ORAL DAILY
Refills: 0 | Status: DISCONTINUED | OUTPATIENT
Start: 2022-02-15 | End: 2022-02-15

## 2022-02-15 RX ORDER — FAMOTIDINE 10 MG/ML
20 INJECTION INTRAVENOUS DAILY
Refills: 0 | Status: ACTIVE | OUTPATIENT
Start: 2022-02-15 | End: 2023-01-14

## 2022-02-15 RX ORDER — METOPROLOL TARTRATE 50 MG
25 TABLET ORAL EVERY 12 HOURS
Refills: 0 | Status: ACTIVE | OUTPATIENT
Start: 2022-02-15 | End: 2023-01-14

## 2022-02-15 RX ORDER — SODIUM CHLORIDE 9 MG/ML
3 INJECTION INTRAMUSCULAR; INTRAVENOUS; SUBCUTANEOUS EVERY 8 HOURS
Refills: 0 | Status: ACTIVE | OUTPATIENT
Start: 2022-02-15 | End: 2023-01-14

## 2022-02-15 RX ORDER — ATORVASTATIN CALCIUM 80 MG/1
80 TABLET, FILM COATED ORAL AT BEDTIME
Refills: 0 | Status: ACTIVE | OUTPATIENT
Start: 2022-02-15 | End: 2023-01-14

## 2022-02-15 RX ORDER — SODIUM CHLORIDE 9 MG/ML
1000 INJECTION, SOLUTION INTRAVENOUS
Refills: 0 | Status: ON HOLD | OUTPATIENT
Start: 2022-02-15 | End: 2023-01-14

## 2022-02-15 RX ORDER — POTASSIUM CHLORIDE 20 MEQ
10 PACKET (EA) ORAL
Refills: 0 | Status: COMPLETED | OUTPATIENT
Start: 2022-02-15 | End: 2022-02-15

## 2022-02-15 RX ORDER — METOPROLOL TARTRATE 50 MG
2.5 TABLET ORAL ONCE
Refills: 0 | Status: COMPLETED | OUTPATIENT
Start: 2022-02-15 | End: 2022-02-15

## 2022-02-15 RX ORDER — DEXTROSE 50 % IN WATER 50 %
15 SYRINGE (ML) INTRAVENOUS ONCE
Refills: 0 | Status: ON HOLD | OUTPATIENT
Start: 2022-02-15 | End: 2023-01-14

## 2022-02-15 RX ORDER — FOLIC ACID 0.8 MG
1 TABLET ORAL ONCE
Refills: 0 | Status: COMPLETED | OUTPATIENT
Start: 2022-02-15 | End: 2022-02-15

## 2022-02-15 RX ORDER — CHLORHEXIDINE GLUCONATE 213 G/1000ML
1 SOLUTION TOPICAL
Refills: 0 | Status: ACTIVE | OUTPATIENT
Start: 2022-02-15 | End: 2023-01-14

## 2022-02-15 RX ORDER — THIAMINE MONONITRATE (VIT B1) 100 MG
100 TABLET ORAL DAILY
Refills: 0 | Status: ACTIVE | OUTPATIENT
Start: 2022-02-15 | End: 2023-01-14

## 2022-02-15 RX ORDER — INSULIN LISPRO 100/ML
VIAL (ML) SUBCUTANEOUS AT BEDTIME
Refills: 0 | Status: ON HOLD | OUTPATIENT
Start: 2022-02-15 | End: 2023-01-14

## 2022-02-15 RX ORDER — ASPIRIN/CALCIUM CARB/MAGNESIUM 324 MG
81 TABLET ORAL DAILY
Refills: 0 | Status: ACTIVE | OUTPATIENT
Start: 2022-02-15 | End: 2023-01-14

## 2022-02-15 RX ORDER — INSULIN LISPRO 100/ML
VIAL (ML) SUBCUTANEOUS
Refills: 0 | Status: ON HOLD | OUTPATIENT
Start: 2022-02-15 | End: 2023-01-14

## 2022-02-15 RX ORDER — ENOXAPARIN SODIUM 100 MG/ML
40 INJECTION SUBCUTANEOUS DAILY
Refills: 0 | Status: ACTIVE | OUTPATIENT
Start: 2022-02-15 | End: 2023-01-14

## 2022-02-15 RX ORDER — MESALAMINE 400 MG
4.8 TABLET, DELAYED RELEASE (ENTERIC COATED) ORAL DAILY
Refills: 0 | Status: ACTIVE | OUTPATIENT
Start: 2022-02-15 | End: 2023-01-14

## 2022-02-15 RX ORDER — GLUCAGON INJECTION, SOLUTION 0.5 MG/.1ML
1 INJECTION, SOLUTION SUBCUTANEOUS ONCE
Refills: 0 | Status: ON HOLD | OUTPATIENT
Start: 2022-02-15 | End: 2023-01-14

## 2022-02-15 RX ADMIN — SODIUM CHLORIDE 3 MILLILITER(S): 9 INJECTION INTRAMUSCULAR; INTRAVENOUS; SUBCUTANEOUS at 13:25

## 2022-02-15 RX ADMIN — Medication 100 MILLIGRAM(S): at 08:48

## 2022-02-15 RX ADMIN — Medication 50 MILLIEQUIVALENT(S): at 01:43

## 2022-02-15 RX ADMIN — Medication 100 MILLIGRAM(S): at 00:26

## 2022-02-15 RX ADMIN — Medication 25 MILLIGRAM(S): at 10:45

## 2022-02-15 RX ADMIN — Medication 100 MILLIGRAM(S): at 17:43

## 2022-02-15 RX ADMIN — Medication 25 MILLIGRAM(S): at 17:43

## 2022-02-15 RX ADMIN — HALOPERIDOL DECANOATE 2.5 MILLIGRAM(S): 100 INJECTION INTRAMUSCULAR at 20:05

## 2022-02-15 RX ADMIN — Medication 100 MILLIGRAM(S): at 22:42

## 2022-02-15 RX ADMIN — FAMOTIDINE 20 MILLIGRAM(S): 10 INJECTION INTRAVENOUS at 12:00

## 2022-02-15 RX ADMIN — Medication 1 MILLIGRAM(S): at 22:42

## 2022-02-15 RX ADMIN — CHLORHEXIDINE GLUCONATE 1 APPLICATION(S): 213 SOLUTION TOPICAL at 06:55

## 2022-02-15 RX ADMIN — SODIUM CHLORIDE 3 MILLILITER(S): 9 INJECTION INTRAMUSCULAR; INTRAVENOUS; SUBCUTANEOUS at 20:45

## 2022-02-15 RX ADMIN — Medication 2.5 MILLIGRAM(S): at 08:10

## 2022-02-15 RX ADMIN — ENOXAPARIN SODIUM 40 MILLIGRAM(S): 100 INJECTION SUBCUTANEOUS at 10:44

## 2022-02-15 RX ADMIN — Medication 50 MILLIEQUIVALENT(S): at 03:31

## 2022-02-15 RX ADMIN — Medication 50 MILLIEQUIVALENT(S): at 01:13

## 2022-02-15 RX ADMIN — Medication 650 MILLIGRAM(S): at 04:48

## 2022-02-15 RX ADMIN — Medication 81 MILLIGRAM(S): at 12:00

## 2022-02-15 RX ADMIN — FAMOTIDINE 20 MILLIGRAM(S): 10 INJECTION INTRAVENOUS at 06:31

## 2022-02-15 RX ADMIN — Medication 4.8 GRAM(S): at 22:41

## 2022-02-15 NOTE — CONSULT NOTE ADULT - ASSESSMENT
Agree with above assessment and plan as outlined above.    - progressing well  - f/u with Dr. Rainey upon D/C    Ervin Jara MD, [facility]  BEEPER [phone]

## 2022-02-15 NOTE — PROGRESS NOTE ADULT - REASON FOR ADMISSION
Atrial fibrillation
Robotic MARYAM clip
Subjective     Carolina Hernandez is a 54 year old female who presents to clinic today for the following health issues:  Chief Complaint   Patient presents with     Hypertension     Elevated BP and Headaches      Headache     Medication Problem     patient stopped taking Losartan-hctz, it was causing stomach upset, dizziness, heartburn, weakness, headaches were worse.          Hypertension Follow-up  Patient was seen on 7/26 for HTN. BP was 160/90  Patient feels that her BP has been elevated for one month.  She was started on losartan 50/hctz 12.5 mg daily  - stopped taking medications after 6 days due to side effects    Do you check your blood pressure regularly outside of the clinic? Yes     Are you following a low salt diet? Yes    Are your blood pressures ever more than 140 on the top number (systolic) OR more   than 90 on the bottom number (diastolic), for example 140/90? Yes    Amount of exercise or physical activity: None    Problems taking medications regularly: Yes,  side effects    Medication side effects: patient stopped taking Losartan-hctz, it was causing stomach upset, dizziness, heartburn, weakness, headaches were worse.     Diet: regular (no restrictions)    Patient states that years ago she was on hydrochlorothiazide 25 mg daily - states that it worked well and she didn't have side effects.      Headache    Onset: 1 month ago     Description:   Location: forehead, temples    Character: squeezing pain  Frequency:  All day   Duration:  Constant     Intensity: moderate to severe    Progression of Symptoms:  constant    Accompanying Signs & Symptoms:  Stiff neck: YES  Neck or upper back pain: no  Fever: no  Sinus pressure: no  Nausea or vomiting: YES- nausea  Dizziness: YES  Numbness: no  Weakness: YES  Visual changes: YES- blurred vision     History:   Head trauma: no  Family history of migraines: no  Previous tests for headaches: no  Neurologist evaluations: no  Able to do daily activities: no  Wake 
"with a headaches: YES  Do headaches wake you up: no  Daily pain medication use: no  Work/school stressors/changes: YES- Father in law whom lives with her is dying     Precipitating factors:   Does light make it worse: no  Does sound make it worse: YES    Alleviating factors:  Does sleep help: YES    Therapies Tried and outcome: Ibuprofen (Advil, Motrin) - no relief             Reviewed and updated as needed this visit by Provider         Review of Systems   ROS COMP: Constitutional, HEENT, cardiovascular, pulmonary, gi and gu systems are negative, except as otherwise noted.      Objective    BP (!) 160/92 (BP Location: Right arm, Patient Position: Chair, Cuff Size: Adult Regular)   Pulse 71   Temp 98.4  F (36.9  C) (Tympanic)   Resp 12   Ht 1.594 m (5' 2.75\")   Wt 67 kg (147 lb 12.8 oz)   SpO2 100%   Breastfeeding? No   BMI 26.39 kg/m    Body mass index is 26.39 kg/m .  Physical Exam   GENERAL: healthy, alert and no distress  NECK: no adenopathy, no asymmetry, masses, or scars and thyroid normal to palpation  RESP: lungs clear to auscultation - no rales, rhonchi or wheezes  CV: regular rate and rhythm, normal S1 S2, no S3 or S4, no murmur, click or rub, no peripheral edema and peripheral pulses strong  ABDOMEN: soft, nontender, no hepatosplenomegaly, no masses and bowel sounds normal  MS: no gross musculoskeletal defects noted, no edema            Assessment & Plan       ICD-10-CM    1. Essential hypertension I10 Did not tolerate the losartan/hctz combo pill.  Will start hydrochlorothiazide (HYDRODIURIL) 12.5 MG tablet daily and reassess in 2 weeks.  Will need BMP  In 2 weeks as well.     2. Acute nonintractable headache, unspecified headache type R51 Most likely related to blood pressure.  Also discussed:  For your chronic headaches:  1. You need regular sleep  2. You need regular exercise  3. You need regular eating habits.  4. Stress management  5. You need to give up all sources of caffeine.   6. You "
s.p
shouldn't use medications for headache relief (like tylenol, ibuprofen or naproxen) more than twice per week.    Will reassess at follow up in 2 weeks.        Tobacco Cessation:   reports that she has been smoking.  She has been smoking about 0.50 packs per day. She has never used smokeless tobacco.  Tobacco Cessation Action Plan: Information offered: Patient not interested at this time      Patient Instructions   Start hydrochlorothiazide 12.5 mg daily.  Follow up in 2 weeks for blood pressure recheck and lab recheck.        Thank you for choosing [facility].  You may be receiving an email and/or telephone survey request from [facility] regarding your visit today.  Please take a few minutes to respond to the survey to let us know how we are doing.      If you have questions or concerns, please contact us via [employer] or you can contact your care team at [phone].    Our Clinic hours are:  Monday 6:40 am  to 7:00 pm  Tuesday -Friday 6:40 am to 5:00 pm    The [place] outpatient lab hours are:  Monday - Friday 6:10 am to 4:45 pm  Saturdays 7:00 am to 11:00 am  Appointments are required, call [phone]    If you have clinical questions after hours or would like to schedule an appointment,  call the clinic at [phone].        Return in about 2 weeks (around 8/20/2019) for BP Recheck, Lab Work.    VALERIA Burciaga CNP  [facility] [place] [facility]

## 2022-02-15 NOTE — PROGRESS NOTE ADULT - SUBJECTIVE AND OBJECTIVE BOX
Subjective " hello I am feeling well"    VITAL SIGNS    Telemetry:  afib 110    Vital Signs Last 24 Hrs  T(C): 37 (02-15-22 @ 08:00), Max: 38.2 (02-15-22 @ 04:00)  T(F): 98.6 (02-15-22 @ 08:00), Max: 100.8 (02-15-22 @ 04:00)  HR: 105 (02-15-22 @ 11:40) (60 - 167)  BP: 102/65 (02-15-22 @ 11:40) (102/65 - 157/120)  RR: 18 (02-15-22 @ 11:40) (13 - 33)  SpO2: 97% (02-15-22 @ 11:40) (89% - 100%)            @ 07:01  -  02-15 @ 07:00  --------------------------------------------------------  IN: 500 mL / OUT: 805 mL / NET: -305 mL    02-15 @ 07:01  -  02-15 @ 11:51  --------------------------------------------------------  IN: 120 mL / OUT: 40 mL / NET: 80 mL    Daily Height in cm: 170.18 (2022 12:22)    Daily Weight in k.6 (15 Feb 2022 00:00)                          12.8   8.60  )-----------( 167      ( 15 Feb 2022 00:29 )             39.1     02-15    140  |  105  |  33<H>  ----------------------------<  123<H>  3.7   |  21<L>  |  1.00    Ca    9.1      15 Feb 2022 00:29  Phos  3.2     -15  Mg     2.0     -15    TPro  6.7  /  Alb  3.7  /  TBili  0.5  /  DBili  x   /  AST  23  /  ALT  13  /  AlkPhos  115  02-15  MEDICATIONS  (STANDING):  aspirin enteric coated 81 milliGRAM(s) Oral daily  atorvastatin 80 milliGRAM(s) Oral at bedtime  cefuroxime  IVPB 1500 milliGRAM(s) IV Intermittent every 8 hours  chlorhexidine 2% Cloths 1 Application(s) Topical <User Schedule>  dextrose 50% Injectable 50 milliLiter(s) IV Push every 15 minutes  dextrose 50% Injectable 25 milliLiter(s) IV Push every 15 minutes  enoxaparin Injectable 40 milliGRAM(s) SubCutaneous daily  famotidine    Tablet 20 milliGRAM(s) Oral daily  metoprolol tartrate 25 milliGRAM(s) Oral every 12 hours  polyethylene glycol 3350 17 Gram(s) Oral daily  sodium chloride 0.9% lock flush 3 milliLiter(s) IV Push every 8 hours  sodium chloride 0.9%. 1000 milliLiter(s) (10 mL/Hr) IV Continuous <Continuous>    MEDICATIONS  (PRN):  acetaminophen     Tablet .. 650 milliGRAM(s) Oral every 6 hours PRN Temp greater or equal to 38.5C (101.3F), Mild Pain (1 - 3)  ondansetron Injectable 4 milliGRAM(s) IV Push once PRN Nausea and/or Vomiting  oxyCODONE    IR 5 milliGRAM(s) Oral every 6 hours PRN Moderate Pain (4 - 6)        CAPILLARY BLOOD GLUCOSE      POCT Blood Glucose.: 177 mg/dL (15 Feb 2022 10:57)  POCT Blood Glucose.: 150 mg/dL (15 Feb 2022 07:22)  POCT Blood Glucose.: 152 mg/dL (15 Feb 2022 04:10)  POCT Blood Glucose.: 111 mg/dL (2022 20:52)  POCT Blood Glucose.: 122 mg/dL (2022 19:58)  POCT Blood Glucose.: 134 mg/dL (2022 18:51)  POCT Blood Glucose.: 144 mg/dL (2022 17:39)              PHYSICAL EXAM        Neurology: alert and oriented x 3, nonfocal, no gross deficits    CV :S1S2 IRR  Left later chest site benuign   LACW site benign  Lungs:B/l CTA on room air     Abdomen: soft, nontender, nondistended, positive bowel sounds, last bowel movement preop    : DTV              Extremities: equal strength throughout   B/lle warm well perfused + DP  no edema                                        Physical Therapy Rec:   Home  [  ]   Home w/ PT  [x  ]  Rehab  [  ]    Discussed with Cardiothoracic Team at AM rounds.

## 2022-02-15 NOTE — PROGRESS NOTE ADULT - ASSESSMENT
This is a  76 yr old male with hx of atrial fibrillation, hypertension, PVD, Diabetes  Mellitus. Underwent  CVA work up "clot" had cerebral angio.   MD recommend surgery for atrial fib.  2/14  Excision of left atrial appendage, percutaneous approach  Left Atrial Appendage Clip via Robotic Assist   recovered in CTU  2/15 VSS  overnight temps  37.9 /38.2  candida d/c this am DTV Transferred to [address] [place] [address] seen by Dr Monroe disposition to home with PT  later this week.      *    This is a  76 yr old male with hx of atrial fibrillation, hypertension, PVD, Diabetes  Mellitus. Underwent  CVA work up "clot" had cerebral angio.   MD recommend surgery for atrial fib.  2/14  Excision of left atrial appendage, percutaneous approach  Left Atrial Appendage Clip via Robotic Assist   recovered in CTU  2/15 VSS  overnight temps  37.9 /38.2  candida d/c this am DTV Transferred to [address] [place] [address] seen by Dr Monroe -It is appropriate to stop apixaban.  He is well protected by the [employer] clip.- disposition to home with PT  later this week      *

## 2022-02-15 NOTE — PROGRESS NOTE ADULT - SUBJECTIVE AND OBJECTIVE BOX
NANY HOUGH  MRN-[id number]  Patient is a 76y old  Male who presents with a chief complaint of Atrial fibrillation (14 Feb 2022 22:15)    HPI:  76 yr old male with hx of atrial fibrillation, hypertension, PVD, Diabetes  Mellitus. Had CVA work up "clot" had cerebral angio.   MD recommend surgery for atrial fib.  Scheduled for laparoscopic robotic left atrial appendage ligation.     **A fib Eliquis last dose 2/10 am  pt/family instructed to stop.    **COVID  denies foreign travel  denies s/s   denies  known exposure vaccine Pfizer 2/1/2021, 3/12/2021 and booster September 2021   swab 2/11 [facility]  (11 Feb 2022 11:50)      Surgery/Hospital Course:  2/14 Robotic MARYAM clip     Today:    REVIEW OF SYSTEMS:  Gen: No fever  EYES/ENT: No visual changes;  No vertigo or throat pain   NECK: No pain   RES:  No shortness of breath or Cough  CARD: No chest pain   GI: No abdominal pain  : No dysuria  NEURO: No weakness  SKIN: No itching, rashes     ICU Vital Signs Last 24 Hrs  T(C): 37 (15 Feb 2022 08:00), Max: 38.2 (15 Feb 2022 04:00)  T(F): 98.6 (15 Feb 2022 08:00), Max: 100.8 (15 Feb 2022 04:00)  HR: 167 (15 Feb 2022 09:00) (60 - 167)  BP: 157/120 (15 Feb 2022 09:00) (112/59 - 157/120)  BP(mean): 133 (15 Feb 2022 09:00) (78 - 133)  ABP: 136/58 (15 Feb 2022 06:00) (113/58 - 147/55)  ABP(mean): 79 (15 Feb 2022 06:00) (72 - 101)  RR: 19 (15 Feb 2022 09:00) (13 - 33)  SpO2: 94% (15 Feb 2022 09:00) (89% - 100%)      Physical Exam:  Gen:  Awake, alert, Multiple lines, and drains   CNS: non focal 	  Neck: no JVD  Chest: +Chest tubes   RES : clear , no wheezing              CVS: Regular  rhythm. Normal S1/S2  Abd: Soft, non-distended. Bowel sounds present.  Skin: No rash.  Ext:  no edema    ============================I/O===========================   I&O's Detail    14 Feb 2022 07:01  -  15 Feb 2022 07:00  --------------------------------------------------------  IN:    IV PiggyBack: 350 mL    sodium chloride 0.9%: 150 mL  Total IN: 500 mL    OUT:    Chest Tube (mL): 30 mL    Indwelling Catheter - Urethral (mL): 775 mL    Insulin: 0 mL  Total OUT: 805 mL    Total NET: -305 mL      15 Feb 2022 07:01  -  15 Feb 2022 10:20  --------------------------------------------------------  IN:    Oral Fluid: 120 mL  Total IN: 120 mL    OUT:    Chest Tube (mL): 40 mL    Insulin: 0 mL  Total OUT: 40 mL    Total NET: 80 mL        ============================ LABS =========================                        12.8   8.60  )-----------( 167      ( 15 Feb 2022 00:29 )             39.1     02-15    140  |  105  |  33<H>  ----------------------------<  123<H>  3.7   |  21<L>  |  1.00    Ca    9.1      15 Feb 2022 00:29  Phos  3.2     02-15  Mg     2.0     02-15    TPro  6.7  /  Alb  3.7  /  TBili  0.5  /  DBili  x   /  AST  23  /  ALT  13  /  AlkPhos  115  02-15    LIVER FUNCTIONS - ( 15 Feb 2022 00:29 )  Alb: 3.7 g/dL / Pro: 6.7 g/dL / ALK PHOS: 115 U/L / ALT: 13 U/L / AST: 23 U/L / GGT: x           PT/INR - ( 14 Feb 2022 16:12 )   PT: 12.7 sec;   INR: 1.06 ratio         PTT - ( 14 Feb 2022 16:12 )  PTT:29.8 sec  ABG - ( 15 Feb 2022 00:13 )  pH, Arterial: 7.44  pH, Blood: x     /  pCO2: 36    /  pO2: 96    / HCO3: 24    / Base Excess: 0.6   /  SaO2: 98.6                ======================Micro/Rad/Cardio=================  Culture: Reviewed   CXR: Reviewed  Echo:Reviewed  ======================================================  PAST MEDICAL & SURGICAL HISTORY:  Essential hypertension    Other hyperlipidemia    Meningioma  left frontal, s/p resection and radiation 2018    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 No note opening at pst. 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   Tylenol PRN, dilaudid, and oxycodone for analgesia     acetaminophen     Tablet .. 650 milliGRAM(s) Oral every 6 hours PRN Temp greater or equal to 38.5C (101.3F), Mild Pain (1 - 3)  HYDROmorphone  Injectable 0.5 milliGRAM(s) IV Push every 10 minutes PRN Severe Pain (7 - 10)  oxyCODONE    IR 5 milliGRAM(s) Oral every 6 hours PRN Moderate Pain (4 - 6)  ==================== RESPIRATORY======================  Receiving supplemental O2 therapy with 3L NC  Continue to monitor RR, breathing pattern, pulse ox, and ABG's.  Encourage incentive spirometry.     ====================CARDIOVASCULAR==================  Hx of A Fib s/p robotic MARYAM clip on 2/14/22  Invasive hemodynamic monitoring, assess perfusion indices.   Rate control w/ lopressor  Continue with ASA and Lipitor for recent procedure     metoprolol tartrate 25 milliGRAM(s) Oral every 12 hours  atorvastatin 80 milliGRAM(s) Oral at bedtime  aspirin enteric coated 81 milliGRAM(s) Oral daily  ===================HEMATOLOGIC/ONC ===================  Acute blood loss anemia Monitor H&H/Plts      VTE prophylaxis, enoxaparin Injectable 40 milliGRAM(s) SubCutaneous daily  ===================== RENAL =========================  Continue to monitor I/Os, BUN/Creatinine, and urine output  Replete lytes PRN. Keep K> 4 and Mg >2   ==================== GASTROINTESTINAL===================  Tolerating PO consistent carb diet.   Continue Pepcid for stress ulcer prophylaxis.   Bowel regimen with Miralax and Senna   Continue with zofran for nausea     famotidine    Tablet 20 milliGRAM(s) Oral daily  polyethylene glycol 3350 17 Gram(s) Oral daily  potassium chloride  10 mEq/50 mL IVPB 10 milliEquivalent(s) IV Intermittent every 1 hour  sodium chloride 0.9%. 1000 milliLiter(s) (10 mL/Hr) IV Continuous <Continuous>  ondansetron Injectable 4 milliGRAM(s) IV Push once PRN Nausea and/or Vomiting  =======================    ENDOCRINE  =====================  Bgl controlled, monitor glucose for need to initiate sliding scale.     dextrose 50% Injectable 50 milliLiter(s) IV Push every 15 minutes  dextrose 50% Injectable 25 milliLiter(s) IV Push every 15 minutes  ========================INFECTIOUS DISEASE================  Afebrile, WBC within normal limits  Continue trending WBC and monitoring fever curve   Continue Cefuroxime for perioperative antibiotic coverage,     cefuroxime  IVPB 1500 milliGRAM(s) IV Intermittent every 8 hours      Patient requires continuous monitoring with bedside rhythm monitoring, pulse ox monitoring, and intermittent blood gas analysis. Care plan discussed with ICU care team. Patient remained critical and at risk for life threatening decompensation.     By signing my name below, Blanca WEISS Tiffany, attest that this documentation has been prepared under the direction and in the presence of Meaghan Kang (PA)  Electronically signed: Monserrat Rios Scribe, 02-15-22 @ 10:20    Jorge Luis WEISS Melinda (PA) , personally performed the services described in this documentation. all medical record entries made by the elginibthomas were at my direction and in my presence. I have reviewed the chart and agree that the record reflects my personal performance and is accurate and complete  Electronically signed: Meaghan Kang)       NANY HOUGH  MRN-[id number]  Patient is a 76y old  Male who presents with a chief complaint of Atrial fibrillation (14 Feb 2022 22:15)    HPI:  76 yr old male with hx of atrial fibrillation, hypertension, PVD, Diabetes  Mellitus. Had CVA work up "clot" had cerebral angio.   MD recommend surgery for atrial fib.  Scheduled for laparoscopic robotic left atrial appendage ligation.     **A fib Eliquis last dose 2/10 am  pt/family instructed to stop.    **COVID  denies foreign travel  denies s/s   denies  known exposure vaccine Pfizer 2/1/2021, 3/12/2021 and booster September 2021   swab 2/11 [facility]  (11 Feb 2022 11:50)      Surgery/Hospital Course:  2/14 Robotic MARYAM clip     Today:  Pt is still in A FIB w/ RVR. Will start Lopressor 50 BID and titrate up to 100 BID as tolerated for Home dose. Will start on ASA only, no AC as per Dr. Cannon    REVIEW OF SYSTEMS:  Gen: No fever  EYES/ENT: No visual changes;  No vertigo or throat pain   NECK: No pain   RES:  No shortness of breath or Cough  CARD: No chest pain   GI: No abdominal pain  : No dysuria  NEURO: No weakness  SKIN: No itching, rashes     ICU Vital Signs Last 24 Hrs  T(C): 37 (15 Feb 2022 08:00), Max: 38.2 (15 Feb 2022 04:00)  T(F): 98.6 (15 Feb 2022 08:00), Max: 100.8 (15 Feb 2022 04:00)  HR: 167 (15 Feb 2022 09:00) (60 - 167)  BP: 157/120 (15 Feb 2022 09:00) (112/59 - 157/120)  BP(mean): 133 (15 Feb 2022 09:00) (78 - 133)  ABP: 136/58 (15 Feb 2022 06:00) (113/58 - 147/55)  ABP(mean): 79 (15 Feb 2022 06:00) (72 - 101)  RR: 19 (15 Feb 2022 09:00) (13 - 33)  SpO2: 94% (15 Feb 2022 09:00) (89% - 100%)      Physical Exam:  Gen:  Awake, alert, Multiple lines, and drains   CNS: non focal 	  Neck: no JVD  Chest: +Chest tubes   RES : clear , no wheezing              CVS: Regular  rhythm. Normal S1/S2  Abd: Soft, non-distended. Bowel sounds present.  Skin: No rash.  Ext:  no edema    ============================I/O===========================   I&O's Detail    14 Feb 2022 07:01  -  15 Feb 2022 07:00  --------------------------------------------------------  IN:    IV PiggyBack: 350 mL    sodium chloride 0.9%: 150 mL  Total IN: 500 mL    OUT:    Chest Tube (mL): 30 mL    Indwelling Catheter - Urethral (mL): 775 mL    Insulin: 0 mL  Total OUT: 805 mL    Total NET: -305 mL      15 Feb 2022 07:01  -  15 Feb 2022 10:20  --------------------------------------------------------  IN:    Oral Fluid: 120 mL  Total IN: 120 mL    OUT:    Chest Tube (mL): 40 mL    Insulin: 0 mL  Total OUT: 40 mL    Total NET: 80 mL        ============================ LABS =========================                        12.8   8.60  )-----------( 167      ( 15 Feb 2022 00:29 )             39.1     02-15    140  |  105  |  33<H>  ----------------------------<  123<H>  3.7   |  21<L>  |  1.00    Ca    9.1      15 Feb 2022 00:29  Phos  3.2     02-15  Mg     2.0     02-15    TPro  6.7  /  Alb  3.7  /  TBili  0.5  /  DBili  x   /  AST  23  /  ALT  13  /  AlkPhos  115  02-15    LIVER FUNCTIONS - ( 15 Feb 2022 00:29 )  Alb: 3.7 g/dL / Pro: 6.7 g/dL / ALK PHOS: 115 U/L / ALT: 13 U/L / AST: 23 U/L / GGT: x           PT/INR - ( 14 Feb 2022 16:12 )   PT: 12.7 sec;   INR: 1.06 ratio         PTT - ( 14 Feb 2022 16:12 )  PTT:29.8 sec  ABG - ( 15 Feb 2022 00:13 )  pH, Arterial: 7.44  pH, Blood: x     /  pCO2: 36    /  pO2: 96    / HCO3: 24    / Base Excess: 0.6   /  SaO2: 98.6                ======================Micro/Rad/Cardio=================  Culture: Reviewed   CXR: Reviewed  Echo:Reviewed  ======================================================  PAST MEDICAL & SURGICAL HISTORY:  Essential hypertension    Other hyperlipidemia    Meningioma  left frontal, s/p resection and radiation 2018    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 No note opening at pst. 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   Tylenol PRN, dilaudid, and oxycodone for analgesia     acetaminophen     Tablet .. 650 milliGRAM(s) Oral every 6 hours PRN Temp greater or equal to 38.5C (101.3F), Mild Pain (1 - 3)  HYDROmorphone  Injectable 0.5 milliGRAM(s) IV Push every 10 minutes PRN Severe Pain (7 - 10)  oxyCODONE    IR 5 milliGRAM(s) Oral every 6 hours PRN Moderate Pain (4 - 6)  ==================== RESPIRATORY======================  Receiving supplemental O2 therapy with 3L NC  Continue to monitor RR, breathing pattern, pulse ox, and ABG's.  Encourage incentive spirometry.     ====================CARDIOVASCULAR==================  Hx of A Fib s/p robotic MARYAM clip on 2/14/22  Invasive hemodynamic monitoring, assess perfusion indices.   Continue with ASA and Lipitor for recent procedure   Pt is still in A FIB w/ RVR. Will start Lopressor 50 BID and titrate up to 100 BID as tolerated for Home dose.   Will start on ASA only, no AC as per Dr. Cannon    metoprolol tartrate 25 milliGRAM(s) Oral every 12 hours  atorvastatin 80 milliGRAM(s) Oral at bedtime  aspirin enteric coated 81 milliGRAM(s) Oral daily  ===================HEMATOLOGIC/ONC ===================  Acute blood loss anemia Monitor H&H/Plts    ===================== RENAL =========================  Continue to monitor I/Os, BUN/Creatinine, and urine output  Replete lytes PRN. Keep K> 4 and Mg >2   ==================== GASTROINTESTINAL===================  Tolerating PO consistent carb diet.   Continue Pepcid for stress ulcer prophylaxis.   Bowel regimen with Miralax and Senna   Continue with zofran for nausea     famotidine    Tablet 20 milliGRAM(s) Oral daily  polyethylene glycol 3350 17 Gram(s) Oral daily  potassium chloride  10 mEq/50 mL IVPB 10 milliEquivalent(s) IV Intermittent every 1 hour  sodium chloride 0.9%. 1000 milliLiter(s) (10 mL/Hr) IV Continuous <Continuous>  ondansetron Injectable 4 milliGRAM(s) IV Push once PRN Nausea and/or Vomiting  =======================    ENDOCRINE  =====================  Bgl controlled, monitor glucose for need to initiate sliding scale.     dextrose 50% Injectable 50 milliLiter(s) IV Push every 15 minutes  dextrose 50% Injectable 25 milliLiter(s) IV Push every 15 minutes  ========================INFECTIOUS DISEASE================  Afebrile, WBC within normal limits  Continue trending WBC and monitoring fever curve   Continue Cefuroxime for perioperative antibiotic coverage,     cefuroxime  IVPB 1500 milliGRAM(s) IV Intermittent every 8 hours      Patient requires continuous monitoring with bedside rhythm monitoring, pulse ox monitoring, and intermittent blood gas analysis. Care plan discussed with ICU care team. Patient remained critical and at risk for life threatening decompensation.     By signing my name below, IBlanca Tiffany, attest that this documentation has been prepared under the direction and in the presence of Meaghan Kang)  Electronically signed: Monserrat Rios Scribe, 02-15-22 @ 10:20    I, Meaghan Kang (PA) , personally performed the services described in this documentation. all medical record entries made by the daysi were at my direction and in my presence. I have reviewed the chart and agree that the record reflects my personal performance and is accurate and complete  Electronically signed: Meaghan Kang (PA)       NANY HOUGH  MRN-[id number]  Patient is a 76y old  Male who presents with a chief complaint of Atrial fibrillation (14 Feb 2022 22:15)    HPI:  76 yr old male with hx of atrial fibrillation, hypertension, PVD, Diabetes  Mellitus. Had CVA work up "clot" had cerebral angio.   MD recommend surgery for atrial fib.  Scheduled for laparoscopic robotic left atrial appendage ligation.     **A fib Eliquis last dose 2/10 am  pt/family instructed to stop.    **COVID  denies foreign travel  denies s/s   denies  known exposure vaccine Pfizer 2/1/2021, 3/12/2021 and booster September 2021   swab 2/11 [facility]  (11 Feb 2022 11:50)      Surgery/Hospital Course:  2/14 Robotic MARYAM clip     Today:  Pt is still in A FIB w/ RVR. Will start Lopressor titrate up as tolerated. Will start on ASA only, no eliquis as per Dr. Cannon.    REVIEW OF SYSTEMS:  Gen: No fever  EYES/ENT: No visual changes;  No vertigo or throat pain   NECK: No pain   RES:  No shortness of breath or Cough  CARD: No chest pain, no palpitations   GI: No abdominal pain  : No dysuria  NEURO: No weakness  SKIN: No itching, rashes     ICU Vital Signs Last 24 Hrs  T(C): 37 (15 Feb 2022 08:00), Max: 38.2 (15 Feb 2022 04:00)  T(F): 98.6 (15 Feb 2022 08:00), Max: 100.8 (15 Feb 2022 04:00)  HR: 167 (15 Feb 2022 09:00) (60 - 167)  BP: 157/120 (15 Feb 2022 09:00) (112/59 - 157/120)  BP(mean): 133 (15 Feb 2022 09:00) (78 - 133)  ABP: 136/58 (15 Feb 2022 06:00) (113/58 - 147/55)  ABP(mean): 79 (15 Feb 2022 06:00) (72 - 101)  RR: 19 (15 Feb 2022 09:00) (13 - 33)  SpO2: 94% (15 Feb 2022 09:00) (89% - 100%)      Physical Exam:  Gen:  Awake, alert, OOBTC   CNS: non focal 	  Neck: no JVD, RIJ dressing s/p intro removal C/D/I  Chest: +Chest tubes   RES : clear, no wheezing              CVS: irregular rhythm. Normal S1/S2  Abd: Soft, non-distended. Bowel sounds present.  Skin: No rash.  Ext:  no edema    ============================I/O===========================   I&O's Detail    14 Feb 2022 07:01  -  15 Feb 2022 07:00  --------------------------------------------------------  IN:    IV PiggyBack: 350 mL    sodium chloride 0.9%: 150 mL  Total IN: 500 mL    OUT:    Chest Tube (mL): 30 mL    Indwelling Catheter - Urethral (mL): 775 mL    Insulin: 0 mL  Total OUT: 805 mL    Total NET: -305 mL      15 Feb 2022 07:01  -  15 Feb 2022 10:20  --------------------------------------------------------  IN:    Oral Fluid: 120 mL  Total IN: 120 mL    OUT:    Chest Tube (mL): 40 mL    Insulin: 0 mL  Total OUT: 40 mL    Total NET: 80 mL        ============================ LABS =========================                        12.8   8.60  )-----------( 167      ( 15 Feb 2022 00:29 )             39.1     02-15    140  |  105  |  33<H>  ----------------------------<  123<H>  3.7   |  21<L>  |  1.00    Ca    9.1      15 Feb 2022 00:29  Phos  3.2     02-15  Mg     2.0     02-15    TPro  6.7  /  Alb  3.7  /  TBili  0.5  /  DBili  x   /  AST  23  /  ALT  13  /  AlkPhos  115  02-15    LIVER FUNCTIONS - ( 15 Feb 2022 00:29 )  Alb: 3.7 g/dL / Pro: 6.7 g/dL / ALK PHOS: 115 U/L / ALT: 13 U/L / AST: 23 U/L / GGT: x           PT/INR - ( 14 Feb 2022 16:12 )   PT: 12.7 sec;   INR: 1.06 ratio         PTT - ( 14 Feb 2022 16:12 )  PTT:29.8 sec  ABG - ( 15 Feb 2022 00:13 )  pH, Arterial: 7.44  pH, Blood: x     /  pCO2: 36    /  pO2: 96    / HCO3: 24    / Base Excess: 0.6   /  SaO2: 98.6                ======================Micro/Rad/Cardio=================  Culture: Reviewed   CXR: Reviewed  Echo:Reviewed  ======================================================  PAST MEDICAL & SURGICAL HISTORY:  Essential hypertension    Other hyperlipidemia    Meningioma  left frontal, s/p resection and radiation 2018    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 No note opening at pst. 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   Tylenol PRN, dilaudid, and oxycodone for analgesia     acetaminophen     Tablet .. 650 milliGRAM(s) Oral every 6 hours PRN Temp greater or equal to 38.5C (101.3F), Mild Pain (1 - 3)  HYDROmorphone  Injectable 0.5 milliGRAM(s) IV Push every 10 minutes PRN Severe Pain (7 - 10)  oxyCODONE    IR 5 milliGRAM(s) Oral every 6 hours PRN Moderate Pain (4 - 6)  ==================== RESPIRATORY======================  Receiving supplemental O2 therapy with 3L NC, titrate down as tolerated  Continue to monitor RR, breathing pattern, pulse ox, and ABG's.  Encourage incentive spirometry.     ====================CARDIOVASCULAR==================  Hx of A Fib s/p robotic MARYAM clip on 2/14/22  Invasive hemodynamic monitoring, assess perfusion indices.   Continue with ASA, start Lipitor for recent procedure   Pt is still in A FIB w/ RVR. Will start Lopressor 25 BID and titrate up as tolerated.   Will start on ASA only, no AC as per Dr. Cannon    metoprolol tartrate 25 milliGRAM(s) Oral every 12 hours  atorvastatin 80 milliGRAM(s) Oral at bedtime  aspirin enteric coated 81 milliGRAM(s) Oral daily  ===================HEMATOLOGIC/ONC ===================  Acute blood loss anemia Monitor H&H/Plts    start lovenox for dvt ppx    ===================== RENAL =========================  Continue to monitor I/Os, BUN/Creatinine, and urine output  Replete lytes PRN. Keep K> 4 and Mg >2   ==================== GASTROINTESTINAL===================  Tolerating PO consistent carb diet.   Continue Pepcid for stress ulcer prophylaxis.   Bowel regimen with Miralax and Senna   Continue with zofran for nausea     famotidine    Tablet 20 milliGRAM(s) Oral daily  polyethylene glycol 3350 17 Gram(s) Oral daily  potassium chloride  10 mEq/50 mL IVPB 10 milliEquivalent(s) IV Intermittent every 1 hour  sodium chloride 0.9%. 1000 milliLiter(s) (10 mL/Hr) IV Continuous <Continuous>  ondansetron Injectable 4 milliGRAM(s) IV Push once PRN Nausea and/or Vomiting  =======================    ENDOCRINE  =====================  Bgl controlled, monitor glucose for need to initiate sliding scale.     dextrose 50% Injectable 50 milliLiter(s) IV Push every 15 minutes  dextrose 50% Injectable 25 milliLiter(s) IV Push every 15 minutes  ========================INFECTIOUS DISEASE================  Afebrile, WBC within normal limits  Continue trending WBC and monitoring fever curve   Continue Cefuroxime for perioperative antibiotic coverage,     cefuroxime  IVPB 1500 milliGRAM(s) IV Intermittent every 8 hours      Patient requires continuous monitoring with bedside rhythm monitoring, pulse ox monitoring, and intermittent blood gas analysis. Care plan discussed with ICU care team. Patient remained critical and at risk for life threatening decompensation.     By signing my name below, Blanca WEISS Tiffany, attest that this documentation has been prepared under the direction and in the presence of Meaghan Kang)  Electronically signed: Monserrat Rios Scribe, 02-15-22 @ 10:20    I, Meaghan Kang (PA) , personally performed the services described in this documentation. all medical record entries made by the daysi were at my direction and in my presence. I have reviewed the chart and agree that the record reflects my personal performance and is accurate and complete  Electronically signed: Meaghan Kang)

## 2022-02-15 NOTE — CONSULT NOTE ADULT - SUBJECTIVE AND OBJECTIVE BOX
C A R D I O L O G Y  *********************    DATE OF SERVICE: 02-15-22    HISTORY OF PRESENT ILLNESS: HPI:  Patient is a 75 y/o male with PMH of HTN, Ulcerative Colitis, chronic Afib, hx of meningioma (s/p left frontal craniotomy in 2017 and s/p radiation), Hx of seizure disorder, DM2, and CVA s/p TPA and thrombectomy. Patient now admitted s/p robotic MARYAM clip. Cardiology consulted for management of afib. Patient extubated and transferred to tele floor. Resting comfortably in no distress. Denies chest pain, SOB, palpitations, dizziness, or syncope.     PAST MEDICAL & SURGICAL HISTORY:  Essential hypertension    Other hyperlipidemia    Meningioma  left frontal, s/p resection and radiation 2018    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 No note opening at pst. skin purple/mottled    H/O right inguinal hernia repair    H/O cataract removal with insertion of prosthetic lens  B/L    S/P colonoscopic polypectomy  2018            MEDICATIONS:  MEDICATIONS  (STANDING):  aspirin enteric coated 81 milliGRAM(s) Oral daily  atorvastatin 80 milliGRAM(s) Oral at bedtime  cefuroxime  IVPB 1500 milliGRAM(s) IV Intermittent every 8 hours  chlorhexidine 2% Cloths 1 Application(s) Topical <User Schedule>  dextrose 50% Injectable 50 milliLiter(s) IV Push every 15 minutes  dextrose 50% Injectable 25 milliLiter(s) IV Push every 15 minutes  enoxaparin Injectable 40 milliGRAM(s) SubCutaneous daily  famotidine    Tablet 20 milliGRAM(s) Oral daily  mesalamine DR Capsule 1200 milliGRAM(s) Oral daily  metoprolol tartrate 25 milliGRAM(s) Oral every 12 hours  polyethylene glycol 3350 17 Gram(s) Oral daily  sodium chloride 0.9% lock flush 3 milliLiter(s) IV Push every 8 hours  sodium chloride 0.9%. 1000 milliLiter(s) (10 mL/Hr) IV Continuous <Continuous>      Allergies    No Known Allergies    Intolerances        FAMILY HISTORY:  FH: hypertension  Father      Non-contributary for premature coronary disease or sudden cardiac death    SOCIAL HISTORY:    [x ] Non-smoker  [ ] Smoker  [ ] Alcohol    FLU VACCINE THIS YEAR STARTS IN AUGUST:  [ ] Yes    [ ] No    IF OVER 65 HAVE YOU EVER HAD A PNA VACCINE:  [ ] Yes    [ ] No       [ ] N/A      REVIEW OF SYSTEMS:  [ ]chest pain  [  ]shortness of breath  [  ]palpitations  [  ]syncope  [ ]near syncope [ ]upper extremity weakness   [ ] lower extremity weakness  [  ]diplopia  [  ]altered mental status   [  ]fevers  [ ]chills [ ]nausea  [ ]vomiting  [  ]dysphagia    [ ]abdominal pain  [ ]melena  [ ]BRBPR    [  ]epistaxis  [  ]rash    [ ]lower extremity edema        [X] All others negative	  [ ] Unable to obtain      LABS:	 	    CARDIAC MARKERS:                              12.8   8.60  )-----------( 167      ( 15 Feb 2022 00:29 )             39.1     Hb Trend: 12.8<--, 12.7<--    02-15    140  |  105  |  33<H>  ----------------------------<  123<H>  3.7   |  21<L>  |  1.00    Ca    9.1      15 Feb 2022 00:29  Phos  3.2     02-15  Mg     2.0     02-15    TPro  6.7  /  Alb  3.7  /  TBili  0.5  /  DBili  x   /  AST  23  /  ALT  13  /  AlkPhos  115  02-15    Creatinine Trend: 1.00<--, 0.97<--, 1.79<--    Coags:      proBNP:   Lipid Profile:   HgA1c:   TSH:         PHYSICAL EXAM:  T(C): 37 (02-15-22 @ 08:00), Max: 38.2 (02-15-22 @ 04:00)  HR: 105 (02-15-22 @ 11:40) (60 - 167)  BP: 102/65 (02-15-22 @ 11:40) (102/65 - 157/120)  RR: 18 (02-15-22 @ 11:40) (13 - 33)  SpO2: 97% (02-15-22 @ 11:40) (89% - 100%)  Wt(kg): --   BMI (kg/m2): 25.2 (02-14-22 @ 12:22)  I&O's Summary    14 Feb 2022 07:01  -  15 Feb 2022 07:00  --------------------------------------------------------  IN: 500 mL / OUT: 805 mL / NET: -305 mL    15 Feb 2022 07:01  -  15 Feb 2022 12:55  --------------------------------------------------------  IN: 320 mL / OUT: 40 mL / NET: 280 mL        Gen: Appears well in NAD  HEENT:  (-)icterus (-)pallor  CV: N S1 S2 1/6 GREG (+)2 Pulses B/l  Resp:  Clear to auscultation B/L, normal effort  GI: (+) BS Soft, NT, ND  Lymph:  (-)Edema, (-)obvious lymphadenopathy  Skin: Warm to touch, Normal turgor  Psych: Appropriate mood and affect      TELEMETRY: AF 	      ECG: Pending	    RADIOLOGY:         CXR: < from: Xray Chest 1 View- PORTABLE-Routine (02.14.22 @ 15:25) >  IMPRESSION:  New left atrial appendage clip.    No pneumothorax.    New left midlung linear atelectasis.    New left perihilar opacity, possibly postsurgical change or subsegmental   atelectasis.    < end of copied text >      ASSESSMENT/PLAN: Patient is a 75 y/o male with PMH of HTN, Ulcerative Colitis, chronic Afib, hx of meningioma (s/p left frontal craniotomy in 2017 and s/p radiation), Hx of seizure disorder, DM2, and CVA s/p TPA and thrombectomy. Patient now admitted s/p robotic MARYAM clip. Cardiology consulted for management of afib.    - No evidence of clinical HF or anginal symptoms  - EP consult appreciated - can stop Eliquis  - Rate control with metoprolol 25mg BID  - CTS follow up  - Patient to f/u with his cardiologist Dr. Ceasar Rainey at [facility] after d/c    MONIQUE VelazquezC  Pager: [phone]  
  HISTORY OF PRESENT ILLNESS: HPI:  76 yr old male with hx of atrial fibrillation, hypertension, PVD, Diabetes  Mellitus. Had CVA work up "clot" had cerebral angio.   MD recommend surgery for atrial fib.  Scheduled for laparoscopic robotic left atrial appendage ligation.     **A fib Eliquis last dose 2/10 am  pt/family instructed to stop.    **COVID  denies foreign travel  denies s/s   denies  known exposure vaccine Pfizer 2/1/2021, 3/12/2021 and booster September 2021   swab 2/11 [facility]  (11 Feb 2022 11:50)    Patient seen in hospital last Fall, after acute CVA.  He has longstanding AFib, and had not been taking apixaban as ordered due to recurrent GI bleeding from ulcerative colitis.  He has refused Watchman in the past, and was ultimately referred to Dr Cannon after stroke recovery, for VATS-->LA appendage ligation.  The procedure went well and the patient feels well today. No angina or palpitations, no shortness of breath.  He is being transferred out of CTU to stepdown today.  He follows with [facility], and our group was consulted for continuity of care on that admission.  He will continue to follow with [facility] on discharge.    PAST MEDICAL & SURGICAL HISTORY:  Essential hypertension    Other hyperlipidemia    Meningioma  left frontal, s/p resection and radiation 2018    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 No note opening at pst. skin purple/mottled    H/O right inguinal hernia repair    H/O cataract removal with insertion of prosthetic lens  B/L    S/P colonoscopic polypectomy  2018      MEDICATIONS  (STANDING):  aspirin enteric coated 81 milliGRAM(s) Oral daily  atorvastatin 80 milliGRAM(s) Oral at bedtime  cefuroxime  IVPB 1500 milliGRAM(s) IV Intermittent every 8 hours  chlorhexidine 2% Cloths 1 Application(s) Topical <User Schedule>  dextrose 50% Injectable 50 milliLiter(s) IV Push every 15 minutes  dextrose 50% Injectable 25 milliLiter(s) IV Push every 15 minutes  enoxaparin Injectable 40 milliGRAM(s) SubCutaneous daily  famotidine    Tablet 20 milliGRAM(s) Oral daily  metoprolol tartrate 25 milliGRAM(s) Oral every 12 hours  polyethylene glycol 3350 17 Gram(s) Oral daily  potassium chloride  10 mEq/50 mL IVPB 10 milliEquivalent(s) IV Intermittent every 1 hour  sodium chloride 0.9% lock flush 3 milliLiter(s) IV Push every 8 hours  sodium chloride 0.9%. 1000 milliLiter(s) (10 mL/Hr) IV Continuous <Continuous>    Allergies    No Known Allergies    Intolerances    FAMILY HISTORY:  FH: hypertension  Father      Non-contributary for premature coronary disease or sudden cardiac death    SOCIAL HISTORY:    [x ] Non-smoker  [ ] Smoker  [ ] Alcohol      PHYSICAL EXAM:  T(C): 37 (02-15-22 @ 08:00), Max: 38.2 (02-15-22 @ 04:00)  HR: 107 (02-15-22 @ 11:03) (60 - 167)  BP: 110/51 (02-15-22 @ 11:03) (110/51 - 157/120)  RR: 18 (02-15-22 @ 11:03) (13 - 33)  SpO2: 97% (02-15-22 @ 11:03) (89% - 100%)  Wt(kg): --    Appearance: Normal appearing adult man in no acute distress, cooperative	  HEENT:   Normal oral mucosa, PERRL, EOMI	  Lymphatic: No lymphadenopathy , no edema  Cardiovascular: irregular rapid S1 S2, No JVD, No murmurs , Peripheral pulses palpable 2+ bilaterally  Respiratory: Lungs clear to auscultation, normal effort 	  Gastrointestinal:  Soft, Non-tender, + BS	  Skin: No rashes, No ecchymoses, No cyanosis, warm to touch  Musculoskeletal: Normal range of motion, normal strength  Psychiatry:  Mood & affect appropriate    TELEMETRY: AFib 120bpm 	    	  LABS:	 	                          12.8   8.60  )-----------( 167      ( 15 Feb 2022 00:29 )             39.1     02-15    140  |  105  |  33<H>  ----------------------------<  123<H>  3.7   |  21<L>  |  1.00    Ca    9.1      15 Feb 2022 00:29  Phos  3.2     02-15  Mg     2.0     02-15    TPro  6.7  /  Alb  3.7  /  TBili  0.5  /  DBili  x   /  AST  23  /  ALT  13  /  AlkPhos  115  02-15    ASSESSMENT/PLAN: 	76y Male with GI bleeding due to UC, chronic AFib, recent CVA.  He was admitted for an elective video-assisted surgery to ligate his LA appendage.    Mr Haddad is recovering well post-op, with a chest tube.    It is appropriate to stop apixaban.  He is well protected by the Atricure clip.  He is not a good candidate for AFib ablation or rhythm control drugs, as he is asymptomatic from his chronic AFib.  Followup with [facility] as planned.    Josiah Monroe M.D.  Cardiac Electrophysiology    office [phone]  pager [phone]

## 2022-02-15 NOTE — PHYSICAL THERAPY INITIAL EVALUATION ADULT - PERTINENT HX OF CURRENT PROBLEM, REHAB EVAL
77y/o M with hx of atrial fibrillation, hypertension, PVD, Diabetes  Mellitus. Had CVA work up "clot" had cerebral angio.   MD recommend surgery for atrial fib.  Scheduled for laparoscopic robotic left atrial appendage ligation.

## 2022-02-15 NOTE — PHYSICAL THERAPY INITIAL EVALUATION ADULT - STRENGTHENING, PT EVAL
GOAL: Pt will improve BUE/BLE strength by 1/2 grade in 2wks to improve overall functional mobility
Skin normal color for race, warm, dry and intact. No evidence of rash.

## 2022-02-15 NOTE — PROGRESS NOTE ADULT - PROBLEM SELECTOR PLAN 1
2/14 Excision of left atrial appendage, percutaneous approach - Left Atrial Appendage Clip via Robotic Assist  ASA  81 mg lovenox 40 metoprolol 25 BID  DTV   incentive spirometry' chest pt    ambulate tid  resume mesalamine   disposition to home with PT 2/14 Excision of left atrial appendage, percutaneous approach - Left Atrial Appendage Clip via Robotic Assist  No need for AC Per EP- Dr Monroe  ASA  81 mg lovenox 40 metoprolol 25 BID  DTV   incentive spirometry' chest pt    ambulate tid  resume mesalamine   disposition to home with PT

## 2022-02-15 NOTE — PHYSICAL THERAPY INITIAL EVALUATION ADULT - PLANNED THERAPY INTERVENTIONS, PT EVAL
GOAL: Pt will independently negotiate stairs with 1 handrail with step to pattern in 2wks./balance training/bed mobility training/gait training/strengthening/transfer training

## 2022-02-15 NOTE — PHYSICAL THERAPY INITIAL EVALUATION ADULT - ADDITIONAL COMMENTS
Pt lives in 3-story town home with wife; independent with functional mobility with use of straight cane; has home health aide to assist with showers (+shower chair). Owns commode and RW however has not been using

## 2022-02-16 ENCOUNTER — TRANSCRIPTION ENCOUNTER (OUTPATIENT)
Age: 77
End: 2022-02-16

## 2022-02-16 LAB
ALBUMIN SERPL ELPH-MCNC: 3.9 G/DL — SIGNIFICANT CHANGE UP (ref 3.3–5)
ALP SERPL-CCNC: 127 U/L — HIGH (ref 40–120)
ALT FLD-CCNC: 22 U/L — SIGNIFICANT CHANGE UP (ref 10–45)
ANION GAP SERPL CALC-SCNC: 12 MMOL/L — SIGNIFICANT CHANGE UP (ref 5–17)
AST SERPL-CCNC: 45 U/L — HIGH (ref 10–40)
BASOPHILS # BLD AUTO: 0.03 K/UL — SIGNIFICANT CHANGE UP (ref 0–0.2)
BASOPHILS NFR BLD AUTO: 0.3 % — SIGNIFICANT CHANGE UP (ref 0–2)
BILIRUB SERPL-MCNC: 1 MG/DL — SIGNIFICANT CHANGE UP (ref 0.2–1.2)
BUN SERPL-MCNC: 26 MG/DL — HIGH (ref 7–23)
CALCIUM SERPL-MCNC: 9.7 MG/DL — SIGNIFICANT CHANGE UP (ref 8.4–10.5)
CHLORIDE SERPL-SCNC: 101 MMOL/L — SIGNIFICANT CHANGE UP (ref 96–108)
CO2 SERPL-SCNC: 22 MMOL/L — SIGNIFICANT CHANGE UP (ref 22–31)
CREAT SERPL-MCNC: 1.02 MG/DL — SIGNIFICANT CHANGE UP (ref 0.5–1.3)
EOSINOPHIL # BLD AUTO: 0.11 K/UL — SIGNIFICANT CHANGE UP (ref 0–0.5)
EOSINOPHIL NFR BLD AUTO: 1 % — SIGNIFICANT CHANGE UP (ref 0–6)
GLUCOSE SERPL-MCNC: 121 MG/DL — HIGH (ref 70–99)
HCT VFR BLD CALC: 44.1 % — SIGNIFICANT CHANGE UP (ref 39–50)
HGB BLD-MCNC: 14.1 G/DL — SIGNIFICANT CHANGE UP (ref 13–17)
IMM GRANULOCYTES NFR BLD AUTO: 0.6 % — SIGNIFICANT CHANGE UP (ref 0–1.5)
LYMPHOCYTES # BLD AUTO: 0.95 K/UL — LOW (ref 1–3.3)
LYMPHOCYTES # BLD AUTO: 8.2 % — LOW (ref 13–44)
MCHC RBC-ENTMCNC: 29.1 PG — SIGNIFICANT CHANGE UP (ref 27–34)
MCHC RBC-ENTMCNC: 32 GM/DL — SIGNIFICANT CHANGE UP (ref 32–36)
MCV RBC AUTO: 90.9 FL — SIGNIFICANT CHANGE UP (ref 80–100)
MONOCYTES # BLD AUTO: 0.72 K/UL — SIGNIFICANT CHANGE UP (ref 0–0.9)
MONOCYTES NFR BLD AUTO: 6.2 % — SIGNIFICANT CHANGE UP (ref 2–14)
NEUTROPHILS # BLD AUTO: 9.67 K/UL — HIGH (ref 1.8–7.4)
NEUTROPHILS NFR BLD AUTO: 83.7 % — HIGH (ref 43–77)
NRBC # BLD: 0 /100 WBCS — SIGNIFICANT CHANGE UP (ref 0–0)
PLATELET # BLD AUTO: 185 K/UL — SIGNIFICANT CHANGE UP (ref 150–400)
POTASSIUM SERPL-MCNC: 3.7 MMOL/L — SIGNIFICANT CHANGE UP (ref 3.5–5.3)
POTASSIUM SERPL-SCNC: 3.7 MMOL/L — SIGNIFICANT CHANGE UP (ref 3.5–5.3)
PROT SERPL-MCNC: 7.1 G/DL — SIGNIFICANT CHANGE UP (ref 6–8.3)
RBC # BLD: 4.85 M/UL — SIGNIFICANT CHANGE UP (ref 4.2–5.8)
RBC # FLD: 13.8 % — SIGNIFICANT CHANGE UP (ref 10.3–14.5)
SODIUM SERPL-SCNC: 135 MMOL/L — SIGNIFICANT CHANGE UP (ref 135–145)
WBC # BLD: 11.55 K/UL — HIGH (ref 3.8–10.5)
WBC # FLD AUTO: 11.55 K/UL — HIGH (ref 3.8–10.5)

## 2022-02-16 PROCEDURE — 71045 X-RAY EXAM CHEST 1 VIEW: CPT | Mod: 26

## 2022-02-16 PROCEDURE — 99024 POSTOP FOLLOW-UP VISIT: CPT

## 2022-02-16 RX ORDER — POTASSIUM CHLORIDE 20 MEQ
40 PACKET (EA) ORAL ONCE
Refills: 0 | Status: COMPLETED | OUTPATIENT
Start: 2022-02-16 | End: 2022-02-16

## 2022-02-16 RX ADMIN — ATORVASTATIN CALCIUM 80 MILLIGRAM(S): 80 TABLET, FILM COATED ORAL at 21:44

## 2022-02-16 RX ADMIN — Medication 25 MILLIGRAM(S): at 17:22

## 2022-02-16 RX ADMIN — Medication 100 MILLIGRAM(S): at 00:25

## 2022-02-16 RX ADMIN — CHLORHEXIDINE GLUCONATE 1 APPLICATION(S): 213 SOLUTION TOPICAL at 11:24

## 2022-02-16 RX ADMIN — ENOXAPARIN SODIUM 40 MILLIGRAM(S): 100 INJECTION SUBCUTANEOUS at 14:09

## 2022-02-16 RX ADMIN — Medication 4.8 GRAM(S): at 14:10

## 2022-02-16 RX ADMIN — Medication 100 MILLIGRAM(S): at 14:10

## 2022-02-16 RX ADMIN — FAMOTIDINE 20 MILLIGRAM(S): 10 INJECTION INTRAVENOUS at 14:11

## 2022-02-16 RX ADMIN — SODIUM CHLORIDE 3 MILLILITER(S): 9 INJECTION INTRAMUSCULAR; INTRAVENOUS; SUBCUTANEOUS at 21:44

## 2022-02-16 RX ADMIN — Medication 40 MILLIEQUIVALENT(S): at 14:12

## 2022-02-16 RX ADMIN — Medication 25 MILLIGRAM(S): at 05:05

## 2022-02-16 RX ADMIN — SODIUM CHLORIDE 3 MILLILITER(S): 9 INJECTION INTRAMUSCULAR; INTRAVENOUS; SUBCUTANEOUS at 13:31

## 2022-02-16 RX ADMIN — Medication 81 MILLIGRAM(S): at 14:10

## 2022-02-16 RX ADMIN — SODIUM CHLORIDE 3 MILLILITER(S): 9 INJECTION INTRAMUSCULAR; INTRAVENOUS; SUBCUTANEOUS at 05:24

## 2022-02-16 NOTE — PROGRESS NOTE ADULT - PROBLEM SELECTOR PLAN 1
2/14 Excision of left atrial appendage, percutaneous approach - Left Atrial Appendage Clip via Robotic Assist  No need for AC Per EP- Dr Monroe  Continue ASA 81 daily, metoprolol 25 BID, atorvastatin 80 HS  Cough and deep breathe, Incentive Spirometry Q1h, Chest PT.   Ambulate 4x daily as tolerated and with PT.   Patient to f/u with his cardiologist Dr. Ceasar Rainey at [facility] after d/c  disposition: Home with PT Thursday 2/17

## 2022-02-16 NOTE — PROGRESS NOTE ADULT - SUBJECTIVE AND OBJECTIVE BOX
HISTORY OF PRESENT ILLNESS: HPI:  76 yr old male with hx of atrial fibrillation, hypertension, PVD, Diabetes  Mellitus. Had CVA work up "clot" had cerebral angio.   MD recommend surgery for atrial fib.  Scheduled for laparoscopic robotic left atrial appendage ligation.     **A fib Eliquis last dose 2/10 am  pt/family instructed to stop.    **COVID  denies foreign travel  denies s/s   denies  known exposure vaccine Pfizer 2/1/2021, 3/12/2021 and booster September 2021   [facility]  (11 Feb 2022 11:50)    Patient seen in hospital last Fall, after acute CVA.  He has longstanding AFib, and had not been taking apixaban as ordered due to recurrent GI bleeding from ulcerative colitis.  He has refused Watchman in the past, and was ultimately referred to Dr Cannon after stroke recovery, for VATS-->LA appendage ligation.  The procedure went well and the patient feels well today. No angina or palpitations, no shortness of breath.  He is being transferred out of CTU to stepdown today.  He follows with [facility], and our group was consulted for continuity of care on that admission.  He will continue to follow with [facility] on discharge.    Date of service 2/16- doing well in stepdown unit, chest tube out.  awaiting discharge plan. no orthopnea, no palpitations.    acetaminophen     Tablet .. 650 milliGRAM(s) Oral every 6 hours PRN  aspirin enteric coated 81 milliGRAM(s) Oral daily  atorvastatin 80 milliGRAM(s) Oral at bedtime  chlorhexidine 2% Cloths 1 Application(s) Topical <User Schedule>  dextrose 50% Injectable 50 milliLiter(s) IV Push every 15 minutes  dextrose 50% Injectable 25 milliLiter(s) IV Push every 15 minutes  enoxaparin Injectable 40 milliGRAM(s) SubCutaneous daily  famotidine    Tablet 20 milliGRAM(s) Oral daily  mesalamine DR (24-Hour) Tablet 4.8 Gram(s) Oral daily  metoprolol tartrate 25 milliGRAM(s) Oral every 12 hours  ondansetron Injectable 4 milliGRAM(s) IV Push once PRN  oxyCODONE    IR 5 milliGRAM(s) Oral every 6 hours PRN  polyethylene glycol 3350 17 Gram(s) Oral daily  potassium chloride    Tablet ER 40 milliEquivalent(s) Oral once  sodium chloride 0.9% lock flush 3 milliLiter(s) IV Push every 8 hours  sodium chloride 0.9%. 1000 milliLiter(s) IV Continuous <Continuous>  thiamine 100 milliGRAM(s) Oral daily                            14.1   11.55 )-----------( 185      ( 16 Feb 2022 06:24 )             44.1       02-16    135  |  101  |  26<H>  ----------------------------<  121<H>  3.7   |  22  |  1.02    Ca    9.7      16 Feb 2022 06:23  Phos  3.2     02-15  Mg     2.0     02-15    TPro  7.1  /  Alb  3.9  /  TBili  1.0  /  DBili  x   /  AST  45<H>  /  ALT  22  /  AlkPhos  127<H>  02-16      T(C): 36.7 (02-16-22 @ 04:02), Max: 36.7 (02-15-22 @ 19:29)  HR: 89 (02-16-22 @ 04:02) (80 - 89)  BP: 110/65 (02-16-22 @ 04:02) (100/62 - 110/71)  RR: 18 (02-16-22 @ 04:02) (18 - 18)  SpO2: 97% (02-16-22 @ 04:02) (97% - 100%)  Wt(kg): --    I&O's Summary    15 Feb 2022 07:01  -  16 Feb 2022 07:00  --------------------------------------------------------  IN: 670 mL / OUT: 390 mL / NET: 280 mL    16 Feb 2022 07:01  -  16 Feb 2022 12:44  --------------------------------------------------------  IN: 0 mL / OUT: 200 mL / NET: -200 mL    Appearance: Normal appearing adult man in no acute distress, cooperative	  HEENT:   Normal oral mucosa, PERRL, EOMI	  Lymphatic: No lymphadenopathy , no edema  Cardiovascular: irregular rapid S1 S2, No JVD, No murmurs , Peripheral pulses palpable 2+ bilaterally  Respiratory: Lungs clear to auscultation, normal effort 	  Gastrointestinal:  Soft, Non-tender, + BS	  Skin: No rashes, No ecchymoses, No cyanosis, warm to touch  Musculoskeletal: Normal range of motion, normal strength  Psychiatry:  Mood & affect appropriate    TELEMETRY: AFib 80s      ASSESSMENT/PLAN: 	76y Male with GI bleeding due to UC, chronic AFib, recent CVA.  He was admitted for an elective video-assisted surgery to ligate his LA appendage.    Recovered well psot op.  It is appropriate to stop apixaban.  He is well protected by the Atricure clip.  He is not a good candidate for AFib ablation or rhythm control drugs, as he is asymptomatic from his chronic AFib.  Followup with [facility] as planned.    Josiah Monroe M.D.  Cardiac Electrophysiology    office [phone]  pager [phone]

## 2022-02-16 NOTE — PROGRESS NOTE ADULT - SUBJECTIVE AND OBJECTIVE BOX
C A R D I O L O G Y  **********************************     DATE OF SERVICE: 02-16-22    Agitated overnight requiring haldol on 1:1, currently calm but still confused (A&Ox1 - self), no reported chest pain or SOB. Limited ROS.   	  MEDICATIONS:  MEDICATIONS  (STANDING):  aspirin enteric coated 81 milliGRAM(s) Oral daily  atorvastatin 80 milliGRAM(s) Oral at bedtime  chlorhexidine 2% Cloths 1 Application(s) Topical <User Schedule>  dextrose 50% Injectable 50 milliLiter(s) IV Push every 15 minutes  dextrose 50% Injectable 25 milliLiter(s) IV Push every 15 minutes  enoxaparin Injectable 40 milliGRAM(s) SubCutaneous daily  famotidine    Tablet 20 milliGRAM(s) Oral daily  mesalamine DR (24-Hour) Tablet 4.8 Gram(s) Oral daily  metoprolol tartrate 25 milliGRAM(s) Oral every 12 hours  polyethylene glycol 3350 17 Gram(s) Oral daily  sodium chloride 0.9% lock flush 3 milliLiter(s) IV Push every 8 hours  sodium chloride 0.9%. 1000 milliLiter(s) (10 mL/Hr) IV Continuous <Continuous>  thiamine 100 milliGRAM(s) Oral daily      LABS:	 	    CARDIAC MARKERS:                                14.1   11.55 )-----------( 185      ( 16 Feb 2022 06:24 )             44.1     Hemoglobin: 14.1 g/dL (02-16 @ 06:24)  Hemoglobin: 12.8 g/dL (02-15 @ 00:29)  Hemoglobin: 12.7 g/dL (02-14 @ 16:34)  Hemoglobin: 16.2 g/dL (02-11 @ 13:07)      02-16    135  |  101  |  26<H>  ----------------------------<  121<H>  3.7   |  22  |  1.02    Ca    9.7      16 Feb 2022 06:23  Phos  3.2     02-15  Mg     2.0     02-15    TPro  7.1  /  Alb  3.9  /  TBili  1.0  /  DBili  x   /  AST  45<H>  /  ALT  22  /  AlkPhos  127<H>  02-16    Creatinine Trend: 1.02<--, 1.00<--, 0.97<--, 1.79<--    COAGS:       proBNP:   Lipid Profile:   HgA1c:   TSH:       PHYSICAL EXAM:  T(C): 36.7 (02-16-22 @ 04:02), Max: 36.7 (02-15-22 @ 19:29)  HR: 89 (02-16-22 @ 04:02) (80 - 89)  BP: 110/65 (02-16-22 @ 04:02) (100/62 - 110/71)  RR: 18 (02-16-22 @ 04:02) (18 - 18)  SpO2: 97% (02-16-22 @ 04:02) (97% - 100%)  Wt(kg): --  I&O's Summary    15 Feb 2022 07:01  -  16 Feb 2022 07:00  --------------------------------------------------------  IN: 670 mL / OUT: 390 mL / NET: 280 mL    16 Feb 2022 07:01  -  16 Feb 2022 12:13  --------------------------------------------------------  IN: 0 mL / OUT: 200 mL / NET: -200 mL      Gen: NAD  HEENT:  (-)icterus (-)pallor  CV: N S1 S2 1/6 GREG (+)2 Pulses B/l  Resp:  Clear to auscultation B/L, normal effort  GI: (+) BS Soft, NT, ND  Lymph:  (-)Edema, (-)obvious lymphadenopathy  Skin: Warm to touch, Normal turgor  Psych: Appropriate mood and affect      TELEMETRY: AF , 3 sec pause overnight	      ASSESSMENT/PLAN: Patient is a 77 y/o male with PMH of HTN, Ulcerative Colitis, chronic Afib, hx of meningioma (s/p left frontal craniotomy in 2017 and s/p radiation), Hx of seizure disorder, DM2, and CVA s/p TPA and thrombectomy. Patient now admitted s/p robotic MARYAM clip. Cardiology consulted for management of afib.    - No evidence of clinical HF or anginal symptoms  - EP follow up - can stop Eliquis  - Rate control with metoprolol 25mg BID  - CTS follow up  - AMS workup per primary team - consider CT Head if persists  - Patient to f/u with his cardiologist Dr. Ceasar Rainey at [facility] after d/c    Kiet Rodriguez PA-C  Pager: [phone]

## 2022-02-16 NOTE — PROGRESS NOTE ADULT - SUBJECTIVE AND OBJECTIVE BOX
Subjective: Pt states "Hello" denies any CP or SOB. No acute events overnight.     Telemetry:  Afib 70 - 100  Vital Signs Last 24 Hrs  T(C): 36.7 (02-16-22 @ 04:02), Max: 36.7 (02-15-22 @ 19:29)  T(F): 98 (02-16-22 @ 04:02), Max: 98 (02-15-22 @ 19:29)  HR: 89 (02-16-22 @ 04:02) (80 - 89)  BP: 110/65 (02-16-22 @ 04:02) (100/62 - 110/71)  RR: 18 (02-16-22 @ 04:02) (18 - 18)  SpO2: 97% (02-16-22 @ 04:02) (97% - 100%)             02-15 @ 07:01  -  02-16 @ 07:00  --------------------------------------------------------  IN: 670 mL / OUT: 390 mL / NET: 280 mL                            14.1   11.55 )-----------( 185      ( 16 Feb 2022 06:24 )             44.1     135  |  101  |  26<H>  ----------------------------<  121<H>  3.7   |  22  |  1.02    AST  45<H>  /  ALT  22  /  AlkPhos  127<H>  02-16        CAPILLARY BLOOD GLUCOSE  133 - 189        PHYSICAL EXAM  Neurology: A&Ox3, NAD  CV : RRR+S1S2  Lungs: Respirations non-labored, B/L BS CTA      Abdomen: Soft, NT/ND, +BSx4Q, last BM 2/15  (-)N/V/D  : Voiding without difficulty  Extremities: B/L LE no edema, negative calf tenderness, +PP       MEDICATIONS  acetaminophen     Tablet .. 650 milliGRAM(s) Oral every 6 hours PRN  aspirin enteric coated 81 milliGRAM(s) Oral daily  atorvastatin 80 milliGRAM(s) Oral at bedtime  chlorhexidine 2% Cloths 1 Application(s) Topical <User Schedule>  enoxaparin Injectable 40 milliGRAM(s) SubCutaneous daily  famotidine    Tablet 20 milliGRAM(s) Oral daily  mesalamine DR (24-Hour) Tablet 4.8 Gram(s) Oral daily  metoprolol tartrate 25 milliGRAM(s) Oral every 12 hours  ondansetron Injectable 4 milliGRAM(s) IV Push once PRN  oxyCODONE    IR 5 milliGRAM(s) Oral every 6 hours PRN  polyethylene glycol 3350 17 Gram(s) Oral daily  potassium chloride    Tablet ER 40 milliEquivalent(s) Oral once  sodium chloride 0.9% lock flush 3 milliLiter(s) IV Push every 8 hours  sodium chloride 0.9%. 1000 milliLiter(s) IV Continuous <Continuous>  thiamine 100 milliGRAM(s) Oral daily      Physical Therapy Rec:   Home  [  ]   Home w/ PT  [ X ]  Rehab  [  ]    Discussed with Cardiothoracic Team at AM rounds.

## 2022-02-16 NOTE — PROGRESS NOTE ADULT - ATTENDING COMMENTS
Patient seen and examined.  Agree with above.   No clinical heart failure or anginal symptoms  AMS workup per primary team   Follow up CTS    Charbel Washburn MD

## 2022-02-16 NOTE — PROGRESS NOTE ADULT - ASSESSMENT
This is a  76 yr old male with hx of atrial fibrillation, hypertension, PVD, Diabetes  Mellitus. Underwent  CVA work up "clot" had cerebral angio.   MD recommend surgery for atrial fib.  2/14  Excision of left atrial appendage, percutaneous approach  Left Atrial Appendage Clip via Robotic Assist   recovered in CTU  2/15 VSS  overnight temps  37.9 /38.2  candida d/c this am DTV Transferred to [address] [place] [address] seen by Dr Monroe -It is appropriate to stop apixaban.  He is well protected by the Atricure clip.- disposition to home with PT  later this week  2/16 VSS, A&Ox3 this AM, NAD, 1:1 dc'd, continue safety checks. Continue metoprolol 25 bid per cardiology for rate control and d/c Eliquis.

## 2022-02-16 NOTE — DISCHARGE NOTE NURSING/CASE MANAGEMENT/SOCIAL WORK - NSDCPEFALRISK_GEN_ALL_CORE
For information on Fall & Injury Prevention, visit: [email or web address] OR  [email or web address] OR  https://www.cdc.gov/steadi/patient.html

## 2022-02-16 NOTE — DISCHARGE NOTE NURSING/CASE MANAGEMENT/SOCIAL WORK - PATIENT PORTAL LINK FT
You can access the FollowMyHealth Patient Portal offered by [facility] by registering at the following website: [email or web address]. By joining [employer]’s FollowMyHealth portal, you will also be able to view your health information using other applications (apps) compatible with our system.

## 2022-02-17 ENCOUNTER — TRANSCRIPTION ENCOUNTER (OUTPATIENT)
Age: 77
End: 2022-02-17

## 2022-02-17 VITALS
DIASTOLIC BLOOD PRESSURE: 74 MMHG | TEMPERATURE: 98 F | RESPIRATION RATE: 18 BRPM | SYSTOLIC BLOOD PRESSURE: 118 MMHG | OXYGEN SATURATION: 98 % | HEART RATE: 88 BPM

## 2022-02-17 DIAGNOSIS — Z98.890 OTHER SPECIFIED POSTPROCEDURAL STATES: ICD-10-CM

## 2022-02-17 LAB
ANION GAP SERPL CALC-SCNC: 12 MMOL/L — SIGNIFICANT CHANGE UP (ref 5–17)
BUN SERPL-MCNC: 25 MG/DL — HIGH (ref 7–23)
CALCIUM SERPL-MCNC: 9.1 MG/DL — SIGNIFICANT CHANGE UP (ref 8.4–10.5)
CHLORIDE SERPL-SCNC: 106 MMOL/L — SIGNIFICANT CHANGE UP (ref 96–108)
CO2 SERPL-SCNC: 19 MMOL/L — LOW (ref 22–31)
CREAT SERPL-MCNC: 0.91 MG/DL — SIGNIFICANT CHANGE UP (ref 0.5–1.3)
GLUCOSE SERPL-MCNC: 119 MG/DL — HIGH (ref 70–99)
HCT VFR BLD CALC: 37.6 % — LOW (ref 39–50)
HGB BLD-MCNC: 12.1 G/DL — LOW (ref 13–17)
MCHC RBC-ENTMCNC: 28.7 PG — SIGNIFICANT CHANGE UP (ref 27–34)
MCHC RBC-ENTMCNC: 32.2 GM/DL — SIGNIFICANT CHANGE UP (ref 32–36)
MCV RBC AUTO: 89.3 FL — SIGNIFICANT CHANGE UP (ref 80–100)
NRBC # BLD: 0 /100 WBCS — SIGNIFICANT CHANGE UP (ref 0–0)
PLATELET # BLD AUTO: 159 K/UL — SIGNIFICANT CHANGE UP (ref 150–400)
POTASSIUM SERPL-MCNC: 3.9 MMOL/L — SIGNIFICANT CHANGE UP (ref 3.5–5.3)
POTASSIUM SERPL-SCNC: 3.9 MMOL/L — SIGNIFICANT CHANGE UP (ref 3.5–5.3)
RBC # BLD: 4.21 M/UL — SIGNIFICANT CHANGE UP (ref 4.2–5.8)
RBC # FLD: 14 % — SIGNIFICANT CHANGE UP (ref 10.3–14.5)
SODIUM SERPL-SCNC: 137 MMOL/L — SIGNIFICANT CHANGE UP (ref 135–145)
WBC # BLD: 8 K/UL — SIGNIFICANT CHANGE UP (ref 3.8–10.5)
WBC # FLD AUTO: 8 K/UL — SIGNIFICANT CHANGE UP (ref 3.8–10.5)

## 2022-02-17 PROCEDURE — 97162 PT EVAL MOD COMPLEX 30 MIN: CPT

## 2022-02-17 PROCEDURE — 82435 ASSAY OF BLOOD CHLORIDE: CPT

## 2022-02-17 PROCEDURE — 71045 X-RAY EXAM CHEST 1 VIEW: CPT | Mod: 26

## 2022-02-17 PROCEDURE — 82803 BLOOD GASES ANY COMBINATION: CPT

## 2022-02-17 PROCEDURE — 85730 THROMBOPLASTIN TIME PARTIAL: CPT

## 2022-02-17 PROCEDURE — 93010 ELECTROCARDIOGRAM REPORT: CPT

## 2022-02-17 PROCEDURE — 80048 BASIC METABOLIC PNL TOTAL CA: CPT

## 2022-02-17 PROCEDURE — 84132 ASSAY OF SERUM POTASSIUM: CPT

## 2022-02-17 PROCEDURE — 82330 ASSAY OF CALCIUM: CPT

## 2022-02-17 PROCEDURE — 85018 HEMOGLOBIN: CPT

## 2022-02-17 PROCEDURE — C1729: CPT

## 2022-02-17 PROCEDURE — 80053 COMPREHEN METABOLIC PANEL: CPT

## 2022-02-17 PROCEDURE — 85610 PROTHROMBIN TIME: CPT

## 2022-02-17 PROCEDURE — 82962 GLUCOSE BLOOD TEST: CPT

## 2022-02-17 PROCEDURE — 97530 THERAPEUTIC ACTIVITIES: CPT

## 2022-02-17 PROCEDURE — 82947 ASSAY GLUCOSE BLOOD QUANT: CPT

## 2022-02-17 PROCEDURE — 85025 COMPLETE CBC W/AUTO DIFF WBC: CPT

## 2022-02-17 PROCEDURE — 86923 COMPATIBILITY TEST ELECTRIC: CPT

## 2022-02-17 PROCEDURE — 85027 COMPLETE CBC AUTOMATED: CPT

## 2022-02-17 PROCEDURE — C1751: CPT

## 2022-02-17 PROCEDURE — 83605 ASSAY OF LACTIC ACID: CPT

## 2022-02-17 PROCEDURE — 71045 X-RAY EXAM CHEST 1 VIEW: CPT

## 2022-02-17 PROCEDURE — C1769: CPT

## 2022-02-17 PROCEDURE — 83735 ASSAY OF MAGNESIUM: CPT

## 2022-02-17 PROCEDURE — C9399: CPT

## 2022-02-17 PROCEDURE — 85014 HEMATOCRIT: CPT

## 2022-02-17 PROCEDURE — 97116 GAIT TRAINING THERAPY: CPT

## 2022-02-17 PROCEDURE — 85384 FIBRINOGEN ACTIVITY: CPT

## 2022-02-17 PROCEDURE — C1889: CPT

## 2022-02-17 PROCEDURE — 99238 HOSP IP/OBS DSCHRG MGMT 30/<: CPT | Mod: 24

## 2022-02-17 PROCEDURE — 93005 ELECTROCARDIOGRAM TRACING: CPT

## 2022-02-17 PROCEDURE — S2900: CPT

## 2022-02-17 PROCEDURE — 84100 ASSAY OF PHOSPHORUS: CPT

## 2022-02-17 PROCEDURE — 84295 ASSAY OF SERUM SODIUM: CPT

## 2022-02-17 RX ORDER — ACETAMINOPHEN 500 MG
2 TABLET ORAL
Qty: 0 | Refills: 0 | DISCHARGE
Start: 2022-02-17

## 2022-02-17 RX ORDER — OXYCODONE HYDROCHLORIDE 5 MG/1
1 TABLET ORAL
Qty: 20 | Refills: 0
Start: 2022-02-17 | End: 2022-02-21

## 2022-02-17 RX ORDER — METOPROLOL TARTRATE 50 MG
1 TABLET ORAL
Qty: 0 | Refills: 0 | DISCHARGE

## 2022-02-17 RX ORDER — ASPIRIN/CALCIUM CARB/MAGNESIUM 324 MG
1 TABLET ORAL
Qty: 30 | Refills: 0
Start: 2022-02-17 | End: 2022-03-18

## 2022-02-17 RX ORDER — BUMETANIDE 0.25 MG/ML
1 INJECTION INTRAMUSCULAR; INTRAVENOUS
Qty: 0 | Refills: 0 | DISCHARGE

## 2022-02-17 RX ORDER — THIAMINE MONONITRATE (VIT B1) 100 MG
1 TABLET ORAL
Qty: 30 | Refills: 0
Start: 2022-02-17 | End: 2022-03-18

## 2022-02-17 RX ORDER — APIXABAN 2.5 MG/1
1 TABLET, FILM COATED ORAL
Qty: 0 | Refills: 0 | DISCHARGE

## 2022-02-17 RX ORDER — ATORVASTATIN CALCIUM 80 MG/1
1 TABLET, FILM COATED ORAL
Qty: 0 | Refills: 0 | DISCHARGE

## 2022-02-17 RX ORDER — ATORVASTATIN CALCIUM 80 MG/1
1 TABLET, FILM COATED ORAL
Qty: 30 | Refills: 0
Start: 2022-02-17 | End: 2022-03-18

## 2022-02-17 RX ORDER — METOPROLOL TARTRATE 50 MG
1 TABLET ORAL
Qty: 0 | Refills: 0 | DISCHARGE
Start: 2022-02-17

## 2022-02-17 RX ORDER — FAMOTIDINE 10 MG/ML
1 INJECTION INTRAVENOUS
Qty: 30 | Refills: 0
Start: 2022-02-17 | End: 2022-03-18

## 2022-02-17 RX ADMIN — CHLORHEXIDINE GLUCONATE 1 APPLICATION(S): 213 SOLUTION TOPICAL at 11:09

## 2022-02-17 RX ADMIN — FAMOTIDINE 20 MILLIGRAM(S): 10 INJECTION INTRAVENOUS at 11:10

## 2022-02-17 RX ADMIN — Medication 4.8 GRAM(S): at 11:10

## 2022-02-17 RX ADMIN — SODIUM CHLORIDE 3 MILLILITER(S): 9 INJECTION INTRAMUSCULAR; INTRAVENOUS; SUBCUTANEOUS at 04:44

## 2022-02-17 RX ADMIN — Medication 100 MILLIGRAM(S): at 11:10

## 2022-02-17 RX ADMIN — Medication 25 MILLIGRAM(S): at 05:02

## 2022-02-17 RX ADMIN — Medication 81 MILLIGRAM(S): at 11:10

## 2022-02-17 NOTE — DISCHARGE NOTE PROVIDER - NSDCACTIVITY_GEN_ALL_CORE
Sex allowed/Showering allowed/Stairs allowed/Driving allowed/Walking - Indoors allowed/Walking - Outdoors allowed/Follow Instructions Provided by your Surgical Team

## 2022-02-17 NOTE — DISCHARGE NOTE PROVIDER - NSDCFUADDAPPT_GEN_ALL_CORE_FT
follow up appt with Dr. Nadir Cannon in 2 weeks  follow up appt with Dr. Jara, Cardiologist in 3-4 weeks

## 2022-02-17 NOTE — PROGRESS NOTE ADULT - SUBJECTIVE AND OBJECTIVE BOX
HISTORY OF PRESENT ILLNESS: HPI:  76 yr old male with hx of atrial fibrillation, hypertension, PVD, Diabetes  Mellitus. Had CVA work up "clot" had cerebral angio.   MD recommend surgery for atrial fib.  Scheduled for laparoscopic robotic left atrial appendage ligation.     **A fib Eliquis last dose 2/10 am  pt/family instructed to stop.    **COVID  denies foreign travel  denies s/s   denies  known exposure vaccine Pfizer 2/1/2021, 3/12/2021 and booster September 2021   swab 2/11 [facility]  (11 Feb 2022 11:50)    Patient seen in hospital last Fall, after acute CVA.  He has longstanding AFib, and had not been taking apixaban as ordered due to recurrent GI bleeding from ulcerative colitis.  He has refused Watchman in the past, and was ultimately referred to Dr Cannon after stroke recovery, for VATS-->LA appendage ligation.  The procedure went well and the patient feels well today. No angina or palpitations, no shortness of breath.  He is being transferred out of CTU to stepdown today.  He follows with [facility], and our group was consulted for continuity of care on that admission.  He will continue to follow with [facility] on discharge.    2/16- doing well in stepdown unit, chest tube out.  awaiting discharge plan. no orthopnea, no palpitations.  Date of service 2/17- resting comfortably. awaiting dc.    acetaminophen     Tablet .. 650 milliGRAM(s) Oral every 6 hours PRN  aspirin enteric coated 81 milliGRAM(s) Oral daily  atorvastatin 80 milliGRAM(s) Oral at bedtime  chlorhexidine 2% Cloths 1 Application(s) Topical <User Schedule>  dextrose 50% Injectable 50 milliLiter(s) IV Push every 15 minutes  dextrose 50% Injectable 25 milliLiter(s) IV Push every 15 minutes  enoxaparin Injectable 40 milliGRAM(s) SubCutaneous daily  famotidine    Tablet 20 milliGRAM(s) Oral daily  mesalamine DR (24-Hour) Tablet 4.8 Gram(s) Oral daily  metoprolol tartrate 25 milliGRAM(s) Oral every 12 hours  ondansetron Injectable 4 milliGRAM(s) IV Push once PRN  oxyCODONE    IR 5 milliGRAM(s) Oral every 6 hours PRN  polyethylene glycol 3350 17 Gram(s) Oral daily  sodium chloride 0.9% lock flush 3 milliLiter(s) IV Push every 8 hours  sodium chloride 0.9%. 1000 milliLiter(s) IV Continuous <Continuous>  thiamine 100 milliGRAM(s) Oral daily                            12.1   8.00  )-----------( 159      ( 17 Feb 2022 05:28 )             37.6       02-17    137  |  106  |  25<H>  ----------------------------<  119<H>  3.9   |  19<L>  |  0.91    Ca    9.1      17 Feb 2022 05:28    TPro  7.1  /  Alb  3.9  /  TBili  1.0  /  DBili  x   /  AST  45<H>  /  ALT  22  /  AlkPhos  127<H>  02-16      T(C): 36.5 (02-17-22 @ 04:08), Max: 36.9 (02-16-22 @ 20:00)  HR: 81 (02-17-22 @ 04:08) (81 - 111)  BP: 116/77 (02-17-22 @ 04:08) (109/64 - 133/82)  RR: 18 (02-17-22 @ 04:08) (18 - 18)  SpO2: 97% (02-17-22 @ 04:08) (95% - 97%)  Wt(kg): --    I&O's Summary    16 Feb 2022 07:01  -  17 Feb 2022 07:00  --------------------------------------------------------  IN: 730 mL / OUT: 200 mL / NET: 530 mL    17 Feb 2022 07:01  -  17 Feb 2022 10:02  --------------------------------------------------------  IN: 240 mL / OUT: 0 mL / NET: 240 mL    Appearance: Normal appearing adult man in no acute distress, cooperative	  HEENT:   Normal oral mucosa, PERRL, EOMI	  Lymphatic: No lymphadenopathy , no edema  Cardiovascular: irregular rapid S1 S2, No JVD, No murmurs , Peripheral pulses palpable 2+ bilaterally  Respiratory: Lungs clear to auscultation, normal effort 	  Gastrointestinal:  Soft, Non-tender, + BS	  Skin: No rashes, No ecchymoses, No cyanosis, warm to touch  Musculoskeletal: Normal range of motion, normal strength  Psychiatry:  Mood & affect appropriate    TELEMETRY: AFib 80s      ASSESSMENT/PLAN: 	76y Male with GI bleeding due to UC, chronic AFib, recent CVA.  He was admitted for an elective video-assisted surgery to ligate his LA appendage.    Recovered well psot op.  It is appropriate to stop apixaban.  He is well protected by the Atricure clip.  He is not a good candidate for AFib ablation or rhythm control drugs, as he is asymptomatic from his chronic AFib.  Followup with [facility] as planned.    Josiah Monroe M.D.  Cardiac Electrophysiology    office [phone]  pager [phone]

## 2022-02-17 NOTE — DISCHARGE NOTE PROVIDER - CARE PROVIDER_API CALL
Nadir Cannon)  Surgery; Thoracic Surgery  [address] [place] [address]  Phone: [phone]  Fax: [phone]  Follow Up Time:     Ervin Jara)  Cardiovascular Disease  [address]  [place] [address]  Phone: [phone]  Fax: [phone]  Follow Up Time:

## 2022-02-17 NOTE — DISCHARGE NOTE PROVIDER - CARE PROVIDERS DIRECT ADDRESSES
[email or web address]

## 2022-02-17 NOTE — PROGRESS NOTE ADULT - SUBJECTIVE AND OBJECTIVE BOX
C A R D I O L O G Y  **********************************     DATE OF SERVICE: 02-17-22    Mental status much improved, back to baseline (A&Ox3). Denies chest pain, SOB, or palpitations. Review of systems otherwise negative  	    MEDICATIONS  (STANDING):  aspirin enteric coated 81 milliGRAM(s) Oral daily  atorvastatin 80 milliGRAM(s) Oral at bedtime  chlorhexidine 2% Cloths 1 Application(s) Topical <User Schedule>  dextrose 50% Injectable 50 milliLiter(s) IV Push every 15 minutes  dextrose 50% Injectable 25 milliLiter(s) IV Push every 15 minutes  enoxaparin Injectable 40 milliGRAM(s) SubCutaneous daily  famotidine    Tablet 20 milliGRAM(s) Oral daily  mesalamine DR (24-Hour) Tablet 4.8 Gram(s) Oral daily  metoprolol tartrate 25 milliGRAM(s) Oral every 12 hours  polyethylene glycol 3350 17 Gram(s) Oral daily  sodium chloride 0.9% lock flush 3 milliLiter(s) IV Push every 8 hours  sodium chloride 0.9%. 1000 milliLiter(s) (10 mL/Hr) IV Continuous <Continuous>  thiamine 100 milliGRAM(s) Oral daily    MEDICATIONS  (PRN):  acetaminophen     Tablet .. 650 milliGRAM(s) Oral every 6 hours PRN Temp greater or equal to 38.5C (101.3F), Mild Pain (1 - 3)  ondansetron Injectable 4 milliGRAM(s) IV Push once PRN Nausea and/or Vomiting  oxyCODONE    IR 5 milliGRAM(s) Oral every 6 hours PRN Moderate Pain (4 - 6)      LABS:                          12.1   8.00  )-----------( 159      ( 17 Feb 2022 05:28 )             37.6     Hemoglobin: 12.1 g/dL (02-17 @ 05:28)  Hemoglobin: 14.1 g/dL (02-16 @ 06:24)  Hemoglobin: 12.8 g/dL (02-15 @ 00:29)  Hemoglobin: 12.7 g/dL (02-14 @ 16:34)    02-17    137  |  106  |  25<H>  ----------------------------<  119<H>  3.9   |  19<L>  |  0.91    Ca    9.1      17 Feb 2022 05:28    TPro  7.1  /  Alb  3.9  /  TBili  1.0  /  DBili  x   /  AST  45<H>  /  ALT  22  /  AlkPhos  127<H>  02-16    Creatinine Trend: 0.91<--, 1.02<--, 1.00<--, 0.97<--, 1.79<--             02-16-22 @ 07:01  -  02-17-22 @ 07:00  --------------------------------------------------------  IN: 730 mL / OUT: 200 mL / NET: 530 mL    02-17-22 @ 07:01  -  02-17-22 @ 14:04  --------------------------------------------------------  IN: 240 mL / OUT: 0 mL / NET: 240 mL        PHYSICAL EXAM  Vital Signs Last 24 Hrs  T(C): 36.5 (17 Feb 2022 12:16), Max: 36.9 (16 Feb 2022 20:00)  T(F): 97.7 (17 Feb 2022 12:16), Max: 98.4 (16 Feb 2022 20:00)  HR: 88 (17 Feb 2022 12:16) (81 - 111)  BP: 118/74 (17 Feb 2022 12:16) (116/77 - 133/82)  BP(mean): --  RR: 18 (17 Feb 2022 12:16) (18 - 18)  SpO2: 98% (17 Feb 2022 12:16) (95% - 98%)      Gen: NAD  HEENT:  (-)icterus (-)pallor  CV: N S1 S2 1/6 GREG (+)2 Pulses B/l  Resp:  Clear to auscultation B/L, normal effort  GI: (+) BS Soft, NT, ND  Lymph:  (-)Edema, (-)obvious lymphadenopathy  Skin: Warm to touch, Normal turgor  Psych: Appropriate mood and affect      TELEMETRY: AF 60-90s    ASSESSMENT/PLAN: Patient is a 75 y/o male with PMH of HTN, Ulcerative Colitis, chronic Afib, hx of meningioma (s/p left frontal craniotomy in 2017 and s/p radiation), Hx of seizure disorder, DM2, and CVA s/p TPA and thrombectomy. Patient now admitted s/p robotic MARYMA clip. Cardiology consulted for management of afib.    - Mental status much improved, back to baseline  - No evidence of clinical HF or anginal symptoms  - EP follow up - can stop Eliquis  - Rate control with metoprolol 25mg BID  - CTS follow up  - D/C planning per primary team  - Patient to f/u with his cardiologist Dr. Ceasar Rainey at [facility] after d/c    MONIQUE VelazquezC  Pager: [phone]

## 2022-02-17 NOTE — PROGRESS NOTE ADULT - PROVIDER SPECIALTY LIST ADULT
CT Surgery
Cardiology
Cardiology
Electrophysiology
CT Surgery
Critical Care
Critical Care
Electrophysiology

## 2022-02-17 NOTE — PROGRESS NOTE ADULT - ASSESSMENT
Agree with above assessment and plan as outlined above.    - d/c planning per medical team    Ervin Jara MD, [facility]  BEEPER [phone]

## 2022-02-17 NOTE — DISCHARGE NOTE PROVIDER - HOSPITAL COURSE
This is a  76 yr old male with hx of atrial fibrillation, hypertension, PVD, Diabetes  Mellitus. Underwent  CVA work up "clot" had cerebral angio.   MD recommend surgery for atrial fib.  2/14  Excision of left atrial appendage, percutaneous approach  Left Atrial Appendage Clip via Robotic Assist   recovered in CTU  2/15 VSS  overnight temps  37.9 /38.2  candida d/c this am DTV Transferred to [address] [place] [address] seen by Dr Monroe -It is appropriate to stop apixaban.  He is well protected by the Atricure clip.- disposition to home with PT  later this week  2/16 VSS, A&Ox3 this AM, NAD, 1:1 dc'd, continue safety checks. Continue metoprolol 25 bid per cardiology for rate control and d/c Eliquis.  2/17  VSS cleared for d/c home this am

## 2022-02-17 NOTE — DISCHARGE NOTE PROVIDER - NSDCCPCAREPLAN_GEN_ALL_CORE_FT
PRINCIPAL DISCHARGE DIAGNOSIS  Diagnosis: S/P left atrial appendage ligation  Assessment and Plan of Treatment:

## 2022-02-17 NOTE — DISCHARGE NOTE PROVIDER - NSDCPNSUBOBJ_GEN_ALL_CORE
PHYSICAL EXAM  Neurology: A&Ox3, NAD  CV : RRR+S1S2  Lungs: Respirations non-labored, B/L BS CTA      Abdomen: Soft, NT/ND, +BSx4Q, last BM 2/15  (-)N/V/D  : Voiding without difficulty  Extremities: B/L LE no edema, negative calf tenderness, +PP

## 2022-03-04 PROBLEM — M54.32 SCIATICA, LEFT SIDE: Chronic | Status: ACTIVE | Noted: 2022-02-11

## 2022-03-04 PROBLEM — I73.9 PERIPHERAL VASCULAR DISEASE, UNSPECIFIED: Chronic | Status: ACTIVE | Noted: 2022-02-11

## 2022-03-04 PROBLEM — I63.9 CEREBRAL INFARCTION, UNSPECIFIED: Chronic | Status: ACTIVE | Noted: 2022-02-11

## 2022-03-04 PROBLEM — I48.91 UNSPECIFIED ATRIAL FIBRILLATION: Chronic | Status: ACTIVE | Noted: 2020-02-27

## 2022-03-04 PROBLEM — R23.9 UNSPECIFIED SKIN CHANGES: Chronic | Status: ACTIVE | Noted: 2022-02-11

## 2022-03-04 PROBLEM — E11.9 TYPE 2 DIABETES MELLITUS WITHOUT COMPLICATIONS: Chronic | Status: ACTIVE | Noted: 2017-05-08

## 2022-03-04 PROBLEM — D32.9 BENIGN NEOPLASM OF MENINGES, UNSPECIFIED: Chronic | Status: ACTIVE | Noted: 2017-05-08

## 2022-03-10 RX ORDER — FAMOTIDINE 20 MG/1
20 TABLET, FILM COATED ORAL DAILY
Qty: 30 | Refills: 0 | Status: ACTIVE | COMMUNITY
Start: 2022-03-10

## 2022-03-10 RX ORDER — FENOFIBRATE 145 MG/1
145 TABLET, COATED ORAL DAILY
Refills: 0 | Status: COMPLETED | COMMUNITY
Start: 2021-11-16 | End: 2022-03-10

## 2022-03-10 RX ORDER — APIXABAN 5 MG/1
5 TABLET, FILM COATED ORAL
Qty: 30 | Refills: 0 | Status: COMPLETED | COMMUNITY
Start: 2021-11-16 | End: 2022-03-10

## 2022-03-10 RX ORDER — POTASSIUM CHLORIDE 1500 MG/1
20 TABLET, FILM COATED, EXTENDED RELEASE ORAL
Qty: 3 | Refills: 0 | Status: COMPLETED | COMMUNITY
Start: 2022-02-11 | End: 2022-03-10

## 2022-03-10 RX ORDER — METOPROLOL TARTRATE 25 MG/1
25 TABLET, FILM COATED ORAL TWICE DAILY
Qty: 60 | Refills: 0 | Status: ACTIVE | COMMUNITY
Start: 2022-03-10

## 2022-03-10 RX ORDER — MESALAMINE 1000 MG/1
1000 SUPPOSITORY RECTAL
Refills: 0 | Status: COMPLETED | COMMUNITY
End: 2022-03-10

## 2022-03-16 ENCOUNTER — APPOINTMENT (OUTPATIENT)
Dept: CARDIOTHORACIC SURGERY | Facility: CLINIC | Age: 77
End: 2022-03-16
Payer: MEDICARE

## 2022-03-16 VITALS
DIASTOLIC BLOOD PRESSURE: 80 MMHG | WEIGHT: 167 LBS | HEIGHT: 71 IN | TEMPERATURE: 98.1 F | SYSTOLIC BLOOD PRESSURE: 148 MMHG | BODY MASS INDEX: 23.38 KG/M2 | HEART RATE: 66 BPM | RESPIRATION RATE: 16 BRPM | OXYGEN SATURATION: 98 %

## 2022-03-16 DIAGNOSIS — Z09 ENCOUNTER FOR FOLLOW-UP EXAMINATION AFTER COMPLETED TREATMENT FOR CONDITIONS OTHER THAN MALIGNANT NEOPLASM: ICD-10-CM

## 2022-03-16 PROCEDURE — 99024 POSTOP FOLLOW-UP VISIT: CPT

## 2022-03-28 PROBLEM — Z09 POSTOP CHECK: Status: ACTIVE | Noted: 2022-03-16

## 2022-03-30 ENCOUNTER — APPOINTMENT (OUTPATIENT)
Dept: CARDIOTHORACIC SURGERY | Facility: CLINIC | Age: 77
End: 2022-03-30
Payer: MEDICARE

## 2022-03-30 DIAGNOSIS — Z09 ENCOUNTER FOR FOLLOW-UP EXAMINATION AFTER COMPLETED TREATMENT FOR CONDITIONS OTHER THAN MALIGNANT NEOPLASM: ICD-10-CM

## 2022-04-06 ENCOUNTER — APPOINTMENT (OUTPATIENT)
Dept: CARDIOTHORACIC SURGERY | Facility: CLINIC | Age: 77
End: 2022-04-06
Payer: MEDICARE

## 2022-04-06 VITALS
SYSTOLIC BLOOD PRESSURE: 150 MMHG | HEIGHT: 71 IN | WEIGHT: 167 LBS | DIASTOLIC BLOOD PRESSURE: 82 MMHG | BODY MASS INDEX: 23.38 KG/M2 | HEART RATE: 70 BPM | OXYGEN SATURATION: 99 % | TEMPERATURE: 98 F | RESPIRATION RATE: 16 BRPM

## 2022-04-06 DIAGNOSIS — Z01.818 ENCOUNTER FOR OTHER PREPROCEDURAL EXAMINATION: ICD-10-CM

## 2022-04-06 PROCEDURE — 99024 POSTOP FOLLOW-UP VISIT: CPT

## 2022-10-18 NOTE — PATIENT PROFILE ADULT. - NUTRITION PROFILE
"Chief Complaint  follow up on fall    Subjective          Lian Morel presents to [facility] PRIMARY [facility]  History of Present Illness  Patient in today for follow-up on a fall.  She states she tripped on a crack in the sidewalk over a week ago.  When had made the appointment, she was having discomfort to her right forearm as well as to her left foot.  She states those symptoms have actually improved over this last week.  Denies any loss of consciousness when she fell, but did hit the right side of her posterior head.  She states otherwise she has been doing well these past 3 months since her last visit here.  She denies any chest pain or shortness of breath.  No return of symptoms of urinary tract infection which she had been in the hospital for  3 months ago.      Objective   Vital Signs:   /78 (BP Location: Left arm, Patient Position: Sitting, Cuff Size: Large Adult)   Pulse 75   Ht 162.6 cm (64\")   Wt 73.5 kg (162 lb)   SpO2 98%   BMI 27.81 kg/m²     Body mass index is 27.81 kg/m².    Review of Systems   Constitutional: Negative for fever.   Respiratory: Negative for shortness of breath.    Cardiovascular: Negative for chest pain and leg swelling.   Musculoskeletal: Positive for arthralgias.   Skin: Positive for bruise.   Neurological: Negative for dizziness and headache.       Past History:  Medical History: has a past medical history of Appendicitis, BMI 31.0-31.9,adult, Cataracts, bilateral, Cholelithiasis, Chronic pain, Chronic pain of both knees, Diastolic dysfunction, GERD (gastroesophageal reflux disease), Hiatal hernia, Hyperglycemia, Left hip pain, Low back pain, Lumbar herniated disc, Mastoiditis, Other fatigue, Other malaise, Palpitations, Type 2 diabetes mellitus without complication, without long-term current use of insulin ([facility]), UTI (urinary tract infection), Viral upper respiratory tract infection, Vitamin B12 deficiency, and Vitamin D deficiency.   Surgical History: " has a past surgical history that includes Mastoid surgery (1993); Cholecystectomy (2005); Appendectomy (1989); and Total abdominal hysterectomy w/ bilateral salpingoophorectomy (1989).   Family History: family history includes Bone cancer in her maternal grandmother; Breast cancer in her mother; Hypertension in her maternal grandmother; Prostate cancer in an other family member.   Social History: reports that she has never smoked. She has never used smokeless tobacco. She reports that she does not drink alcohol and does not use drugs.      Current Outpatient Medications:   •  Cholecalciferol (VITAMIN D3 PO), Take  by mouth., Disp: , Rfl:   •  Cyanocobalamin (VITAMIN B-12 PO), Take  by mouth., Disp: , Rfl:   Allergies: Bee venom, Codeine, and Sulfanilamide    Physical Exam  Constitutional:       Appearance: Normal appearance.   HENT:      Right Ear: Tympanic membrane normal.      Left Ear: Tympanic membrane normal.      Nose: Nose normal.      Mouth/Throat:      Mouth: Mucous membranes are moist.   Eyes:      Conjunctiva/sclera: Conjunctivae normal.      Pupils: Pupils are equal, round, and reactive to light.   Abdominal:      Palpations: Abdomen is soft.      Tenderness: There is no abdominal tenderness.   Musculoskeletal:      Comments: FROM of left ankle and foot as well as toes; nontender to palpate these areas; Bruise noted to mid right forearm; minimal tenderness to palpate these areas ; no swelling noted; FROM of right elbow and wrist areas; no abnormality noted to right posterior scalp area   Skin:     Comments: Bruise noted to mid right forearm; bruise noted to left great toe.    Neurological:      Mental Status: She is alert and oriented to person, place, and time.      Cranial Nerves: Cranial nerves 2-12 are intact.      Coordination: Coordination is intact.      Gait: Gait is intact.   Psychiatric:         Mood and Affect: Mood normal.         Behavior: Behavior normal.             Assessment and Plan    Diagnoses and all orders for this visit:    1. Right arm pain (Primary)  Patient states discomfort to right arm has improved.  Offered to check an x-ray of this area since the fall, she declines today.  She states we will continue to monitor symptoms and return to clinic if she feels there is any progression of symptoms.  2. Left foot pain  She states left foot pain has also improved.  She has full range of motion of this area.  Declines doing any further evaluation today.  Will monitor closely and return to clinic if symptoms persist.          Follow Up   No follow-ups on file.  Patient was given instructions and counseling regarding her condition or for health maintenance advice. Please see specific information pulled into the AVS if appropriate.     Radha Lin PA-C   no indicators present

## 2022-11-28 NOTE — PHYSICAL EXAM
[de-identified] : fever [FreeTextEntry6] : \par Pt with onset fever last jennifer. Tm 104. Slight c/c\par  No IE [Person] : oriented to person [Place] : oriented to place [Time] : oriented to time [Concentration Intact] : normal concentrating ability [Visual Intact] : visual attention was ~T not ~L decreased [Naming Objects] : no difficulty naming common objects [Repeating Phrases] : no difficulty repeating a phrase [Writing A Sentence] : no difficulty writing a sentence [Fluency] : fluency intact [Comprehension] : comprehension intact [Reading] : reading intact [Past History] : adequate knowledge of personal past history [Cranial Nerves Optic (II)] : visual acuity intact bilaterally,  visual fields full to confrontation, pupils equal round and reactive to light [Cranial Nerves Oculomotor (III)] : extraocular motion intact [Cranial Nerves Trigeminal (V)] : facial sensation intact symmetrically [Cranial Nerves Facial (VII)] : face symmetrical [Cranial Nerves Vestibulocochlear (VIII)] : hearing was intact bilaterally [Cranial Nerves Glossopharyngeal (IX)] : tongue and palate midline [Cranial Nerves Accessory (XI - Cranial And Spinal)] : head turning and shoulder shrug symmetric [Cranial Nerves Hypoglossal (XII)] : there was no tongue deviation with protrusion [Motor Tone] : muscle tone was normal in all four extremities [No Muscle Atrophy] : normal bulk in all four extremities [Motor Strength Upper Extremities Right] : there was weakness of the right upper extremity [Motor Strength Lower Extremities Right] : there was weakness of the right lower extremity [Sensation Tactile Decrease] : light touch was intact [Balance] : balance was intact [2+] : Ankle jerk left 2+ [Abnormal Walk] : normal gait [Skin Color & Pigmentation] : normal skin color and pigmentation [Past-pointing] : there was no past-pointing [Tremor] : no tremor present [Plantar Reflex Right Only] : normal on the right [Plantar Reflex Left Only] : normal on the left [FreeTextEntry8] : right sided weakness [FreeTextEntry1] : arthritis of right knee

## 2022-12-08 NOTE — DATA REVIEWED
[FreeTextEntry1] : EXAM: PHYSIOL EXTREM LOW 3+ LEV BI \par \par \par PROCEDURE DATE: 03/02/2020 \par \par \par \par INTERPRETATION: History:Nonpalpable pulses. Prior smoker with hypertension \par and diabetes. Right lower extremity claudication \par \par Technique: Bilateral ALEX/PVR \par \par Comparison: None \par \par Findings: \par \par RIGHT \par ALEX: 1.26 \par Flow study: Good flow from the high thigh through the great toe. \par \par LEFT \par ALEX: 1.27 \par Flow study: Good flow from the high thigh through the great toe. \par \par \par Impression: \par \par Calcified lower extremity arteries \par \par No evidence of large vessel hemodynamically significant lower extremity \par arterial disease at rest. 
No

## 2022-12-28 ENCOUNTER — APPOINTMENT (OUTPATIENT)
Dept: NEUROLOGY | Facility: CLINIC | Age: 77
End: 2022-12-28

## 2022-12-28 VITALS
SYSTOLIC BLOOD PRESSURE: 144 MMHG | WEIGHT: 167 LBS | HEIGHT: 71 IN | DIASTOLIC BLOOD PRESSURE: 84 MMHG | BODY MASS INDEX: 23.38 KG/M2 | HEART RATE: 76 BPM

## 2022-12-28 DIAGNOSIS — I48.91 UNSPECIFIED ATRIAL FIBRILLATION: ICD-10-CM

## 2022-12-28 DIAGNOSIS — R29.898 OTHER SYMPTOMS AND SIGNS INVOLVING THE MUSCULOSKELETAL SYSTEM: ICD-10-CM

## 2022-12-28 PROCEDURE — 99215 OFFICE O/P EST HI 40 MIN: CPT

## 2022-12-28 NOTE — HISTORY OF PRESENT ILLNESS
[FreeTextEntry1] : 77 M who is here for initial consultation of seizures that led to the diagnosis of his meningioma. Had right focal seizure to his hand in Jan and feb of 2017. he had none since. He was initially on keppra and he weaned off in Oct of 2018 when he was with Dr. Cornelius, his previous neurologist. Patient informed me that Dr. Cornelius didn't give the referral notes quickly to his PMD and so the PMD decided to refer him elsewhere. After his visit, his notes from Dr. Quiñones shows that he was on keppra and lamictal. He is currently on none. He has been driving since coming off the keppra. \par \par He is s/p craniotomy and tumor resection 5/9/17 and followup MRI 7/30/18 shows high left parietal craniotomy. No residual enhancing neoplasm.  \par \par Interval history December 28, 2022: Since his last visit with me in 2019 patient had removal of his meningioma and he has since been on no antiseizure medications.  Patient had a stroke in October 2021 status post thrombectomy and patient also had a atrial appendage clip in April 2022.  Patient was on Eliquis for the time being between the clip placement for his atrial fibrillation.  Patient had a previous history of a GI bleed in 2019 or earlier which prompted his cardiologist to not restart the Eliquis.  Patient has had no recent GI follow-up.  Patient is currently on aspirin 81 mg and atorvastatin 80 mg for stroke prevention.  Patient's daughter wanted to see a neurologist because he has not improved in terms of his physical capabilities since his stroke and he has difficulty buttoning his shirt.

## 2022-12-28 NOTE — PHYSICAL EXAM
[General Appearance - Alert] : alert [Oriented To Time, Place, And Person] : oriented to person, place, and time [Person] : oriented to person [Place] : oriented to place [Time] : oriented to time [Short Term Intact] : short term memory impaired [Fluency] : fluency intact [Current Events] : inadequate knowledge of current events [Cranial Nerves Optic (II)] : visual acuity intact bilaterally,  visual fields full to confrontation, pupils equal round and reactive to light [Cranial Nerves Oculomotor (III)] : extraocular motion intact [Cranial Nerves Vestibulocochlear (VIII)] : hearing was intact bilaterally [Cranial Nerves Accessory (XI - Cranial And Spinal)] : head turning and shoulder shrug symmetric [Sensation Tactile Decrease] : light touch was intact [Coordination - Dysmetria Impaired Finger-to-Nose Bilateral] : not present [1+] : Patella right 1+ [0] : Patella left 0 [FreeTextEntry6] : Increased tone on the right.  Some mild weakness on the right side [FreeTextEntry8] : Walks slowly with walker.  Uses arms to stand up from seated position.

## 2022-12-28 NOTE — DISCUSSION/SUMMARY
[FreeTextEntry1] : 77-year-old gentleman who is here for follow-up after his meningioma removal.  Patient had a stroke status post thrombectomy in October 2021 with atrial appendage clip placement.  Patient has a history of GI bleed even before 2019 and A. fib may still be a risk factor.  We will have him see my stroke colleagues for further optimization of his risk factor management.  Patient will continue aspirin and atorvastatin for the time being.  Patient will also be sent for GI consultation to determine if he is still at risk for GI bleed if we decide to restart Eliquis for his atrial fibrillation.  Patient will be sent for physical therapy and Occupational Therapy as per daughter request.\par \par I spent the time noted on the day of this patient encounter preparing for, providing and documenting the above E/M service and counseling and educate patient on differential, workup, disease course, and treatment/management. Education was provided to the patient during this encounter. All questions and concerns were answered and addressed in detail. The patient verbalized understanding and agreed to plan. Patient was advised to continue to monitor for neurologic symptoms and to notify my office or go to the nearest emergency room if there are any changes. Any orders/referrals and communications were provided as well. \par Side effects of the above medications were discussed in detail including but not limited to applicable black box warning and teratogenicity as appropriate. \par Patient was advised to bring previous records to my office. \par \par \par

## 2022-12-28 NOTE — REASON FOR VISIT
[Consultation] : a consultation visit [Spouse] : spouse [Other: _____] : [unfilled] [FreeTextEntry1] : post stroke

## 2023-05-14 ENCOUNTER — EMERGENCY (EMERGENCY)
Facility: HOSPITAL | Age: 78
LOS: 1 days | Discharge: HOME | End: 2023-05-14
Attending: STUDENT IN AN ORGANIZED HEALTH CARE EDUCATION/TRAINING PROGRAM | Admitting: STUDENT IN AN ORGANIZED HEALTH CARE EDUCATION/TRAINING PROGRAM
Payer: COMMERCIAL

## 2023-05-14 VITALS
DIASTOLIC BLOOD PRESSURE: 81 MMHG | HEART RATE: 68 BPM | RESPIRATION RATE: 18 BRPM | OXYGEN SATURATION: 96 % | SYSTOLIC BLOOD PRESSURE: 125 MMHG

## 2023-05-14 VITALS
OXYGEN SATURATION: 96 % | DIASTOLIC BLOOD PRESSURE: 89 MMHG | HEIGHT: 71 IN | TEMPERATURE: 98 F | SYSTOLIC BLOOD PRESSURE: 140 MMHG | HEART RATE: 68 BPM | WEIGHT: 184.97 LBS | RESPIRATION RATE: 18 BRPM

## 2023-05-14 DIAGNOSIS — Z98.49 CATARACT EXTRACTION STATUS, UNSPECIFIED EYE: Chronic | ICD-10-CM

## 2023-05-14 DIAGNOSIS — Z98.890 OTHER SPECIFIED POSTPROCEDURAL STATES: Chronic | ICD-10-CM

## 2023-05-14 LAB
ALBUMIN SERPL ELPH-MCNC: 3.6 G/DL — SIGNIFICANT CHANGE UP (ref 3.3–5)
ALP SERPL-CCNC: 70 U/L — SIGNIFICANT CHANGE UP (ref 40–120)
ALT FLD-CCNC: 19 U/L — SIGNIFICANT CHANGE UP (ref 10–45)
ANION GAP SERPL CALC-SCNC: 14 MMOL/L — SIGNIFICANT CHANGE UP (ref 5–17)
AST SERPL-CCNC: 25 U/L — SIGNIFICANT CHANGE UP (ref 10–40)
BASOPHILS # BLD AUTO: 0.03 K/UL — SIGNIFICANT CHANGE UP (ref 0–0.2)
BASOPHILS NFR BLD AUTO: 0.3 % — SIGNIFICANT CHANGE UP (ref 0–2)
BILIRUB SERPL-MCNC: 0.5 MG/DL — SIGNIFICANT CHANGE UP (ref 0.2–1.2)
BUN SERPL-MCNC: 23 MG/DL — SIGNIFICANT CHANGE UP (ref 7–23)
CALCIUM SERPL-MCNC: 8.9 MG/DL — SIGNIFICANT CHANGE UP (ref 8.4–10.5)
CHLORIDE SERPL-SCNC: 104 MMOL/L — SIGNIFICANT CHANGE UP (ref 96–108)
CO2 SERPL-SCNC: 25 MMOL/L — SIGNIFICANT CHANGE UP (ref 22–31)
CREAT SERPL-MCNC: 1.06 MG/DL — SIGNIFICANT CHANGE UP (ref 0.5–1.3)
EGFR: 71 ML/MIN/1.73M2 — SIGNIFICANT CHANGE UP
EOSINOPHIL # BLD AUTO: 0.02 K/UL — SIGNIFICANT CHANGE UP (ref 0–0.5)
EOSINOPHIL NFR BLD AUTO: 0.2 % — SIGNIFICANT CHANGE UP (ref 0–6)
ETHANOL SERPL-MCNC: 200 MG/DL — HIGH (ref 0–3)
GLUCOSE SERPL-MCNC: 157 MG/DL — HIGH (ref 70–99)
HCT VFR BLD CALC: 45.9 % — SIGNIFICANT CHANGE UP (ref 39–50)
HGB BLD-MCNC: 14.9 G/DL — SIGNIFICANT CHANGE UP (ref 13–17)
IMM GRANULOCYTES NFR BLD AUTO: 0.6 % — SIGNIFICANT CHANGE UP (ref 0–0.9)
LIDOCAIN IGE QN: 166 U/L — SIGNIFICANT CHANGE UP (ref 73–393)
LYMPHOCYTES # BLD AUTO: 0.88 K/UL — LOW (ref 1–3.3)
LYMPHOCYTES # BLD AUTO: 9.2 % — LOW (ref 13–44)
MAGNESIUM SERPL-MCNC: 1.8 MG/DL — SIGNIFICANT CHANGE UP (ref 1.6–2.6)
MCHC RBC-ENTMCNC: 30.7 PG — SIGNIFICANT CHANGE UP (ref 27–34)
MCHC RBC-ENTMCNC: 32.5 GM/DL — SIGNIFICANT CHANGE UP (ref 32–36)
MCV RBC AUTO: 94.4 FL — SIGNIFICANT CHANGE UP (ref 80–100)
MONOCYTES # BLD AUTO: 0.18 K/UL — SIGNIFICANT CHANGE UP (ref 0–0.9)
MONOCYTES NFR BLD AUTO: 1.9 % — LOW (ref 2–14)
NEUTROPHILS # BLD AUTO: 8.37 K/UL — HIGH (ref 1.8–7.4)
NEUTROPHILS NFR BLD AUTO: 87.8 % — HIGH (ref 43–77)
NRBC # BLD: 0 /100 WBCS — SIGNIFICANT CHANGE UP (ref 0–0)
PLATELET # BLD AUTO: 186 K/UL — SIGNIFICANT CHANGE UP (ref 150–400)
POTASSIUM SERPL-MCNC: 3.6 MMOL/L — SIGNIFICANT CHANGE UP (ref 3.5–5.3)
POTASSIUM SERPL-SCNC: 3.6 MMOL/L — SIGNIFICANT CHANGE UP (ref 3.5–5.3)
PROT SERPL-MCNC: 7.1 G/DL — SIGNIFICANT CHANGE UP (ref 6–8.3)
RBC # BLD: 4.86 M/UL — SIGNIFICANT CHANGE UP (ref 4.2–5.8)
RBC # FLD: 13.2 % — SIGNIFICANT CHANGE UP (ref 10.3–14.5)
SODIUM SERPL-SCNC: 143 MMOL/L — SIGNIFICANT CHANGE UP (ref 135–145)
TROPONIN I, HIGH SENSITIVITY RESULT: 18.1 NG/L — SIGNIFICANT CHANGE UP
WBC # BLD: 9.54 K/UL — SIGNIFICANT CHANGE UP (ref 3.8–10.5)
WBC # FLD AUTO: 9.54 K/UL — SIGNIFICANT CHANGE UP (ref 3.8–10.5)

## 2023-05-14 PROCEDURE — 36415 COLL VENOUS BLD VENIPUNCTURE: CPT

## 2023-05-14 PROCEDURE — 83690 ASSAY OF LIPASE: CPT

## 2023-05-14 PROCEDURE — 93010 ELECTROCARDIOGRAM REPORT: CPT

## 2023-05-14 PROCEDURE — 84484 ASSAY OF TROPONIN QUANT: CPT

## 2023-05-14 PROCEDURE — 80053 COMPREHEN METABOLIC PANEL: CPT

## 2023-05-14 PROCEDURE — 93005 ELECTROCARDIOGRAM TRACING: CPT

## 2023-05-14 PROCEDURE — 83735 ASSAY OF MAGNESIUM: CPT

## 2023-05-14 PROCEDURE — 74177 CT ABD & PELVIS W/CONTRAST: CPT | Mod: MA

## 2023-05-14 PROCEDURE — 96360 HYDRATION IV INFUSION INIT: CPT | Mod: XU

## 2023-05-14 PROCEDURE — 99285 EMERGENCY DEPT VISIT HI MDM: CPT

## 2023-05-14 PROCEDURE — 74177 CT ABD & PELVIS W/CONTRAST: CPT | Mod: 26,MA

## 2023-05-14 PROCEDURE — 99285 EMERGENCY DEPT VISIT HI MDM: CPT | Mod: 25

## 2023-05-14 PROCEDURE — 80307 DRUG TEST PRSMV CHEM ANLYZR: CPT

## 2023-05-14 PROCEDURE — 85025 COMPLETE CBC W/AUTO DIFF WBC: CPT

## 2023-05-14 RX ORDER — FOLIC ACID 0.8 MG
1 TABLET ORAL ONCE
Refills: 0 | Status: COMPLETED | OUTPATIENT
Start: 2023-05-14 | End: 2023-05-14

## 2023-05-14 RX ORDER — SODIUM CHLORIDE 9 MG/ML
1000 INJECTION INTRAMUSCULAR; INTRAVENOUS; SUBCUTANEOUS ONCE
Refills: 0 | Status: COMPLETED | OUTPATIENT
Start: 2023-05-14 | End: 2023-05-14

## 2023-05-14 RX ORDER — THIAMINE MONONITRATE (VIT B1) 100 MG
100 TABLET ORAL ONCE
Refills: 0 | Status: COMPLETED | OUTPATIENT
Start: 2023-05-14 | End: 2023-05-14

## 2023-05-14 RX ADMIN — SODIUM CHLORIDE 1000 MILLILITER(S): 9 INJECTION INTRAMUSCULAR; INTRAVENOUS; SUBCUTANEOUS at 06:07

## 2023-05-14 RX ADMIN — Medication 1 MILLIGRAM(S): at 06:50

## 2023-05-14 RX ADMIN — Medication 100 MILLIGRAM(S): at 05:07

## 2023-05-14 RX ADMIN — SODIUM CHLORIDE 1000 MILLILITER(S): 9 INJECTION INTRAMUSCULAR; INTRAVENOUS; SUBCUTANEOUS at 05:07

## 2023-05-14 NOTE — ED PROVIDER NOTE - PATIENT PORTAL LINK FT
You can access the FollowMyHealth Patient Portal offered by [facility] by registering at the following website: [email or web address]. By joining [employer]’s FollowMyHealth portal, you will also be able to view your health information using other applications (apps) compatible with our system.

## 2023-05-14 NOTE — ED PROVIDER NOTE - OBJECTIVE STATEMENT
78M PMHx of AFIB on ASA s/p left atrial appendage excision/clip, HTN, PVD, DM, presenting with 1 episode of vomiting this last night 12AM. Reports watching TV after drink 4 ounces of scotch. In the ED, patient asymptomatic. Denies any chest pain, abdominal pain, shortness of breath,  , headaches, fevers, chills, diarrhea ,constipation, weakness, syncope, hematuria, dysuria, urinary symptoms, subjective neurological deficits.

## 2023-05-14 NOTE — ED ADULT NURSE NOTE - OBJECTIVE STATEMENT
Pt is alert, came to the ER via EMS due to vomiting a couple of times but denies any pain or diarrhea. Pt admits to drinking some Scotch in the evening.

## 2023-05-14 NOTE — ED PROVIDER NOTE - PHYSICAL EXAMINATION
VITAL SIGNS: I have reviewed nursing notes and confirm.   GEN: Well-developed; well-nourished; in no acute distress. Speaking full sentences. (+) fruity odor on breath  SKIN: Warm, pink, no rash, no diaphoresis, no cyanosis, well perfused.   HEAD: Normocephalic; atraumatic. No scalp lacerations, no abrasions.  NECK: Supple; non tender.   EYES: Pupils 3mm equal, round, reactive to light and accomodation, conjunctiva and sclera clear. Extra-ocular movements intact bilaterally.  ENT: No nasal discharge; airway clear. Trachea is midline. ORAL: No oropharyngeal exudates or erythema. Normal dentition.  CV: Regular rate and rhythm. S1, S2 normal; no murmurs, gallops, or rubs. No lower extremity pitting edema bilaterally. Capillary refill < 2 seconds throughout. Distal pulses intact 2+ throughout.  RESP: CTA bilaterally. No wheezes, rales, or rhonchi.   ABD: Normal bowel sounds, soft, non-distended, non-tender, no rebound, no guarding, no rigidity, no hepatosplenomegaly. No CVA tenderness bilaterally.  MSK: Normal range of motion and movement of all 4 extremities. No joint or muscular pain throughout. No clubbing.   BACK: No thoracolumbar midline or paravertebral tenderness. No step-offs or obvious deformities.  NEURO: Alert & oriented x 3, Grossly unremarkable. Sensory and motor intact throughout. No focal deficits.  Normal speech and coordination.   PSYCH: Cooperative, appropriate.

## 2023-05-14 NOTE — ED PROVIDER NOTE - NSFOLLOWUPINSTRUCTIONS_ED_ALL_ED_FT
Alcohol Abuse    Alcohol intoxication occurs when the amount of alcohol that a person has consumed impairs his or her ability to mentally and physically function. Chronic alcohol consumption can also lead to a variety of health issues including neurological disease, stomach disease, heart disease, liver disease, etc. Do not drive after drinking alcohol. Drinking enough alcohol to end up in an Emergency Room suggests you may have an alcohol abuse problem. Seek help at a drug addiction center.    SEEK IMMEDIATE MEDICAL CARE IF YOU HAVE ANY OF THE FOLLOWING SYMPTOMS: seizures, vomiting blood, blood in your stool, lightheadedness/dizziness, or becoming shaky to tremulous when you stop drinking.     Take acetaminophen 650 mg orally every 6-8 hours for pain control as needed. Please do not exceed 4,000 mg of acetaminophen during a 24 hours period. Acetaminophen can be found in many over-the-counter cold medications as well as opioid medications that may be given for pain.    Take ibuprofen (also known as MOTRIN or ADVIL) 400 mg orally every 6-8 hours for pain control as needed with food to avoid an upset stomach. Ibuprofen can be found in many over-the-counter medications. Please do not take ibuprofen if you have a bleeding disorder, stomach or gastrointestinal ulcer, or liver disease.    If needed, you can alternate these medications so that you can take one medication every 3 hours. For example, at noon take ibuprofen, then at 3PM take acetaminophen, then at 6PM take ibuprofen.

## 2023-05-14 NOTE — ED ADULT NURSE NOTE - NSFALLUNIVINTERV_ED_ALL_ED
Bed/Stretcher in lowest position, wheels locked, appropriate side rails in place/Call bell, personal items and telephone in reach/Instruct patient to call for assistance before getting out of bed/chair/stretcher/Non-slip footwear applied when patient is off stretcher/[place] to call system/Physically safe environment - no spills, clutter or unnecessary equipment/Purposeful proactive rounding/Room/bathroom lighting operational, light cord in reach

## 2023-05-14 NOTE — ED PROVIDER NOTE - CLINICAL SUMMARY MEDICAL DECISION MAKING FREE TEXT BOX
Pt with PMHx of etoh intoxication, htn, ambulatory with walker p/w 1 x nausea, vomiting,  episode after drinking alcohol;  (-) history of surgery. No hx of trauma. No hematemesis or hematochezia/melena. Pt is hemodynamically stable, mentating well. Exam and history is concerning for ETOH intoxication vs PUD/gastritis. Will require diagnostic imaging.  Considered DDx but unlikely due to the clinical presentation and exam:   Cardiac: ACS, AAA rupture, dissection  Pulm: Lower lobe pneumonia  GI: SBO, Inflammatory bowel disease, Irritable Bowel Syndrome, viscous perforation, gastroenteritis, gastritis/PUD/perforated ulcer  Metabolic: DKA  Renal: Urolithiasis, UTI/cystitis/pyelonephritis.  : Testicular torsion     PLAN:  - Reviewed patient's prior medical record.   - IV fluids, parenteral analgesia & anti-emetics PRN  - EKG, troponin for atypical ACS - negative,  CBC normal CMP normal, lipase negative, ETOH 200, patient does not have any ETOH w/d symptoms.   - CT abd/pelvis (+) proctitis but patient without any rectal pain or tenesmus, will need follow up with GI for resolution.   -Observation and reassessment, surgical consultation if necessary.

## 2023-05-14 NOTE — ED ADULT TRIAGE NOTE - CHIEF COMPLAINT QUOTE
He had some Scotch and he started vomiting, no abdominal pain or diarrhea. He got Zofran and IVF" as per EMS

## 2023-05-17 NOTE — CHART NOTE - NSCHARTNOTEFT_GEN_A_CORE
SW placed call to patient to discuss and assist with follow up care.  Patient presented to ED on 5/14/23 due to vomiting. SW unable to reach patient at this time, unable to leave VM.

## 2023-11-07 NOTE — ED ADULT NURSE NOTE - ED CARDIAC RHYTHM
Brief Postoperative Note    Patient: Ana Pollard  YOB: 1988  MRN: [id number]    Date of Procedure: 2/3/Swati Cam MD    Surgical Assistant: Surg Asst-1: Renetta Rangel    [id number] Right 1 Implanted   MESH LEON L [id number] negative regular

## 2023-12-05 NOTE — ED ADULT NURSE NOTE - NS ED NURSE RECORD ANOTHER VITAL SIGN
Refill request received for Levothyroxine        Labs was not done since 1/25/2023    Follow up appointment 12/12/2023  Last refill was on 10/15/2023 for 30 days.     RN please advice.     Yes

## 2023-12-20 NOTE — DISCHARGE NOTE PROVIDER - NSDCMRMEDTOKEN_GEN_ALL_CORE_FT
Called patient and informed of normal stress test results. Patient understood.  acetaminophen 325 mg oral tablet: 2 tab(s) orally every 6 hours, As needed, Temp greater or equal to 38.5C (101.3F), Mild Pain (1 - 3)  aspirin 81 mg oral delayed release tablet: 1 tab(s) orally once a day  atorvastatin 80 mg oral tablet: 1 tab(s) orally once a day (at bedtime)  famotidine 20 mg oral tablet: 1 tab(s) orally once a day  mesalamine 1.2 g oral delayed release tablet: 4 tab(s) orally once a day  metoprolol tartrate 25 mg oral tablet: 1 tab(s) orally every 12 hours  oxyCODONE 5 mg oral tablet: 1 tab(s) orally every 6 hours, As needed, Moderate Pain (4 - 6) MDD:4  thiamine 100 mg oral tablet: 1 tab(s) orally once a day

## 2024-04-17 NOTE — ED ADULT NURSE NOTE - NSICDXPASTSURGICALHX_GEN_ALL_CORE_FT
no concerns
PAST SURGICAL HISTORY:  H/O cataract removal with insertion of prosthetic lens B/L    H/O right inguinal hernia repair     S/P colonoscopic polypectomy 2018

## 2024-06-17 ENCOUNTER — EMERGENCY (EMERGENCY)
Facility: HOSPITAL | Age: 79
LOS: 1 days | Discharge: HOME | End: 2024-06-17
Attending: EMERGENCY MEDICINE | Admitting: EMERGENCY MEDICINE
Payer: COMMERCIAL

## 2024-06-17 VITALS
WEIGHT: 169.98 LBS | HEIGHT: 71 IN | SYSTOLIC BLOOD PRESSURE: 116 MMHG | HEART RATE: 100 BPM | TEMPERATURE: 97 F | DIASTOLIC BLOOD PRESSURE: 52 MMHG | OXYGEN SATURATION: 96 % | RESPIRATION RATE: 15 BRPM

## 2024-06-17 VITALS — TEMPERATURE: 99 F

## 2024-06-17 DIAGNOSIS — Z98.890 OTHER SPECIFIED POSTPROCEDURAL STATES: Chronic | ICD-10-CM

## 2024-06-17 DIAGNOSIS — Z98.49 CATARACT EXTRACTION STATUS, UNSPECIFIED EYE: Chronic | ICD-10-CM

## 2024-06-17 LAB
ALBUMIN SERPL ELPH-MCNC: 3.6 G/DL — SIGNIFICANT CHANGE UP (ref 3.3–5)
ALP SERPL-CCNC: 64 U/L — SIGNIFICANT CHANGE UP (ref 40–120)
ALT FLD-CCNC: 175 U/L — HIGH (ref 10–45)
ANION GAP SERPL CALC-SCNC: 13 MMOL/L — SIGNIFICANT CHANGE UP (ref 5–17)
APTT BLD: 26.3 SEC — SIGNIFICANT CHANGE UP (ref 24.5–35.6)
AST SERPL-CCNC: 405 U/L — HIGH (ref 10–40)
BASOPHILS # BLD AUTO: 0 K/UL — SIGNIFICANT CHANGE UP (ref 0–0.2)
BASOPHILS NFR BLD AUTO: 0 % — SIGNIFICANT CHANGE UP (ref 0–2)
BILIRUB SERPL-MCNC: 4.8 MG/DL — HIGH (ref 0.2–1.2)
BUN SERPL-MCNC: 35 MG/DL — HIGH (ref 7–23)
CALCIUM SERPL-MCNC: 8.9 MG/DL — SIGNIFICANT CHANGE UP (ref 8.4–10.5)
CHLORIDE SERPL-SCNC: 107 MMOL/L — SIGNIFICANT CHANGE UP (ref 96–108)
CO2 SERPL-SCNC: 21 MMOL/L — LOW (ref 22–31)
CREAT SERPL-MCNC: 1.62 MG/DL — HIGH (ref 0.5–1.3)
EGFR: 43 ML/MIN/1.73M2 — LOW
EOSINOPHIL # BLD AUTO: 0 K/UL — SIGNIFICANT CHANGE UP (ref 0–0.5)
EOSINOPHIL NFR BLD AUTO: 0 % — SIGNIFICANT CHANGE UP (ref 0–6)
GLUCOSE SERPL-MCNC: 182 MG/DL — HIGH (ref 70–99)
HCT VFR BLD CALC: 38.9 % — LOW (ref 39–50)
HGB BLD-MCNC: 13.4 G/DL — SIGNIFICANT CHANGE UP (ref 13–17)
INR BLD: 0.93 RATIO — SIGNIFICANT CHANGE UP (ref 0.85–1.18)
LACTATE SERPL-SCNC: 1.9 MMOL/L — SIGNIFICANT CHANGE UP (ref 0.7–2)
LYMPHOCYTES # BLD AUTO: 0.23 K/UL — LOW (ref 1–3.3)
LYMPHOCYTES # BLD AUTO: 1 % — LOW (ref 13–44)
MANUAL SMEAR VERIFICATION: SIGNIFICANT CHANGE UP
MCHC RBC-ENTMCNC: 31.6 PG — SIGNIFICANT CHANGE UP (ref 27–34)
MCHC RBC-ENTMCNC: 34.4 GM/DL — SIGNIFICANT CHANGE UP (ref 32–36)
MCV RBC AUTO: 91.7 FL — SIGNIFICANT CHANGE UP (ref 80–100)
MONOCYTES # BLD AUTO: 0.68 K/UL — SIGNIFICANT CHANGE UP (ref 0–0.9)
MONOCYTES NFR BLD AUTO: 3 % — SIGNIFICANT CHANGE UP (ref 2–14)
NEUTROPHILS # BLD AUTO: 21.61 K/UL — HIGH (ref 1.8–7.4)
NEUTROPHILS NFR BLD AUTO: 94 % — HIGH (ref 43–77)
NEUTS BAND # BLD: 2 % — SIGNIFICANT CHANGE UP (ref 0–8)
NRBC # BLD: 0 /100 WBCS — SIGNIFICANT CHANGE UP (ref 0–0)
NT-PROBNP SERPL-SCNC: 2200 PG/ML — HIGH (ref 0–300)
PLAT MORPH BLD: NORMAL — SIGNIFICANT CHANGE UP
PLATELET # BLD AUTO: 176 K/UL — SIGNIFICANT CHANGE UP (ref 150–400)
POTASSIUM SERPL-MCNC: 3.7 MMOL/L — SIGNIFICANT CHANGE UP (ref 3.5–5.3)
POTASSIUM SERPL-SCNC: 3.7 MMOL/L — SIGNIFICANT CHANGE UP (ref 3.5–5.3)
PROT SERPL-MCNC: 7.1 G/DL — SIGNIFICANT CHANGE UP (ref 6–8.3)
PROTHROM AB SERPL-ACNC: 10.6 SEC — SIGNIFICANT CHANGE UP (ref 9.5–13)
RBC # BLD: 4.24 M/UL — SIGNIFICANT CHANGE UP (ref 4.2–5.8)
RBC # FLD: 13.6 % — SIGNIFICANT CHANGE UP (ref 10.3–14.5)
RBC BLD AUTO: NORMAL — SIGNIFICANT CHANGE UP
SODIUM SERPL-SCNC: 141 MMOL/L — SIGNIFICANT CHANGE UP (ref 135–145)
TROPONIN I, HIGH SENSITIVITY RESULT: 15.4 NG/L — SIGNIFICANT CHANGE UP
WBC # BLD: 22.51 K/UL — HIGH (ref 3.8–10.5)
WBC # FLD AUTO: 22.51 K/UL — HIGH (ref 3.8–10.5)

## 2024-06-17 PROCEDURE — 83880 ASSAY OF NATRIURETIC PEPTIDE: CPT

## 2024-06-17 PROCEDURE — 87186 SC STD MICRODIL/AGAR DIL: CPT

## 2024-06-17 PROCEDURE — 87077 CULTURE AEROBIC IDENTIFY: CPT

## 2024-06-17 PROCEDURE — 99285 EMERGENCY DEPT VISIT HI MDM: CPT | Mod: 25

## 2024-06-17 PROCEDURE — 84484 ASSAY OF TROPONIN QUANT: CPT

## 2024-06-17 PROCEDURE — 71250 CT THORAX DX C-: CPT | Mod: MC

## 2024-06-17 PROCEDURE — 71250 CT THORAX DX C-: CPT | Mod: 26,MC

## 2024-06-17 PROCEDURE — 99284 EMERGENCY DEPT VISIT MOD MDM: CPT

## 2024-06-17 PROCEDURE — 71045 X-RAY EXAM CHEST 1 VIEW: CPT

## 2024-06-17 PROCEDURE — 87150 DNA/RNA AMPLIFIED PROBE: CPT

## 2024-06-17 PROCEDURE — 85730 THROMBOPLASTIN TIME PARTIAL: CPT

## 2024-06-17 PROCEDURE — 36415 COLL VENOUS BLD VENIPUNCTURE: CPT

## 2024-06-17 PROCEDURE — 71045 X-RAY EXAM CHEST 1 VIEW: CPT | Mod: 26

## 2024-06-17 PROCEDURE — 93010 ELECTROCARDIOGRAM REPORT: CPT

## 2024-06-17 PROCEDURE — 96365 THER/PROPH/DIAG IV INF INIT: CPT

## 2024-06-17 PROCEDURE — 85610 PROTHROMBIN TIME: CPT

## 2024-06-17 PROCEDURE — 85025 COMPLETE CBC W/AUTO DIFF WBC: CPT

## 2024-06-17 PROCEDURE — 80053 COMPREHEN METABOLIC PANEL: CPT

## 2024-06-17 PROCEDURE — 93005 ELECTROCARDIOGRAM TRACING: CPT

## 2024-06-17 PROCEDURE — 83605 ASSAY OF LACTIC ACID: CPT

## 2024-06-17 PROCEDURE — 87040 BLOOD CULTURE FOR BACTERIA: CPT

## 2024-06-17 RX ORDER — SODIUM CHLORIDE 9 MG/ML
2400 INJECTION INTRAMUSCULAR; INTRAVENOUS; SUBCUTANEOUS ONCE
Refills: 0 | Status: COMPLETED | OUTPATIENT
Start: 2024-06-17 | End: 2024-06-17

## 2024-06-17 RX ORDER — PIPERACILLIN AND TAZOBACTAM 4; .5 G/20ML; G/20ML
3.38 INJECTION, POWDER, LYOPHILIZED, FOR SOLUTION INTRAVENOUS ONCE
Refills: 0 | Status: COMPLETED | OUTPATIENT
Start: 2024-06-17 | End: 2024-06-17

## 2024-06-17 RX ORDER — PIPERACILLIN AND TAZOBACTAM 4; .5 G/20ML; G/20ML
3.38 INJECTION, POWDER, LYOPHILIZED, FOR SOLUTION INTRAVENOUS ONCE
Refills: 0 | Status: DISCONTINUED | OUTPATIENT
Start: 2024-06-17 | End: 2024-06-20

## 2024-06-17 RX ADMIN — PIPERACILLIN AND TAZOBACTAM 3.38 GRAM(S): 4; .5 INJECTION, POWDER, LYOPHILIZED, FOR SOLUTION INTRAVENOUS at 11:06

## 2024-06-17 RX ADMIN — PIPERACILLIN AND TAZOBACTAM 200 GRAM(S): 4; .5 INJECTION, POWDER, LYOPHILIZED, FOR SOLUTION INTRAVENOUS at 10:36

## 2024-06-17 RX ADMIN — SODIUM CHLORIDE 2400 MILLILITER(S): 9 INJECTION INTRAMUSCULAR; INTRAVENOUS; SUBCUTANEOUS at 10:36

## 2024-06-17 NOTE — ED PROVIDER NOTE - CARE PROVIDER_API CALL
Nina Wooten Itzel  Urology  [address] [place] [address]  Phone: [phone]  Fax: [phone]  Follow Up Time: 1-3 Days

## 2024-06-17 NOTE — ED PROVIDER NOTE - CARDIAC, MLM
Pt presents to ED after having mechanical fall 30 minutes PTA. Pt states he was in the garage going down steps and lost balance causing him to fall forward hitting center of forehead on corner of wall and falling onto left arm injurying upper arm/shoulder. Pt is on Xarelto. Pt denies LOC, head, neck, back pain. 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 Pt only c/o left arm pain and is unable to move arm, pt has small abrasion to center of forehead with bleeding controlled.    Normal rate, regular rhythm.  Heart sounds S1, S2.  No murmurs, rubs or gallops.

## 2024-06-17 NOTE — ED PROVIDER NOTE - OBJECTIVE STATEMENT
The patient is a 80 y/o male who presents to the emergency department complaining of shortness of breath starting last night. He states he had trouble falling asleep last night and then woke up multiple times throughout the night with shortness of breath. This morning when he woke up he was also having difficulty breathing so he decided to call the ambulance. His SOB has since resolved. He said he experienced this SOB once before, when he had a stroke in 2022. He denies fever, chills, chest pain, cough, abdominal pain, N/V, or urinary symptoms. He states he had one episode of non-bloody diarrhea during the night. Yesterday he went to a [employer] for Father's day and had a large meal and was drinking beer. He denies any recent travel, recent surgeries, personal history of cancer, DVT, or PE. He used to smoke cigarettes for about 10 years but quit in 2011. He denies use of drugs and drinks alcohol on occasion.

## 2024-06-17 NOTE — ED ADULT NURSE NOTE - NSFALLHARMRISKINTERV_ED_ALL_ED

## 2024-06-17 NOTE — ED PROVIDER NOTE - PROGRESS NOTE DETAILS
As per son patient had a episode of hematuria about a week ago. All results were explained to patient and/or family and a copy of all available results given.  Patient's son and wife were presents.  Abnormal results were discussed regarding elevated LFT, elevated BUN, elevated creatinine, elevated white blood cell count.  Patient will follow-up with his primary care doctor Brigid this week.

## 2024-06-17 NOTE — DISCHARGE NOTE NURSING/CASE MANAGEMENT/SOCIAL WORK - NSDCCRNAME_GEN_ALL_CORE_FT
[facility]  [facility]IMARY [facility]  [address] [place] [address]   [address]  [facility] [address]  Dept: [phone]  Dept Fax: [phone]    Julisa Ng is a 29 y.o. female who presentstoday for her medical conditions/complaints as noted below.  Julisa Ng is c/o of  Chief Complaint   Patient presents with    Medication Check                HPI:     Presents for 6 month recheck via telehealth  Weight is stable per patient  Unable to review other vitals    Using adderall 20mg XR in am and 10mg booster  This works well for focus  Denies any side effects with use of med    Anxiety stable with meds  Using xanax prn- requesting refill  Denies any side effects    Denies any other problems/concerns        No results found for: \"LABA1C\"          ( goal A1C is < 7)   No components found for: \"LABMICR\"  No components found for: \"LDLCHOLESTEROL\", \"LDLCALC\"    (goal LDL is <100)   No results found for: \"AST\", \"ALT\", \"BUN\", \"CR\"  BP Readings from Last 3 Encounters:   08/15/23 110/60   02/03/23 132/86   10/13/22 120/70          ([id number])    Past Medical History:   Diagnosis Date    ADHD (attention deficit hyperactivity disorder) 2/28/2017    Anxiety     Depression     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 Li       Family History   Problem Relation Age of Onset    Asthma Mother     Depression Mother     Depression Father     Stroke Maternal Grandmother     Heart Disease Maternal Grandfather     Cancer Paternal Grandmother     Breast Cancer Paternal Grandmother 40    Cancer Paternal Grandfather     Prostate Cancer Paternal Grandfather 60          Social History     Tobacco Use    Smoking status: Never    Smokeless tobacco: Never   Substance Use Topics    Alcohol use: Yes     Comment: Social       Current Outpatient Medications   Medication Sig Dispense Refill     CORINA/Jm Arcos (Once home-based therapies end)

## 2024-06-17 NOTE — ED ADULT TRIAGE NOTE - CHIEF COMPLAINT QUOTE
Pt. to ED stating he feels as though he can't take a deep breat, even at rest.  Pt. denies any chest pain, nausea or diaphoresis.  Pt. arrived via EMS on room air and speaking full sentences.  Skin warm and dry.

## 2024-06-17 NOTE — ED ADULT NURSE NOTE - OBJECTIVE STATEMENT
Patient presents to ED BIB EMS complaining of shortness of breath last night. Patient states that overnight he had difficulty catching his breath. Patient denies fever, denies n/v/d. Patient states he has history of TIA and brain surgery. Patient axox4, well appearing, denies chest pain.

## 2024-06-18 ENCOUNTER — INPATIENT (INPATIENT)
Facility: HOSPITAL | Age: 79
LOS: 3 days | Discharge: HOME | DRG: 872 | End: 2024-06-22
Attending: STUDENT IN AN ORGANIZED HEALTH CARE EDUCATION/TRAINING PROGRAM | Admitting: FAMILY MEDICINE
Payer: COMMERCIAL

## 2024-06-18 ENCOUNTER — RESULT REVIEW (OUTPATIENT)
Age: 79
End: 2024-06-18

## 2024-06-18 VITALS
HEIGHT: 71 IN | RESPIRATION RATE: 18 BRPM | TEMPERATURE: 98 F | SYSTOLIC BLOOD PRESSURE: 139 MMHG | WEIGHT: 169.98 LBS | HEART RATE: 107 BPM | OXYGEN SATURATION: 97 % | DIASTOLIC BLOOD PRESSURE: 76 MMHG

## 2024-06-18 DIAGNOSIS — Z98.890 OTHER SPECIFIED POSTPROCEDURAL STATES: Chronic | ICD-10-CM

## 2024-06-18 DIAGNOSIS — Z98.49 CATARACT EXTRACTION STATUS, UNSPECIFIED EYE: Chronic | ICD-10-CM

## 2024-06-18 DIAGNOSIS — R78.81 BACTEREMIA: ICD-10-CM

## 2024-06-18 LAB
ALBUMIN SERPL ELPH-MCNC: 3.1 G/DL — LOW (ref 3.3–5)
ALP SERPL-CCNC: 84 U/L — SIGNIFICANT CHANGE UP (ref 40–120)
ALT FLD-CCNC: 254 U/L — HIGH (ref 10–45)
ANION GAP SERPL CALC-SCNC: 9 MMOL/L — SIGNIFICANT CHANGE UP (ref 5–17)
ANISOCYTOSIS BLD QL: SLIGHT — SIGNIFICANT CHANGE UP
APPEARANCE UR: CLEAR — SIGNIFICANT CHANGE UP
APTT BLD: 30.1 SEC — SIGNIFICANT CHANGE UP (ref 24.5–35.6)
AST SERPL-CCNC: 337 U/L — HIGH (ref 10–40)
BACTERIA # UR AUTO: ABNORMAL /HPF
BASOPHILS # BLD AUTO: 0.07 K/UL — SIGNIFICANT CHANGE UP (ref 0–0.2)
BASOPHILS NFR BLD AUTO: 1 % — SIGNIFICANT CHANGE UP (ref 0–2)
BILIRUB SERPL-MCNC: 4.7 MG/DL — HIGH (ref 0.2–1.2)
BILIRUB UR-MCNC: ABNORMAL
BUN SERPL-MCNC: 27 MG/DL — HIGH (ref 7–23)
CALCIUM SERPL-MCNC: 8.7 MG/DL — SIGNIFICANT CHANGE UP (ref 8.4–10.5)
CHLORIDE SERPL-SCNC: 103 MMOL/L — SIGNIFICANT CHANGE UP (ref 96–108)
CO2 SERPL-SCNC: 24 MMOL/L — SIGNIFICANT CHANGE UP (ref 22–31)
COLOR SPEC: SIGNIFICANT CHANGE UP
CREAT SERPL-MCNC: 1.37 MG/DL — HIGH (ref 0.5–1.3)
DIFF PNL FLD: NEGATIVE — SIGNIFICANT CHANGE UP
E CLOAC COMP DNA BLD POS QL NAA+PROBE: SIGNIFICANT CHANGE UP
E CLOAC COMP DNA BLD POS QL NAA+PROBE: SIGNIFICANT CHANGE UP
E FAECIUM DNA BLD POS QL NAA+NON-PROBE: SIGNIFICANT CHANGE UP
EGFR: 52 ML/MIN/1.73M2 — LOW
EOSINOPHIL # BLD AUTO: 0 K/UL — SIGNIFICANT CHANGE UP (ref 0–0.5)
EOSINOPHIL NFR BLD AUTO: 0 % — SIGNIFICANT CHANGE UP (ref 0–6)
EPI CELLS # UR: 0 — SIGNIFICANT CHANGE UP
GLUCOSE BLDC GLUCOMTR-MCNC: 119 MG/DL — HIGH (ref 70–99)
GLUCOSE BLDC GLUCOMTR-MCNC: 154 MG/DL — HIGH (ref 70–99)
GLUCOSE SERPL-MCNC: 119 MG/DL — HIGH (ref 70–99)
GLUCOSE UR QL: NEGATIVE MG/DL — SIGNIFICANT CHANGE UP
GRAM STN FLD: ABNORMAL
HCT VFR BLD CALC: 36.8 % — LOW (ref 39–50)
HGB BLD-MCNC: 12.3 G/DL — LOW (ref 13–17)
INR BLD: 1 RATIO — SIGNIFICANT CHANGE UP (ref 0.85–1.18)
KETONES UR-MCNC: NEGATIVE MG/DL — SIGNIFICANT CHANGE UP
LACTATE SERPL-SCNC: 1.1 MMOL/L — SIGNIFICANT CHANGE UP (ref 0.7–2)
LEUKOCYTE ESTERASE UR-ACNC: NEGATIVE — SIGNIFICANT CHANGE UP
LYMPHOCYTES # BLD AUTO: 0.29 K/UL — LOW (ref 1–3.3)
LYMPHOCYTES # BLD AUTO: 4 % — LOW (ref 13–44)
MANUAL SMEAR VERIFICATION: SIGNIFICANT CHANGE UP
MCHC RBC-ENTMCNC: 31.1 PG — SIGNIFICANT CHANGE UP (ref 27–34)
MCHC RBC-ENTMCNC: 33.4 GM/DL — SIGNIFICANT CHANGE UP (ref 32–36)
MCV RBC AUTO: 92.9 FL — SIGNIFICANT CHANGE UP (ref 80–100)
METHOD TYPE: SIGNIFICANT CHANGE UP
METHOD TYPE: SIGNIFICANT CHANGE UP
MONOCYTES # BLD AUTO: 0.14 K/UL — SIGNIFICANT CHANGE UP (ref 0–0.9)
MONOCYTES NFR BLD AUTO: 2 % — SIGNIFICANT CHANGE UP (ref 2–14)
NEUTROPHILS # BLD AUTO: 6.63 K/UL — SIGNIFICANT CHANGE UP (ref 1.8–7.4)
NEUTROPHILS NFR BLD AUTO: 91 % — HIGH (ref 43–77)
NEUTS BAND # BLD: 2 % — SIGNIFICANT CHANGE UP (ref 0–8)
NITRITE UR-MCNC: NEGATIVE — SIGNIFICANT CHANGE UP
NRBC # BLD: 0 /100 WBCS — SIGNIFICANT CHANGE UP (ref 0–0)
PH UR: 5 — SIGNIFICANT CHANGE UP (ref 5–8)
PLAT MORPH BLD: NORMAL — SIGNIFICANT CHANGE UP
PLATELET # BLD AUTO: 113 K/UL — LOW (ref 150–400)
POTASSIUM SERPL-MCNC: 3.4 MMOL/L — LOW (ref 3.5–5.3)
POTASSIUM SERPL-SCNC: 3.4 MMOL/L — LOW (ref 3.5–5.3)
PROT SERPL-MCNC: 6.5 G/DL — SIGNIFICANT CHANGE UP (ref 6–8.3)
PROT UR-MCNC: 30 MG/DL
PROTHROM AB SERPL-ACNC: 11.7 SEC — SIGNIFICANT CHANGE UP (ref 9.5–13)
RBC # BLD: 3.96 M/UL — LOW (ref 4.2–5.8)
RBC # FLD: 13.8 % — SIGNIFICANT CHANGE UP (ref 10.3–14.5)
RBC BLD AUTO: ABNORMAL
RBC CASTS # UR COMP ASSIST: 0 /HPF — SIGNIFICANT CHANGE UP (ref 0–4)
SODIUM SERPL-SCNC: 136 MMOL/L — SIGNIFICANT CHANGE UP (ref 135–145)
SP GR SPEC: 1.06 — HIGH (ref 1–1.03)
SPECIMEN SOURCE: SIGNIFICANT CHANGE UP
SPECIMEN SOURCE: SIGNIFICANT CHANGE UP
UROBILINOGEN FLD QL: 1 MG/DL — SIGNIFICANT CHANGE UP (ref 0.2–1)
WBC # BLD: 7.13 K/UL — SIGNIFICANT CHANGE UP (ref 3.8–10.5)
WBC # FLD AUTO: 7.13 K/UL — SIGNIFICANT CHANGE UP (ref 3.8–10.5)
WBC UR QL: 0 /HPF — SIGNIFICANT CHANGE UP (ref 0–5)

## 2024-06-18 PROCEDURE — 93010 ELECTROCARDIOGRAM REPORT: CPT

## 2024-06-18 PROCEDURE — 76700 US EXAM ABDOM COMPLETE: CPT | Mod: 26

## 2024-06-18 PROCEDURE — 99223 1ST HOSP IP/OBS HIGH 75: CPT

## 2024-06-18 PROCEDURE — 99285 EMERGENCY DEPT VISIT HI MDM: CPT

## 2024-06-18 PROCEDURE — 93306 TTE W/DOPPLER COMPLETE: CPT | Mod: 26

## 2024-06-18 PROCEDURE — 74177 CT ABD & PELVIS W/CONTRAST: CPT | Mod: 26

## 2024-06-18 RX ORDER — HYDRALAZINE HYDROCHLORIDE 50 MG/1
1 TABLET ORAL
Refills: 0 | DISCHARGE

## 2024-06-18 RX ORDER — PANTOPRAZOLE SODIUM 40 MG/10ML
40 INJECTION, POWDER, FOR SOLUTION INTRAVENOUS
Refills: 0 | Status: DISCONTINUED | OUTPATIENT
Start: 2024-06-18 | End: 2024-06-22

## 2024-06-18 RX ORDER — ATORVASTATIN CALCIUM 20 MG/1
80 TABLET, FILM COATED ORAL AT BEDTIME
Refills: 0 | Status: DISCONTINUED | OUTPATIENT
Start: 2024-06-18 | End: 2024-06-19

## 2024-06-18 RX ORDER — DEXTROSE MONOHYDRATE 100 MG/ML
125 INJECTION, SOLUTION INTRAVENOUS ONCE
Refills: 0 | Status: DISCONTINUED | OUTPATIENT
Start: 2024-06-18 | End: 2024-06-22

## 2024-06-18 RX ORDER — HYDRALAZINE HYDROCHLORIDE 50 MG/1
50 TABLET ORAL
Refills: 0 | Status: DISCONTINUED | OUTPATIENT
Start: 2024-06-18 | End: 2024-06-22

## 2024-06-18 RX ORDER — INSULIN LISPRO 100 [IU]/ML
INJECTION, SOLUTION SUBCUTANEOUS AT BEDTIME
Refills: 0 | Status: DISCONTINUED | OUTPATIENT
Start: 2024-06-18 | End: 2024-06-22

## 2024-06-18 RX ORDER — MEROPENEM 500 MG/1
1000 INJECTION, POWDER, FOR SOLUTION INTRAVENOUS EVERY 8 HOURS
Refills: 0 | Status: DISCONTINUED | OUTPATIENT
Start: 2024-06-18 | End: 2024-06-20

## 2024-06-18 RX ORDER — DEXTROSE 30 % IN WATER 30 %
15 VIAL (ML) INTRAVENOUS ONCE
Refills: 0 | Status: DISCONTINUED | OUTPATIENT
Start: 2024-06-18 | End: 2024-06-22

## 2024-06-18 RX ORDER — POTASSIUM CHLORIDE 600 MG/1
40 TABLET, FILM COATED, EXTENDED RELEASE ORAL ONCE
Refills: 0 | Status: COMPLETED | OUTPATIENT
Start: 2024-06-18 | End: 2024-06-18

## 2024-06-18 RX ORDER — DEXTROSE MONOHYDRATE AND SODIUM CHLORIDE 5; .3 G/100ML; G/100ML
1000 INJECTION, SOLUTION INTRAVENOUS
Refills: 0 | Status: DISCONTINUED | OUTPATIENT
Start: 2024-06-18 | End: 2024-06-22

## 2024-06-18 RX ORDER — ESCITALOPRAM OXALATE 20 MG/1
1 TABLET, FILM COATED ORAL
Refills: 0 | DISCHARGE

## 2024-06-18 RX ORDER — MEROPENEM 500 MG/1
1000 INJECTION, POWDER, FOR SOLUTION INTRAVENOUS ONCE
Refills: 0 | Status: COMPLETED | OUTPATIENT
Start: 2024-06-18 | End: 2024-06-18

## 2024-06-18 RX ORDER — METOPROLOL TARTRATE 50 MG
25 TABLET ORAL EVERY 12 HOURS
Refills: 0 | Status: DISCONTINUED | OUTPATIENT
Start: 2024-06-18 | End: 2024-06-19

## 2024-06-18 RX ORDER — DEXTROSE 30 % IN WATER 30 %
25 VIAL (ML) INTRAVENOUS ONCE
Refills: 0 | Status: DISCONTINUED | OUTPATIENT
Start: 2024-06-18 | End: 2024-06-22

## 2024-06-18 RX ORDER — ENOXAPARIN SODIUM 100 MG/ML
40 INJECTION SUBCUTANEOUS EVERY 24 HOURS
Refills: 0 | Status: DISCONTINUED | OUTPATIENT
Start: 2024-06-18 | End: 2024-06-22

## 2024-06-18 RX ORDER — ESCITALOPRAM OXALATE 20 MG/1
5 TABLET, FILM COATED ORAL DAILY
Refills: 0 | Status: DISCONTINUED | OUTPATIENT
Start: 2024-06-18 | End: 2024-06-22

## 2024-06-18 RX ORDER — MESALAMINE 500 MG/1
1200 CAPSULE ORAL
Refills: 0 | Status: DISCONTINUED | OUTPATIENT
Start: 2024-06-18 | End: 2024-06-22

## 2024-06-18 RX ORDER — DEXTROSE MONOHYDRATE AND SODIUM CHLORIDE 5; .3 G/100ML; G/100ML
1000 INJECTION, SOLUTION INTRAVENOUS
Refills: 0 | Status: DISCONTINUED | OUTPATIENT
Start: 2024-06-18 | End: 2024-06-19

## 2024-06-18 RX ORDER — ACETAMINOPHEN 325 MG
650 TABLET ORAL EVERY 6 HOURS
Refills: 0 | Status: DISCONTINUED | OUTPATIENT
Start: 2024-06-18 | End: 2024-06-22

## 2024-06-18 RX ORDER — INSULIN LISPRO 100 [IU]/ML
INJECTION, SOLUTION SUBCUTANEOUS
Refills: 0 | Status: DISCONTINUED | OUTPATIENT
Start: 2024-06-18 | End: 2024-06-22

## 2024-06-18 RX ORDER — VANCOMYCIN HYDROCHLORIDE 50 MG/ML
1000 KIT ORAL ONCE
Refills: 0 | Status: COMPLETED | OUTPATIENT
Start: 2024-06-18 | End: 2024-06-18

## 2024-06-18 RX ORDER — GLUCAGON HYDROCHLORIDE 1 MG/ML
1 INJECTION, POWDER, FOR SOLUTION INTRAMUSCULAR; INTRAVENOUS; SUBCUTANEOUS ONCE
Refills: 0 | Status: DISCONTINUED | OUTPATIENT
Start: 2024-06-18 | End: 2024-06-22

## 2024-06-18 RX ORDER — VANCOMYCIN HYDROCHLORIDE 50 MG/ML
1000 KIT ORAL EVERY 24 HOURS
Refills: 0 | Status: DISCONTINUED | OUTPATIENT
Start: 2024-06-19 | End: 2024-06-20

## 2024-06-18 RX ORDER — MAGNESIUM, ALUMINUM HYDROXIDE 400-400
30 TABLET,CHEWABLE ORAL EVERY 4 HOURS
Refills: 0 | Status: DISCONTINUED | OUTPATIENT
Start: 2024-06-18 | End: 2024-06-22

## 2024-06-18 RX ORDER — ONDANSETRON HYDROCHLORIDE 2 MG/ML
4 INJECTION INTRAMUSCULAR; INTRAVENOUS EVERY 8 HOURS
Refills: 0 | Status: DISCONTINUED | OUTPATIENT
Start: 2024-06-18 | End: 2024-06-22

## 2024-06-18 RX ORDER — DEXTROSE 30 % IN WATER 30 %
12.5 VIAL (ML) INTRAVENOUS ONCE
Refills: 0 | Status: DISCONTINUED | OUTPATIENT
Start: 2024-06-18 | End: 2024-06-22

## 2024-06-18 RX ADMIN — MEROPENEM 100 MILLIGRAM(S): 500 INJECTION, POWDER, FOR SOLUTION INTRAVENOUS at 12:47

## 2024-06-18 RX ADMIN — MEROPENEM 1000 MILLIGRAM(S): 500 INJECTION, POWDER, FOR SOLUTION INTRAVENOUS at 13:15

## 2024-06-18 RX ADMIN — POTASSIUM CHLORIDE 40 MILLIEQUIVALENT(S): 600 TABLET, FILM COATED, EXTENDED RELEASE ORAL at 17:57

## 2024-06-18 RX ADMIN — Medication 25 MILLIGRAM(S): at 18:02

## 2024-06-18 RX ADMIN — VANCOMYCIN HYDROCHLORIDE 250 MILLIGRAM(S): KIT at 13:25

## 2024-06-18 RX ADMIN — VANCOMYCIN HYDROCHLORIDE 1000 MILLIGRAM(S): KIT at 14:25

## 2024-06-18 RX ADMIN — MESALAMINE 1200 MILLIGRAM(S): 500 CAPSULE ORAL at 17:58

## 2024-06-18 RX ADMIN — MESALAMINE 1200 MILLIGRAM(S): 500 CAPSULE ORAL at 23:33

## 2024-06-18 RX ADMIN — HYDRALAZINE HYDROCHLORIDE 50 MILLIGRAM(S): 50 TABLET ORAL at 17:57

## 2024-06-18 RX ADMIN — ENOXAPARIN SODIUM 40 MILLIGRAM(S): 100 INJECTION SUBCUTANEOUS at 17:57

## 2024-06-18 RX ADMIN — MEROPENEM 100 MILLIGRAM(S): 500 INJECTION, POWDER, FOR SOLUTION INTRAVENOUS at 21:36

## 2024-06-18 RX ADMIN — DEXTROSE MONOHYDRATE AND SODIUM CHLORIDE 100 MILLILITER(S): 5; .3 INJECTION, SOLUTION INTRAVENOUS at 18:05

## 2024-06-18 RX ADMIN — ATORVASTATIN CALCIUM 80 MILLIGRAM(S): 20 TABLET, FILM COATED ORAL at 21:42

## 2024-06-18 NOTE — ED POST DISCHARGE NOTE - DETAILS
Spoke with patient by phone ([phone]) at 4514 6/18/24, 0720: Patient called again and reiterated to him the serious nature of bacterial infection in the blood and that it could lead to death.  Advised patient strongly that he needs to return to the hospital for IV antibiotics.  Also explained to patient that oral antibiotics would likely not be effective in treating the infection and that he will need IV antibiotics.  Patient states he understands but at this time still declining to return to the hospital immediately. Dr Nath.

## 2024-06-18 NOTE — ED POST DISCHARGE NOTE - ADDITIONAL DOCUMENTATION
Inform patient of positive blood cultures and requested patient return to any emergency department to be reevaluated and likely admitted for IV antibiotics.  Patient states she only feels some fatigue, otherwise no fever or other major symptoms.  He is refusing to return to the hospital.  He states he is going to see his primary care Dr Geeta Santiago in [place] today for follow-up.  I expressed the potential serious nature of bacteremia and that it could lead to severe illness and even death and that hospitalization with IV antibiotics is the safest course.  Patient understands risks and concern but still will not come back to the hospital.  I explained that his primary doctor will likely want him to be admitted to the hospital.  He states he will wait to speak with and see her.  I attempted to contact his PMD from [facility]. [phone].  Answering service unable to take message and no on-call physician available.  I also offered and did send prescription for antibiotic to patient's pharmacy in case he still does not come back to the hospital and does not see his primary care doctor Inform patient of positive blood cultures and requested patient return to any emergency department to be reevaluated and likely admitted for IV antibiotics.  Patient states she only feels some fatigue, otherwise no fever or other major symptoms.  He is refusing to return to the hospital.  He states he is going to see his primary care Dr Geeta Santiago in [place] today for follow-up.  I expressed the potential serious nature of bacteremia and that it could lead to severe illness and even death and that hospitalization with IV antibiotics is the safest course.  Patient understands risks and concern but still will not come back to the hospital.  I explained that his primary doctor will likely want him to be admitted to the hospital.  He states he will wait to speak with and see her.  I attempted to contact his PMD from [facility]. [phone].  Answering service unable to take message and no on-call physician available.  I also offered and did send prescription for Bactrim DS to patient's pharmacy in case he still does not come back to the hospital and does not see his primary care doctor Inform patient of positive blood cultures and requested patient return to any emergency department to be reevaluated and likely admitted for IV antibiotics.  Patient states she only feels some fatigue, otherwise no fever or other major symptoms.  He is refusing to return to the hospital.  He states he is going to see his primary care Dr Geeta Santiago in [place] today for follow-up.  I expressed the potential serious nature of bacteremia and that it could lead to severe illness and even death and that hospitalization with IV antibiotics is the safest course.  Patient understands risks and concern but still will not come back to the hospital.  I explained that his primary doctor will likely want him to be admitted to the hospital.  He states he will wait to speak with and see her.  I attempted to contact his PMD from [facility]. [phone].  Answering service unable to take message and no on-call physician available.  I also offered and did send prescription for Bactrim DS to patient's pharmacy in case he still does not come back to the hospital and does not see his primary care doctor. I advised patient that his doctors on-call service is unable to take the message regarding the blood culture results.  Patient states that he will he ask his doctor to call the emergency department to discuss the results at his 9 AM appointment

## 2024-06-19 DIAGNOSIS — R78.81 BACTEREMIA: ICD-10-CM

## 2024-06-19 DIAGNOSIS — R79.89 OTHER SPECIFIED ABNORMAL FINDINGS OF BLOOD CHEMISTRY: ICD-10-CM

## 2024-06-19 LAB
-  AMPICILLIN/SULBACTAM: SIGNIFICANT CHANGE UP
-  AMPICILLIN: SIGNIFICANT CHANGE UP
-  AMPICILLIN: SIGNIFICANT CHANGE UP
-  AZTREONAM: SIGNIFICANT CHANGE UP
-  CEFAZOLIN: SIGNIFICANT CHANGE UP
-  CEFEPIME: SIGNIFICANT CHANGE UP
-  CEFOXITIN: SIGNIFICANT CHANGE UP
-  CEFTRIAXONE: SIGNIFICANT CHANGE UP
-  CIPROFLOXACIN: SIGNIFICANT CHANGE UP
-  ERTAPENEM: SIGNIFICANT CHANGE UP
-  GENTAMICIN SYNERGY: SIGNIFICANT CHANGE UP
-  GENTAMICIN: SIGNIFICANT CHANGE UP
-  IMIPENEM: SIGNIFICANT CHANGE UP
-  LEVOFLOXACIN: SIGNIFICANT CHANGE UP
-  MEROPENEM: SIGNIFICANT CHANGE UP
-  PIPERACILLIN/TAZOBACTAM: SIGNIFICANT CHANGE UP
-  STREPTOMYCIN SYNERGY: SIGNIFICANT CHANGE UP
-  TOBRAMYCIN: SIGNIFICANT CHANGE UP
-  TRIMETHOPRIM/SULFAMETHOXAZOLE: SIGNIFICANT CHANGE UP
-  VANCOMYCIN: SIGNIFICANT CHANGE UP
A1C WITH ESTIMATED AVERAGE GLUCOSE RESULT: 6.1 % — HIGH (ref 4–5.6)
ALBUMIN SERPL ELPH-MCNC: 3 G/DL — LOW (ref 3.3–5)
ALP SERPL-CCNC: 134 U/L — HIGH (ref 40–120)
ALT FLD-CCNC: 256 U/L — HIGH (ref 10–45)
ANION GAP SERPL CALC-SCNC: 9 MMOL/L — SIGNIFICANT CHANGE UP (ref 5–17)
AST SERPL-CCNC: 290 U/L — HIGH (ref 10–40)
BASOPHILS # BLD AUTO: 0.01 K/UL — SIGNIFICANT CHANGE UP (ref 0–0.2)
BASOPHILS NFR BLD AUTO: 0.2 % — SIGNIFICANT CHANGE UP (ref 0–2)
BILIRUB DIRECT SERPL-MCNC: 2.4 MG/DL — HIGH (ref 0–0.3)
BILIRUB INDIRECT FLD-MCNC: 0.7 MG/DL — SIGNIFICANT CHANGE UP (ref 0.2–1.2)
BILIRUB SERPL-MCNC: 3.1 MG/DL — HIGH (ref 0.2–1.2)
BILIRUB SERPL-MCNC: 3.6 MG/DL — HIGH (ref 0.2–1.2)
BUN SERPL-MCNC: 18 MG/DL — SIGNIFICANT CHANGE UP (ref 7–23)
CALCIUM SERPL-MCNC: 8.4 MG/DL — SIGNIFICANT CHANGE UP (ref 8.4–10.5)
CHLORIDE SERPL-SCNC: 102 MMOL/L — SIGNIFICANT CHANGE UP (ref 96–108)
CO2 SERPL-SCNC: 22 MMOL/L — SIGNIFICANT CHANGE UP (ref 22–31)
CREAT SERPL-MCNC: 0.94 MG/DL — SIGNIFICANT CHANGE UP (ref 0.5–1.3)
CULTURE RESULTS: ABNORMAL
CULTURE RESULTS: ABNORMAL
EGFR: 81 ML/MIN/1.73M2 — SIGNIFICANT CHANGE UP
EOSINOPHIL # BLD AUTO: 0.01 K/UL — SIGNIFICANT CHANGE UP (ref 0–0.5)
EOSINOPHIL NFR BLD AUTO: 0.2 % — SIGNIFICANT CHANGE UP (ref 0–6)
ESTIMATED AVERAGE GLUCOSE: 128 MG/DL — HIGH (ref 68–114)
GLUCOSE BLDC GLUCOMTR-MCNC: 144 MG/DL — HIGH (ref 70–99)
GLUCOSE BLDC GLUCOMTR-MCNC: 155 MG/DL — HIGH (ref 70–99)
GLUCOSE BLDC GLUCOMTR-MCNC: 163 MG/DL — HIGH (ref 70–99)
GLUCOSE BLDC GLUCOMTR-MCNC: 181 MG/DL — HIGH (ref 70–99)
GLUCOSE SERPL-MCNC: 139 MG/DL — HIGH (ref 70–99)
HCT VFR BLD CALC: 38 % — LOW (ref 39–50)
HGB BLD-MCNC: 12.7 G/DL — LOW (ref 13–17)
IMM GRANULOCYTES NFR BLD AUTO: 0.5 % — SIGNIFICANT CHANGE UP (ref 0–0.9)
LYMPHOCYTES # BLD AUTO: 0.24 K/UL — LOW (ref 1–3.3)
LYMPHOCYTES # BLD AUTO: 4.2 % — LOW (ref 13–44)
MCHC RBC-ENTMCNC: 30.6 PG — SIGNIFICANT CHANGE UP (ref 27–34)
MCHC RBC-ENTMCNC: 33.4 GM/DL — SIGNIFICANT CHANGE UP (ref 32–36)
MCV RBC AUTO: 91.6 FL — SIGNIFICANT CHANGE UP (ref 80–100)
METHOD TYPE: SIGNIFICANT CHANGE UP
METHOD TYPE: SIGNIFICANT CHANGE UP
MONOCYTES # BLD AUTO: 0.28 K/UL — SIGNIFICANT CHANGE UP (ref 0–0.9)
MONOCYTES NFR BLD AUTO: 4.9 % — SIGNIFICANT CHANGE UP (ref 2–14)
NEUTROPHILS # BLD AUTO: 5.13 K/UL — SIGNIFICANT CHANGE UP (ref 1.8–7.4)
NEUTROPHILS NFR BLD AUTO: 90 % — HIGH (ref 43–77)
NRBC # BLD: 0 /100 WBCS — SIGNIFICANT CHANGE UP (ref 0–0)
ORGANISM # SPEC MICROSCOPIC CNT: ABNORMAL
ORGANISM # SPEC MICROSCOPIC CNT: SIGNIFICANT CHANGE UP
ORGANISM # SPEC MICROSCOPIC CNT: SIGNIFICANT CHANGE UP
PLATELET # BLD AUTO: 124 K/UL — LOW (ref 150–400)
POTASSIUM SERPL-MCNC: 3.2 MMOL/L — LOW (ref 3.5–5.3)
POTASSIUM SERPL-SCNC: 3.2 MMOL/L — LOW (ref 3.5–5.3)
PROT SERPL-MCNC: 6.5 G/DL — SIGNIFICANT CHANGE UP (ref 6–8.3)
RBC # BLD: 4.15 M/UL — LOW (ref 4.2–5.8)
RBC # FLD: 13.6 % — SIGNIFICANT CHANGE UP (ref 10.3–14.5)
SODIUM SERPL-SCNC: 133 MMOL/L — LOW (ref 135–145)
SPECIMEN SOURCE: SIGNIFICANT CHANGE UP
SPECIMEN SOURCE: SIGNIFICANT CHANGE UP
WBC # BLD: 5.7 K/UL — SIGNIFICANT CHANGE UP (ref 3.8–10.5)
WBC # FLD AUTO: 5.7 K/UL — SIGNIFICANT CHANGE UP (ref 3.8–10.5)

## 2024-06-19 PROCEDURE — 99222 1ST HOSP IP/OBS MODERATE 55: CPT

## 2024-06-19 PROCEDURE — 99223 1ST HOSP IP/OBS HIGH 75: CPT

## 2024-06-19 PROCEDURE — 99232 SBSQ HOSP IP/OBS MODERATE 35: CPT

## 2024-06-19 RX ORDER — METOPROLOL TARTRATE 50 MG
50 TABLET ORAL EVERY 12 HOURS
Refills: 0 | Status: DISCONTINUED | OUTPATIENT
Start: 2024-06-19 | End: 2024-06-22

## 2024-06-19 RX ORDER — POTASSIUM CHLORIDE 600 MG/1
40 TABLET, FILM COATED, EXTENDED RELEASE ORAL EVERY 4 HOURS
Refills: 0 | Status: COMPLETED | OUTPATIENT
Start: 2024-06-19 | End: 2024-06-19

## 2024-06-19 RX ADMIN — Medication 50 MILLIGRAM(S): at 17:19

## 2024-06-19 RX ADMIN — PANTOPRAZOLE SODIUM 40 MILLIGRAM(S): 40 INJECTION, POWDER, FOR SOLUTION INTRAVENOUS at 06:33

## 2024-06-19 RX ADMIN — VANCOMYCIN HYDROCHLORIDE 250 MILLIGRAM(S): KIT at 12:04

## 2024-06-19 RX ADMIN — MESALAMINE 1200 MILLIGRAM(S): 500 CAPSULE ORAL at 21:14

## 2024-06-19 RX ADMIN — ONDANSETRON HYDROCHLORIDE 4 MILLIGRAM(S): 2 INJECTION INTRAMUSCULAR; INTRAVENOUS at 07:53

## 2024-06-19 RX ADMIN — INSULIN LISPRO 1: 100 INJECTION, SOLUTION SUBCUTANEOUS at 12:06

## 2024-06-19 RX ADMIN — HYDRALAZINE HYDROCHLORIDE 50 MILLIGRAM(S): 50 TABLET ORAL at 17:16

## 2024-06-19 RX ADMIN — MESALAMINE 1200 MILLIGRAM(S): 500 CAPSULE ORAL at 12:07

## 2024-06-19 RX ADMIN — DEXTROSE MONOHYDRATE AND SODIUM CHLORIDE 100 MILLILITER(S): 5; .3 INJECTION, SOLUTION INTRAVENOUS at 07:09

## 2024-06-19 RX ADMIN — ENOXAPARIN SODIUM 40 MILLIGRAM(S): 100 INJECTION SUBCUTANEOUS at 17:14

## 2024-06-19 RX ADMIN — INSULIN LISPRO 1: 100 INJECTION, SOLUTION SUBCUTANEOUS at 17:13

## 2024-06-19 RX ADMIN — HYDRALAZINE HYDROCHLORIDE 50 MILLIGRAM(S): 50 TABLET ORAL at 06:34

## 2024-06-19 RX ADMIN — MEROPENEM 100 MILLIGRAM(S): 500 INJECTION, POWDER, FOR SOLUTION INTRAVENOUS at 21:15

## 2024-06-19 RX ADMIN — ESCITALOPRAM OXALATE 5 MILLIGRAM(S): 20 TABLET, FILM COATED ORAL at 12:07

## 2024-06-19 RX ADMIN — POTASSIUM CHLORIDE 40 MILLIEQUIVALENT(S): 600 TABLET, FILM COATED, EXTENDED RELEASE ORAL at 13:54

## 2024-06-19 RX ADMIN — MEROPENEM 100 MILLIGRAM(S): 500 INJECTION, POWDER, FOR SOLUTION INTRAVENOUS at 07:05

## 2024-06-19 RX ADMIN — MESALAMINE 1200 MILLIGRAM(S): 500 CAPSULE ORAL at 06:32

## 2024-06-19 RX ADMIN — POTASSIUM CHLORIDE 40 MILLIEQUIVALENT(S): 600 TABLET, FILM COATED, EXTENDED RELEASE ORAL at 17:22

## 2024-06-19 RX ADMIN — MESALAMINE 1200 MILLIGRAM(S): 500 CAPSULE ORAL at 17:13

## 2024-06-19 RX ADMIN — MEROPENEM 100 MILLIGRAM(S): 500 INJECTION, POWDER, FOR SOLUTION INTRAVENOUS at 12:08

## 2024-06-19 RX ADMIN — Medication 25 MILLIGRAM(S): at 06:33

## 2024-06-20 LAB
ALBUMIN SERPL ELPH-MCNC: 3 G/DL — LOW (ref 3.3–5)
ALP SERPL-CCNC: 204 U/L — HIGH (ref 40–120)
ALT FLD-CCNC: 217 U/L — HIGH (ref 10–45)
ANION GAP SERPL CALC-SCNC: 10 MMOL/L — SIGNIFICANT CHANGE UP (ref 5–17)
AST SERPL-CCNC: 153 U/L — HIGH (ref 10–40)
BILIRUB SERPL-MCNC: 1.9 MG/DL — HIGH (ref 0.2–1.2)
BUN SERPL-MCNC: 20 MG/DL — SIGNIFICANT CHANGE UP (ref 7–23)
CALCIUM SERPL-MCNC: 8.9 MG/DL — SIGNIFICANT CHANGE UP (ref 8.4–10.5)
CHLORIDE SERPL-SCNC: 101 MMOL/L — SIGNIFICANT CHANGE UP (ref 96–108)
CO2 SERPL-SCNC: 23 MMOL/L — SIGNIFICANT CHANGE UP (ref 22–31)
CREAT SERPL-MCNC: 1.03 MG/DL — SIGNIFICANT CHANGE UP (ref 0.5–1.3)
CULTURE RESULTS: NO GROWTH — SIGNIFICANT CHANGE UP
EGFR: 73 ML/MIN/1.73M2 — SIGNIFICANT CHANGE UP
GLUCOSE BLDC GLUCOMTR-MCNC: 113 MG/DL — HIGH (ref 70–99)
GLUCOSE BLDC GLUCOMTR-MCNC: 121 MG/DL — HIGH (ref 70–99)
GLUCOSE BLDC GLUCOMTR-MCNC: 131 MG/DL — HIGH (ref 70–99)
GLUCOSE BLDC GLUCOMTR-MCNC: 133 MG/DL — HIGH (ref 70–99)
GLUCOSE BLDC GLUCOMTR-MCNC: 175 MG/DL — HIGH (ref 70–99)
GLUCOSE SERPL-MCNC: 131 MG/DL — HIGH (ref 70–99)
HCT VFR BLD CALC: 41.5 % — SIGNIFICANT CHANGE UP (ref 39–50)
HGB BLD-MCNC: 14 G/DL — SIGNIFICANT CHANGE UP (ref 13–17)
MAGNESIUM SERPL-MCNC: 1.8 MG/DL — SIGNIFICANT CHANGE UP (ref 1.6–2.6)
MCHC RBC-ENTMCNC: 30.8 PG — SIGNIFICANT CHANGE UP (ref 27–34)
MCHC RBC-ENTMCNC: 33.7 GM/DL — SIGNIFICANT CHANGE UP (ref 32–36)
MCV RBC AUTO: 91.2 FL — SIGNIFICANT CHANGE UP (ref 80–100)
NRBC # BLD: 0 /100 WBCS — SIGNIFICANT CHANGE UP (ref 0–0)
PHOSPHATE SERPL-MCNC: 1.4 MG/DL — LOW (ref 2.5–4.5)
PLATELET # BLD AUTO: 155 K/UL — SIGNIFICANT CHANGE UP (ref 150–400)
POTASSIUM SERPL-MCNC: 3.7 MMOL/L — SIGNIFICANT CHANGE UP (ref 3.5–5.3)
POTASSIUM SERPL-SCNC: 3.7 MMOL/L — SIGNIFICANT CHANGE UP (ref 3.5–5.3)
PROT SERPL-MCNC: 7.2 G/DL — SIGNIFICANT CHANGE UP (ref 6–8.3)
RBC # BLD: 4.55 M/UL — SIGNIFICANT CHANGE UP (ref 4.2–5.8)
RBC # FLD: 13.9 % — SIGNIFICANT CHANGE UP (ref 10.3–14.5)
SODIUM SERPL-SCNC: 134 MMOL/L — LOW (ref 135–145)
SPECIMEN SOURCE: SIGNIFICANT CHANGE UP
VANCOMYCIN TROUGH SERPL-MCNC: 4.9 UG/ML — LOW (ref 10–20)
WBC # BLD: 7.16 K/UL — SIGNIFICANT CHANGE UP (ref 3.8–10.5)
WBC # FLD AUTO: 7.16 K/UL — SIGNIFICANT CHANGE UP (ref 3.8–10.5)

## 2024-06-20 PROCEDURE — 74183 MRI ABD W/O CNTR FLWD CNTR: CPT | Mod: 26

## 2024-06-20 PROCEDURE — 99232 SBSQ HOSP IP/OBS MODERATE 35: CPT

## 2024-06-20 PROCEDURE — 99233 SBSQ HOSP IP/OBS HIGH 50: CPT

## 2024-06-20 RX ORDER — AMPICILLIN TRIHYDRATE 250 MG
2 CAPSULE ORAL EVERY 6 HOURS
Refills: 0 | Status: DISCONTINUED | OUTPATIENT
Start: 2024-06-20 | End: 2024-06-22

## 2024-06-20 RX ADMIN — HYDRALAZINE HYDROCHLORIDE 50 MILLIGRAM(S): 50 TABLET ORAL at 05:36

## 2024-06-20 RX ADMIN — ESCITALOPRAM OXALATE 5 MILLIGRAM(S): 20 TABLET, FILM COATED ORAL at 12:06

## 2024-06-20 RX ADMIN — MESALAMINE 1200 MILLIGRAM(S): 500 CAPSULE ORAL at 20:47

## 2024-06-20 RX ADMIN — Medication 200 GRAM(S): at 17:24

## 2024-06-20 RX ADMIN — MESALAMINE 1200 MILLIGRAM(S): 500 CAPSULE ORAL at 17:25

## 2024-06-20 RX ADMIN — MEROPENEM 100 MILLIGRAM(S): 500 INJECTION, POWDER, FOR SOLUTION INTRAVENOUS at 05:36

## 2024-06-20 RX ADMIN — VANCOMYCIN HYDROCHLORIDE 250 MILLIGRAM(S): KIT at 12:08

## 2024-06-20 RX ADMIN — ENOXAPARIN SODIUM 40 MILLIGRAM(S): 100 INJECTION SUBCUTANEOUS at 17:26

## 2024-06-20 RX ADMIN — MEROPENEM 100 MILLIGRAM(S): 500 INJECTION, POWDER, FOR SOLUTION INTRAVENOUS at 13:37

## 2024-06-20 RX ADMIN — Medication 200 GRAM(S): at 20:47

## 2024-06-20 RX ADMIN — Medication 50 MILLIGRAM(S): at 05:36

## 2024-06-20 RX ADMIN — MESALAMINE 1200 MILLIGRAM(S): 500 CAPSULE ORAL at 05:36

## 2024-06-20 RX ADMIN — Medication 50 MILLIGRAM(S): at 17:25

## 2024-06-20 RX ADMIN — MESALAMINE 1200 MILLIGRAM(S): 500 CAPSULE ORAL at 12:08

## 2024-06-20 RX ADMIN — PANTOPRAZOLE SODIUM 40 MILLIGRAM(S): 40 INJECTION, POWDER, FOR SOLUTION INTRAVENOUS at 05:36

## 2024-06-20 RX ADMIN — INSULIN LISPRO 1: 100 INJECTION, SOLUTION SUBCUTANEOUS at 12:20

## 2024-06-20 RX ADMIN — HYDRALAZINE HYDROCHLORIDE 50 MILLIGRAM(S): 50 TABLET ORAL at 17:25

## 2024-06-21 ENCOUNTER — TRANSCRIPTION ENCOUNTER (OUTPATIENT)
Age: 79
End: 2024-06-21

## 2024-06-21 LAB
GLUCOSE BLDC GLUCOMTR-MCNC: 108 MG/DL — HIGH (ref 70–99)
GLUCOSE BLDC GLUCOMTR-MCNC: 112 MG/DL — HIGH (ref 70–99)
GLUCOSE BLDC GLUCOMTR-MCNC: 146 MG/DL — HIGH (ref 70–99)
GLUCOSE BLDC GLUCOMTR-MCNC: 180 MG/DL — HIGH (ref 70–99)

## 2024-06-21 PROCEDURE — 99232 SBSQ HOSP IP/OBS MODERATE 35: CPT

## 2024-06-21 PROCEDURE — 99233 SBSQ HOSP IP/OBS HIGH 50: CPT

## 2024-06-21 RX ADMIN — Medication 50 MILLIGRAM(S): at 06:22

## 2024-06-21 RX ADMIN — ENOXAPARIN SODIUM 40 MILLIGRAM(S): 100 INJECTION SUBCUTANEOUS at 17:03

## 2024-06-21 RX ADMIN — Medication 50 MILLIGRAM(S): at 17:03

## 2024-06-21 RX ADMIN — Medication 200 GRAM(S): at 12:01

## 2024-06-21 RX ADMIN — HYDRALAZINE HYDROCHLORIDE 50 MILLIGRAM(S): 50 TABLET ORAL at 17:02

## 2024-06-21 RX ADMIN — ESCITALOPRAM OXALATE 5 MILLIGRAM(S): 20 TABLET, FILM COATED ORAL at 12:02

## 2024-06-21 RX ADMIN — Medication 200 GRAM(S): at 23:09

## 2024-06-21 RX ADMIN — PANTOPRAZOLE SODIUM 40 MILLIGRAM(S): 40 INJECTION, POWDER, FOR SOLUTION INTRAVENOUS at 06:22

## 2024-06-21 RX ADMIN — MESALAMINE 1200 MILLIGRAM(S): 500 CAPSULE ORAL at 06:22

## 2024-06-21 RX ADMIN — Medication 200 GRAM(S): at 06:22

## 2024-06-21 RX ADMIN — MESALAMINE 1200 MILLIGRAM(S): 500 CAPSULE ORAL at 15:54

## 2024-06-21 RX ADMIN — Medication 200 GRAM(S): at 17:03

## 2024-06-21 RX ADMIN — HYDRALAZINE HYDROCHLORIDE 50 MILLIGRAM(S): 50 TABLET ORAL at 06:22

## 2024-06-22 ENCOUNTER — TRANSCRIPTION ENCOUNTER (OUTPATIENT)
Age: 79
End: 2024-06-22

## 2024-06-22 VITALS
OXYGEN SATURATION: 96 % | HEART RATE: 74 BPM | TEMPERATURE: 99 F | SYSTOLIC BLOOD PRESSURE: 162 MMHG | DIASTOLIC BLOOD PRESSURE: 97 MMHG | RESPIRATION RATE: 16 BRPM

## 2024-06-22 LAB — GLUCOSE BLDC GLUCOMTR-MCNC: 107 MG/DL — HIGH (ref 70–99)

## 2024-06-22 PROCEDURE — 99232 SBSQ HOSP IP/OBS MODERATE 35: CPT

## 2024-06-22 PROCEDURE — 93306 TTE W/DOPPLER COMPLETE: CPT

## 2024-06-22 PROCEDURE — A9585: CPT

## 2024-06-22 PROCEDURE — 83036 HEMOGLOBIN GLYCOSYLATED A1C: CPT

## 2024-06-22 PROCEDURE — 85025 COMPLETE CBC W/AUTO DIFF WBC: CPT

## 2024-06-22 PROCEDURE — 96365 THER/PROPH/DIAG IV INF INIT: CPT

## 2024-06-22 PROCEDURE — 85027 COMPLETE CBC AUTOMATED: CPT

## 2024-06-22 PROCEDURE — 85610 PROTHROMBIN TIME: CPT

## 2024-06-22 PROCEDURE — 85730 THROMBOPLASTIN TIME PARTIAL: CPT

## 2024-06-22 PROCEDURE — 82962 GLUCOSE BLOOD TEST: CPT

## 2024-06-22 PROCEDURE — 82247 BILIRUBIN TOTAL: CPT

## 2024-06-22 PROCEDURE — 87040 BLOOD CULTURE FOR BACTERIA: CPT

## 2024-06-22 PROCEDURE — 80053 COMPREHEN METABOLIC PANEL: CPT

## 2024-06-22 PROCEDURE — 80202 ASSAY OF VANCOMYCIN: CPT

## 2024-06-22 PROCEDURE — 99285 EMERGENCY DEPT VISIT HI MDM: CPT

## 2024-06-22 PROCEDURE — 82248 BILIRUBIN DIRECT: CPT

## 2024-06-22 PROCEDURE — 99239 HOSP IP/OBS DSCHRG MGMT >30: CPT

## 2024-06-22 PROCEDURE — 83735 ASSAY OF MAGNESIUM: CPT

## 2024-06-22 PROCEDURE — 96375 TX/PRO/DX INJ NEW DRUG ADDON: CPT

## 2024-06-22 PROCEDURE — 83605 ASSAY OF LACTIC ACID: CPT

## 2024-06-22 PROCEDURE — 81001 URINALYSIS AUTO W/SCOPE: CPT

## 2024-06-22 PROCEDURE — 93005 ELECTROCARDIOGRAM TRACING: CPT

## 2024-06-22 PROCEDURE — 76700 US EXAM ABDOM COMPLETE: CPT

## 2024-06-22 PROCEDURE — 87086 URINE CULTURE/COLONY COUNT: CPT

## 2024-06-22 PROCEDURE — 74183 MRI ABD W/O CNTR FLWD CNTR: CPT | Mod: MC

## 2024-06-22 PROCEDURE — 74177 CT ABD & PELVIS W/CONTRAST: CPT | Mod: MC

## 2024-06-22 PROCEDURE — 36415 COLL VENOUS BLD VENIPUNCTURE: CPT

## 2024-06-22 PROCEDURE — 84100 ASSAY OF PHOSPHORUS: CPT

## 2024-06-22 RX ORDER — LEVOFLOXACIN 25 MG/ML
1 INJECTION INTRAVENOUS
Qty: 10 | Refills: 0
Start: 2024-06-22 | End: 2024-07-01

## 2024-06-22 RX ORDER — AMOXICILLIN 500 MG
1 CAPSULE ORAL
Qty: 30 | Refills: 0
Start: 2024-06-22 | End: 2024-07-01

## 2024-06-22 RX ADMIN — MESALAMINE 1200 MILLIGRAM(S): 500 CAPSULE ORAL at 05:45

## 2024-06-22 RX ADMIN — Medication 50 MILLIGRAM(S): at 05:44

## 2024-06-22 RX ADMIN — Medication 200 GRAM(S): at 05:44

## 2024-06-22 RX ADMIN — HYDRALAZINE HYDROCHLORIDE 50 MILLIGRAM(S): 50 TABLET ORAL at 05:44

## 2024-06-23 LAB
CULTURE RESULTS: SIGNIFICANT CHANGE UP
CULTURE RESULTS: SIGNIFICANT CHANGE UP
SPECIMEN SOURCE: SIGNIFICANT CHANGE UP
SPECIMEN SOURCE: SIGNIFICANT CHANGE UP

## 2024-06-23 NOTE — CHART NOTE - NSCHARTNOTEFT_GEN_A_CORE
SW called pt to discuss and assist with follow up care.  Pt is an 80 y/o male presented to ED for shortness of breath.  Pt reports that pt is feeling sightly better and has  an cardiology appt with Nadir Vance on 6/25/24.  Pt is happy with over experience he received while here.  Pt has declined SW offer to assist with appt.

## 2024-10-22 NOTE — PATIENT PROFILE ADULT - NSPROPTRIGHTREPPHONE_GEN_A_NUR
Attempted to contact patients daughter to schedule NP or MD, 7 to 10 days, voiding trial, voicemail is not set up. Can take 10:45am on 10/29 with Sarah Duong   [phone]

## 2024-10-24 ENCOUNTER — APPOINTMENT (OUTPATIENT)
Dept: GASTROENTEROLOGY | Facility: CLINIC | Age: 79
End: 2024-10-24

## 2025-03-03 NOTE — ED ADULT NURSE NOTE - ALCOHOL PRE SCREEN (AUDIT - C)
Meets NCCN Criteria for Genetic Testing  Declines genetic testing? Y   Reason for decline? Cost Concerns   If Reason for Decline is Previous Testing    [place] CARE     Non-CARE     Non-CARE Confirmation    Navigator Confirmed?     Patient Reported?     Elevated Tyrer-Cuzick Score ? Or Equal to 20%    Declines referral?     Reason for decline?         Statement Selected

## 2025-06-17 NOTE — H&P ADULT - PSH
H/O cataract removal with insertion of prosthetic lens  B/L  H/O right inguinal hernia repair
17-Jun-2025 20:53

## 2025-09-05 ENCOUNTER — EMERGENCY (EMERGENCY)
Facility: HOSPITAL | Age: 80
LOS: 1 days | End: 2025-09-05
Attending: EMERGENCY MEDICINE | Admitting: EMERGENCY MEDICINE
Payer: COMMERCIAL

## 2025-09-05 VITALS
TEMPERATURE: 98 F | OXYGEN SATURATION: 99 % | SYSTOLIC BLOOD PRESSURE: 177 MMHG | HEIGHT: 71 IN | DIASTOLIC BLOOD PRESSURE: 89 MMHG | RESPIRATION RATE: 18 BRPM | WEIGHT: 143.96 LBS | HEART RATE: 62 BPM

## 2025-09-05 VITALS
DIASTOLIC BLOOD PRESSURE: 87 MMHG | HEART RATE: 67 BPM | SYSTOLIC BLOOD PRESSURE: 155 MMHG | OXYGEN SATURATION: 98 % | RESPIRATION RATE: 17 BRPM

## 2025-09-05 DIAGNOSIS — Z98.890 OTHER SPECIFIED POSTPROCEDURAL STATES: Chronic | ICD-10-CM

## 2025-09-05 DIAGNOSIS — Z98.49 CATARACT EXTRACTION STATUS, UNSPECIFIED EYE: Chronic | ICD-10-CM

## 2025-09-05 LAB
ALBUMIN SERPL ELPH-MCNC: 3.8 G/DL — SIGNIFICANT CHANGE UP (ref 3.3–5)
ALP SERPL-CCNC: 83 U/L — SIGNIFICANT CHANGE UP (ref 40–120)
ALT FLD-CCNC: 22 U/L — SIGNIFICANT CHANGE UP (ref 10–45)
ANION GAP SERPL CALC-SCNC: 7 MMOL/L — SIGNIFICANT CHANGE UP (ref 5–17)
APPEARANCE UR: ABNORMAL
AST SERPL-CCNC: 23 U/L — SIGNIFICANT CHANGE UP (ref 10–40)
BACTERIA # UR AUTO: NEGATIVE /HPF — SIGNIFICANT CHANGE UP
BASOPHILS # BLD AUTO: 0.04 K/UL — SIGNIFICANT CHANGE UP (ref 0–0.2)
BASOPHILS NFR BLD AUTO: 0.4 % — SIGNIFICANT CHANGE UP (ref 0–2)
BILIRUB SERPL-MCNC: 0.6 MG/DL — SIGNIFICANT CHANGE UP (ref 0.2–1.2)
BILIRUB UR-MCNC: ABNORMAL
BUN SERPL-MCNC: 26 MG/DL — HIGH (ref 7–23)
CALCIUM SERPL-MCNC: 9 MG/DL — SIGNIFICANT CHANGE UP (ref 8.4–10.5)
CHLORIDE SERPL-SCNC: 103 MMOL/L — SIGNIFICANT CHANGE UP (ref 96–108)
CO2 SERPL-SCNC: 29 MMOL/L — SIGNIFICANT CHANGE UP (ref 22–31)
COLOR SPEC: ABNORMAL
CREAT SERPL-MCNC: 1.03 MG/DL — SIGNIFICANT CHANGE UP (ref 0.5–1.3)
DIFF PNL FLD: ABNORMAL
EGFR: 73 ML/MIN/1.73M2 — SIGNIFICANT CHANGE UP
EGFR: 73 ML/MIN/1.73M2 — SIGNIFICANT CHANGE UP
EOSINOPHIL # BLD AUTO: 0.28 K/UL — SIGNIFICANT CHANGE UP (ref 0–0.5)
EOSINOPHIL NFR BLD AUTO: 2.6 % — SIGNIFICANT CHANGE UP (ref 0–6)
EPI CELLS # UR: SIGNIFICANT CHANGE UP
GLUCOSE SERPL-MCNC: 83 MG/DL — SIGNIFICANT CHANGE UP (ref 70–99)
GLUCOSE UR QL: NEGATIVE MG/DL — SIGNIFICANT CHANGE UP
HCT VFR BLD CALC: 42.1 % — SIGNIFICANT CHANGE UP (ref 39–50)
HGB BLD-MCNC: 13.9 G/DL — SIGNIFICANT CHANGE UP (ref 13–17)
IMM GRANULOCYTES NFR BLD AUTO: 0.5 % — SIGNIFICANT CHANGE UP (ref 0–0.9)
KETONES UR QL: NEGATIVE MG/DL — SIGNIFICANT CHANGE UP
LEUKOCYTE ESTERASE UR-ACNC: ABNORMAL
LYMPHOCYTES # BLD AUTO: 1.32 K/UL — SIGNIFICANT CHANGE UP (ref 1–3.3)
LYMPHOCYTES # BLD AUTO: 12.3 % — LOW (ref 13–44)
MCHC RBC-ENTMCNC: 31.2 PG — SIGNIFICANT CHANGE UP (ref 27–34)
MCHC RBC-ENTMCNC: 33 G/DL — SIGNIFICANT CHANGE UP (ref 32–36)
MCV RBC AUTO: 94.4 FL — SIGNIFICANT CHANGE UP (ref 80–100)
MONOCYTES # BLD AUTO: 0.66 K/UL — SIGNIFICANT CHANGE UP (ref 0–0.9)
MONOCYTES NFR BLD AUTO: 6.2 % — SIGNIFICANT CHANGE UP (ref 2–14)
NEUTROPHILS # BLD AUTO: 8.38 K/UL — HIGH (ref 1.8–7.4)
NEUTROPHILS NFR BLD AUTO: 78 % — HIGH (ref 43–77)
NITRITE UR-MCNC: NEGATIVE — SIGNIFICANT CHANGE UP
NRBC BLD AUTO-RTO: 0 /100 WBCS — SIGNIFICANT CHANGE UP (ref 0–0)
PH UR: 7 — SIGNIFICANT CHANGE UP (ref 5–8)
PLATELET # BLD AUTO: 164 K/UL — SIGNIFICANT CHANGE UP (ref 150–400)
POTASSIUM SERPL-MCNC: 4.3 MMOL/L — SIGNIFICANT CHANGE UP (ref 3.5–5.3)
POTASSIUM SERPL-SCNC: 4.3 MMOL/L — SIGNIFICANT CHANGE UP (ref 3.5–5.3)
PROT SERPL-MCNC: 7.1 G/DL — SIGNIFICANT CHANGE UP (ref 6–8.3)
PROT UR-MCNC: 100 MG/DL
RBC # BLD: 4.46 M/UL — SIGNIFICANT CHANGE UP (ref 4.2–5.8)
RBC # FLD: 13.2 % — SIGNIFICANT CHANGE UP (ref 10.3–14.5)
RBC CASTS # UR COMP ASSIST: >50 /HPF — HIGH (ref 0–4)
SODIUM SERPL-SCNC: 139 MMOL/L — SIGNIFICANT CHANGE UP (ref 135–145)
SP GR SPEC: 1.01 — SIGNIFICANT CHANGE UP (ref 1–1.03)
UROBILINOGEN FLD QL: 0.2 MG/DL — SIGNIFICANT CHANGE UP (ref 0.2–1)
WBC # BLD: 10.73 K/UL — HIGH (ref 3.8–10.5)
WBC # FLD AUTO: 10.73 K/UL — HIGH (ref 3.8–10.5)
WBC UR QL: 0 /HPF — SIGNIFICANT CHANGE UP (ref 0–5)

## 2025-09-05 PROCEDURE — 99284 EMERGENCY DEPT VISIT MOD MDM: CPT | Mod: 25

## 2025-09-05 PROCEDURE — 80053 COMPREHEN METABOLIC PANEL: CPT

## 2025-09-05 PROCEDURE — 99284 EMERGENCY DEPT VISIT MOD MDM: CPT

## 2025-09-05 PROCEDURE — 85025 COMPLETE CBC W/AUTO DIFF WBC: CPT

## 2025-09-05 PROCEDURE — 74176 CT ABD & PELVIS W/O CONTRAST: CPT | Mod: 26

## 2025-09-05 PROCEDURE — 96374 THER/PROPH/DIAG INJ IV PUSH: CPT

## 2025-09-05 PROCEDURE — 74176 CT ABD & PELVIS W/O CONTRAST: CPT

## 2025-09-05 PROCEDURE — 81001 URINALYSIS AUTO W/SCOPE: CPT

## 2025-09-05 PROCEDURE — 87086 URINE CULTURE/COLONY COUNT: CPT

## 2025-09-05 PROCEDURE — 36415 COLL VENOUS BLD VENIPUNCTURE: CPT

## 2025-09-05 RX ORDER — CEFUROXIME SODIUM 1.5 G
1 VIAL (EA) INJECTION
Qty: 20 | Refills: 0
Start: 2025-09-05 | End: 2025-09-14

## 2025-09-05 RX ORDER — CEFTRIAXONE 500 MG/1
1000 INJECTION, POWDER, FOR SOLUTION INTRAMUSCULAR; INTRAVENOUS ONCE
Refills: 0 | Status: COMPLETED | OUTPATIENT
Start: 2025-09-05 | End: 2025-09-05

## 2025-09-05 RX ADMIN — CEFTRIAXONE 100 MILLIGRAM(S): 500 INJECTION, POWDER, FOR SOLUTION INTRAMUSCULAR; INTRAVENOUS at 19:02

## 2025-09-05 RX ADMIN — Medication 1000 MILLILITER(S): at 17:48

## 2025-09-07 LAB
CULTURE RESULTS: SIGNIFICANT CHANGE UP
SPECIMEN SOURCE: SIGNIFICANT CHANGE UP

## (undated) DEVICE — GLV 8 PROTEXIS (WHITE)

## (undated) DEVICE — SUT BOOT STANDARD (ASSORTED) 5 PAIR

## (undated) DEVICE — SUT PROLENE 6-0 18" RB-2

## (undated) DEVICE — SUT BLUNT SZ 5

## (undated) DEVICE — Device

## (undated) DEVICE — CONNECTOR INTERSEPT "Y" 1/4 X 1/4 X 1/4"

## (undated) DEVICE — SENSOR MYOCARDIAL TEMP 15MM

## (undated) DEVICE — DRAPE 1/2 SHEET 40X57"

## (undated) DEVICE — LUBRICANT INST ELECTROLUBE Z SOLUTION

## (undated) DEVICE — STAPLER SKIN VISI-STAT 35 WIDE

## (undated) DEVICE — PACK UNIVERSAL CARDIAC SUPPLEMNTAL B

## (undated) DEVICE — XI ARM CLIP APPLIER SMALL

## (undated) DEVICE — SUT BIOSYN 4-0 18" P-12

## (undated) DEVICE — DRSG TEGADERM 6"X8"

## (undated) DEVICE — SUT PROLENE 4-0 36" SH

## (undated) DEVICE — SUCTION TUBE CARDIAC SOFT TIP 6FR SHAFT 10FR TIP 6"

## (undated) DEVICE — SUT PROLENE 6-0 4-30" C-1

## (undated) DEVICE — DRAPE SLUSH / WARMER 44 X 66"

## (undated) DEVICE — SYR LUER LOK 10CC

## (undated) DEVICE — ELCTR BOVIE TIP BLADE MEGADYNE E-Z CLEAN 6.5" (LONG)

## (undated) DEVICE — STOPCOCK 4-WAY 2 GANG W SWIVEL MALE LUER LOCK

## (undated) DEVICE — LAP PAD 18 X 18"

## (undated) DEVICE — FOLEY TRAY 16FR 5CC LF LUBRISIL ADVANCE TEMP CLOSED

## (undated) DEVICE — GLV 6.5 PROTEXIS (WHITE)

## (undated) DEVICE — DRAIN CHANNEL 19FR ROUND FULL FLUTED

## (undated) DEVICE — AORTIC PUNCH 4.0MM STANDARD HANDLE

## (undated) DEVICE — MARKING PEN W RULER

## (undated) DEVICE — TUBING TUR 2 PRONG

## (undated) DEVICE — MULTIPLE PERFUSION SET FEMALE 1 INLET LEG W 4 LEGS 15" (BLUE/RED)

## (undated) DEVICE — SAW BLADE MICROAIRE STERNUM 1X34X9.4MM

## (undated) DEVICE — SUT SILK 6-0 30" C-1

## (undated) DEVICE — XI DRAPE ARM

## (undated) DEVICE — SOL IRR POUR NS 0.9% 500ML

## (undated) DEVICE — BLADE SCALPEL SAFETYLOCK #11

## (undated) DEVICE — DRAPE SURGICAL #1010

## (undated) DEVICE — SUT PLEDGET PRE PUNCH 4.8 X 9.5 X 1.5 MM

## (undated) DEVICE — DRAIN PLEUROVAC

## (undated) DEVICE — VISITEC 4X4

## (undated) DEVICE — SYR LUER LOK 50CC

## (undated) DEVICE — SUCTION YANKAUER NO CONTROL VENT

## (undated) DEVICE — DRAPE MAYO STAND 30"

## (undated) DEVICE — GOWN TRIMAX LG

## (undated) DEVICE — PACING CABLE (BROWN) A/V TEMP SCREW DOWN 12FT

## (undated) DEVICE — SUT DOUBLE 6 WIRE STERNAL

## (undated) DEVICE — NDL HYPO SAFE 20G X 1.5" (YELLOW)

## (undated) DEVICE — ACROBAT SUV VACUUM OFF PUMP SYSTEM

## (undated) DEVICE — PACING CABLE A/V TEMP SCREW DOWN 6FT

## (undated) DEVICE — TUBING STRYKER HIGH FLOW HEATED INSUFFLATOR

## (undated) DEVICE — SUT MONOCRYL 4-0 27" PS-2 UNDYED

## (undated) DEVICE — GLV 8.5 PROTEXIS (WHITE)

## (undated) DEVICE — WARMING BLANKET DUO-THERM HYPER/HYPOTHERM ADULT

## (undated) DEVICE — SUT PDS II 0 27" OS-6

## (undated) DEVICE — SPECIMEN CONTAINER 100ML

## (undated) DEVICE — SUT POLYSORB 2-0 30" GS-21 UNDYED

## (undated) DEVICE — MEDTRONIC URCHIN EVO HEART POSITIONER & CANISTER TUBING SET

## (undated) DEVICE — AORTIC PUNCH 4.0MM LONG LENGTH HANDLE

## (undated) DEVICE — DRAPE IOBAN 33" X 23"

## (undated) DEVICE — DRAPE 3/4 SHEET W REINFORCEMENT 56X77"

## (undated) DEVICE — POSITIONER CARDIAC BUMP

## (undated) DEVICE — DRSG OPSITE 13.75 X 4"

## (undated) DEVICE — XI ARM PERMANENT CAUTERY SPATULA

## (undated) DEVICE — SUT VICRYL 0 36" CTX UNDYED

## (undated) DEVICE — NDL HYPO SAFE 18G X 1.5" (PINK)

## (undated) DEVICE — ELCTR BOVIE PENCIL HANDPIECE ROCKER SWITCH 15FT

## (undated) DEVICE — COVER PROBE T TIP INTRAOP LG

## (undated) DEVICE — SUT POLYSORB 0 36" GS-25 UNDYED

## (undated) DEVICE — SYR LUER LOK 30CC

## (undated) DEVICE — AORTIC PUNCH 5MM STANDARD HANDLE

## (undated) DEVICE — SYR LUER LOK 20CC

## (undated) DEVICE — ELCTR BOVIE TIP BLADE MEGADYNE E-Z CLEAN 4" EXTENDED

## (undated) DEVICE — SUT PROLENE 7-0 4-24" BV-1

## (undated) DEVICE — BLADE SCALPEL SAFETYLOCK #15

## (undated) DEVICE — SUT POLYSORB 1 36" GS-25

## (undated) DEVICE — XI ARM FORCEP BIPOLAR MICRO 8MM

## (undated) DEVICE — PACK UNIVERSAL CARDIAC

## (undated) DEVICE — SOL INJ NS 0.9% 1000ML

## (undated) DEVICE — SUT ETHIBOND 0 18" TIES

## (undated) DEVICE — DISSECTOR ENDO PEANUT 5MM

## (undated) DEVICE — VENTING ADAPTER "Y" (RED/BLUE) 7.5"

## (undated) DEVICE — DRAPE MAGNETIC INSTRUMENT MEDIUM

## (undated) DEVICE — SUT VICRYL 1 36" CTX UNDYED

## (undated) DEVICE — SUT PDS II 2-0 27" CT-2

## (undated) DEVICE — XI DRAPE COLUMN

## (undated) DEVICE — TUBING STRYKER PNEUMOSURE HEATED RTP

## (undated) DEVICE — PHRENIC NERVE PAD MEDIUM

## (undated) DEVICE — MEDICATION LABELS W MARKER

## (undated) DEVICE — DRSG ACE BANDAGE 6"

## (undated) DEVICE — SWITCH ARISS TABLE MOUNT UNEQUAL LEGS 13"

## (undated) DEVICE — VESSEL LOOP ASPEN MAXI RED

## (undated) DEVICE — SUMP INTRACARDIAC 20FR 1/4" ADULT

## (undated) DEVICE — SUT PROLENE 4-0 36" RB-1

## (undated) DEVICE — SUT SOFSILK 4-0 24" CV-15

## (undated) DEVICE — PACK CARDIAC YELLOW

## (undated) DEVICE — SYR ASEPTO

## (undated) DEVICE — DRAPE INSTRUMENT POUCH 6.75" X 11"

## (undated) DEVICE — SUT VICRYL 0 27" CT

## (undated) DEVICE — SYNOVIS VASCULAR PROBE 1.5MM 15CM

## (undated) DEVICE — SUT SOFSILK 2 60" TIES

## (undated) DEVICE — DRAIN RESERVOIR FOR JACKSON PRATT 100CC CARDINAL

## (undated) DEVICE — SUT PROLENE 4-0 36" BB

## (undated) DEVICE — SUT VICRYL 0 27" CT-3

## (undated) DEVICE — SUT PDS II 3-0 36" SH

## (undated) DEVICE — SUT PROLENE 7-0 24" BV175-6

## (undated) DEVICE — SUT PROLENE 7-0 24" BV175-8 MULTIPASS

## (undated) DEVICE — SUT PROLENE 6-0 30" C-1

## (undated) DEVICE — PREP CHLORAPREP HI-LITE ORANGE 26ML

## (undated) DEVICE — SOL NORMOSOL-R PH7.4 1000ML

## (undated) DEVICE — GETINGE VASOVIEW 7 ENDOSCOPIC VESSEL HARVESTING SYSTEM

## (undated) DEVICE — TUBING INSUFFLATION LAP FILTER 10FT

## (undated) DEVICE — CONNECTOR STRAIGHT 3/8 X 1/2"

## (undated) DEVICE — SYS VEIN HARVESTING VIRTUOSAPH PLUS W/ RADIAL

## (undated) DEVICE — SUT SOFSILK 2-0 18" V-20 (POP-OFF)

## (undated) DEVICE — SUT VICRYL 2-0 27" CT-1

## (undated) DEVICE — BLADE SCALPEL SAFETYLOCK #10

## (undated) DEVICE — SUT SOFSILK 0 30" V-20

## (undated) DEVICE — POSITIONER FOAM EGG CRATE ULNAR 2PCS (PINK)

## (undated) DEVICE — URETERAL CATH RED RUBBER 8FR

## (undated) DEVICE — DRAPE FLUID WARMER 44 X 66"

## (undated) DEVICE — CATH IV PROTECT PLUS 24G X 0.75"

## (undated) DEVICE — TOURNIQUET SET TOURNIKWIK 12FR (4 TUBES, 1 SNARE) 7.5"

## (undated) DEVICE — FOLEY TRAY 16FR 5CC LTX UMETER CLOSED

## (undated) DEVICE — SUT PROLENE 3-0 36" SH

## (undated) DEVICE — TUBING SUCTION 20FT

## (undated) DEVICE — NDL BLUNT 18G LOOP VESSEL MAXI WHITE

## (undated) DEVICE — ELCTR BOVIE TIP BLADE VALLEYLAB 6.5"

## (undated) DEVICE — SOL ANTI FOG

## (undated) DEVICE — DRSG STERISTRIPS 0.5 X 4"

## (undated) DEVICE — DRSG OPSITE 2.5 X 2"

## (undated) DEVICE — SUCTION CATH ARGYLE WHISTLE TIP 14FR STRAIGHT PACKED

## (undated) DEVICE — SUT NUROLON 1 18" OS-8 (POP-OFF)

## (undated) DEVICE — DRAPE IOBAN 23" X 23"

## (undated) DEVICE — DRSG DERMABOND PRINEO 60CM

## (undated) DEVICE — GOWN LG

## (undated) DEVICE — BULLDOG SPRING CLIP 6MM SOFT/SOFT

## (undated) DEVICE — SUT HOLDER INSERT FOR OCTOBASE STERNAL RETRACTOR

## (undated) DEVICE — STOPCOCK 4-WAY W SWIVEL MALE LUER LOCK NON VENTED RED CAP

## (undated) DEVICE — D HELP - CLEARVIEW CLEARIFY SYSTEM

## (undated) DEVICE — BLOWER MISTER AXIUS WITH IV SET

## (undated) DEVICE — TISSUE STABILIZER MEDTRONIC OCTOPUS EVOLUTION

## (undated) DEVICE — SOL IRR POUR H2O 250ML

## (undated) DEVICE — STABILIZER W STABLESOFT II LS

## (undated) DEVICE — POOLE SUCTION TIP

## (undated) DEVICE — BEAVER BLADE MINI SHARP ALL ROUND (BLUE)

## (undated) DEVICE — ADAPTOR DLP "Y" FEMALE ON SINGLE LEG 3.5" X 10"

## (undated) DEVICE — TROCAR COVIDIEN VERSASTEP PLUS 12MM SHORT

## (undated) DEVICE — XI ARM SCISSOR POTTS

## (undated) DEVICE — GLV 7.5 PROTEXIS (WHITE)

## (undated) DEVICE — GLV 7 PROTEXIS (WHITE)

## (undated) DEVICE — TUBING ALARIS PUMP MODULE NON-DEHP

## (undated) DEVICE — XI SEAL UNIV 5- 8 MM

## (undated) DEVICE — DEFIBRILLATOR PAD PRE-CONNECT ADULT/CHILD

## (undated) DEVICE — DRAPE TOWEL BLUE 17" X 24"

## (undated) DEVICE — CLAMP BULLDOG MIDI 45 DEGREE (YELLOW) DISP

## (undated) DEVICE — BAG PRESSURE 500ML

## (undated) DEVICE — SUT SURGICAL STEEL 6 30" BP-1

## (undated) DEVICE — SUT ETHIBOND 1 30" CT-1